# Patient Record
Sex: MALE | Race: WHITE | NOT HISPANIC OR LATINO | URBAN - METROPOLITAN AREA
[De-identification: names, ages, dates, MRNs, and addresses within clinical notes are randomized per-mention and may not be internally consistent; named-entity substitution may affect disease eponyms.]

---

## 2017-01-01 ENCOUNTER — EMERGENCY (EMERGENCY)
Facility: HOSPITAL | Age: 82
LOS: 1 days | Discharge: ANOTHER HOSPITAL | End: 2017-01-01
Attending: EMERGENCY MEDICINE | Admitting: EMERGENCY MEDICINE
Payer: MEDICARE

## 2017-01-01 ENCOUNTER — INPATIENT (INPATIENT)
Facility: HOSPITAL | Age: 82
LOS: 19 days | DRG: 215 | End: 2017-11-04
Attending: STUDENT IN AN ORGANIZED HEALTH CARE EDUCATION/TRAINING PROGRAM | Admitting: STUDENT IN AN ORGANIZED HEALTH CARE EDUCATION/TRAINING PROGRAM
Payer: MEDICARE

## 2017-01-01 VITALS
SYSTOLIC BLOOD PRESSURE: 98 MMHG | HEART RATE: 59 BPM | WEIGHT: 197.98 LBS | OXYGEN SATURATION: 94 % | RESPIRATION RATE: 22 BRPM | DIASTOLIC BLOOD PRESSURE: 72 MMHG | TEMPERATURE: 98 F | HEIGHT: 71 IN

## 2017-01-01 VITALS — RESPIRATION RATE: 24 BRPM | OXYGEN SATURATION: 77 % | HEART RATE: 58 BPM

## 2017-01-01 VITALS — HEART RATE: 100 BPM | SYSTOLIC BLOOD PRESSURE: 84 MMHG | DIASTOLIC BLOOD PRESSURE: 50 MMHG

## 2017-01-01 VITALS
SYSTOLIC BLOOD PRESSURE: 78 MMHG | DIASTOLIC BLOOD PRESSURE: 53 MMHG | OXYGEN SATURATION: 95 % | HEART RATE: 48 BPM | RESPIRATION RATE: 22 BRPM

## 2017-01-01 DIAGNOSIS — Z29.9 ENCOUNTER FOR PROPHYLACTIC MEASURES, UNSPECIFIED: ICD-10-CM

## 2017-01-01 DIAGNOSIS — E07.9 DISORDER OF THYROID, UNSPECIFIED: ICD-10-CM

## 2017-01-01 DIAGNOSIS — R41.0 DISORIENTATION, UNSPECIFIED: ICD-10-CM

## 2017-01-01 DIAGNOSIS — E04.9 NONTOXIC GOITER, UNSPECIFIED: ICD-10-CM

## 2017-01-01 DIAGNOSIS — I50.810 RIGHT HEART FAILURE, UNSPECIFIED: ICD-10-CM

## 2017-01-01 DIAGNOSIS — I10 ESSENTIAL (PRIMARY) HYPERTENSION: ICD-10-CM

## 2017-01-01 DIAGNOSIS — I48.91 UNSPECIFIED ATRIAL FIBRILLATION: ICD-10-CM

## 2017-01-01 DIAGNOSIS — I21.19 ST ELEVATION (STEMI) MYOCARDIAL INFARCTION INVOLVING OTHER CORONARY ARTERY OF INFERIOR WALL: ICD-10-CM

## 2017-01-01 DIAGNOSIS — E87.70 FLUID OVERLOAD, UNSPECIFIED: ICD-10-CM

## 2017-01-01 DIAGNOSIS — R57.0 CARDIOGENIC SHOCK: ICD-10-CM

## 2017-01-01 DIAGNOSIS — E04.0 NONTOXIC DIFFUSE GOITER: ICD-10-CM

## 2017-01-01 DIAGNOSIS — I72.4 ANEURYSM OF ARTERY OF LOWER EXTREMITY: ICD-10-CM

## 2017-01-01 DIAGNOSIS — R09.89 OTHER SPECIFIED SYMPTOMS AND SIGNS INVOLVING THE CIRCULATORY AND RESPIRATORY SYSTEMS: ICD-10-CM

## 2017-01-01 DIAGNOSIS — I48.2 CHRONIC ATRIAL FIBRILLATION: ICD-10-CM

## 2017-01-01 DIAGNOSIS — Z87.891 PERSONAL HISTORY OF NICOTINE DEPENDENCE: ICD-10-CM

## 2017-01-01 DIAGNOSIS — R80.9 PROTEINURIA, UNSPECIFIED: ICD-10-CM

## 2017-01-01 DIAGNOSIS — E87.5 HYPERKALEMIA: ICD-10-CM

## 2017-01-01 DIAGNOSIS — D75.9 DISEASE OF BLOOD AND BLOOD-FORMING ORGANS, UNSPECIFIED: ICD-10-CM

## 2017-01-01 DIAGNOSIS — I21.11 ST ELEVATION (STEMI) MYOCARDIAL INFARCTION INVOLVING RIGHT CORONARY ARTERY: ICD-10-CM

## 2017-01-01 DIAGNOSIS — N17.9 ACUTE KIDNEY FAILURE, UNSPECIFIED: ICD-10-CM

## 2017-01-01 DIAGNOSIS — F05 DELIRIUM DUE TO KNOWN PHYSIOLOGICAL CONDITION: ICD-10-CM

## 2017-01-01 DIAGNOSIS — Z79.01 LONG TERM (CURRENT) USE OF ANTICOAGULANTS: ICD-10-CM

## 2017-01-01 DIAGNOSIS — I21.3 ST ELEVATION (STEMI) MYOCARDIAL INFARCTION OF UNSPECIFIED SITE: ICD-10-CM

## 2017-01-01 DIAGNOSIS — I46.9 CARDIAC ARREST, CAUSE UNSPECIFIED: ICD-10-CM

## 2017-01-01 DIAGNOSIS — R06.02 SHORTNESS OF BREATH: ICD-10-CM

## 2017-01-01 LAB
% ALBUMIN: 43.7 % — SIGNIFICANT CHANGE UP
% ALBUMIN: SIGNIFICANT CHANGE UP %
% ALPHA 1: 11.2 % — SIGNIFICANT CHANGE UP
% ALPHA 1: SIGNIFICANT CHANGE UP %
% ALPHA 2: 7.9 % — SIGNIFICANT CHANGE UP
% ALPHA 2: SIGNIFICANT CHANGE UP %
% BETA: 19.7 % — SIGNIFICANT CHANGE UP
% BETA: SIGNIFICANT CHANGE UP %
% GAMMA: 17.5 % — SIGNIFICANT CHANGE UP
% GAMMA: SIGNIFICANT CHANGE UP %
-  COAGULASE NEGATIVE STAPHYLOCOCCUS: SIGNIFICANT CHANGE UP
ALBUMIN SERPL ELPH-MCNC: 2.4 G/DL — LOW (ref 3.3–5)
ALBUMIN SERPL ELPH-MCNC: 2.4 G/DL — LOW (ref 3.3–5)
ALBUMIN SERPL ELPH-MCNC: 2.4 G/DL — LOW (ref 3.6–5.5)
ALBUMIN SERPL ELPH-MCNC: 2.5 G/DL — LOW (ref 3.3–5)
ALBUMIN SERPL ELPH-MCNC: 2.6 G/DL — LOW (ref 3.3–5)
ALBUMIN SERPL ELPH-MCNC: 2.7 G/DL — LOW (ref 3.3–5)
ALBUMIN SERPL ELPH-MCNC: 2.8 G/DL — LOW (ref 3.3–5)
ALBUMIN SERPL ELPH-MCNC: 2.9 G/DL — LOW (ref 3.3–5)
ALBUMIN SERPL ELPH-MCNC: 3 G/DL — LOW (ref 3.3–5)
ALBUMIN SERPL ELPH-MCNC: 3.1 G/DL — LOW (ref 3.3–5)
ALBUMIN SERPL ELPH-MCNC: 3.1 G/DL — LOW (ref 3.3–5)
ALBUMIN SERPL ELPH-MCNC: 3.2 G/DL — LOW (ref 3.3–5)
ALBUMIN SERPL ELPH-MCNC: SIGNIFICANT CHANGE UP G/DL (ref 3.6–5.5)
ALBUMIN/GLOB SERPL ELPH: 0.8 RATIO — SIGNIFICANT CHANGE UP
ALBUMIN/GLOB SERPL ELPH: SIGNIFICANT CHANGE UP RATIO
ALP SERPL-CCNC: 100 U/L — SIGNIFICANT CHANGE UP (ref 40–120)
ALP SERPL-CCNC: 103 U/L — SIGNIFICANT CHANGE UP (ref 40–120)
ALP SERPL-CCNC: 30 U/L — LOW (ref 40–120)
ALP SERPL-CCNC: 39 U/L — LOW (ref 40–120)
ALP SERPL-CCNC: 40 U/L — SIGNIFICANT CHANGE UP (ref 40–120)
ALP SERPL-CCNC: 41 U/L — SIGNIFICANT CHANGE UP (ref 40–120)
ALP SERPL-CCNC: 42 U/L — SIGNIFICANT CHANGE UP (ref 40–120)
ALP SERPL-CCNC: 44 U/L — SIGNIFICANT CHANGE UP (ref 40–120)
ALP SERPL-CCNC: 44 U/L — SIGNIFICANT CHANGE UP (ref 40–120)
ALP SERPL-CCNC: 45 U/L — SIGNIFICANT CHANGE UP (ref 40–120)
ALP SERPL-CCNC: 48 U/L — SIGNIFICANT CHANGE UP (ref 40–120)
ALP SERPL-CCNC: 51 U/L — SIGNIFICANT CHANGE UP (ref 40–120)
ALP SERPL-CCNC: 52 U/L — SIGNIFICANT CHANGE UP (ref 40–120)
ALP SERPL-CCNC: 52 U/L — SIGNIFICANT CHANGE UP (ref 40–120)
ALP SERPL-CCNC: 53 U/L — SIGNIFICANT CHANGE UP (ref 40–120)
ALP SERPL-CCNC: 55 U/L — SIGNIFICANT CHANGE UP (ref 40–120)
ALP SERPL-CCNC: 57 U/L — SIGNIFICANT CHANGE UP (ref 40–120)
ALP SERPL-CCNC: 66 U/L — SIGNIFICANT CHANGE UP (ref 40–120)
ALP SERPL-CCNC: 68 U/L — SIGNIFICANT CHANGE UP (ref 40–120)
ALP SERPL-CCNC: 71 U/L — SIGNIFICANT CHANGE UP (ref 40–120)
ALP SERPL-CCNC: 72 U/L — SIGNIFICANT CHANGE UP (ref 40–120)
ALP SERPL-CCNC: 75 U/L — SIGNIFICANT CHANGE UP (ref 40–120)
ALP SERPL-CCNC: 78 U/L — SIGNIFICANT CHANGE UP (ref 40–120)
ALP SERPL-CCNC: 79 U/L — SIGNIFICANT CHANGE UP (ref 40–120)
ALP SERPL-CCNC: 81 U/L — SIGNIFICANT CHANGE UP (ref 40–120)
ALP SERPL-CCNC: 81 U/L — SIGNIFICANT CHANGE UP (ref 40–120)
ALP SERPL-CCNC: 82 U/L — SIGNIFICANT CHANGE UP (ref 40–120)
ALP SERPL-CCNC: 84 U/L — SIGNIFICANT CHANGE UP (ref 40–120)
ALP SERPL-CCNC: 85 U/L — SIGNIFICANT CHANGE UP (ref 40–120)
ALP SERPL-CCNC: 85 U/L — SIGNIFICANT CHANGE UP (ref 40–120)
ALP SERPL-CCNC: 86 U/L — SIGNIFICANT CHANGE UP (ref 40–120)
ALP SERPL-CCNC: 86 U/L — SIGNIFICANT CHANGE UP (ref 40–120)
ALP SERPL-CCNC: 88 U/L — SIGNIFICANT CHANGE UP (ref 40–120)
ALP SERPL-CCNC: 88 U/L — SIGNIFICANT CHANGE UP (ref 40–120)
ALP SERPL-CCNC: 89 U/L — SIGNIFICANT CHANGE UP (ref 40–120)
ALP SERPL-CCNC: 90 U/L — SIGNIFICANT CHANGE UP (ref 40–120)
ALP SERPL-CCNC: 90 U/L — SIGNIFICANT CHANGE UP (ref 40–120)
ALP SERPL-CCNC: 91 U/L — SIGNIFICANT CHANGE UP (ref 40–120)
ALP SERPL-CCNC: 91 U/L — SIGNIFICANT CHANGE UP (ref 40–120)
ALP SERPL-CCNC: 92 U/L — SIGNIFICANT CHANGE UP (ref 40–120)
ALP SERPL-CCNC: 94 U/L — SIGNIFICANT CHANGE UP (ref 40–120)
ALP SERPL-CCNC: 95 U/L — SIGNIFICANT CHANGE UP (ref 40–120)
ALP SERPL-CCNC: 97 U/L — SIGNIFICANT CHANGE UP (ref 40–120)
ALP SERPL-CCNC: 98 U/L — SIGNIFICANT CHANGE UP (ref 40–120)
ALP SERPL-CCNC: 99 U/L — SIGNIFICANT CHANGE UP (ref 40–120)
ALP SERPL-CCNC: 99 U/L — SIGNIFICANT CHANGE UP (ref 40–120)
ALPHA1 GLOB SERPL ELPH-MCNC: 0.6 G/DL — HIGH (ref 0.1–0.4)
ALPHA1 GLOB SERPL ELPH-MCNC: SIGNIFICANT CHANGE UP G/DL (ref 0.1–0.4)
ALPHA2 GLOB SERPL ELPH-MCNC: 0.4 G/DL — LOW (ref 0.5–1)
ALPHA2 GLOB SERPL ELPH-MCNC: SIGNIFICANT CHANGE UP G/DL (ref 0.5–1)
ALT FLD-CCNC: 102 U/L RC — HIGH (ref 10–45)
ALT FLD-CCNC: 106 U/L RC — HIGH (ref 10–45)
ALT FLD-CCNC: 116 U/L RC — HIGH (ref 10–45)
ALT FLD-CCNC: 120 U/L — HIGH (ref 12–78)
ALT FLD-CCNC: 130 U/L RC — HIGH (ref 10–45)
ALT FLD-CCNC: 130 U/L RC — HIGH (ref 10–45)
ALT FLD-CCNC: 132 U/L RC — HIGH (ref 10–45)
ALT FLD-CCNC: 138 U/L RC — HIGH (ref 10–45)
ALT FLD-CCNC: 144 U/L RC — HIGH (ref 10–45)
ALT FLD-CCNC: 153 U/L RC — HIGH (ref 10–45)
ALT FLD-CCNC: 162 U/L RC — HIGH (ref 10–45)
ALT FLD-CCNC: 196 U/L RC — HIGH (ref 10–45)
ALT FLD-CCNC: 197 U/L RC — HIGH (ref 10–45)
ALT FLD-CCNC: 206 U/L RC — HIGH (ref 10–45)
ALT FLD-CCNC: 226 U/L RC — HIGH (ref 10–45)
ALT FLD-CCNC: 238 U/L RC — HIGH (ref 10–45)
ALT FLD-CCNC: 248 U/L RC — HIGH (ref 10–45)
ALT FLD-CCNC: 274 U/L RC — HIGH (ref 10–45)
ALT FLD-CCNC: 299 U/L RC — HIGH (ref 10–45)
ALT FLD-CCNC: 313 U/L RC — HIGH (ref 10–45)
ALT FLD-CCNC: 347 U/L RC — HIGH (ref 10–45)
ALT FLD-CCNC: 353 U/L RC — HIGH (ref 10–45)
ALT FLD-CCNC: 386 U/L RC — HIGH (ref 10–45)
ALT FLD-CCNC: 431 U/L RC — HIGH (ref 10–45)
ALT FLD-CCNC: 466 U/L RC — HIGH (ref 10–45)
ALT FLD-CCNC: 471 U/L RC — HIGH (ref 10–45)
ALT FLD-CCNC: 528 U/L RC — HIGH (ref 10–45)
ALT FLD-CCNC: 543 U/L RC — HIGH (ref 10–45)
ALT FLD-CCNC: 55 U/L RC — HIGH (ref 10–45)
ALT FLD-CCNC: 55 U/L RC — HIGH (ref 10–45)
ALT FLD-CCNC: 57 U/L RC — HIGH (ref 10–45)
ALT FLD-CCNC: 57 U/L RC — HIGH (ref 10–45)
ALT FLD-CCNC: 59 U/L RC — HIGH (ref 10–45)
ALT FLD-CCNC: 59 U/L RC — HIGH (ref 10–45)
ALT FLD-CCNC: 60 U/L RC — HIGH (ref 10–45)
ALT FLD-CCNC: 601 U/L RC — HIGH (ref 10–45)
ALT FLD-CCNC: 61 U/L RC — HIGH (ref 10–45)
ALT FLD-CCNC: 61 U/L RC — HIGH (ref 10–45)
ALT FLD-CCNC: 62 U/L RC — HIGH (ref 10–45)
ALT FLD-CCNC: 64 U/L RC — HIGH (ref 10–45)
ALT FLD-CCNC: 65 U/L RC — HIGH (ref 10–45)
ALT FLD-CCNC: 66 U/L RC — HIGH (ref 10–45)
ALT FLD-CCNC: 67 U/L RC — HIGH (ref 10–45)
ALT FLD-CCNC: 71 U/L RC — HIGH (ref 10–45)
ALT FLD-CCNC: 71 U/L RC — HIGH (ref 10–45)
ALT FLD-CCNC: 80 U/L RC — HIGH (ref 10–45)
ALT FLD-CCNC: 83 U/L RC — HIGH (ref 10–45)
ALT FLD-CCNC: 83 U/L RC — HIGH (ref 10–45)
ALT FLD-CCNC: 87 U/L RC — HIGH (ref 10–45)
ALT FLD-CCNC: 91 U/L RC — HIGH (ref 10–45)
ALT FLD-CCNC: 95 U/L RC — HIGH (ref 10–45)
ANA PAT FLD IF-IMP: ABNORMAL
ANA PAT FLD IF-IMP: ABNORMAL
ANA TITR SER: ABNORMAL
ANA TITR SER: ABNORMAL
ANION GAP SERPL CALC-SCNC: 10 MMOL/L — SIGNIFICANT CHANGE UP (ref 5–17)
ANION GAP SERPL CALC-SCNC: 11 MMOL/L — SIGNIFICANT CHANGE UP (ref 5–17)
ANION GAP SERPL CALC-SCNC: 12 MMOL/L — SIGNIFICANT CHANGE UP (ref 5–17)
ANION GAP SERPL CALC-SCNC: 13 MMOL/L — SIGNIFICANT CHANGE UP (ref 5–17)
ANION GAP SERPL CALC-SCNC: 14 MMOL/L — SIGNIFICANT CHANGE UP (ref 5–17)
ANION GAP SERPL CALC-SCNC: 15 MMOL/L — SIGNIFICANT CHANGE UP (ref 5–17)
ANION GAP SERPL CALC-SCNC: 16 MMOL/L — SIGNIFICANT CHANGE UP (ref 5–17)
ANION GAP SERPL CALC-SCNC: 17 MMOL/L — SIGNIFICANT CHANGE UP (ref 5–17)
ANION GAP SERPL CALC-SCNC: 17 MMOL/L — SIGNIFICANT CHANGE UP (ref 5–17)
ANION GAP SERPL CALC-SCNC: 18 MMOL/L — HIGH (ref 5–17)
ANION GAP SERPL CALC-SCNC: 18 MMOL/L — HIGH (ref 5–17)
ANION GAP SERPL CALC-SCNC: 19 MMOL/L — HIGH (ref 5–17)
ANION GAP SERPL CALC-SCNC: 20 MMOL/L — HIGH (ref 5–17)
ANION GAP SERPL CALC-SCNC: 22 MMOL/L — HIGH (ref 5–17)
ANION GAP SERPL CALC-SCNC: 27 MMOL/L — HIGH (ref 5–17)
ANION GAP SERPL CALC-SCNC: 9 MMOL/L — SIGNIFICANT CHANGE UP (ref 5–17)
ANISOCYTOSIS BLD QL: SIGNIFICANT CHANGE UP
ANISOCYTOSIS BLD QL: SIGNIFICANT CHANGE UP
ANISOCYTOSIS BLD QL: SLIGHT — SIGNIFICANT CHANGE UP
ANISOCYTOSIS BLD QL: SLIGHT — SIGNIFICANT CHANGE UP
ANTI-RIBONUCLEAR PROTEIN: <0.2 AI — SIGNIFICANT CHANGE UP
APPEARANCE UR: ABNORMAL
APTT BLD: 103.1 SEC — HIGH (ref 27.5–37.4)
APTT BLD: 109 SEC — HIGH (ref 27.5–37.4)
APTT BLD: 110.6 SEC — HIGH (ref 27.5–37.4)
APTT BLD: 124.1 SEC — CRITICAL HIGH (ref 27.5–37.4)
APTT BLD: 144 SEC — CRITICAL HIGH (ref 27.5–37.4)
APTT BLD: 173 SEC — CRITICAL HIGH (ref 27.5–37.4)
APTT BLD: 29.7 SEC — SIGNIFICANT CHANGE UP (ref 27.5–37.4)
APTT BLD: 30.6 SEC — SIGNIFICANT CHANGE UP (ref 27.5–37.4)
APTT BLD: 35.2 SEC — SIGNIFICANT CHANGE UP (ref 27.5–37.4)
APTT BLD: 35.6 SEC — SIGNIFICANT CHANGE UP (ref 27.5–37.4)
APTT BLD: 37.7 SEC — HIGH (ref 27.5–37.4)
APTT BLD: 40.6 SEC — HIGH (ref 27.5–37.4)
APTT BLD: 42 SEC — HIGH (ref 27.5–37.4)
APTT BLD: 42 SEC — HIGH (ref 27.5–37.4)
APTT BLD: 45.5 SEC — HIGH (ref 27.5–37.4)
APTT BLD: 46.2 SEC — HIGH (ref 27.5–37.4)
APTT BLD: 47.4 SEC — HIGH (ref 27.5–37.4)
APTT BLD: 47.5 SEC — HIGH (ref 27.5–37.4)
APTT BLD: 48.5 SEC — HIGH (ref 27.5–37.4)
APTT BLD: 48.9 SEC — HIGH (ref 27.5–37.4)
APTT BLD: 49.2 SEC — HIGH (ref 27.5–37.4)
APTT BLD: 49.7 SEC — HIGH (ref 27.5–37.4)
APTT BLD: 50.3 SEC — HIGH (ref 27.5–37.4)
APTT BLD: 50.7 SEC — HIGH (ref 27.5–37.4)
APTT BLD: 50.7 SEC — HIGH (ref 27.5–37.4)
APTT BLD: 53.6 SEC — HIGH (ref 27.5–37.4)
APTT BLD: 57.2 SEC — HIGH (ref 27.5–37.4)
APTT BLD: 57.4 SEC — HIGH (ref 27.5–37.4)
APTT BLD: 57.9 SEC — HIGH (ref 27.5–37.4)
APTT BLD: 59.6 SEC — HIGH (ref 27.5–37.4)
APTT BLD: 62.2 SEC — HIGH (ref 27.5–37.4)
APTT BLD: 63.7 SEC — HIGH (ref 27.5–37.4)
APTT BLD: 64.6 SEC — HIGH (ref 27.5–37.4)
APTT BLD: 65.6 SEC — HIGH (ref 27.5–37.4)
APTT BLD: 66 SEC — HIGH (ref 27.5–37.4)
APTT BLD: 70.1 SEC — HIGH (ref 27.5–37.4)
APTT BLD: 71.2 SEC — HIGH (ref 27.5–37.4)
APTT BLD: 74.1 SEC — HIGH (ref 27.5–37.4)
APTT BLD: 74.1 SEC — HIGH (ref 27.5–37.4)
APTT BLD: 76.9 SEC — HIGH (ref 27.5–37.4)
APTT BLD: 78.9 SEC — HIGH (ref 27.5–37.4)
APTT BLD: 79.1 SEC — HIGH (ref 27.5–37.4)
APTT BLD: 79.2 SEC — HIGH (ref 27.5–37.4)
APTT BLD: 83.2 SEC — HIGH (ref 27.5–37.4)
APTT BLD: 84.7 SEC — HIGH (ref 27.5–37.4)
APTT BLD: 85.5 SEC — HIGH (ref 27.5–37.4)
APTT BLD: 87.3 SEC — HIGH (ref 27.5–37.4)
APTT BLD: 92.3 SEC — HIGH (ref 27.5–37.4)
APTT BLD: 98.4 SEC — HIGH (ref 27.5–37.4)
APTT BLD: > 200 SEC (ref 27.5–37.4)
APTT BLD: >200 SEC — CRITICAL HIGH (ref 27.5–37.4)
AST SERPL-CCNC: 106 U/L — HIGH (ref 10–40)
AST SERPL-CCNC: 106 U/L — HIGH (ref 10–40)
AST SERPL-CCNC: 109 U/L — HIGH (ref 10–40)
AST SERPL-CCNC: 114 U/L — HIGH (ref 15–37)
AST SERPL-CCNC: 168 U/L — HIGH (ref 10–40)
AST SERPL-CCNC: 214 U/L — HIGH (ref 10–40)
AST SERPL-CCNC: 231 U/L — HIGH (ref 10–40)
AST SERPL-CCNC: 236 U/L — HIGH (ref 10–40)
AST SERPL-CCNC: 236 U/L — HIGH (ref 10–40)
AST SERPL-CCNC: 241 U/L — HIGH (ref 10–40)
AST SERPL-CCNC: 272 U/L — HIGH (ref 10–40)
AST SERPL-CCNC: 279 U/L — HIGH (ref 10–40)
AST SERPL-CCNC: 316 U/L — HIGH (ref 10–40)
AST SERPL-CCNC: 318 U/L — HIGH (ref 10–40)
AST SERPL-CCNC: 324 U/L — HIGH (ref 10–40)
AST SERPL-CCNC: 324 U/L — HIGH (ref 10–40)
AST SERPL-CCNC: 336 U/L — HIGH (ref 10–40)
AST SERPL-CCNC: 341 U/L — HIGH (ref 10–40)
AST SERPL-CCNC: 364 U/L — HIGH (ref 10–40)
AST SERPL-CCNC: 40 U/L — SIGNIFICANT CHANGE UP (ref 10–40)
AST SERPL-CCNC: 41 U/L — HIGH (ref 10–40)
AST SERPL-CCNC: 425 U/L — HIGH (ref 10–40)
AST SERPL-CCNC: 43 U/L — HIGH (ref 10–40)
AST SERPL-CCNC: 44 U/L — HIGH (ref 10–40)
AST SERPL-CCNC: 45 U/L — HIGH (ref 10–40)
AST SERPL-CCNC: 46 U/L — HIGH (ref 10–40)
AST SERPL-CCNC: 473 U/L — HIGH (ref 10–40)
AST SERPL-CCNC: 49 U/L — HIGH (ref 10–40)
AST SERPL-CCNC: 50 U/L — HIGH (ref 10–40)
AST SERPL-CCNC: 50 U/L — HIGH (ref 10–40)
AST SERPL-CCNC: 507 U/L — HIGH (ref 10–40)
AST SERPL-CCNC: 55 U/L — HIGH (ref 10–40)
AST SERPL-CCNC: 61 U/L — HIGH (ref 10–40)
AST SERPL-CCNC: 629 U/L — HIGH (ref 10–40)
AST SERPL-CCNC: 63 U/L — HIGH (ref 10–40)
AST SERPL-CCNC: 63 U/L — HIGH (ref 10–40)
AST SERPL-CCNC: 66 U/L — HIGH (ref 10–40)
AST SERPL-CCNC: 663 U/L — HIGH (ref 10–40)
AST SERPL-CCNC: 674 U/L — HIGH (ref 10–40)
AST SERPL-CCNC: 69 U/L — HIGH (ref 10–40)
AST SERPL-CCNC: 773 U/L — HIGH (ref 10–40)
AST SERPL-CCNC: 78 U/L — HIGH (ref 10–40)
AST SERPL-CCNC: 80 U/L — HIGH (ref 10–40)
AST SERPL-CCNC: 839 U/L — HIGH (ref 10–40)
AST SERPL-CCNC: 849 U/L — HIGH (ref 10–40)
AST SERPL-CCNC: 85 U/L — HIGH (ref 10–40)
AST SERPL-CCNC: 90 U/L — HIGH (ref 10–40)
AST SERPL-CCNC: 92 U/L — HIGH (ref 10–40)
AST SERPL-CCNC: 920 U/L — HIGH (ref 10–40)
AST SERPL-CCNC: 93 U/L — HIGH (ref 10–40)
AST SERPL-CCNC: 95 U/L — HIGH (ref 10–40)
AST SERPL-CCNC: 96 U/L — HIGH (ref 10–40)
AUTO DIFF PNL BLD: ABNORMAL
B-GLOBULIN SERPL ELPH-MCNC: 1.1 G/DL — HIGH (ref 0.5–1)
B-GLOBULIN SERPL ELPH-MCNC: SIGNIFICANT CHANGE UP G/DL (ref 0.5–1)
BACTERIA # UR AUTO: ABNORMAL /HPF
BASE EXCESS BLDA CALC-SCNC: 4.5 MMOL/L — HIGH (ref -2–2)
BASE EXCESS BLDMV CALC-SCNC: -0.2 MMOL/L — SIGNIFICANT CHANGE UP (ref -3–3)
BASE EXCESS BLDMV CALC-SCNC: -1.4 MMOL/L — SIGNIFICANT CHANGE UP (ref -3–3)
BASE EXCESS BLDMV CALC-SCNC: -1.4 MMOL/L — SIGNIFICANT CHANGE UP (ref -3–3)
BASE EXCESS BLDMV CALC-SCNC: -2.1 MMOL/L — SIGNIFICANT CHANGE UP (ref -3–3)
BASE EXCESS BLDMV CALC-SCNC: -3.7 MMOL/L — LOW (ref -3–3)
BASE EXCESS BLDMV CALC-SCNC: -3.7 MMOL/L — LOW (ref -3–3)
BASE EXCESS BLDMV CALC-SCNC: -3.8 MMOL/L — LOW (ref -3–3)
BASE EXCESS BLDMV CALC-SCNC: -3.9 MMOL/L — LOW (ref -3–3)
BASE EXCESS BLDMV CALC-SCNC: -4 MMOL/L — LOW (ref -3–3)
BASE EXCESS BLDMV CALC-SCNC: -4 MMOL/L — LOW (ref -3–3)
BASE EXCESS BLDMV CALC-SCNC: -4.3 MMOL/L — LOW (ref -3–3)
BASE EXCESS BLDMV CALC-SCNC: -4.5 MMOL/L — LOW (ref -3–3)
BASE EXCESS BLDMV CALC-SCNC: -4.5 MMOL/L — LOW (ref -3–3)
BASE EXCESS BLDMV CALC-SCNC: -4.6 MMOL/L — LOW (ref -3–3)
BASE EXCESS BLDMV CALC-SCNC: -4.7 MMOL/L — LOW (ref -3–3)
BASE EXCESS BLDMV CALC-SCNC: -4.9 MMOL/L — LOW (ref -3–3)
BASE EXCESS BLDMV CALC-SCNC: -5.5 MMOL/L — LOW (ref -3–3)
BASE EXCESS BLDMV CALC-SCNC: -5.5 MMOL/L — LOW (ref -3–3)
BASE EXCESS BLDMV CALC-SCNC: -5.9 MMOL/L — LOW (ref -3–3)
BASE EXCESS BLDMV CALC-SCNC: -6.1 MMOL/L — LOW (ref -3–3)
BASE EXCESS BLDMV CALC-SCNC: -6.2 MMOL/L — LOW (ref -3–3)
BASE EXCESS BLDMV CALC-SCNC: -6.4 MMOL/L — LOW (ref -3–3)
BASE EXCESS BLDMV CALC-SCNC: -6.5 MMOL/L — LOW (ref -3–3)
BASE EXCESS BLDMV CALC-SCNC: -7.5 MMOL/L — LOW (ref -3–3)
BASE EXCESS BLDMV CALC-SCNC: 0.9 MMOL/L — SIGNIFICANT CHANGE UP (ref -3–3)
BASE EXCESS BLDMV CALC-SCNC: 1 MMOL/L — SIGNIFICANT CHANGE UP (ref -3–3)
BASE EXCESS BLDMV CALC-SCNC: 1 MMOL/L — SIGNIFICANT CHANGE UP (ref -3–3)
BASE EXCESS BLDMV CALC-SCNC: 1.1 MMOL/L — SIGNIFICANT CHANGE UP (ref -3–3)
BASE EXCESS BLDMV CALC-SCNC: 1.4 MMOL/L — SIGNIFICANT CHANGE UP (ref -3–3)
BASE EXCESS BLDMV CALC-SCNC: 1.7 MMOL/L — SIGNIFICANT CHANGE UP (ref -3–3)
BASE EXCESS BLDMV CALC-SCNC: 2.1 MMOL/L — SIGNIFICANT CHANGE UP (ref -3–3)
BASE EXCESS BLDMV CALC-SCNC: 2.6 MMOL/L — SIGNIFICANT CHANGE UP (ref -3–3)
BASE EXCESS BLDMV CALC-SCNC: 2.7 MMOL/L — SIGNIFICANT CHANGE UP (ref -3–3)
BASE EXCESS BLDMV CALC-SCNC: 2.9 MMOL/L — SIGNIFICANT CHANGE UP (ref -3–3)
BASE EXCESS BLDMV CALC-SCNC: 3 MMOL/L — SIGNIFICANT CHANGE UP (ref -3–3)
BASE EXCESS BLDMV CALC-SCNC: 3 MMOL/L — SIGNIFICANT CHANGE UP (ref -3–3)
BASE EXCESS BLDMV CALC-SCNC: 3.1 MMOL/L — HIGH (ref -3–3)
BASE EXCESS BLDMV CALC-SCNC: 3.2 MMOL/L — HIGH (ref -3–3)
BASE EXCESS BLDMV CALC-SCNC: 3.5 MMOL/L — HIGH (ref -3–3)
BASE EXCESS BLDMV CALC-SCNC: 3.7 MMOL/L — HIGH (ref -3–3)
BASE EXCESS BLDMV CALC-SCNC: 3.8 MMOL/L — HIGH (ref -3–3)
BASE EXCESS BLDMV CALC-SCNC: 4.9 MMOL/L — HIGH (ref -3–3)
BASE EXCESS BLDMV CALC-SCNC: 5.3 MMOL/L — HIGH (ref -3–3)
BASE EXCESS BLDMV CALC-SCNC: 5.7 MMOL/L — HIGH (ref -3–3)
BASE EXCESS BLDV CALC-SCNC: -2.2 MMOL/L — LOW (ref -2–2)
BASE EXCESS BLDV CALC-SCNC: -3.1 MMOL/L — LOW (ref -2–2)
BASE EXCESS BLDV CALC-SCNC: -7.2 MMOL/L — LOW (ref -2–2)
BASE EXCESS BLDV CALC-SCNC: 2.3 MMOL/L — HIGH (ref -2–2)
BASE EXCESS BLDV CALC-SCNC: 2.5 MMOL/L — HIGH (ref -2–2)
BASE EXCESS BLDV CALC-SCNC: 2.6 MMOL/L — HIGH (ref -2–2)
BASE EXCESS BLDV CALC-SCNC: 2.6 MMOL/L — HIGH (ref -2–2)
BASE EXCESS BLDV CALC-SCNC: 2.8 MMOL/L — HIGH (ref -2–2)
BASE EXCESS BLDV CALC-SCNC: 3 MMOL/L — HIGH (ref -2–2)
BASE EXCESS BLDV CALC-SCNC: 3 MMOL/L — HIGH (ref -2–2)
BASE EXCESS BLDV CALC-SCNC: 3.3 MMOL/L — HIGH (ref -2–2)
BASE EXCESS BLDV CALC-SCNC: 3.6 MMOL/L — HIGH (ref -2–2)
BASE EXCESS BLDV CALC-SCNC: 3.6 MMOL/L — HIGH (ref -2–2)
BASE EXCESS BLDV CALC-SCNC: 3.7 MMOL/L — HIGH (ref -2–2)
BASE EXCESS BLDV CALC-SCNC: 3.9 MMOL/L — HIGH (ref -2–2)
BASE EXCESS BLDV CALC-SCNC: 4.8 MMOL/L — HIGH (ref -2–2)
BASE EXCESS BLDV CALC-SCNC: 5.3 MMOL/L — HIGH (ref -2–2)
BASO STIPL BLD QL SMEAR: SLIGHT — SIGNIFICANT CHANGE UP
BASOPHILS # BLD AUTO: 0 K/UL — SIGNIFICANT CHANGE UP (ref 0–0.2)
BASOPHILS # BLD AUTO: 0.1 K/UL — SIGNIFICANT CHANGE UP (ref 0–0.2)
BASOPHILS # BLD AUTO: 0.1 K/UL — SIGNIFICANT CHANGE UP (ref 0–0.2)
BASOPHILS NFR BLD AUTO: 0 % — SIGNIFICANT CHANGE UP (ref 0–2)
BASOPHILS NFR BLD AUTO: 0.1 % — SIGNIFICANT CHANGE UP (ref 0–2)
BASOPHILS NFR BLD AUTO: 0.2 % — SIGNIFICANT CHANGE UP (ref 0–2)
BASOPHILS NFR BLD AUTO: 0.2 % — SIGNIFICANT CHANGE UP (ref 0–2)
BASOPHILS NFR BLD AUTO: 0.3 % — SIGNIFICANT CHANGE UP (ref 0–2)
BASOPHILS NFR BLD AUTO: 1.1 % — SIGNIFICANT CHANGE UP (ref 0–2)
BCR/ABL BY RT - PCR QUANTITATIVE: SIGNIFICANT CHANGE UP
BILIRUB SERPL-MCNC: 0.9 MG/DL — SIGNIFICANT CHANGE UP (ref 0.2–1.2)
BILIRUB SERPL-MCNC: 0.9 MG/DL — SIGNIFICANT CHANGE UP (ref 0.2–1.2)
BILIRUB SERPL-MCNC: 1 MG/DL — SIGNIFICANT CHANGE UP (ref 0.2–1.2)
BILIRUB SERPL-MCNC: 1 MG/DL — SIGNIFICANT CHANGE UP (ref 0.2–1.2)
BILIRUB SERPL-MCNC: 1.1 MG/DL — SIGNIFICANT CHANGE UP (ref 0.2–1.2)
BILIRUB SERPL-MCNC: 1.2 MG/DL — SIGNIFICANT CHANGE UP (ref 0.2–1.2)
BILIRUB SERPL-MCNC: 1.3 MG/DL — HIGH (ref 0.2–1.2)
BILIRUB SERPL-MCNC: 1.4 MG/DL — HIGH (ref 0.2–1.2)
BILIRUB SERPL-MCNC: 1.5 MG/DL — HIGH (ref 0.2–1.2)
BILIRUB SERPL-MCNC: 1.6 MG/DL — HIGH (ref 0.2–1.2)
BILIRUB SERPL-MCNC: 1.7 MG/DL — HIGH (ref 0.2–1.2)
BILIRUB SERPL-MCNC: 1.7 MG/DL — HIGH (ref 0.2–1.2)
BILIRUB SERPL-MCNC: 1.8 MG/DL — HIGH (ref 0.2–1.2)
BILIRUB SERPL-MCNC: 1.8 MG/DL — HIGH (ref 0.2–1.2)
BILIRUB SERPL-MCNC: 2 MG/DL — HIGH (ref 0.2–1.2)
BILIRUB SERPL-MCNC: 2.1 MG/DL — HIGH (ref 0.2–1.2)
BILIRUB SERPL-MCNC: 2.1 MG/DL — HIGH (ref 0.2–1.2)
BILIRUB SERPL-MCNC: 2.2 MG/DL — HIGH (ref 0.2–1.2)
BILIRUB SERPL-MCNC: 2.2 MG/DL — HIGH (ref 0.2–1.2)
BILIRUB SERPL-MCNC: 2.3 MG/DL — HIGH (ref 0.2–1.2)
BILIRUB SERPL-MCNC: 2.3 MG/DL — HIGH (ref 0.2–1.2)
BILIRUB SERPL-MCNC: 2.4 MG/DL — HIGH (ref 0.2–1.2)
BILIRUB SERPL-MCNC: 2.5 MG/DL — HIGH (ref 0.2–1.2)
BILIRUB SERPL-MCNC: 2.5 MG/DL — HIGH (ref 0.2–1.2)
BILIRUB SERPL-MCNC: 2.6 MG/DL — HIGH (ref 0.2–1.2)
BILIRUB SERPL-MCNC: 2.7 MG/DL — HIGH (ref 0.2–1.2)
BILIRUB SERPL-MCNC: 2.8 MG/DL — HIGH (ref 0.2–1.2)
BILIRUB SERPL-MCNC: 3.1 MG/DL — HIGH (ref 0.2–1.2)
BILIRUB SERPL-MCNC: 3.2 MG/DL — HIGH (ref 0.2–1.2)
BILIRUB SERPL-MCNC: 3.3 MG/DL — HIGH (ref 0.2–1.2)
BILIRUB UR-MCNC: NEGATIVE — SIGNIFICANT CHANGE UP
BLD GP AB SCN SERPL QL: NEGATIVE — SIGNIFICANT CHANGE UP
BUN SERPL-MCNC: 24 MG/DL — HIGH (ref 7–23)
BUN SERPL-MCNC: 25 MG/DL — HIGH (ref 7–23)
BUN SERPL-MCNC: 26 MG/DL — HIGH (ref 7–23)
BUN SERPL-MCNC: 27 MG/DL — HIGH (ref 7–23)
BUN SERPL-MCNC: 28 MG/DL — HIGH (ref 7–23)
BUN SERPL-MCNC: 29 MG/DL — HIGH (ref 7–23)
BUN SERPL-MCNC: 31 MG/DL — HIGH (ref 7–23)
BUN SERPL-MCNC: 32 MG/DL — HIGH (ref 7–23)
BUN SERPL-MCNC: 34 MG/DL — HIGH (ref 7–23)
BUN SERPL-MCNC: 35 MG/DL — HIGH (ref 7–23)
BUN SERPL-MCNC: 35 MG/DL — HIGH (ref 7–23)
BUN SERPL-MCNC: 36 MG/DL — HIGH (ref 7–23)
BUN SERPL-MCNC: 37 MG/DL — HIGH (ref 7–23)
BUN SERPL-MCNC: 37 MG/DL — HIGH (ref 7–23)
BUN SERPL-MCNC: 38 MG/DL — HIGH (ref 7–23)
BUN SERPL-MCNC: 39 MG/DL — HIGH (ref 7–23)
BUN SERPL-MCNC: 40 MG/DL — HIGH (ref 7–23)
BUN SERPL-MCNC: 41 MG/DL — HIGH (ref 7–23)
BUN SERPL-MCNC: 42 MG/DL — HIGH (ref 7–23)
BUN SERPL-MCNC: 43 MG/DL — HIGH (ref 7–23)
BUN SERPL-MCNC: 43 MG/DL — HIGH (ref 7–23)
BUN SERPL-MCNC: 44 MG/DL — HIGH (ref 7–23)
BUN SERPL-MCNC: 45 MG/DL — HIGH (ref 7–23)
BUN SERPL-MCNC: 46 MG/DL — HIGH (ref 7–23)
BUN SERPL-MCNC: 46 MG/DL — HIGH (ref 7–23)
BUN SERPL-MCNC: 48 MG/DL — HIGH (ref 7–23)
BUN SERPL-MCNC: 48 MG/DL — HIGH (ref 7–23)
BUN SERPL-MCNC: 50 MG/DL — HIGH (ref 7–23)
BUN SERPL-MCNC: 50 MG/DL — HIGH (ref 7–23)
BUN SERPL-MCNC: 54 MG/DL — HIGH (ref 7–23)
BUN SERPL-MCNC: 55 MG/DL — HIGH (ref 7–23)
BUN SERPL-MCNC: 58 MG/DL — HIGH (ref 7–23)
BUN SERPL-MCNC: 58 MG/DL — HIGH (ref 7–23)
BUN SERPL-MCNC: 61 MG/DL — HIGH (ref 7–23)
BUN SERPL-MCNC: 65 MG/DL — HIGH (ref 7–23)
BUN SERPL-MCNC: 67 MG/DL — HIGH (ref 7–23)
BUN SERPL-MCNC: 69 MG/DL — HIGH (ref 7–23)
C-ANCA SER-ACNC: NEGATIVE — SIGNIFICANT CHANGE UP
C3 SERPL-MCNC: 72 MG/DL — LOW (ref 80–180)
C3 SERPL-MCNC: 95 MG/DL — SIGNIFICANT CHANGE UP (ref 80–180)
C4 SERPL-MCNC: 14 MG/DL — SIGNIFICANT CHANGE UP (ref 10–45)
C4 SERPL-MCNC: 17 MG/DL — SIGNIFICANT CHANGE UP (ref 10–45)
CA-I SERPL-SCNC: 0.98 MMOL/L — LOW (ref 1.12–1.3)
CA-I SERPL-SCNC: 1.15 MMOL/L — SIGNIFICANT CHANGE UP (ref 1.12–1.3)
CA-I SERPL-SCNC: 1.2 MMOL/L — SIGNIFICANT CHANGE UP (ref 1.12–1.3)
CALCIUM SERPL-MCNC: 7.2 MG/DL — LOW (ref 8.4–10.5)
CALCIUM SERPL-MCNC: 7.2 MG/DL — LOW (ref 8.4–10.5)
CALCIUM SERPL-MCNC: 7.5 MG/DL — LOW (ref 8.4–10.5)
CALCIUM SERPL-MCNC: 7.5 MG/DL — LOW (ref 8.4–10.5)
CALCIUM SERPL-MCNC: 7.6 MG/DL — LOW (ref 8.4–10.5)
CALCIUM SERPL-MCNC: 7.9 MG/DL — LOW (ref 8.4–10.5)
CALCIUM SERPL-MCNC: 7.9 MG/DL — LOW (ref 8.4–10.5)
CALCIUM SERPL-MCNC: 8 MG/DL — LOW (ref 8.4–10.5)
CALCIUM SERPL-MCNC: 8.1 MG/DL — LOW (ref 8.4–10.5)
CALCIUM SERPL-MCNC: 8.2 MG/DL — LOW (ref 8.4–10.5)
CALCIUM SERPL-MCNC: 8.3 MG/DL — LOW (ref 8.4–10.5)
CALCIUM SERPL-MCNC: 8.3 MG/DL — LOW (ref 8.5–10.1)
CALCIUM SERPL-MCNC: 8.4 MG/DL — SIGNIFICANT CHANGE UP (ref 8.4–10.5)
CALCIUM SERPL-MCNC: 8.4 MG/DL — SIGNIFICANT CHANGE UP (ref 8.4–10.5)
CALCIUM SERPL-MCNC: 8.5 MG/DL — SIGNIFICANT CHANGE UP (ref 8.4–10.5)
CALCIUM SERPL-MCNC: 8.6 MG/DL — SIGNIFICANT CHANGE UP (ref 8.4–10.5)
CALCIUM SERPL-MCNC: 8.7 MG/DL — SIGNIFICANT CHANGE UP (ref 8.4–10.5)
CALCIUM SERPL-MCNC: 8.8 MG/DL — SIGNIFICANT CHANGE UP (ref 8.4–10.5)
CALCIUM SERPL-MCNC: 8.8 MG/DL — SIGNIFICANT CHANGE UP (ref 8.4–10.5)
CALCIUM SERPL-MCNC: 8.9 MG/DL — SIGNIFICANT CHANGE UP (ref 8.4–10.5)
CALCIUM SERPL-MCNC: 9 MG/DL — SIGNIFICANT CHANGE UP (ref 8.4–10.5)
CALCIUM SERPL-MCNC: 9 MG/DL — SIGNIFICANT CHANGE UP (ref 8.4–10.5)
CALCIUM SERPL-MCNC: 9.1 MG/DL — SIGNIFICANT CHANGE UP (ref 8.4–10.5)
CALCIUM SERPL-MCNC: 9.2 MG/DL — SIGNIFICANT CHANGE UP (ref 8.4–10.5)
CALCIUM SERPL-MCNC: 9.3 MG/DL — SIGNIFICANT CHANGE UP (ref 8.4–10.5)
CALCIUM SERPL-MCNC: <3 MG/DL — CRITICAL LOW (ref 8.4–10.5)
CHLORIDE BLDV-SCNC: 104 MMOL/L — SIGNIFICANT CHANGE UP (ref 96–108)
CHLORIDE BLDV-SCNC: 105 MMOL/L — SIGNIFICANT CHANGE UP (ref 96–108)
CHLORIDE BLDV-SCNC: 112 MMOL/L — HIGH (ref 96–108)
CHLORIDE SERPL-SCNC: 100 MMOL/L — SIGNIFICANT CHANGE UP (ref 96–108)
CHLORIDE SERPL-SCNC: 101 MMOL/L — SIGNIFICANT CHANGE UP (ref 96–108)
CHLORIDE SERPL-SCNC: 102 MMOL/L — SIGNIFICANT CHANGE UP (ref 96–108)
CHLORIDE SERPL-SCNC: 103 MMOL/L — SIGNIFICANT CHANGE UP (ref 96–108)
CHLORIDE SERPL-SCNC: 103 MMOL/L — SIGNIFICANT CHANGE UP (ref 96–108)
CHLORIDE SERPL-SCNC: 104 MMOL/L — SIGNIFICANT CHANGE UP (ref 96–108)
CHLORIDE SERPL-SCNC: 104 MMOL/L — SIGNIFICANT CHANGE UP (ref 96–108)
CHLORIDE SERPL-SCNC: 105 MMOL/L — SIGNIFICANT CHANGE UP (ref 96–108)
CHLORIDE SERPL-SCNC: 106 MMOL/L — SIGNIFICANT CHANGE UP (ref 96–108)
CHLORIDE SERPL-SCNC: 107 MMOL/L — SIGNIFICANT CHANGE UP (ref 96–108)
CHLORIDE SERPL-SCNC: 108 MMOL/L — SIGNIFICANT CHANGE UP (ref 96–108)
CHLORIDE SERPL-SCNC: 93 MMOL/L — LOW (ref 96–108)
CHLORIDE SERPL-SCNC: 97 MMOL/L — SIGNIFICANT CHANGE UP (ref 96–108)
CHLORIDE SERPL-SCNC: 98 MMOL/L — SIGNIFICANT CHANGE UP (ref 96–108)
CHLORIDE SERPL-SCNC: 99 MMOL/L — SIGNIFICANT CHANGE UP (ref 96–108)
CHOLEST SERPL-MCNC: 129 MG/DL — SIGNIFICANT CHANGE UP (ref 10–199)
CK MB BLD-MCNC: 0.6 % — SIGNIFICANT CHANGE UP (ref 0–3.5)
CK MB BLD-MCNC: 0.6 % — SIGNIFICANT CHANGE UP (ref 0–3.5)
CK MB BLD-MCNC: 0.7 % — SIGNIFICANT CHANGE UP (ref 0–3.5)
CK MB BLD-MCNC: 1 % — SIGNIFICANT CHANGE UP (ref 0–3.5)
CK MB BLD-MCNC: 1.4 % — SIGNIFICANT CHANGE UP (ref 0–3.5)
CK MB BLD-MCNC: 11.3 % — HIGH (ref 0–3.5)
CK MB BLD-MCNC: 2.9 % — SIGNIFICANT CHANGE UP (ref 0–3.5)
CK MB BLD-MCNC: 4.3 % — HIGH (ref 0–3.5)
CK MB BLD-MCNC: 6.5 % — HIGH (ref 0–3.5)
CK MB BLD-MCNC: 6.9 % — HIGH (ref 0–3.5)
CK MB BLD-MCNC: 9.9 % — HIGH (ref 0–3.5)
CK MB CFR SERPL CALC: 152.6 NG/ML — HIGH (ref 0–6.7)
CK MB CFR SERPL CALC: 179.1 NG/ML — HIGH (ref 0–6.7)
CK MB CFR SERPL CALC: 220.8 NG/ML — HIGH (ref 0–6.7)
CK MB CFR SERPL CALC: 27.1 NG/ML — HIGH (ref 0–6.7)
CK MB CFR SERPL CALC: 283.1 NG/ML — HIGH (ref 0–6.7)
CK MB CFR SERPL CALC: 285.9 NG/ML — HIGH (ref 0–6.7)
CK MB CFR SERPL CALC: 297.6 NG/ML — HIGH (ref 0–6.7)
CK MB CFR SERPL CALC: 30.9 NG/ML — HIGH (ref 0–6.7)
CK MB CFR SERPL CALC: 37.8 NG/ML — HIGH (ref 0–6.7)
CK MB CFR SERPL CALC: 53.6 NG/ML — HIGH (ref 0–6.7)
CK MB CFR SERPL CALC: 77.7 NG/ML — HIGH (ref 0–6.7)
CK SERPL-CCNC: 126 U/L — SIGNIFICANT CHANGE UP (ref 26–308)
CK SERPL-CCNC: 1417 U/L — HIGH (ref 30–200)
CK SERPL-CCNC: 1587 U/L — HIGH (ref 30–200)
CK SERPL-CCNC: 2864 U/L — HIGH (ref 30–200)
CK SERPL-CCNC: 4165 U/L — HIGH (ref 30–200)
CK SERPL-CCNC: 4583 U/L — HIGH (ref 30–200)
CK SERPL-CCNC: 4675 U/L — HIGH (ref 30–200)
CK SERPL-CCNC: 4968 U/L — HIGH (ref 30–200)
CK SERPL-CCNC: 5078 U/L — HIGH (ref 30–200)
CK SERPL-CCNC: 5097 U/L — HIGH (ref 30–200)
CK SERPL-CCNC: 5124 U/L — HIGH (ref 30–200)
CK SERPL-CCNC: 5331 U/L — HIGH (ref 30–200)
CK SERPL-CCNC: 5407 U/L — HIGH (ref 30–200)
CO2 BLDA-SCNC: 27 MMOL/L — SIGNIFICANT CHANGE UP (ref 22–30)
CO2 BLDMV-SCNC: 20 MMOL/L — LOW (ref 21–29)
CO2 BLDMV-SCNC: 20 MMOL/L — LOW (ref 21–29)
CO2 BLDMV-SCNC: 21 MMOL/L — SIGNIFICANT CHANGE UP (ref 21–29)
CO2 BLDMV-SCNC: 22 MMOL/L — SIGNIFICANT CHANGE UP (ref 21–29)
CO2 BLDMV-SCNC: 23 MMOL/L — SIGNIFICANT CHANGE UP (ref 21–29)
CO2 BLDMV-SCNC: 25 MMOL/L — SIGNIFICANT CHANGE UP (ref 21–29)
CO2 BLDMV-SCNC: 26 MMOL/L — SIGNIFICANT CHANGE UP (ref 21–29)
CO2 BLDMV-SCNC: 27 MMOL/L — SIGNIFICANT CHANGE UP (ref 21–29)
CO2 BLDMV-SCNC: 28 MMOL/L — SIGNIFICANT CHANGE UP (ref 21–29)
CO2 BLDMV-SCNC: 29 MMOL/L — SIGNIFICANT CHANGE UP (ref 21–29)
CO2 BLDV-SCNC: 21 MMOL/L — LOW (ref 22–30)
CO2 BLDV-SCNC: 21 MMOL/L — LOW (ref 22–30)
CO2 BLDV-SCNC: 22 MMOL/L — SIGNIFICANT CHANGE UP (ref 22–30)
CO2 BLDV-SCNC: 27 MMOL/L — SIGNIFICANT CHANGE UP (ref 22–30)
CO2 BLDV-SCNC: 28 MMOL/L — SIGNIFICANT CHANGE UP (ref 22–30)
CO2 BLDV-SCNC: 28 MMOL/L — SIGNIFICANT CHANGE UP (ref 22–30)
CO2 BLDV-SCNC: 29 MMOL/L — SIGNIFICANT CHANGE UP (ref 22–30)
CO2 BLDV-SCNC: 30 MMOL/L — SIGNIFICANT CHANGE UP (ref 22–30)
CO2 SERPL-SCNC: 11 MMOL/L — LOW (ref 22–31)
CO2 SERPL-SCNC: 15 MMOL/L — LOW (ref 22–31)
CO2 SERPL-SCNC: 15 MMOL/L — LOW (ref 22–31)
CO2 SERPL-SCNC: 16 MMOL/L — LOW (ref 22–31)
CO2 SERPL-SCNC: 17 MMOL/L — LOW (ref 22–31)
CO2 SERPL-SCNC: 18 MMOL/L — LOW (ref 22–31)
CO2 SERPL-SCNC: 19 MMOL/L — LOW (ref 22–31)
CO2 SERPL-SCNC: 20 MMOL/L — LOW (ref 22–31)
CO2 SERPL-SCNC: 21 MMOL/L — LOW (ref 22–31)
CO2 SERPL-SCNC: 22 MMOL/L — SIGNIFICANT CHANGE UP (ref 22–31)
CO2 SERPL-SCNC: 23 MMOL/L — SIGNIFICANT CHANGE UP (ref 22–31)
CO2 SERPL-SCNC: 24 MMOL/L — SIGNIFICANT CHANGE UP (ref 22–31)
CO2 SERPL-SCNC: 25 MMOL/L — SIGNIFICANT CHANGE UP (ref 22–31)
CO2 SERPL-SCNC: 26 MMOL/L — SIGNIFICANT CHANGE UP (ref 22–31)
CO2 SERPL-SCNC: 27 MMOL/L — SIGNIFICANT CHANGE UP (ref 22–31)
CO2 SERPL-SCNC: 27 MMOL/L — SIGNIFICANT CHANGE UP (ref 22–31)
CO2 SERPL-SCNC: 28 MMOL/L — SIGNIFICANT CHANGE UP (ref 22–31)
COLOR SPEC: YELLOW — SIGNIFICANT CHANGE UP
CREAT SERPL-MCNC: 1.28 MG/DL — SIGNIFICANT CHANGE UP (ref 0.5–1.3)
CREAT SERPL-MCNC: 1.29 MG/DL — SIGNIFICANT CHANGE UP (ref 0.5–1.3)
CREAT SERPL-MCNC: 1.31 MG/DL — HIGH (ref 0.5–1.3)
CREAT SERPL-MCNC: 1.33 MG/DL — HIGH (ref 0.5–1.3)
CREAT SERPL-MCNC: 1.36 MG/DL — HIGH (ref 0.5–1.3)
CREAT SERPL-MCNC: 1.37 MG/DL — HIGH (ref 0.5–1.3)
CREAT SERPL-MCNC: 1.37 MG/DL — HIGH (ref 0.5–1.3)
CREAT SERPL-MCNC: 1.44 MG/DL — HIGH (ref 0.5–1.3)
CREAT SERPL-MCNC: 1.47 MG/DL — HIGH (ref 0.5–1.3)
CREAT SERPL-MCNC: 1.5 MG/DL — HIGH (ref 0.5–1.3)
CREAT SERPL-MCNC: 1.53 MG/DL — HIGH (ref 0.5–1.3)
CREAT SERPL-MCNC: 1.53 MG/DL — HIGH (ref 0.5–1.3)
CREAT SERPL-MCNC: 1.56 MG/DL — HIGH (ref 0.5–1.3)
CREAT SERPL-MCNC: 1.56 MG/DL — HIGH (ref 0.5–1.3)
CREAT SERPL-MCNC: 1.62 MG/DL — HIGH (ref 0.5–1.3)
CREAT SERPL-MCNC: 1.64 MG/DL — HIGH (ref 0.5–1.3)
CREAT SERPL-MCNC: 1.7 MG/DL — HIGH (ref 0.5–1.3)
CREAT SERPL-MCNC: 1.72 MG/DL — HIGH (ref 0.5–1.3)
CREAT SERPL-MCNC: 1.81 MG/DL — HIGH (ref 0.5–1.3)
CREAT SERPL-MCNC: 1.83 MG/DL — HIGH (ref 0.5–1.3)
CREAT SERPL-MCNC: 1.85 MG/DL — HIGH (ref 0.5–1.3)
CREAT SERPL-MCNC: 1.88 MG/DL — HIGH (ref 0.5–1.3)
CREAT SERPL-MCNC: 1.91 MG/DL — HIGH (ref 0.5–1.3)
CREAT SERPL-MCNC: 1.92 MG/DL — HIGH (ref 0.5–1.3)
CREAT SERPL-MCNC: 1.96 MG/DL — HIGH (ref 0.5–1.3)
CREAT SERPL-MCNC: 1.98 MG/DL — HIGH (ref 0.5–1.3)
CREAT SERPL-MCNC: 2.01 MG/DL — HIGH (ref 0.5–1.3)
CREAT SERPL-MCNC: 2.07 MG/DL — HIGH (ref 0.5–1.3)
CREAT SERPL-MCNC: 2.1 MG/DL — HIGH (ref 0.5–1.3)
CREAT SERPL-MCNC: 2.12 MG/DL — HIGH (ref 0.5–1.3)
CREAT SERPL-MCNC: 2.18 MG/DL — HIGH (ref 0.5–1.3)
CREAT SERPL-MCNC: 2.26 MG/DL — HIGH (ref 0.5–1.3)
CREAT SERPL-MCNC: 2.27 MG/DL — HIGH (ref 0.5–1.3)
CREAT SERPL-MCNC: 2.33 MG/DL — HIGH (ref 0.5–1.3)
CREAT SERPL-MCNC: 2.36 MG/DL — HIGH (ref 0.5–1.3)
CREAT SERPL-MCNC: 2.45 MG/DL — HIGH (ref 0.5–1.3)
CREAT SERPL-MCNC: 2.46 MG/DL — HIGH (ref 0.5–1.3)
CREAT SERPL-MCNC: 2.56 MG/DL — HIGH (ref 0.5–1.3)
CREAT SERPL-MCNC: 2.69 MG/DL — HIGH (ref 0.5–1.3)
CREAT SERPL-MCNC: 2.81 MG/DL — HIGH (ref 0.5–1.3)
CREAT SERPL-MCNC: 2.9 MG/DL — HIGH (ref 0.5–1.3)
CREAT SERPL-MCNC: 2.94 MG/DL — HIGH (ref 0.5–1.3)
CREAT SERPL-MCNC: 2.97 MG/DL — HIGH (ref 0.5–1.3)
CREAT SERPL-MCNC: 3.04 MG/DL — HIGH (ref 0.5–1.3)
CREAT SERPL-MCNC: 3.04 MG/DL — HIGH (ref 0.5–1.3)
CREAT SERPL-MCNC: 3.27 MG/DL — HIGH (ref 0.5–1.3)
CREAT SERPL-MCNC: 3.3 MG/DL — HIGH (ref 0.5–1.3)
CREAT SERPL-MCNC: 3.48 MG/DL — HIGH (ref 0.5–1.3)
CREAT SERPL-MCNC: 3.5 MG/DL — HIGH (ref 0.5–1.3)
CREAT SERPL-MCNC: 3.68 MG/DL — HIGH (ref 0.5–1.3)
CREAT SERPL-MCNC: 3.71 MG/DL — HIGH (ref 0.5–1.3)
CREAT SERPL-MCNC: 3.89 MG/DL — HIGH (ref 0.5–1.3)
CREAT SERPL-MCNC: 3.91 MG/DL — HIGH (ref 0.5–1.3)
CREAT SERPL-MCNC: 4.39 MG/DL — HIGH (ref 0.5–1.3)
CREAT SERPL-MCNC: 4.43 MG/DL — HIGH (ref 0.5–1.3)
CREAT SERPL-MCNC: 4.7 MG/DL — HIGH (ref 0.5–1.3)
CREAT SERPL-MCNC: 5.09 MG/DL — HIGH (ref 0.5–1.3)
CREAT SERPL-MCNC: 5.51 MG/DL — HIGH (ref 0.5–1.3)
CULTURE RESULTS: NO GROWTH — SIGNIFICANT CHANGE UP
CULTURE RESULTS: SIGNIFICANT CHANGE UP
DACRYOCYTES BLD QL SMEAR: SLIGHT — SIGNIFICANT CHANGE UP
DACRYOCYTES BLD QL SMEAR: SLIGHT — SIGNIFICANT CHANGE UP
DIFF PNL FLD: ABNORMAL
DIGOXIN SERPL-MCNC: 1.5 NG/ML — SIGNIFICANT CHANGE UP (ref 0.8–2)
DSDNA AB SER-ACNC: <12 IU/ML — SIGNIFICANT CHANGE UP
DSDNA AB SER-ACNC: <12 IU/ML — SIGNIFICANT CHANGE UP
ELLIPTOCYTES BLD QL SMEAR: SLIGHT — SIGNIFICANT CHANGE UP
ELLIPTOCYTES BLD QL SMEAR: SLIGHT — SIGNIFICANT CHANGE UP
EOSINOPHIL # BLD AUTO: 0 K/UL — SIGNIFICANT CHANGE UP (ref 0–0.5)
EOSINOPHIL # BLD AUTO: 0.1 K/UL — SIGNIFICANT CHANGE UP (ref 0–0.5)
EOSINOPHIL NFR BLD AUTO: 0 % — SIGNIFICANT CHANGE UP (ref 0–6)
EOSINOPHIL NFR BLD AUTO: 0 % — SIGNIFICANT CHANGE UP (ref 0–6)
EOSINOPHIL NFR BLD AUTO: 0.1 % — SIGNIFICANT CHANGE UP (ref 0–6)
EOSINOPHIL NFR BLD AUTO: 0.1 % — SIGNIFICANT CHANGE UP (ref 0–6)
EOSINOPHIL NFR BLD AUTO: 0.2 % — SIGNIFICANT CHANGE UP (ref 0–6)
EOSINOPHIL NFR BLD AUTO: 0.3 % — SIGNIFICANT CHANGE UP (ref 0–6)
EOSINOPHIL NFR BLD AUTO: 0.4 % — SIGNIFICANT CHANGE UP (ref 0–6)
EOSINOPHIL NFR BLD AUTO: 0.4 % — SIGNIFICANT CHANGE UP (ref 0–6)
EOSINOPHIL NFR BLD AUTO: 0.5 % — SIGNIFICANT CHANGE UP (ref 0–6)
EOSINOPHIL NFR BLD AUTO: 1 % — SIGNIFICANT CHANGE UP (ref 0–6)
EOSINOPHIL NFR BLD AUTO: 1.5 % — SIGNIFICANT CHANGE UP (ref 0–6)
EPI CELLS # UR: SIGNIFICANT CHANGE UP /HPF
GAMMA GLOBULIN: 1 G/DL — SIGNIFICANT CHANGE UP (ref 0.6–1.6)
GAMMA GLOBULIN: SIGNIFICANT CHANGE UP G/DL (ref 0.6–1.6)
GAS PNL BLDA: SIGNIFICANT CHANGE UP
GAS PNL BLDMV: SIGNIFICANT CHANGE UP
GAS PNL BLDV: 134 MMOL/L — LOW (ref 136–145)
GAS PNL BLDV: 135 MMOL/L — LOW (ref 136–145)
GAS PNL BLDV: 139 MMOL/L — SIGNIFICANT CHANGE UP (ref 136–145)
GAS PNL BLDV: SIGNIFICANT CHANGE UP
GLUCOSE BLDC GLUCOMTR-MCNC: 104 MG/DL — HIGH (ref 70–99)
GLUCOSE BLDC GLUCOMTR-MCNC: 108 MG/DL — HIGH (ref 70–99)
GLUCOSE BLDC GLUCOMTR-MCNC: 114 MG/DL — HIGH (ref 70–99)
GLUCOSE BLDC GLUCOMTR-MCNC: 117 MG/DL — HIGH (ref 70–99)
GLUCOSE BLDC GLUCOMTR-MCNC: 125 MG/DL — HIGH (ref 70–99)
GLUCOSE BLDC GLUCOMTR-MCNC: 126 MG/DL — HIGH (ref 70–99)
GLUCOSE BLDC GLUCOMTR-MCNC: 127 MG/DL — HIGH (ref 70–99)
GLUCOSE BLDC GLUCOMTR-MCNC: 129 MG/DL — HIGH (ref 70–99)
GLUCOSE BLDC GLUCOMTR-MCNC: 421 MG/DL — HIGH (ref 70–99)
GLUCOSE BLDV-MCNC: 179 MG/DL — HIGH (ref 70–99)
GLUCOSE BLDV-MCNC: 73 MG/DL — SIGNIFICANT CHANGE UP (ref 70–99)
GLUCOSE BLDV-MCNC: 96 MG/DL — SIGNIFICANT CHANGE UP (ref 70–99)
GLUCOSE SERPL-MCNC: 101 MG/DL — HIGH (ref 70–99)
GLUCOSE SERPL-MCNC: 102 MG/DL — HIGH (ref 70–99)
GLUCOSE SERPL-MCNC: 103 MG/DL — HIGH (ref 70–99)
GLUCOSE SERPL-MCNC: 104 MG/DL — HIGH (ref 70–99)
GLUCOSE SERPL-MCNC: 104 MG/DL — HIGH (ref 70–99)
GLUCOSE SERPL-MCNC: 105 MG/DL — HIGH (ref 70–99)
GLUCOSE SERPL-MCNC: 105 MG/DL — HIGH (ref 70–99)
GLUCOSE SERPL-MCNC: 108 MG/DL — HIGH (ref 70–99)
GLUCOSE SERPL-MCNC: 111 MG/DL — HIGH (ref 70–99)
GLUCOSE SERPL-MCNC: 111 MG/DL — HIGH (ref 70–99)
GLUCOSE SERPL-MCNC: 112 MG/DL — HIGH (ref 70–99)
GLUCOSE SERPL-MCNC: 113 MG/DL — HIGH (ref 70–99)
GLUCOSE SERPL-MCNC: 114 MG/DL — HIGH (ref 70–99)
GLUCOSE SERPL-MCNC: 115 MG/DL — HIGH (ref 70–99)
GLUCOSE SERPL-MCNC: 116 MG/DL — HIGH (ref 70–99)
GLUCOSE SERPL-MCNC: 117 MG/DL — HIGH (ref 70–99)
GLUCOSE SERPL-MCNC: 117 MG/DL — HIGH (ref 70–99)
GLUCOSE SERPL-MCNC: 118 MG/DL — HIGH (ref 70–99)
GLUCOSE SERPL-MCNC: 120 MG/DL — HIGH (ref 70–99)
GLUCOSE SERPL-MCNC: 121 MG/DL — HIGH (ref 70–99)
GLUCOSE SERPL-MCNC: 123 MG/DL — HIGH (ref 70–99)
GLUCOSE SERPL-MCNC: 124 MG/DL — HIGH (ref 70–99)
GLUCOSE SERPL-MCNC: 124 MG/DL — HIGH (ref 70–99)
GLUCOSE SERPL-MCNC: 126 MG/DL — HIGH (ref 70–99)
GLUCOSE SERPL-MCNC: 126 MG/DL — HIGH (ref 70–99)
GLUCOSE SERPL-MCNC: 127 MG/DL — HIGH (ref 70–99)
GLUCOSE SERPL-MCNC: 127 MG/DL — HIGH (ref 70–99)
GLUCOSE SERPL-MCNC: 128 MG/DL — HIGH (ref 70–99)
GLUCOSE SERPL-MCNC: 129 MG/DL — HIGH (ref 70–99)
GLUCOSE SERPL-MCNC: 130 MG/DL — HIGH (ref 70–99)
GLUCOSE SERPL-MCNC: 131 MG/DL — HIGH (ref 70–99)
GLUCOSE SERPL-MCNC: 132 MG/DL — HIGH (ref 70–99)
GLUCOSE SERPL-MCNC: 132 MG/DL — HIGH (ref 70–99)
GLUCOSE SERPL-MCNC: 136 MG/DL — HIGH (ref 70–99)
GLUCOSE SERPL-MCNC: 137 MG/DL — HIGH (ref 70–99)
GLUCOSE SERPL-MCNC: 137 MG/DL — HIGH (ref 70–99)
GLUCOSE SERPL-MCNC: 138 MG/DL — HIGH (ref 70–99)
GLUCOSE SERPL-MCNC: 140 MG/DL — HIGH (ref 70–99)
GLUCOSE SERPL-MCNC: 141 MG/DL — HIGH (ref 70–99)
GLUCOSE SERPL-MCNC: 142 MG/DL — HIGH (ref 70–99)
GLUCOSE SERPL-MCNC: 145 MG/DL — HIGH (ref 70–99)
GLUCOSE SERPL-MCNC: 146 MG/DL — HIGH (ref 70–99)
GLUCOSE SERPL-MCNC: 148 MG/DL — HIGH (ref 70–99)
GLUCOSE SERPL-MCNC: 160 MG/DL — HIGH (ref 70–99)
GLUCOSE SERPL-MCNC: 191 MG/DL — HIGH (ref 70–99)
GLUCOSE SERPL-MCNC: 204 MG/DL — HIGH (ref 70–99)
GLUCOSE SERPL-MCNC: 211 MG/DL — HIGH (ref 70–99)
GLUCOSE SERPL-MCNC: 90 MG/DL — SIGNIFICANT CHANGE UP (ref 70–99)
GLUCOSE SERPL-MCNC: 90 MG/DL — SIGNIFICANT CHANGE UP (ref 70–99)
GLUCOSE SERPL-MCNC: 91 MG/DL — SIGNIFICANT CHANGE UP (ref 70–99)
GLUCOSE SERPL-MCNC: 92 MG/DL — SIGNIFICANT CHANGE UP (ref 70–99)
GLUCOSE SERPL-MCNC: 92 MG/DL — SIGNIFICANT CHANGE UP (ref 70–99)
GLUCOSE SERPL-MCNC: 93 MG/DL — SIGNIFICANT CHANGE UP (ref 70–99)
GLUCOSE SERPL-MCNC: 94 MG/DL — SIGNIFICANT CHANGE UP (ref 70–99)
GLUCOSE SERPL-MCNC: 94 MG/DL — SIGNIFICANT CHANGE UP (ref 70–99)
GLUCOSE SERPL-MCNC: 96 MG/DL — SIGNIFICANT CHANGE UP (ref 70–99)
GLUCOSE SERPL-MCNC: 98 MG/DL — SIGNIFICANT CHANGE UP (ref 70–99)
GLUCOSE UR QL: 50
GRAM STN FLD: SIGNIFICANT CHANGE UP
HAPTOGLOB SERPL-MCNC: <20 MG/DL — LOW (ref 34–200)
HAV IGM SER-ACNC: SIGNIFICANT CHANGE UP
HBA1C BLD-MCNC: 5.4 % — SIGNIFICANT CHANGE UP (ref 4–5.6)
HBV CORE IGM SER-ACNC: SIGNIFICANT CHANGE UP
HBV SURFACE AG SER-ACNC: SIGNIFICANT CHANGE UP
HCO3 BLDA-SCNC: 26 MMOL/L — SIGNIFICANT CHANGE UP (ref 21–29)
HCO3 BLDMV-SCNC: 19 MMOL/L — LOW (ref 20–28)
HCO3 BLDMV-SCNC: 20 MMOL/L — SIGNIFICANT CHANGE UP (ref 20–28)
HCO3 BLDMV-SCNC: 21 MMOL/L — SIGNIFICANT CHANGE UP (ref 20–28)
HCO3 BLDMV-SCNC: 22 MMOL/L — SIGNIFICANT CHANGE UP (ref 20–28)
HCO3 BLDMV-SCNC: 24 MMOL/L — SIGNIFICANT CHANGE UP (ref 20–28)
HCO3 BLDMV-SCNC: 25 MMOL/L — SIGNIFICANT CHANGE UP (ref 20–28)
HCO3 BLDMV-SCNC: 26 MMOL/L — SIGNIFICANT CHANGE UP (ref 20–28)
HCO3 BLDMV-SCNC: 27 MMOL/L — SIGNIFICANT CHANGE UP (ref 20–28)
HCO3 BLDMV-SCNC: 28 MMOL/L — SIGNIFICANT CHANGE UP (ref 20–28)
HCO3 BLDV-SCNC: 19 MMOL/L — LOW (ref 21–29)
HCO3 BLDV-SCNC: 21 MMOL/L — SIGNIFICANT CHANGE UP (ref 21–29)
HCO3 BLDV-SCNC: 21 MMOL/L — SIGNIFICANT CHANGE UP (ref 21–29)
HCO3 BLDV-SCNC: 26 MMOL/L — SIGNIFICANT CHANGE UP (ref 21–29)
HCO3 BLDV-SCNC: 27 MMOL/L — SIGNIFICANT CHANGE UP (ref 21–29)
HCO3 BLDV-SCNC: 27 MMOL/L — SIGNIFICANT CHANGE UP (ref 21–29)
HCO3 BLDV-SCNC: 28 MMOL/L — SIGNIFICANT CHANGE UP (ref 21–29)
HCO3 BLDV-SCNC: 29 MMOL/L — SIGNIFICANT CHANGE UP (ref 21–29)
HCT VFR BLD CALC: 22.5 % — LOW (ref 39–50)
HCT VFR BLD CALC: 22.8 % — LOW (ref 39–50)
HCT VFR BLD CALC: 23.1 % — LOW (ref 39–50)
HCT VFR BLD CALC: 23.2 % — LOW (ref 39–50)
HCT VFR BLD CALC: 23.3 % — LOW (ref 39–50)
HCT VFR BLD CALC: 23.7 % — LOW (ref 39–50)
HCT VFR BLD CALC: 24 % — LOW (ref 39–50)
HCT VFR BLD CALC: 24.2 % — LOW (ref 39–50)
HCT VFR BLD CALC: 24.4 % — LOW (ref 39–50)
HCT VFR BLD CALC: 24.4 % — LOW (ref 39–50)
HCT VFR BLD CALC: 24.5 % — LOW (ref 39–50)
HCT VFR BLD CALC: 24.5 % — LOW (ref 39–50)
HCT VFR BLD CALC: 24.7 % — LOW (ref 39–50)
HCT VFR BLD CALC: 25.2 % — LOW (ref 39–50)
HCT VFR BLD CALC: 25.3 % — LOW (ref 39–50)
HCT VFR BLD CALC: 25.3 % — LOW (ref 39–50)
HCT VFR BLD CALC: 25.4 % — LOW (ref 39–50)
HCT VFR BLD CALC: 25.4 % — LOW (ref 39–50)
HCT VFR BLD CALC: 25.5 % — LOW (ref 39–50)
HCT VFR BLD CALC: 25.6 % — LOW (ref 39–50)
HCT VFR BLD CALC: 25.6 % — LOW (ref 39–50)
HCT VFR BLD CALC: 25.8 % — LOW (ref 39–50)
HCT VFR BLD CALC: 25.8 % — LOW (ref 39–50)
HCT VFR BLD CALC: 25.9 % — LOW (ref 39–50)
HCT VFR BLD CALC: 26.2 % — LOW (ref 39–50)
HCT VFR BLD CALC: 26.2 % — LOW (ref 39–50)
HCT VFR BLD CALC: 26.3 % — LOW (ref 39–50)
HCT VFR BLD CALC: 26.4 % — LOW (ref 39–50)
HCT VFR BLD CALC: 26.6 % — LOW (ref 39–50)
HCT VFR BLD CALC: 26.7 % — LOW (ref 39–50)
HCT VFR BLD CALC: 26.7 % — LOW (ref 39–50)
HCT VFR BLD CALC: 26.9 % — LOW (ref 39–50)
HCT VFR BLD CALC: 27 % — LOW (ref 39–50)
HCT VFR BLD CALC: 27.3 % — LOW (ref 39–50)
HCT VFR BLD CALC: 27.7 % — LOW (ref 39–50)
HCT VFR BLD CALC: 27.8 % — LOW (ref 39–50)
HCT VFR BLD CALC: 29 % — LOW (ref 39–50)
HCT VFR BLD CALC: 29.5 % — LOW (ref 39–50)
HCT VFR BLD CALC: 29.7 % — LOW (ref 39–50)
HCT VFR BLD CALC: 30.5 % — LOW (ref 39–50)
HCT VFR BLD CALC: 30.7 % — LOW (ref 39–50)
HCT VFR BLD CALC: 32.7 % — LOW (ref 39–50)
HCT VFR BLD CALC: 33.8 % — LOW (ref 39–50)
HCT VFR BLD CALC: 37.1 % — LOW (ref 39–50)
HCT VFR BLD CALC: 37.7 % — LOW (ref 39–50)
HCT VFR BLD CALC: 38 % — LOW (ref 39–50)
HCT VFR BLD CALC: 43.4 % — SIGNIFICANT CHANGE UP (ref 39–50)
HCT VFR BLD CALC: 44.5 % — SIGNIFICANT CHANGE UP (ref 39–50)
HCT VFR BLD CALC: 46.4 % — SIGNIFICANT CHANGE UP (ref 39–50)
HCT VFR BLD CALC: 49.2 % — SIGNIFICANT CHANGE UP (ref 39–50)
HCT VFR BLDA CALC: 21 % — CRITICAL LOW (ref 39–50)
HCT VFR BLDA CALC: 22 % — CRITICAL LOW (ref 39–50)
HCT VFR BLDA CALC: 25 % — LOW (ref 39–50)
HCV AB S/CO SERPL IA: 0.15 S/CO — SIGNIFICANT CHANGE UP
HCV AB SERPL-IMP: SIGNIFICANT CHANGE UP
HDLC SERPL-MCNC: 41 MG/DL — SIGNIFICANT CHANGE UP (ref 40–125)
HGB BLD CALC-MCNC: 6.6 G/DL — CRITICAL LOW (ref 13–17)
HGB BLD CALC-MCNC: 7.1 G/DL — LOW (ref 13–17)
HGB BLD CALC-MCNC: 8.1 G/DL — LOW (ref 13–17)
HGB BLD-MCNC: 10 G/DL — LOW (ref 13–17)
HGB BLD-MCNC: 10.2 G/DL — LOW (ref 13–17)
HGB BLD-MCNC: 10.3 G/DL — LOW (ref 13–17)
HGB BLD-MCNC: 10.8 G/DL — LOW (ref 13–17)
HGB BLD-MCNC: 11.2 G/DL — LOW (ref 13–17)
HGB BLD-MCNC: 11.7 G/DL — LOW (ref 13–17)
HGB BLD-MCNC: 12.3 G/DL — LOW (ref 13–17)
HGB BLD-MCNC: 12.6 G/DL — LOW (ref 13–17)
HGB BLD-MCNC: 13.6 G/DL — SIGNIFICANT CHANGE UP (ref 13–17)
HGB BLD-MCNC: 14.6 G/DL — SIGNIFICANT CHANGE UP (ref 13–17)
HGB BLD-MCNC: 14.8 G/DL — SIGNIFICANT CHANGE UP (ref 13–17)
HGB BLD-MCNC: 14.9 G/DL — SIGNIFICANT CHANGE UP (ref 13–17)
HGB BLD-MCNC: 15.6 G/DL — SIGNIFICANT CHANGE UP (ref 13–17)
HGB BLD-MCNC: 7.3 G/DL — LOW (ref 13–17)
HGB BLD-MCNC: 7.6 G/DL — LOW (ref 13–17)
HGB BLD-MCNC: 7.7 G/DL — LOW (ref 13–17)
HGB BLD-MCNC: 7.7 G/DL — LOW (ref 13–17)
HGB BLD-MCNC: 7.8 G/DL — LOW (ref 13–17)
HGB BLD-MCNC: 7.9 G/DL — LOW (ref 13–17)
HGB BLD-MCNC: 7.9 G/DL — LOW (ref 13–17)
HGB BLD-MCNC: 8 G/DL — LOW (ref 13–17)
HGB BLD-MCNC: 8.1 G/DL — LOW (ref 13–17)
HGB BLD-MCNC: 8.2 G/DL — LOW (ref 13–17)
HGB BLD-MCNC: 8.2 G/DL — LOW (ref 13–17)
HGB BLD-MCNC: 8.3 G/DL — LOW (ref 13–17)
HGB BLD-MCNC: 8.4 G/DL — LOW (ref 13–17)
HGB BLD-MCNC: 8.5 G/DL — LOW (ref 13–17)
HGB BLD-MCNC: 8.6 G/DL — LOW (ref 13–17)
HGB BLD-MCNC: 8.7 G/DL — LOW (ref 13–17)
HGB BLD-MCNC: 8.8 G/DL — LOW (ref 13–17)
HGB BLD-MCNC: 8.9 G/DL — LOW (ref 13–17)
HGB BLD-MCNC: 9 G/DL — LOW (ref 13–17)
HGB BLD-MCNC: 9.2 G/DL — LOW (ref 13–17)
HGB BLD-MCNC: 9.3 G/DL — LOW (ref 13–17)
HGB BLD-MCNC: 9.5 G/DL — LOW (ref 13–17)
HGB BLD-MCNC: 9.8 G/DL — LOW (ref 13–17)
HGB BLD-MCNC: 9.8 G/DL — LOW (ref 13–17)
HGB FREE PLAS-MCNC: 127.5 MG/DL — HIGH
HISTONE AB SER-ACNC: 1.7 UNITS — HIGH (ref 0–0.9)
HIV 1+2 AB+HIV1 P24 AG SERPL QL IA: SIGNIFICANT CHANGE UP
HOROWITZ INDEX BLDA+IHG-RTO: 21 — SIGNIFICANT CHANGE UP
HOROWITZ INDEX BLDMV+IHG-RTO: 100 — SIGNIFICANT CHANGE UP
HOROWITZ INDEX BLDMV+IHG-RTO: 21 — SIGNIFICANT CHANGE UP
HOROWITZ INDEX BLDMV+IHG-RTO: 35 — SIGNIFICANT CHANGE UP
HOROWITZ INDEX BLDMV+IHG-RTO: 36 — SIGNIFICANT CHANGE UP
HOROWITZ INDEX BLDMV+IHG-RTO: 36 — SIGNIFICANT CHANGE UP
HOROWITZ INDEX BLDMV+IHG-RTO: 40 — SIGNIFICANT CHANGE UP
HOROWITZ INDEX BLDMV+IHG-RTO: 45 — SIGNIFICANT CHANGE UP
HOROWITZ INDEX BLDMV+IHG-RTO: 55 — SIGNIFICANT CHANGE UP
HOROWITZ INDEX BLDMV+IHG-RTO: 60 — SIGNIFICANT CHANGE UP
HOROWITZ INDEX BLDMV+IHG-RTO: 75 — SIGNIFICANT CHANGE UP
HOROWITZ INDEX BLDMV+IHG-RTO: 75 — SIGNIFICANT CHANGE UP
HOROWITZ INDEX BLDMV+IHG-RTO: SIGNIFICANT CHANGE UP
HOROWITZ INDEX BLDV+IHG-RTO: 100 — SIGNIFICANT CHANGE UP
HOROWITZ INDEX BLDV+IHG-RTO: 21 — SIGNIFICANT CHANGE UP
HOROWITZ INDEX BLDV+IHG-RTO: 28 — SIGNIFICANT CHANGE UP
INR BLD: 1.08 RATIO — SIGNIFICANT CHANGE UP (ref 0.88–1.16)
INR BLD: 1.09 RATIO — SIGNIFICANT CHANGE UP (ref 0.88–1.16)
INR BLD: 1.13 RATIO — SIGNIFICANT CHANGE UP (ref 0.88–1.16)
INR BLD: 1.18 RATIO — HIGH (ref 0.88–1.16)
INR BLD: 1.23 RATIO — HIGH (ref 0.88–1.16)
INR BLD: 1.23 RATIO — HIGH (ref 0.88–1.16)
INR BLD: 1.24 RATIO — HIGH (ref 0.88–1.16)
INR BLD: 1.24 RATIO — HIGH (ref 0.88–1.16)
INR BLD: 1.25 RATIO — HIGH (ref 0.88–1.16)
INR BLD: 1.3 RATIO — HIGH (ref 0.88–1.16)
INR BLD: 1.31 RATIO — HIGH (ref 0.88–1.16)
INR BLD: 1.36 RATIO — HIGH (ref 0.88–1.16)
INR BLD: 1.43 RATIO — HIGH (ref 0.88–1.16)
INR BLD: 1.5 RATIO — HIGH (ref 0.88–1.16)
INR BLD: 1.5 RATIO — HIGH (ref 0.88–1.16)
INR BLD: 1.51 RATIO — HIGH (ref 0.88–1.16)
INR BLD: 1.53 RATIO — HIGH (ref 0.88–1.16)
INR BLD: 1.57 RATIO — HIGH (ref 0.88–1.16)
INR BLD: 1.63 RATIO — HIGH (ref 0.88–1.16)
INR BLD: 1.69 RATIO — HIGH (ref 0.88–1.16)
INR BLD: 1.69 RATIO — HIGH (ref 0.88–1.16)
INR BLD: 2.12 RATIO — HIGH (ref 0.88–1.16)
INTERPRETATION SERPL IFE-IMP: SIGNIFICANT CHANGE UP
KAPPA LC SER QL IFE: 15.2 MG/DL — HIGH (ref 0.33–1.94)
KAPPA/LAMBDA FREE LIGHT CHAIN RATIO, SERUM: 1.45 RATIO — SIGNIFICANT CHANGE UP (ref 0.26–1.65)
KETONES UR-MCNC: NEGATIVE — SIGNIFICANT CHANGE UP
LACTATE BLDV-MCNC: 1.7 MMOL/L — SIGNIFICANT CHANGE UP (ref 0.7–2)
LACTATE BLDV-MCNC: 3.3 MMOL/L — HIGH (ref 0.7–2)
LACTATE BLDV-MCNC: 7.3 MMOL/L — CRITICAL HIGH (ref 0.7–2)
LACTATE SERPL-SCNC: 1.3 MMOL/L — SIGNIFICANT CHANGE UP (ref 0.7–2)
LACTATE SERPL-SCNC: 1.4 MMOL/L — SIGNIFICANT CHANGE UP (ref 0.7–2)
LACTATE SERPL-SCNC: 1.5 MMOL/L — SIGNIFICANT CHANGE UP (ref 0.7–2)
LACTATE SERPL-SCNC: 1.6 MMOL/L — SIGNIFICANT CHANGE UP (ref 0.7–2)
LACTATE SERPL-SCNC: 1.6 MMOL/L — SIGNIFICANT CHANGE UP (ref 0.7–2)
LACTATE SERPL-SCNC: 1.7 MMOL/L — SIGNIFICANT CHANGE UP (ref 0.7–2)
LACTATE SERPL-SCNC: 1.8 MMOL/L — SIGNIFICANT CHANGE UP (ref 0.7–2)
LACTATE SERPL-SCNC: 2.1 MMOL/L — HIGH (ref 0.7–2)
LACTATE SERPL-SCNC: 2.2 MMOL/L — HIGH (ref 0.7–2)
LACTATE SERPL-SCNC: 2.4 MMOL/L — HIGH (ref 0.7–2)
LACTATE SERPL-SCNC: 3 MMOL/L — HIGH (ref 0.7–2)
LACTATE SERPL-SCNC: 3.4 MMOL/L — HIGH (ref 0.7–2)
LACTATE SERPL-SCNC: 3.5 MMOL/L — HIGH (ref 0.7–2)
LACTATE SERPL-SCNC: 3.8 MMOL/L — HIGH (ref 0.7–2)
LACTATE SERPL-SCNC: 4.2 MMOL/L — CRITICAL HIGH (ref 0.7–2)
LAMBDA LC SER QL IFE: 10.5 MG/DL — HIGH (ref 0.57–2.63)
LDH SERPL L TO P-CCNC: 1438 U/L — HIGH (ref 50–242)
LDH SERPL L TO P-CCNC: 1815 U/L — HIGH (ref 50–242)
LDH SERPL L TO P-CCNC: 1924 U/L — HIGH (ref 50–242)
LDH SERPL L TO P-CCNC: 1932 U/L — HIGH (ref 50–242)
LDH SERPL L TO P-CCNC: 1979 U/L — HIGH (ref 50–242)
LDH SERPL L TO P-CCNC: 2048 U/L — HIGH (ref 50–242)
LDH SERPL L TO P-CCNC: 2411 U/L — HIGH (ref 50–242)
LDH SERPL L TO P-CCNC: 2432 U/L — HIGH (ref 50–242)
LDH SERPL L TO P-CCNC: 2452 U/L — HIGH (ref 50–242)
LDH SERPL L TO P-CCNC: 2718 U/L — HIGH (ref 50–242)
LDH SERPL L TO P-CCNC: 2720 U/L — HIGH (ref 50–242)
LDH SERPL L TO P-CCNC: 2740 U/L — HIGH (ref 50–242)
LDH SERPL L TO P-CCNC: 2950 U/L — HIGH (ref 50–242)
LDH SERPL L TO P-CCNC: 2990 U/L — HIGH (ref 50–242)
LDH SERPL L TO P-CCNC: 734 U/L — HIGH (ref 50–242)
LDH SERPL L TO P-CCNC: 929 U/L — HIGH (ref 50–242)
LEUKOCYTE ESTERASE UR-ACNC: NEGATIVE — SIGNIFICANT CHANGE UP
LIPID PNL WITH DIRECT LDL SERPL: 79 MG/DL — SIGNIFICANT CHANGE UP
LYMPHOCYTES # BLD AUTO: 0.7 K/UL — LOW (ref 1–3.3)
LYMPHOCYTES # BLD AUTO: 0.8 K/UL — LOW (ref 1–3.3)
LYMPHOCYTES # BLD AUTO: 0.8 K/UL — LOW (ref 1–3.3)
LYMPHOCYTES # BLD AUTO: 0.9 K/UL — LOW (ref 1–3.3)
LYMPHOCYTES # BLD AUTO: 1 K/UL — SIGNIFICANT CHANGE UP (ref 1–3.3)
LYMPHOCYTES # BLD AUTO: 1 K/UL — SIGNIFICANT CHANGE UP (ref 1–3.3)
LYMPHOCYTES # BLD AUTO: 1.1 K/UL — SIGNIFICANT CHANGE UP (ref 1–3.3)
LYMPHOCYTES # BLD AUTO: 1.2 K/UL — SIGNIFICANT CHANGE UP (ref 1–3.3)
LYMPHOCYTES # BLD AUTO: 1.2 K/UL — SIGNIFICANT CHANGE UP (ref 1–3.3)
LYMPHOCYTES # BLD AUTO: 1.3 K/UL — SIGNIFICANT CHANGE UP (ref 1–3.3)
LYMPHOCYTES # BLD AUTO: 1.4 K/UL — SIGNIFICANT CHANGE UP (ref 1–3.3)
LYMPHOCYTES # BLD AUTO: 1.4 K/UL — SIGNIFICANT CHANGE UP (ref 1–3.3)
LYMPHOCYTES # BLD AUTO: 1.5 K/UL — SIGNIFICANT CHANGE UP (ref 1–3.3)
LYMPHOCYTES # BLD AUTO: 1.5 K/UL — SIGNIFICANT CHANGE UP (ref 1–3.3)
LYMPHOCYTES # BLD AUTO: 1.7 K/UL — SIGNIFICANT CHANGE UP (ref 1–3.3)
LYMPHOCYTES # BLD AUTO: 10 % — LOW (ref 13–44)
LYMPHOCYTES # BLD AUTO: 10 % — LOW (ref 13–44)
LYMPHOCYTES # BLD AUTO: 12 % — LOW (ref 13–44)
LYMPHOCYTES # BLD AUTO: 13 % — SIGNIFICANT CHANGE UP (ref 13–44)
LYMPHOCYTES # BLD AUTO: 21.7 % — SIGNIFICANT CHANGE UP (ref 13–44)
LYMPHOCYTES # BLD AUTO: 3.2 K/UL — SIGNIFICANT CHANGE UP (ref 1–3.3)
LYMPHOCYTES # BLD AUTO: 3.7 % — LOW (ref 13–44)
LYMPHOCYTES # BLD AUTO: 3.9 K/UL — HIGH (ref 1–3.3)
LYMPHOCYTES # BLD AUTO: 33.7 % — SIGNIFICANT CHANGE UP (ref 13–44)
LYMPHOCYTES # BLD AUTO: 4 % — LOW (ref 13–44)
LYMPHOCYTES # BLD AUTO: 4 % — LOW (ref 13–44)
LYMPHOCYTES # BLD AUTO: 4.3 % — LOW (ref 13–44)
LYMPHOCYTES # BLD AUTO: 4.3 % — LOW (ref 13–44)
LYMPHOCYTES # BLD AUTO: 5 % — LOW (ref 13–44)
LYMPHOCYTES # BLD AUTO: 6 % — LOW (ref 13–44)
LYMPHOCYTES # BLD AUTO: 6.6 % — LOW (ref 13–44)
LYMPHOCYTES # BLD AUTO: 6.8 % — LOW (ref 13–44)
LYMPHOCYTES # BLD AUTO: 6.9 % — LOW (ref 13–44)
LYMPHOCYTES # BLD AUTO: 7 % — LOW (ref 13–44)
LYMPHOCYTES # BLD AUTO: 7.8 % — LOW (ref 13–44)
LYMPHOCYTES # BLD AUTO: 8.3 % — LOW (ref 13–44)
LYMPHOCYTES # BLD AUTO: 8.9 % — LOW (ref 13–44)
LYMPHOCYTES # BLD AUTO: 9 % — LOW (ref 13–44)
LYMPHOCYTES # BLD AUTO: SIGNIFICANT CHANGE UP (ref 1–3.3)
MACROCYTES BLD QL: SIGNIFICANT CHANGE UP
MAGNESIUM SERPL-MCNC: 0.1 MG/DL — CRITICAL LOW (ref 1.6–2.6)
MAGNESIUM SERPL-MCNC: 1.7 MG/DL — SIGNIFICANT CHANGE UP (ref 1.6–2.6)
MAGNESIUM SERPL-MCNC: 1.8 MG/DL — SIGNIFICANT CHANGE UP (ref 1.6–2.6)
MAGNESIUM SERPL-MCNC: 1.9 MG/DL — SIGNIFICANT CHANGE UP (ref 1.6–2.6)
MAGNESIUM SERPL-MCNC: 2 MG/DL — SIGNIFICANT CHANGE UP (ref 1.6–2.6)
MAGNESIUM SERPL-MCNC: 2.1 MG/DL — SIGNIFICANT CHANGE UP (ref 1.6–2.6)
MAGNESIUM SERPL-MCNC: 2.2 MG/DL — SIGNIFICANT CHANGE UP (ref 1.6–2.6)
MAGNESIUM SERPL-MCNC: 2.3 MG/DL — SIGNIFICANT CHANGE UP (ref 1.6–2.6)
MAGNESIUM SERPL-MCNC: 2.4 MG/DL — SIGNIFICANT CHANGE UP (ref 1.6–2.6)
MAGNESIUM SERPL-MCNC: 2.5 MG/DL — SIGNIFICANT CHANGE UP (ref 1.6–2.6)
MAGNESIUM SERPL-MCNC: 2.6 MG/DL — SIGNIFICANT CHANGE UP (ref 1.6–2.6)
MAGNESIUM SERPL-MCNC: 2.7 MG/DL — HIGH (ref 1.6–2.6)
MAGNESIUM SERPL-MCNC: 2.8 MG/DL — HIGH (ref 1.6–2.6)
MAGNESIUM SERPL-MCNC: 2.8 MG/DL — HIGH (ref 1.6–2.6)
MAGNESIUM SERPL-MCNC: 3 MG/DL — HIGH (ref 1.6–2.6)
MCHC RBC-ENTMCNC: 30.5 GM/DL — LOW (ref 32–36)
MCHC RBC-ENTMCNC: 31.1 GM/DL — LOW (ref 32–36)
MCHC RBC-ENTMCNC: 31.2 GM/DL — LOW (ref 32–36)
MCHC RBC-ENTMCNC: 31.6 GM/DL — LOW (ref 32–36)
MCHC RBC-ENTMCNC: 31.6 GM/DL — LOW (ref 32–36)
MCHC RBC-ENTMCNC: 31.7 GM/DL — LOW (ref 32–36)
MCHC RBC-ENTMCNC: 31.8 GM/DL — LOW (ref 32–36)
MCHC RBC-ENTMCNC: 31.8 PG — SIGNIFICANT CHANGE UP (ref 27–34)
MCHC RBC-ENTMCNC: 32 GM/DL — SIGNIFICANT CHANGE UP (ref 32–36)
MCHC RBC-ENTMCNC: 32.1 GM/DL — SIGNIFICANT CHANGE UP (ref 32–36)
MCHC RBC-ENTMCNC: 32.2 GM/DL — SIGNIFICANT CHANGE UP (ref 32–36)
MCHC RBC-ENTMCNC: 32.2 GM/DL — SIGNIFICANT CHANGE UP (ref 32–36)
MCHC RBC-ENTMCNC: 32.4 GM/DL — SIGNIFICANT CHANGE UP (ref 32–36)
MCHC RBC-ENTMCNC: 32.5 GM/DL — SIGNIFICANT CHANGE UP (ref 32–36)
MCHC RBC-ENTMCNC: 32.6 GM/DL — SIGNIFICANT CHANGE UP (ref 32–36)
MCHC RBC-ENTMCNC: 32.6 GM/DL — SIGNIFICANT CHANGE UP (ref 32–36)
MCHC RBC-ENTMCNC: 32.6 PG — SIGNIFICANT CHANGE UP (ref 27–34)
MCHC RBC-ENTMCNC: 32.7 PG — SIGNIFICANT CHANGE UP (ref 27–34)
MCHC RBC-ENTMCNC: 32.8 GM/DL — SIGNIFICANT CHANGE UP (ref 32–36)
MCHC RBC-ENTMCNC: 32.8 GM/DL — SIGNIFICANT CHANGE UP (ref 32–36)
MCHC RBC-ENTMCNC: 32.9 GM/DL — SIGNIFICANT CHANGE UP (ref 32–36)
MCHC RBC-ENTMCNC: 32.9 PG — SIGNIFICANT CHANGE UP (ref 27–34)
MCHC RBC-ENTMCNC: 33.2 PG — SIGNIFICANT CHANGE UP (ref 27–34)
MCHC RBC-ENTMCNC: 33.2 PG — SIGNIFICANT CHANGE UP (ref 27–34)
MCHC RBC-ENTMCNC: 33.3 GM/DL — SIGNIFICANT CHANGE UP (ref 32–36)
MCHC RBC-ENTMCNC: 33.4 PG — SIGNIFICANT CHANGE UP (ref 27–34)
MCHC RBC-ENTMCNC: 33.4 PG — SIGNIFICANT CHANGE UP (ref 27–34)
MCHC RBC-ENTMCNC: 33.5 GM/DL — SIGNIFICANT CHANGE UP (ref 32–36)
MCHC RBC-ENTMCNC: 33.5 GM/DL — SIGNIFICANT CHANGE UP (ref 32–36)
MCHC RBC-ENTMCNC: 33.5 PG — SIGNIFICANT CHANGE UP (ref 27–34)
MCHC RBC-ENTMCNC: 33.5 PG — SIGNIFICANT CHANGE UP (ref 27–34)
MCHC RBC-ENTMCNC: 33.6 GM/DL — SIGNIFICANT CHANGE UP (ref 32–36)
MCHC RBC-ENTMCNC: 33.6 GM/DL — SIGNIFICANT CHANGE UP (ref 32–36)
MCHC RBC-ENTMCNC: 33.7 GM/DL — SIGNIFICANT CHANGE UP (ref 32–36)
MCHC RBC-ENTMCNC: 33.7 PG — SIGNIFICANT CHANGE UP (ref 27–34)
MCHC RBC-ENTMCNC: 33.7 PG — SIGNIFICANT CHANGE UP (ref 27–34)
MCHC RBC-ENTMCNC: 33.8 GM/DL — SIGNIFICANT CHANGE UP (ref 32–36)
MCHC RBC-ENTMCNC: 33.8 GM/DL — SIGNIFICANT CHANGE UP (ref 32–36)
MCHC RBC-ENTMCNC: 33.8 PG — SIGNIFICANT CHANGE UP (ref 27–34)
MCHC RBC-ENTMCNC: 33.9 GM/DL — SIGNIFICANT CHANGE UP (ref 32–36)
MCHC RBC-ENTMCNC: 33.9 PG — SIGNIFICANT CHANGE UP (ref 27–34)
MCHC RBC-ENTMCNC: 34 GM/DL — SIGNIFICANT CHANGE UP (ref 32–36)
MCHC RBC-ENTMCNC: 34 PG — SIGNIFICANT CHANGE UP (ref 27–34)
MCHC RBC-ENTMCNC: 34 PG — SIGNIFICANT CHANGE UP (ref 27–34)
MCHC RBC-ENTMCNC: 34.1 GM/DL — SIGNIFICANT CHANGE UP (ref 32–36)
MCHC RBC-ENTMCNC: 34.1 PG — HIGH (ref 27–34)
MCHC RBC-ENTMCNC: 34.2 PG — HIGH (ref 27–34)
MCHC RBC-ENTMCNC: 34.3 GM/DL — SIGNIFICANT CHANGE UP (ref 32–36)
MCHC RBC-ENTMCNC: 34.3 PG — HIGH (ref 27–34)
MCHC RBC-ENTMCNC: 34.4 GM/DL — SIGNIFICANT CHANGE UP (ref 32–36)
MCHC RBC-ENTMCNC: 34.4 PG — HIGH (ref 27–34)
MCHC RBC-ENTMCNC: 34.5 GM/DL — SIGNIFICANT CHANGE UP (ref 32–36)
MCHC RBC-ENTMCNC: 34.5 PG — HIGH (ref 27–34)
MCHC RBC-ENTMCNC: 34.6 GM/DL — SIGNIFICANT CHANGE UP (ref 32–36)
MCHC RBC-ENTMCNC: 34.6 PG — HIGH (ref 27–34)
MCHC RBC-ENTMCNC: 34.7 GM/DL — SIGNIFICANT CHANGE UP (ref 32–36)
MCHC RBC-ENTMCNC: 34.7 PG — HIGH (ref 27–34)
MCHC RBC-ENTMCNC: 34.8 GM/DL — SIGNIFICANT CHANGE UP (ref 32–36)
MCHC RBC-ENTMCNC: 34.8 PG — HIGH (ref 27–34)
MCHC RBC-ENTMCNC: 34.8 PG — HIGH (ref 27–34)
MCHC RBC-ENTMCNC: 34.9 GM/DL — SIGNIFICANT CHANGE UP (ref 32–36)
MCHC RBC-ENTMCNC: 34.9 PG — HIGH (ref 27–34)
MCHC RBC-ENTMCNC: 35 GM/DL — SIGNIFICANT CHANGE UP (ref 32–36)
MCHC RBC-ENTMCNC: 35 PG — HIGH (ref 27–34)
MCHC RBC-ENTMCNC: 35.2 GM/DL — SIGNIFICANT CHANGE UP (ref 32–36)
MCHC RBC-ENTMCNC: 35.2 GM/DL — SIGNIFICANT CHANGE UP (ref 32–36)
MCHC RBC-ENTMCNC: 35.3 PG — HIGH (ref 27–34)
MCHC RBC-ENTMCNC: 35.4 PG — HIGH (ref 27–34)
MCHC RBC-ENTMCNC: 35.5 PG — HIGH (ref 27–34)
MCHC RBC-ENTMCNC: 35.7 GM/DL — SIGNIFICANT CHANGE UP (ref 32–36)
MCHC RBC-ENTMCNC: 35.7 PG — HIGH (ref 27–34)
MCHC RBC-ENTMCNC: 35.8 GM/DL — SIGNIFICANT CHANGE UP (ref 32–36)
MCHC RBC-ENTMCNC: 36 GM/DL — SIGNIFICANT CHANGE UP (ref 32–36)
MCHC RBC-ENTMCNC: 36 PG — HIGH (ref 27–34)
MCHC RBC-ENTMCNC: 36.1 GM/DL — HIGH (ref 32–36)
MCHC RBC-ENTMCNC: 36.1 PG — HIGH (ref 27–34)
MCHC RBC-ENTMCNC: 36.1 PG — HIGH (ref 27–34)
MCHC RBC-ENTMCNC: 36.2 GM/DL — HIGH (ref 32–36)
MCHC RBC-ENTMCNC: 36.3 GM/DL — HIGH (ref 32–36)
MCHC RBC-ENTMCNC: 36.4 PG — HIGH (ref 27–34)
MCHC RBC-ENTMCNC: 36.5 PG — HIGH (ref 27–34)
MCV RBC AUTO: 100 FL — SIGNIFICANT CHANGE UP (ref 80–100)
MCV RBC AUTO: 101 FL — HIGH (ref 80–100)
MCV RBC AUTO: 102 FL — HIGH (ref 80–100)
MCV RBC AUTO: 103 FL — HIGH (ref 80–100)
MCV RBC AUTO: 104 FL — HIGH (ref 80–100)
MCV RBC AUTO: 105 FL — HIGH (ref 80–100)
MCV RBC AUTO: 106 FL — HIGH (ref 80–100)
MCV RBC AUTO: 108 FL — HIGH (ref 80–100)
MCV RBC AUTO: 109 FL — HIGH (ref 80–100)
MCV RBC AUTO: 110 FL — HIGH (ref 80–100)
MCV RBC AUTO: 96.9 FL — SIGNIFICANT CHANGE UP (ref 80–100)
MCV RBC AUTO: 97.7 FL — SIGNIFICANT CHANGE UP (ref 80–100)
MCV RBC AUTO: 98 FL — SIGNIFICANT CHANGE UP (ref 80–100)
MCV RBC AUTO: 98.1 FL — SIGNIFICANT CHANGE UP (ref 80–100)
MCV RBC AUTO: 98.3 FL — SIGNIFICANT CHANGE UP (ref 80–100)
MCV RBC AUTO: 98.7 FL — SIGNIFICANT CHANGE UP (ref 80–100)
MCV RBC AUTO: 98.7 FL — SIGNIFICANT CHANGE UP (ref 80–100)
METAMYELOCYTES # FLD: 2 % — HIGH (ref 0–0)
METAMYELOCYTES # FLD: 2 % — HIGH (ref 0–0)
METAMYELOCYTES # FLD: 4 % — HIGH (ref 0–0)
METAMYELOCYTES # FLD: 4 % — HIGH (ref 0–0)
METAMYELOCYTES # FLD: 5 % — HIGH (ref 0–0)
METAMYELOCYTES # FLD: 7 % — HIGH (ref 0–0)
METHOD TYPE: SIGNIFICANT CHANGE UP
MONOCYTES # BLD AUTO: 0.5 K/UL — SIGNIFICANT CHANGE UP (ref 0–0.9)
MONOCYTES # BLD AUTO: 0.6 K/UL — SIGNIFICANT CHANGE UP (ref 0–0.9)
MONOCYTES # BLD AUTO: 0.6 K/UL — SIGNIFICANT CHANGE UP (ref 0–0.9)
MONOCYTES # BLD AUTO: 1 K/UL — HIGH (ref 0–0.9)
MONOCYTES # BLD AUTO: 1.1 K/UL — HIGH (ref 0–0.9)
MONOCYTES # BLD AUTO: 1.2 K/UL — HIGH (ref 0–0.9)
MONOCYTES # BLD AUTO: 1.3 K/UL — HIGH (ref 0–0.9)
MONOCYTES # BLD AUTO: 1.4 K/UL — HIGH (ref 0–0.9)
MONOCYTES # BLD AUTO: 1.4 K/UL — HIGH (ref 0–0.9)
MONOCYTES # BLD AUTO: 1.5 K/UL — HIGH (ref 0–0.9)
MONOCYTES # BLD AUTO: 1.6 K/UL — HIGH (ref 0–0.9)
MONOCYTES # BLD AUTO: 2.2 K/UL — HIGH (ref 0–0.9)
MONOCYTES NFR BLD AUTO: 10 % — SIGNIFICANT CHANGE UP (ref 2–14)
MONOCYTES NFR BLD AUTO: 10.6 % — SIGNIFICANT CHANGE UP (ref 2–14)
MONOCYTES NFR BLD AUTO: 11 % — SIGNIFICANT CHANGE UP (ref 2–14)
MONOCYTES NFR BLD AUTO: 11 % — SIGNIFICANT CHANGE UP (ref 2–14)
MONOCYTES NFR BLD AUTO: 13 % — SIGNIFICANT CHANGE UP (ref 2–14)
MONOCYTES NFR BLD AUTO: 2 % — SIGNIFICANT CHANGE UP (ref 2–14)
MONOCYTES NFR BLD AUTO: 3 % — SIGNIFICANT CHANGE UP (ref 2–14)
MONOCYTES NFR BLD AUTO: 4 % — SIGNIFICANT CHANGE UP (ref 2–14)
MONOCYTES NFR BLD AUTO: 4.9 % — SIGNIFICANT CHANGE UP (ref 2–14)
MONOCYTES NFR BLD AUTO: 5 % — SIGNIFICANT CHANGE UP (ref 2–14)
MONOCYTES NFR BLD AUTO: 6 % — SIGNIFICANT CHANGE UP (ref 2–14)
MONOCYTES NFR BLD AUTO: 6.1 % — SIGNIFICANT CHANGE UP (ref 1–9)
MONOCYTES NFR BLD AUTO: 6.9 % — SIGNIFICANT CHANGE UP (ref 2–14)
MONOCYTES NFR BLD AUTO: 7 % — SIGNIFICANT CHANGE UP (ref 2–14)
MONOCYTES NFR BLD AUTO: 7.3 % — SIGNIFICANT CHANGE UP (ref 2–14)
MONOCYTES NFR BLD AUTO: 7.4 % — SIGNIFICANT CHANGE UP (ref 2–14)
MONOCYTES NFR BLD AUTO: 7.6 % — SIGNIFICANT CHANGE UP (ref 2–14)
MONOCYTES NFR BLD AUTO: 7.7 % — SIGNIFICANT CHANGE UP (ref 2–14)
MONOCYTES NFR BLD AUTO: 7.8 % — SIGNIFICANT CHANGE UP (ref 2–14)
MONOCYTES NFR BLD AUTO: 8 % — SIGNIFICANT CHANGE UP (ref 2–14)
MONOCYTES NFR BLD AUTO: 8.5 % — SIGNIFICANT CHANGE UP (ref 2–14)
MONOCYTES NFR BLD AUTO: 8.6 % — SIGNIFICANT CHANGE UP (ref 2–14)
MYELOCYTES NFR BLD: 1 % — HIGH (ref 0–0)
MYELOCYTES NFR BLD: 2 % — HIGH (ref 0–0)
MYELOCYTES NFR BLD: 2 % — HIGH (ref 0–0)
MYELOCYTES NFR BLD: 4 % — HIGH (ref 0–0)
MYELOCYTES NFR BLD: 6 % — HIGH (ref 0–0)
MYELOCYTES NFR BLD: 6 % — HIGH (ref 0–0)
MYELOCYTES NFR BLD: 9 % — HIGH (ref 0–0)
MYOGLOBIN SERPL-MCNC: 1376 NG/ML — HIGH (ref 16–96)
MYOGLOBIN SERPL-MCNC: 1466 NG/ML — HIGH (ref 0–116)
MYOGLOBIN SERPL-MCNC: 1603 NG/ML — HIGH (ref 16–96)
MYOGLOBIN SERPL-MCNC: 1763 NG/ML — HIGH (ref 0–116)
MYOGLOBIN SERPL-MCNC: 1833 NG/ML — HIGH (ref 16–96)
MYOGLOBIN SERPL-MCNC: 2120 NG/ML — HIGH (ref 16–96)
MYOGLOBIN SERPL-MCNC: 2206 NG/ML — HIGH (ref 16–96)
MYOGLOBIN SERPL-MCNC: 2234 NG/ML — HIGH (ref 0–116)
MYOGLOBIN SERPL-MCNC: 2539 NG/ML — HIGH (ref 0–116)
MYOGLOBIN SERPL-MCNC: 2834 NG/ML — HIGH (ref 16–96)
MYOGLOBIN SERPL-MCNC: 3571 NG/ML — HIGH (ref 0–116)
MYOGLOBIN SERPL-MCNC: 4002 NG/ML — HIGH (ref 16–96)
MYOGLOBIN SERPL-MCNC: 4510 NG/ML — HIGH (ref 16–96)
MYOGLOBIN SERPL-MCNC: 6969 NG/ML — HIGH (ref 0–116)
NEUTROPHILS # BLD AUTO: 11.2 K/UL — HIGH (ref 1.8–7.4)
NEUTROPHILS # BLD AUTO: 11.8 K/UL — HIGH (ref 1.8–7.4)
NEUTROPHILS # BLD AUTO: 12 K/UL — HIGH (ref 1.8–7.4)
NEUTROPHILS # BLD AUTO: 12.4 K/UL — HIGH (ref 1.8–7.4)
NEUTROPHILS # BLD AUTO: 12.5 K/UL — HIGH (ref 1.8–7.4)
NEUTROPHILS # BLD AUTO: 13.7 K/UL — HIGH (ref 1.8–7.4)
NEUTROPHILS # BLD AUTO: 13.8 K/UL — HIGH (ref 1.8–7.4)
NEUTROPHILS # BLD AUTO: 14 K/UL — HIGH (ref 1.8–7.4)
NEUTROPHILS # BLD AUTO: 15.1 K/UL — HIGH (ref 1.8–7.4)
NEUTROPHILS # BLD AUTO: 15.2 K/UL — HIGH (ref 1.8–7.4)
NEUTROPHILS # BLD AUTO: 16.4 K/UL — HIGH (ref 1.8–7.4)
NEUTROPHILS # BLD AUTO: 16.5 K/UL — HIGH (ref 1.8–7.4)
NEUTROPHILS # BLD AUTO: 16.7 K/UL — HIGH (ref 1.8–7.4)
NEUTROPHILS # BLD AUTO: 16.9 K/UL — HIGH (ref 1.8–7.4)
NEUTROPHILS # BLD AUTO: 17.6 K/UL — HIGH (ref 1.8–7.4)
NEUTROPHILS # BLD AUTO: 17.8 K/UL — HIGH (ref 1.8–7.4)
NEUTROPHILS # BLD AUTO: 18.4 K/UL — HIGH (ref 1.8–7.4)
NEUTROPHILS # BLD AUTO: 18.8 K/UL — HIGH (ref 1.8–7.4)
NEUTROPHILS # BLD AUTO: 19 K/UL — HIGH (ref 1.8–7.4)
NEUTROPHILS # BLD AUTO: 19.5 K/UL — HIGH (ref 1.8–7.4)
NEUTROPHILS # BLD AUTO: 20.1 K/UL — HIGH (ref 1.8–7.4)
NEUTROPHILS # BLD AUTO: 22.4 K/UL — HIGH (ref 1.8–7.4)
NEUTROPHILS # BLD AUTO: 5.4 K/UL — SIGNIFICANT CHANGE UP (ref 1.8–7.4)
NEUTROPHILS # BLD AUTO: SIGNIFICANT CHANGE UP (ref 1.8–7.4)
NEUTROPHILS NFR BLD AUTO: 57.6 % — SIGNIFICANT CHANGE UP (ref 43–77)
NEUTROPHILS NFR BLD AUTO: 62 % — SIGNIFICANT CHANGE UP (ref 43–77)
NEUTROPHILS NFR BLD AUTO: 68 % — SIGNIFICANT CHANGE UP (ref 43–77)
NEUTROPHILS NFR BLD AUTO: 69.5 % — SIGNIFICANT CHANGE UP (ref 43–77)
NEUTROPHILS NFR BLD AUTO: 72 % — SIGNIFICANT CHANGE UP (ref 43–77)
NEUTROPHILS NFR BLD AUTO: 73 % — SIGNIFICANT CHANGE UP (ref 43–77)
NEUTROPHILS NFR BLD AUTO: 74 % — SIGNIFICANT CHANGE UP (ref 43–77)
NEUTROPHILS NFR BLD AUTO: 75 % — SIGNIFICANT CHANGE UP (ref 43–77)
NEUTROPHILS NFR BLD AUTO: 77 % — SIGNIFICANT CHANGE UP (ref 43–77)
NEUTROPHILS NFR BLD AUTO: 78 % — HIGH (ref 43–77)
NEUTROPHILS NFR BLD AUTO: 78 % — HIGH (ref 43–77)
NEUTROPHILS NFR BLD AUTO: 79 % — HIGH (ref 43–77)
NEUTROPHILS NFR BLD AUTO: 79.9 % — HIGH (ref 43–77)
NEUTROPHILS NFR BLD AUTO: 80 % — HIGH (ref 43–77)
NEUTROPHILS NFR BLD AUTO: 80 % — HIGH (ref 43–77)
NEUTROPHILS NFR BLD AUTO: 83.9 % — HIGH (ref 43–77)
NEUTROPHILS NFR BLD AUTO: 84.7 % — HIGH (ref 43–77)
NEUTROPHILS NFR BLD AUTO: 84.7 % — HIGH (ref 43–77)
NEUTROPHILS NFR BLD AUTO: 84.8 % — HIGH (ref 43–77)
NEUTROPHILS NFR BLD AUTO: 85 % — HIGH (ref 43–77)
NEUTROPHILS NFR BLD AUTO: 86.8 % — HIGH (ref 43–77)
NEUTROPHILS NFR BLD AUTO: 87 % — HIGH (ref 43–77)
NEUTROPHILS NFR BLD AUTO: 87.3 % — HIGH (ref 43–77)
NEUTROPHILS NFR BLD AUTO: 88.1 % — HIGH (ref 43–77)
NEUTROPHILS NFR BLD AUTO: 88.7 % — HIGH (ref 43–77)
NEUTROPHILS NFR BLD AUTO: 88.7 % — HIGH (ref 43–77)
NEUTS BAND # BLD: 1 % — SIGNIFICANT CHANGE UP (ref 0–8)
NEUTS BAND # BLD: 2 % — SIGNIFICANT CHANGE UP (ref 0–8)
NEUTS BAND # BLD: 2 % — SIGNIFICANT CHANGE UP (ref 0–8)
NEUTS BAND # BLD: 3 % — SIGNIFICANT CHANGE UP (ref 0–8)
NEUTS BAND # BLD: 4 % — SIGNIFICANT CHANGE UP (ref 0–8)
NEUTS BAND # BLD: 5 % — SIGNIFICANT CHANGE UP (ref 0–8)
NITRITE UR-MCNC: NEGATIVE — SIGNIFICANT CHANGE UP
NRBC # BLD: 1 /100 — HIGH (ref 0–0)
NRBC # BLD: 13 /100 — HIGH (ref 0–0)
NRBC # BLD: 13 /100 — HIGH (ref 0–0)
NRBC # BLD: 27 /100 — HIGH (ref 0–0)
NRBC # BLD: 28 /100 — HIGH (ref 0–0)
NRBC # BLD: 4 /100 — HIGH (ref 0–0)
NT-PROBNP SERPL-SCNC: 3344 PG/ML — HIGH (ref 0–300)
O2 CT VFR BLD CALC: 25 MMHG — LOW (ref 30–65)
O2 CT VFR BLD CALC: 25 MMHG — LOW (ref 30–65)
O2 CT VFR BLD CALC: 27 MMHG — LOW (ref 30–65)
O2 CT VFR BLD CALC: 28 MMHG — LOW (ref 30–65)
O2 CT VFR BLD CALC: 29 MMHG — LOW (ref 30–65)
O2 CT VFR BLD CALC: 30 MMHG — SIGNIFICANT CHANGE UP (ref 30–65)
O2 CT VFR BLD CALC: 31 MMHG — SIGNIFICANT CHANGE UP (ref 30–65)
O2 CT VFR BLD CALC: 34 MMHG — SIGNIFICANT CHANGE UP (ref 30–65)
O2 CT VFR BLD CALC: 35 MMHG — SIGNIFICANT CHANGE UP (ref 30–65)
O2 CT VFR BLD CALC: 36 MMHG — SIGNIFICANT CHANGE UP (ref 30–65)
O2 CT VFR BLD CALC: 37 MMHG — SIGNIFICANT CHANGE UP (ref 30–65)
O2 CT VFR BLD CALC: 38 MMHG — SIGNIFICANT CHANGE UP (ref 30–65)
O2 CT VFR BLD CALC: 39 MMHG — SIGNIFICANT CHANGE UP (ref 30–65)
O2 CT VFR BLD CALC: 39 MMHG — SIGNIFICANT CHANGE UP (ref 30–65)
O2 CT VFR BLD CALC: 40 MMHG — SIGNIFICANT CHANGE UP (ref 30–65)
O2 CT VFR BLD CALC: 41 MMHG — SIGNIFICANT CHANGE UP (ref 30–65)
O2 CT VFR BLD CALC: 52 MMHG — SIGNIFICANT CHANGE UP (ref 30–65)
ORGANISM # SPEC MICROSCOPIC CNT: SIGNIFICANT CHANGE UP
ORGANISM # SPEC MICROSCOPIC CNT: SIGNIFICANT CHANGE UP
OTHER CELLS CSF MANUAL: 4 ML/DL — LOW (ref 18–22)
OTHER CELLS CSF MANUAL: 6 ML/DL — LOW (ref 18–22)
OTHER CELLS CSF MANUAL: 7 ML/DL — LOW (ref 18–22)
OVALOCYTES BLD QL SMEAR: SLIGHT — SIGNIFICANT CHANGE UP
OVALOCYTES BLD QL SMEAR: SLIGHT — SIGNIFICANT CHANGE UP
P-ANCA SER-ACNC: NEGATIVE — SIGNIFICANT CHANGE UP
PCO2 BLDA: 29 MMHG — LOW (ref 32–46)
PCO2 BLDMV: 30 MMHG — SIGNIFICANT CHANGE UP (ref 30–65)
PCO2 BLDMV: 31 MMHG — SIGNIFICANT CHANGE UP (ref 30–65)
PCO2 BLDMV: 31 MMHG — SIGNIFICANT CHANGE UP (ref 30–65)
PCO2 BLDMV: 32 MMHG — SIGNIFICANT CHANGE UP (ref 30–65)
PCO2 BLDMV: 33 MMHG — SIGNIFICANT CHANGE UP (ref 30–65)
PCO2 BLDMV: 34 MMHG — SIGNIFICANT CHANGE UP (ref 30–65)
PCO2 BLDMV: 34 MMHG — SIGNIFICANT CHANGE UP (ref 30–65)
PCO2 BLDMV: 35 MMHG — SIGNIFICANT CHANGE UP (ref 30–65)
PCO2 BLDMV: 36 MMHG — SIGNIFICANT CHANGE UP (ref 30–65)
PCO2 BLDMV: 38 MMHG — SIGNIFICANT CHANGE UP (ref 30–65)
PCO2 BLDMV: 39 MMHG — SIGNIFICANT CHANGE UP (ref 30–65)
PCO2 BLDMV: 39 MMHG — SIGNIFICANT CHANGE UP (ref 30–65)
PCO2 BLDMV: 40 MMHG — SIGNIFICANT CHANGE UP (ref 30–65)
PCO2 BLDMV: 41 MMHG — SIGNIFICANT CHANGE UP (ref 30–65)
PCO2 BLDMV: 42 MMHG — SIGNIFICANT CHANGE UP (ref 30–65)
PCO2 BLDMV: 43 MMHG — SIGNIFICANT CHANGE UP (ref 30–65)
PCO2 BLDMV: 44 MMHG — SIGNIFICANT CHANGE UP (ref 30–65)
PCO2 BLDMV: 44 MMHG — SIGNIFICANT CHANGE UP (ref 30–65)
PCO2 BLDMV: 45 MMHG — SIGNIFICANT CHANGE UP (ref 30–65)
PCO2 BLDMV: 46 MMHG — SIGNIFICANT CHANGE UP (ref 30–65)
PCO2 BLDMV: 47 MMHG — SIGNIFICANT CHANGE UP (ref 30–65)
PCO2 BLDMV: 47 MMHG — SIGNIFICANT CHANGE UP (ref 30–65)
PCO2 BLDMV: 49 MMHG — SIGNIFICANT CHANGE UP (ref 30–65)
PCO2 BLDMV: 54 MMHG — SIGNIFICANT CHANGE UP (ref 30–65)
PCO2 BLDV: 28 MMHG — LOW (ref 35–50)
PCO2 BLDV: 32 MMHG — LOW (ref 35–50)
PCO2 BLDV: 33 MMHG — LOW (ref 35–50)
PCO2 BLDV: 35 MMHG — SIGNIFICANT CHANGE UP (ref 35–50)
PCO2 BLDV: 36 MMHG — SIGNIFICANT CHANGE UP (ref 35–50)
PCO2 BLDV: 36 MMHG — SIGNIFICANT CHANGE UP (ref 35–50)
PCO2 BLDV: 37 MMHG — SIGNIFICANT CHANGE UP (ref 35–50)
PCO2 BLDV: 37 MMHG — SIGNIFICANT CHANGE UP (ref 35–50)
PCO2 BLDV: 38 MMHG — SIGNIFICANT CHANGE UP (ref 35–50)
PCO2 BLDV: 40 MMHG — SIGNIFICANT CHANGE UP (ref 35–50)
PCO2 BLDV: 40 MMHG — SIGNIFICANT CHANGE UP (ref 35–50)
PCO2 BLDV: 42 MMHG — SIGNIFICANT CHANGE UP (ref 35–50)
PCO2 BLDV: 48 MMHG — SIGNIFICANT CHANGE UP (ref 35–50)
PH BLDA: 7.56 — HIGH (ref 7.35–7.45)
PH BLDMV: 7.21 — LOW (ref 7.32–7.45)
PH BLDMV: 7.26 — LOW (ref 7.32–7.45)
PH BLDMV: 7.27 — LOW (ref 7.32–7.45)
PH BLDMV: 7.28 — LOW (ref 7.32–7.45)
PH BLDMV: 7.28 — LOW (ref 7.32–7.45)
PH BLDMV: 7.29 — LOW (ref 7.32–7.45)
PH BLDMV: 7.3 — LOW (ref 7.32–7.45)
PH BLDMV: 7.32 — SIGNIFICANT CHANGE UP (ref 7.32–7.45)
PH BLDMV: 7.33 — SIGNIFICANT CHANGE UP (ref 7.32–7.45)
PH BLDMV: 7.36 — SIGNIFICANT CHANGE UP (ref 7.32–7.45)
PH BLDMV: 7.36 — SIGNIFICANT CHANGE UP (ref 7.32–7.45)
PH BLDMV: 7.37 — SIGNIFICANT CHANGE UP (ref 7.32–7.45)
PH BLDMV: 7.39 — SIGNIFICANT CHANGE UP (ref 7.32–7.45)
PH BLDMV: 7.4 — SIGNIFICANT CHANGE UP (ref 7.32–7.45)
PH BLDMV: 7.4 — SIGNIFICANT CHANGE UP (ref 7.32–7.45)
PH BLDMV: 7.41 — SIGNIFICANT CHANGE UP (ref 7.32–7.45)
PH BLDMV: 7.42 — SIGNIFICANT CHANGE UP (ref 7.32–7.45)
PH BLDMV: 7.43 — SIGNIFICANT CHANGE UP (ref 7.32–7.45)
PH BLDMV: 7.44 — SIGNIFICANT CHANGE UP (ref 7.32–7.45)
PH BLDMV: 7.47 — HIGH (ref 7.32–7.45)
PH BLDMV: 7.5 — HIGH (ref 7.32–7.45)
PH BLDMV: 7.51 — HIGH (ref 7.32–7.45)
PH BLDMV: 7.54 — HIGH (ref 7.32–7.45)
PH BLDMV: 7.55 — HIGH (ref 7.32–7.45)
PH BLDV: 7.23 — LOW (ref 7.35–7.45)
PH BLDV: 7.37 — SIGNIFICANT CHANGE UP (ref 7.35–7.45)
PH BLDV: 7.44 — SIGNIFICANT CHANGE UP (ref 7.35–7.45)
PH BLDV: 7.45 — SIGNIFICANT CHANGE UP (ref 7.35–7.45)
PH BLDV: 7.45 — SIGNIFICANT CHANGE UP (ref 7.35–7.45)
PH BLDV: 7.46 — HIGH (ref 7.35–7.45)
PH BLDV: 7.47 — HIGH (ref 7.35–7.45)
PH BLDV: 7.48 — HIGH (ref 7.35–7.45)
PH BLDV: 7.48 — HIGH (ref 7.35–7.45)
PH BLDV: 7.49 — HIGH (ref 7.35–7.45)
PH BLDV: 7.49 — HIGH (ref 7.35–7.45)
PH BLDV: 7.52 — HIGH (ref 7.35–7.45)
PH BLDV: 7.53 — HIGH (ref 7.35–7.45)
PH UR: 6.5 — SIGNIFICANT CHANGE UP (ref 5–8)
PHOSPHATE SERPL-MCNC: 1.6 MG/DL — LOW (ref 2.5–4.5)
PHOSPHATE SERPL-MCNC: 1.8 MG/DL — LOW (ref 2.5–4.5)
PHOSPHATE SERPL-MCNC: 1.9 MG/DL — LOW (ref 2.5–4.5)
PHOSPHATE SERPL-MCNC: 1.9 MG/DL — LOW (ref 2.5–4.5)
PHOSPHATE SERPL-MCNC: 2 MG/DL — LOW (ref 2.5–4.5)
PHOSPHATE SERPL-MCNC: 2.1 MG/DL — LOW (ref 2.5–4.5)
PHOSPHATE SERPL-MCNC: 2.2 MG/DL — LOW (ref 2.5–4.5)
PHOSPHATE SERPL-MCNC: 2.3 MG/DL — LOW (ref 2.5–4.5)
PHOSPHATE SERPL-MCNC: 2.4 MG/DL — LOW (ref 2.5–4.5)
PHOSPHATE SERPL-MCNC: 2.5 MG/DL — SIGNIFICANT CHANGE UP (ref 2.5–4.5)
PHOSPHATE SERPL-MCNC: 2.5 MG/DL — SIGNIFICANT CHANGE UP (ref 2.5–4.5)
PHOSPHATE SERPL-MCNC: 2.6 MG/DL — SIGNIFICANT CHANGE UP (ref 2.5–4.5)
PHOSPHATE SERPL-MCNC: 2.6 MG/DL — SIGNIFICANT CHANGE UP (ref 2.5–4.5)
PHOSPHATE SERPL-MCNC: 2.7 MG/DL — SIGNIFICANT CHANGE UP (ref 2.5–4.5)
PHOSPHATE SERPL-MCNC: 2.7 MG/DL — SIGNIFICANT CHANGE UP (ref 2.5–4.5)
PHOSPHATE SERPL-MCNC: 2.8 MG/DL — SIGNIFICANT CHANGE UP (ref 2.5–4.5)
PHOSPHATE SERPL-MCNC: 2.9 MG/DL — SIGNIFICANT CHANGE UP (ref 2.5–4.5)
PHOSPHATE SERPL-MCNC: 3.1 MG/DL — SIGNIFICANT CHANGE UP (ref 2.5–4.5)
PHOSPHATE SERPL-MCNC: 3.1 MG/DL — SIGNIFICANT CHANGE UP (ref 2.5–4.5)
PHOSPHATE SERPL-MCNC: 3.2 MG/DL — SIGNIFICANT CHANGE UP (ref 2.5–4.5)
PHOSPHATE SERPL-MCNC: 3.3 MG/DL — SIGNIFICANT CHANGE UP (ref 2.5–4.5)
PHOSPHATE SERPL-MCNC: 3.3 MG/DL — SIGNIFICANT CHANGE UP (ref 2.5–4.5)
PHOSPHATE SERPL-MCNC: 3.5 MG/DL — SIGNIFICANT CHANGE UP (ref 2.5–4.5)
PHOSPHATE SERPL-MCNC: 3.5 MG/DL — SIGNIFICANT CHANGE UP (ref 2.5–4.5)
PHOSPHATE SERPL-MCNC: 3.7 MG/DL — SIGNIFICANT CHANGE UP (ref 2.5–4.5)
PHOSPHATE SERPL-MCNC: 3.8 MG/DL — SIGNIFICANT CHANGE UP (ref 2.5–4.5)
PHOSPHATE SERPL-MCNC: 4.2 MG/DL — SIGNIFICANT CHANGE UP (ref 2.5–4.5)
PHOSPHATE SERPL-MCNC: 4.3 MG/DL — SIGNIFICANT CHANGE UP (ref 2.5–4.5)
PHOSPHATE SERPL-MCNC: 4.4 MG/DL — SIGNIFICANT CHANGE UP (ref 2.5–4.5)
PHOSPHATE SERPL-MCNC: 4.5 MG/DL — SIGNIFICANT CHANGE UP (ref 2.5–4.5)
PHOSPHATE SERPL-MCNC: 4.6 MG/DL — HIGH (ref 2.5–4.5)
PHOSPHATE SERPL-MCNC: 4.6 MG/DL — HIGH (ref 2.5–4.5)
PHOSPHATE SERPL-MCNC: 4.7 MG/DL — HIGH (ref 2.5–4.5)
PHOSPHATE SERPL-MCNC: 4.8 MG/DL — HIGH (ref 2.5–4.5)
PHOSPHATE SERPL-MCNC: 5 MG/DL — HIGH (ref 2.5–4.5)
PHOSPHATE SERPL-MCNC: 5.6 MG/DL — HIGH (ref 2.5–4.5)
PHOSPHATE SERPL-MCNC: 5.7 MG/DL — HIGH (ref 2.5–4.5)
PHOSPHATE SERPL-MCNC: 6 MG/DL — HIGH (ref 2.5–4.5)
PLAT MORPH BLD: NORMAL — SIGNIFICANT CHANGE UP
PLATELET # BLD AUTO: 102 K/UL — LOW (ref 150–400)
PLATELET # BLD AUTO: 104 K/UL — LOW (ref 150–400)
PLATELET # BLD AUTO: 109 K/UL — LOW (ref 150–400)
PLATELET # BLD AUTO: 112 K/UL — LOW (ref 150–400)
PLATELET # BLD AUTO: 118 K/UL — LOW (ref 150–400)
PLATELET # BLD AUTO: 120 K/UL — LOW (ref 150–400)
PLATELET # BLD AUTO: 123 K/UL — LOW (ref 150–400)
PLATELET # BLD AUTO: 125 K/UL — LOW (ref 150–400)
PLATELET # BLD AUTO: 126 K/UL — LOW (ref 150–400)
PLATELET # BLD AUTO: 132 K/UL — LOW (ref 150–400)
PLATELET # BLD AUTO: 139 K/UL — LOW (ref 150–400)
PLATELET # BLD AUTO: 142 K/UL — LOW (ref 150–400)
PLATELET # BLD AUTO: 154 K/UL — SIGNIFICANT CHANGE UP (ref 150–400)
PLATELET # BLD AUTO: 157 K/UL — SIGNIFICANT CHANGE UP (ref 150–400)
PLATELET # BLD AUTO: 169 K/UL — SIGNIFICANT CHANGE UP (ref 150–400)
PLATELET # BLD AUTO: 172 K/UL — SIGNIFICANT CHANGE UP (ref 150–400)
PLATELET # BLD AUTO: 173 K/UL — SIGNIFICANT CHANGE UP (ref 150–400)
PLATELET # BLD AUTO: 195 K/UL — SIGNIFICANT CHANGE UP (ref 150–400)
PLATELET # BLD AUTO: 200 K/UL — SIGNIFICANT CHANGE UP (ref 150–400)
PLATELET # BLD AUTO: 207 K/UL — SIGNIFICANT CHANGE UP (ref 150–400)
PLATELET # BLD AUTO: 212 K/UL — SIGNIFICANT CHANGE UP (ref 150–400)
PLATELET # BLD AUTO: 227 K/UL — SIGNIFICANT CHANGE UP (ref 150–400)
PLATELET # BLD AUTO: 230 K/UL — SIGNIFICANT CHANGE UP (ref 150–400)
PLATELET # BLD AUTO: 240 K/UL — SIGNIFICANT CHANGE UP (ref 150–400)
PLATELET # BLD AUTO: 259 K/UL — SIGNIFICANT CHANGE UP (ref 150–400)
PLATELET # BLD AUTO: 267 K/UL — SIGNIFICANT CHANGE UP (ref 150–400)
PLATELET # BLD AUTO: 298 K/UL — SIGNIFICANT CHANGE UP (ref 150–400)
PLATELET # BLD AUTO: 298 K/UL — SIGNIFICANT CHANGE UP (ref 150–400)
PLATELET # BLD AUTO: 309 K/UL — SIGNIFICANT CHANGE UP (ref 150–400)
PLATELET # BLD AUTO: 330 K/UL — SIGNIFICANT CHANGE UP (ref 150–400)
PLATELET # BLD AUTO: 338 K/UL — SIGNIFICANT CHANGE UP (ref 150–400)
PLATELET # BLD AUTO: 365 K/UL — SIGNIFICANT CHANGE UP (ref 150–400)
PLATELET # BLD AUTO: 366 K/UL — SIGNIFICANT CHANGE UP (ref 150–400)
PLATELET # BLD AUTO: 368 K/UL — SIGNIFICANT CHANGE UP (ref 150–400)
PLATELET # BLD AUTO: 374 K/UL — SIGNIFICANT CHANGE UP (ref 150–400)
PLATELET # BLD AUTO: 378 K/UL — SIGNIFICANT CHANGE UP (ref 150–400)
PLATELET # BLD AUTO: 386 K/UL — SIGNIFICANT CHANGE UP (ref 150–400)
PLATELET # BLD AUTO: 392 K/UL — SIGNIFICANT CHANGE UP (ref 150–400)
PLATELET # BLD AUTO: 393 K/UL — SIGNIFICANT CHANGE UP (ref 150–400)
PLATELET # BLD AUTO: 404 K/UL — HIGH (ref 150–400)
PLATELET # BLD AUTO: 411 K/UL — HIGH (ref 150–400)
PLATELET # BLD AUTO: 412 K/UL — HIGH (ref 150–400)
PLATELET # BLD AUTO: 425 K/UL — HIGH (ref 150–400)
PLATELET # BLD AUTO: 435 K/UL — HIGH (ref 150–400)
PLATELET # BLD AUTO: 450 K/UL — HIGH (ref 150–400)
PLATELET # BLD AUTO: 71 K/UL — LOW (ref 150–400)
PLATELET # BLD AUTO: 78 K/UL — LOW (ref 150–400)
PLATELET # BLD AUTO: 83 K/UL — LOW (ref 150–400)
PLATELET # BLD AUTO: 85 K/UL — LOW (ref 150–400)
PLATELET # BLD AUTO: 89 K/UL — LOW (ref 150–400)
PLATELET # BLD AUTO: 91 K/UL — LOW (ref 150–400)
PLATELET # BLD AUTO: 91 K/UL — LOW (ref 150–400)
PLATELET # BLD AUTO: 92 K/UL — LOW (ref 150–400)
PLATELET # BLD AUTO: 94 K/UL — LOW (ref 150–400)
PLATELET # BLD AUTO: 95 K/UL — LOW (ref 150–400)
PLATELET # BLD AUTO: 98 K/UL — LOW (ref 150–400)
PLATELET # BLD AUTO: 98 K/UL — LOW (ref 150–400)
PO2 BLDA: 81 MMHG — SIGNIFICANT CHANGE UP (ref 74–108)
PO2 BLDV: 21 MMHG — LOW (ref 25–45)
PO2 BLDV: 26 MMHG — SIGNIFICANT CHANGE UP (ref 25–45)
PO2 BLDV: 27 MMHG — SIGNIFICANT CHANGE UP (ref 25–45)
PO2 BLDV: 27 MMHG — SIGNIFICANT CHANGE UP (ref 25–45)
PO2 BLDV: 28 MMHG — SIGNIFICANT CHANGE UP (ref 25–45)
PO2 BLDV: 30 MMHG — SIGNIFICANT CHANGE UP (ref 25–45)
PO2 BLDV: 32 MMHG — SIGNIFICANT CHANGE UP (ref 25–45)
PO2 BLDV: 33 MMHG — SIGNIFICANT CHANGE UP (ref 25–45)
PO2 BLDV: 35 MMHG — SIGNIFICANT CHANGE UP (ref 25–45)
PO2 BLDV: 37 MMHG — SIGNIFICANT CHANGE UP (ref 25–45)
PO2 BLDV: 38 MMHG — SIGNIFICANT CHANGE UP (ref 25–45)
PO2 BLDV: 51 MMHG — HIGH (ref 25–45)
POIKILOCYTOSIS BLD QL AUTO: SLIGHT — SIGNIFICANT CHANGE UP
POLYCHROMASIA BLD QL SMEAR: SIGNIFICANT CHANGE UP
POLYCHROMASIA BLD QL SMEAR: SLIGHT — SIGNIFICANT CHANGE UP
POTASSIUM BLDV-SCNC: 3.5 MMOL/L — SIGNIFICANT CHANGE UP (ref 3.5–5)
POTASSIUM BLDV-SCNC: 4.8 MMOL/L — SIGNIFICANT CHANGE UP (ref 3.5–5)
POTASSIUM BLDV-SCNC: 5.1 MMOL/L — HIGH (ref 3.5–5)
POTASSIUM SERPL-MCNC: 2.9 MMOL/L — CRITICAL LOW (ref 3.5–5.3)
POTASSIUM SERPL-MCNC: 3.3 MMOL/L — LOW (ref 3.5–5.3)
POTASSIUM SERPL-MCNC: 3.3 MMOL/L — LOW (ref 3.5–5.3)
POTASSIUM SERPL-MCNC: 3.5 MMOL/L — SIGNIFICANT CHANGE UP (ref 3.5–5.3)
POTASSIUM SERPL-MCNC: 3.6 MMOL/L — SIGNIFICANT CHANGE UP (ref 3.5–5.3)
POTASSIUM SERPL-MCNC: 3.9 MMOL/L — SIGNIFICANT CHANGE UP (ref 3.5–5.3)
POTASSIUM SERPL-MCNC: 4 MMOL/L — SIGNIFICANT CHANGE UP (ref 3.5–5.3)
POTASSIUM SERPL-MCNC: 4 MMOL/L — SIGNIFICANT CHANGE UP (ref 3.5–5.3)
POTASSIUM SERPL-MCNC: 4.1 MMOL/L — SIGNIFICANT CHANGE UP (ref 3.5–5.3)
POTASSIUM SERPL-MCNC: 4.1 MMOL/L — SIGNIFICANT CHANGE UP (ref 3.5–5.3)
POTASSIUM SERPL-MCNC: 4.2 MMOL/L — SIGNIFICANT CHANGE UP (ref 3.5–5.3)
POTASSIUM SERPL-MCNC: 4.3 MMOL/L — SIGNIFICANT CHANGE UP (ref 3.5–5.3)
POTASSIUM SERPL-MCNC: 4.3 MMOL/L — SIGNIFICANT CHANGE UP (ref 3.5–5.3)
POTASSIUM SERPL-MCNC: 4.4 MMOL/L — SIGNIFICANT CHANGE UP (ref 3.5–5.3)
POTASSIUM SERPL-MCNC: 4.5 MMOL/L — SIGNIFICANT CHANGE UP (ref 3.5–5.3)
POTASSIUM SERPL-MCNC: 4.5 MMOL/L — SIGNIFICANT CHANGE UP (ref 3.5–5.3)
POTASSIUM SERPL-MCNC: 4.6 MMOL/L — SIGNIFICANT CHANGE UP (ref 3.5–5.3)
POTASSIUM SERPL-MCNC: 4.7 MMOL/L — SIGNIFICANT CHANGE UP (ref 3.5–5.3)
POTASSIUM SERPL-MCNC: 4.8 MMOL/L — SIGNIFICANT CHANGE UP (ref 3.5–5.3)
POTASSIUM SERPL-MCNC: 4.9 MMOL/L — SIGNIFICANT CHANGE UP (ref 3.5–5.3)
POTASSIUM SERPL-MCNC: 5 MMOL/L — SIGNIFICANT CHANGE UP (ref 3.5–5.3)
POTASSIUM SERPL-MCNC: 5.1 MMOL/L — SIGNIFICANT CHANGE UP (ref 3.5–5.3)
POTASSIUM SERPL-MCNC: 5.2 MMOL/L — SIGNIFICANT CHANGE UP (ref 3.5–5.3)
POTASSIUM SERPL-MCNC: 5.3 MMOL/L — SIGNIFICANT CHANGE UP (ref 3.5–5.3)
POTASSIUM SERPL-MCNC: 5.4 MMOL/L — HIGH (ref 3.5–5.3)
POTASSIUM SERPL-MCNC: 5.5 MMOL/L — HIGH (ref 3.5–5.3)
POTASSIUM SERPL-MCNC: 5.5 MMOL/L — HIGH (ref 3.5–5.3)
POTASSIUM SERPL-MCNC: 5.7 MMOL/L — HIGH (ref 3.5–5.3)
POTASSIUM SERPL-MCNC: 5.8 MMOL/L — HIGH (ref 3.5–5.3)
POTASSIUM SERPL-MCNC: 5.9 MMOL/L — HIGH (ref 3.5–5.3)
POTASSIUM SERPL-MCNC: >10 MMOL/L — CRITICAL HIGH (ref 3.5–5.3)
POTASSIUM SERPL-SCNC: 2.9 MMOL/L — CRITICAL LOW (ref 3.5–5.3)
POTASSIUM SERPL-SCNC: 3.3 MMOL/L — LOW (ref 3.5–5.3)
POTASSIUM SERPL-SCNC: 3.3 MMOL/L — LOW (ref 3.5–5.3)
POTASSIUM SERPL-SCNC: 3.5 MMOL/L — SIGNIFICANT CHANGE UP (ref 3.5–5.3)
POTASSIUM SERPL-SCNC: 3.6 MMOL/L — SIGNIFICANT CHANGE UP (ref 3.5–5.3)
POTASSIUM SERPL-SCNC: 3.9 MMOL/L — SIGNIFICANT CHANGE UP (ref 3.5–5.3)
POTASSIUM SERPL-SCNC: 4 MMOL/L — SIGNIFICANT CHANGE UP (ref 3.5–5.3)
POTASSIUM SERPL-SCNC: 4 MMOL/L — SIGNIFICANT CHANGE UP (ref 3.5–5.3)
POTASSIUM SERPL-SCNC: 4.1 MMOL/L — SIGNIFICANT CHANGE UP (ref 3.5–5.3)
POTASSIUM SERPL-SCNC: 4.1 MMOL/L — SIGNIFICANT CHANGE UP (ref 3.5–5.3)
POTASSIUM SERPL-SCNC: 4.2 MMOL/L — SIGNIFICANT CHANGE UP (ref 3.5–5.3)
POTASSIUM SERPL-SCNC: 4.3 MMOL/L — SIGNIFICANT CHANGE UP (ref 3.5–5.3)
POTASSIUM SERPL-SCNC: 4.3 MMOL/L — SIGNIFICANT CHANGE UP (ref 3.5–5.3)
POTASSIUM SERPL-SCNC: 4.4 MMOL/L — SIGNIFICANT CHANGE UP (ref 3.5–5.3)
POTASSIUM SERPL-SCNC: 4.5 MMOL/L — SIGNIFICANT CHANGE UP (ref 3.5–5.3)
POTASSIUM SERPL-SCNC: 4.5 MMOL/L — SIGNIFICANT CHANGE UP (ref 3.5–5.3)
POTASSIUM SERPL-SCNC: 4.6 MMOL/L — SIGNIFICANT CHANGE UP (ref 3.5–5.3)
POTASSIUM SERPL-SCNC: 4.7 MMOL/L — SIGNIFICANT CHANGE UP (ref 3.5–5.3)
POTASSIUM SERPL-SCNC: 4.8 MMOL/L — SIGNIFICANT CHANGE UP (ref 3.5–5.3)
POTASSIUM SERPL-SCNC: 4.9 MMOL/L — SIGNIFICANT CHANGE UP (ref 3.5–5.3)
POTASSIUM SERPL-SCNC: 5 MMOL/L — SIGNIFICANT CHANGE UP (ref 3.5–5.3)
POTASSIUM SERPL-SCNC: 5.1 MMOL/L — SIGNIFICANT CHANGE UP (ref 3.5–5.3)
POTASSIUM SERPL-SCNC: 5.2 MMOL/L — SIGNIFICANT CHANGE UP (ref 3.5–5.3)
POTASSIUM SERPL-SCNC: 5.3 MMOL/L — SIGNIFICANT CHANGE UP (ref 3.5–5.3)
POTASSIUM SERPL-SCNC: 5.4 MMOL/L — HIGH (ref 3.5–5.3)
POTASSIUM SERPL-SCNC: 5.5 MMOL/L — HIGH (ref 3.5–5.3)
POTASSIUM SERPL-SCNC: 5.5 MMOL/L — HIGH (ref 3.5–5.3)
POTASSIUM SERPL-SCNC: 5.7 MMOL/L — HIGH (ref 3.5–5.3)
POTASSIUM SERPL-SCNC: 5.8 MMOL/L — HIGH (ref 3.5–5.3)
POTASSIUM SERPL-SCNC: 5.9 MMOL/L — HIGH (ref 3.5–5.3)
POTASSIUM SERPL-SCNC: >10 MMOL/L — CRITICAL HIGH (ref 3.5–5.3)
PROMYELOCYTES # FLD: 2 % — HIGH (ref 0–0)
PROT PATTERN SERPL ELPH-IMP: SIGNIFICANT CHANGE UP
PROT PATTERN SERPL ELPH-IMP: SIGNIFICANT CHANGE UP
PROT SERPL-MCNC: 5 G/DL — LOW (ref 6–8.3)
PROT SERPL-MCNC: 5.2 G/DL — LOW (ref 6–8.3)
PROT SERPL-MCNC: 5.3 G/DL — LOW (ref 6–8.3)
PROT SERPL-MCNC: 5.3 G/DL — LOW (ref 6–8.3)
PROT SERPL-MCNC: 5.4 G/DL — LOW (ref 6–8.3)
PROT SERPL-MCNC: 5.4 G/DL — LOW (ref 6–8.3)
PROT SERPL-MCNC: 5.5 G/DL — LOW (ref 6–8.3)
PROT SERPL-MCNC: 5.6 G/DL — LOW (ref 6–8.3)
PROT SERPL-MCNC: 5.6 G/DL — LOW (ref 6–8.3)
PROT SERPL-MCNC: 5.7 G/DL — LOW (ref 6–8.3)
PROT SERPL-MCNC: 5.8 G/DL — LOW (ref 6–8.3)
PROT SERPL-MCNC: 5.9 G/DL — LOW (ref 6–8.3)
PROT SERPL-MCNC: 6 G/DL — SIGNIFICANT CHANGE UP (ref 6–8.3)
PROT SERPL-MCNC: 6.1 G/DL — SIGNIFICANT CHANGE UP (ref 6–8.3)
PROT SERPL-MCNC: 6.2 G/DL — SIGNIFICANT CHANGE UP (ref 6–8.3)
PROT SERPL-MCNC: 6.3 G/DL — SIGNIFICANT CHANGE UP (ref 6–8.3)
PROT SERPL-MCNC: 6.5 G/DL — SIGNIFICANT CHANGE UP (ref 6–8.3)
PROT SERPL-MCNC: 6.5 G/DL — SIGNIFICANT CHANGE UP (ref 6–8.3)
PROT SERPL-MCNC: 6.6 G/DL — SIGNIFICANT CHANGE UP (ref 6–8.3)
PROT SERPL-MCNC: 7.1 G/DL — SIGNIFICANT CHANGE UP (ref 6–8.3)
PROT UR-MCNC: >600 MG/DL
PROTHROM AB SERPL-ACNC: 11.8 SEC — SIGNIFICANT CHANGE UP (ref 9.8–12.7)
PROTHROM AB SERPL-ACNC: 11.8 SEC — SIGNIFICANT CHANGE UP (ref 9.8–12.7)
PROTHROM AB SERPL-ACNC: 12.4 SEC — SIGNIFICANT CHANGE UP (ref 9.8–12.7)
PROTHROM AB SERPL-ACNC: 12.8 SEC — HIGH (ref 9.8–12.7)
PROTHROM AB SERPL-ACNC: 12.8 SEC — HIGH (ref 9.8–12.7)
PROTHROM AB SERPL-ACNC: 12.9 SEC — HIGH (ref 9.8–12.7)
PROTHROM AB SERPL-ACNC: 13.3 SEC — HIGH (ref 9.8–12.7)
PROTHROM AB SERPL-ACNC: 13.4 SEC — HIGH (ref 9.8–12.7)
PROTHROM AB SERPL-ACNC: 13.4 SEC — HIGH (ref 9.8–12.7)
PROTHROM AB SERPL-ACNC: 13.5 SEC — HIGH (ref 9.8–12.7)
PROTHROM AB SERPL-ACNC: 13.6 SEC — HIGH (ref 9.8–12.7)
PROTHROM AB SERPL-ACNC: 14.3 SEC — HIGH (ref 9.8–12.7)
PROTHROM AB SERPL-ACNC: 14.4 SEC — HIGH (ref 9.8–12.7)
PROTHROM AB SERPL-ACNC: 14.9 SEC — HIGH (ref 9.8–12.7)
PROTHROM AB SERPL-ACNC: 15.7 SEC — HIGH (ref 9.8–12.7)
PROTHROM AB SERPL-ACNC: 16.3 SEC — HIGH (ref 9.8–12.7)
PROTHROM AB SERPL-ACNC: 16.5 SEC — HIGH (ref 9.8–12.7)
PROTHROM AB SERPL-ACNC: 16.5 SEC — HIGH (ref 9.8–12.7)
PROTHROM AB SERPL-ACNC: 16.8 SEC — HIGH (ref 9.8–12.7)
PROTHROM AB SERPL-ACNC: 17.3 SEC — HIGH (ref 9.8–12.7)
PROTHROM AB SERPL-ACNC: 18 SEC — HIGH (ref 9.8–12.7)
PROTHROM AB SERPL-ACNC: 18.6 SEC — HIGH (ref 9.8–12.7)
PROTHROM AB SERPL-ACNC: 18.6 SEC — HIGH (ref 9.8–12.7)
PROTHROM AB SERPL-ACNC: 23.3 SEC — HIGH (ref 9.8–12.7)
RBC # BLD: 2.07 M/UL — LOW (ref 4.2–5.8)
RBC # BLD: 2.13 M/UL — LOW (ref 4.2–5.8)
RBC # BLD: 2.23 M/UL — LOW (ref 4.2–5.8)
RBC # BLD: 2.23 M/UL — LOW (ref 4.2–5.8)
RBC # BLD: 2.26 M/UL — LOW (ref 4.2–5.8)
RBC # BLD: 2.26 M/UL — LOW (ref 4.2–5.8)
RBC # BLD: 2.27 M/UL — LOW (ref 4.2–5.8)
RBC # BLD: 2.28 M/UL — LOW (ref 4.2–5.8)
RBC # BLD: 2.3 M/UL — LOW (ref 4.2–5.8)
RBC # BLD: 2.3 M/UL — LOW (ref 4.2–5.8)
RBC # BLD: 2.31 M/UL — LOW (ref 4.2–5.8)
RBC # BLD: 2.31 M/UL — LOW (ref 4.2–5.8)
RBC # BLD: 2.32 M/UL — LOW (ref 4.2–5.8)
RBC # BLD: 2.35 M/UL — LOW (ref 4.2–5.8)
RBC # BLD: 2.39 M/UL — LOW (ref 4.2–5.8)
RBC # BLD: 2.4 M/UL — LOW (ref 4.2–5.8)
RBC # BLD: 2.41 M/UL — LOW (ref 4.2–5.8)
RBC # BLD: 2.41 M/UL — LOW (ref 4.2–5.8)
RBC # BLD: 2.43 M/UL — LOW (ref 4.2–5.8)
RBC # BLD: 2.43 M/UL — LOW (ref 4.2–5.8)
RBC # BLD: 2.44 M/UL — LOW (ref 4.2–5.8)
RBC # BLD: 2.44 M/UL — LOW (ref 4.2–5.8)
RBC # BLD: 2.45 M/UL — LOW (ref 4.2–5.8)
RBC # BLD: 2.5 M/UL — LOW (ref 4.2–5.8)
RBC # BLD: 2.51 M/UL — LOW (ref 4.2–5.8)
RBC # BLD: 2.54 M/UL — LOW (ref 4.2–5.8)
RBC # BLD: 2.59 M/UL — LOW (ref 4.2–5.8)
RBC # BLD: 2.59 M/UL — LOW (ref 4.2–5.8)
RBC # BLD: 2.6 M/UL — LOW (ref 4.2–5.8)
RBC # BLD: 2.6 M/UL — LOW (ref 4.2–5.8)
RBC # BLD: 2.62 M/UL — LOW (ref 4.2–5.8)
RBC # BLD: 2.63 M/UL — LOW (ref 4.2–5.8)
RBC # BLD: 2.66 M/UL — LOW (ref 4.2–5.8)
RBC # BLD: 2.68 M/UL — LOW (ref 4.2–5.8)
RBC # BLD: 2.69 M/UL — LOW (ref 4.2–5.8)
RBC # BLD: 2.71 M/UL — LOW (ref 4.2–5.8)
RBC # BLD: 2.72 M/UL — LOW (ref 4.2–5.8)
RBC # BLD: 2.74 M/UL — LOW (ref 4.2–5.8)
RBC # BLD: 2.74 M/UL — LOW (ref 4.2–5.8)
RBC # BLD: 2.85 M/UL — LOW (ref 4.2–5.8)
RBC # BLD: 2.9 M/UL — LOW (ref 4.2–5.8)
RBC # BLD: 2.93 M/UL — LOW (ref 4.2–5.8)
RBC # BLD: 2.99 M/UL — LOW (ref 4.2–5.8)
RBC # BLD: 3.05 M/UL — LOW (ref 4.2–5.8)
RBC # BLD: 3.24 M/UL — LOW (ref 4.2–5.8)
RBC # BLD: 3.35 M/UL — LOW (ref 4.2–5.8)
RBC # BLD: 3.66 M/UL — LOW (ref 4.2–5.8)
RBC # BLD: 3.7 M/UL — LOW (ref 4.2–5.8)
RBC # BLD: 3.76 M/UL — LOW (ref 4.2–5.8)
RBC # BLD: 4.28 M/UL — SIGNIFICANT CHANGE UP (ref 4.2–5.8)
RBC # BLD: 4.37 M/UL — SIGNIFICANT CHANGE UP (ref 4.2–5.8)
RBC # BLD: 4.54 M/UL — SIGNIFICANT CHANGE UP (ref 4.2–5.8)
RBC # BLD: 4.77 M/UL — SIGNIFICANT CHANGE UP (ref 4.2–5.8)
RBC # FLD: 13 % — SIGNIFICANT CHANGE UP (ref 10.3–14.5)
RBC # FLD: 13 % — SIGNIFICANT CHANGE UP (ref 10.3–14.5)
RBC # FLD: 13.1 % — SIGNIFICANT CHANGE UP (ref 10.3–14.5)
RBC # FLD: 13.2 % — SIGNIFICANT CHANGE UP (ref 10.3–14.5)
RBC # FLD: 13.3 % — SIGNIFICANT CHANGE UP (ref 10.3–14.5)
RBC # FLD: 13.4 % — SIGNIFICANT CHANGE UP (ref 10.3–14.5)
RBC # FLD: 13.4 % — SIGNIFICANT CHANGE UP (ref 10.3–14.5)
RBC # FLD: 13.5 % — SIGNIFICANT CHANGE UP (ref 10.3–14.5)
RBC # FLD: 13.6 % — SIGNIFICANT CHANGE UP (ref 10.3–14.5)
RBC # FLD: 13.7 % — SIGNIFICANT CHANGE UP (ref 10.3–14.5)
RBC # FLD: 15.6 % — HIGH (ref 10.3–14.5)
RBC # FLD: 15.8 % — HIGH (ref 10.3–14.5)
RBC # FLD: 15.9 % — HIGH (ref 10.3–14.5)
RBC # FLD: 16 % — HIGH (ref 10.3–14.5)
RBC # FLD: 16.1 % — HIGH (ref 10.3–14.5)
RBC # FLD: 16.2 % — HIGH (ref 10.3–14.5)
RBC # FLD: 16.7 % — HIGH (ref 10.3–14.5)
RBC # FLD: 16.7 % — HIGH (ref 10.3–14.5)
RBC # FLD: 16.8 % — HIGH (ref 10.3–14.5)
RBC # FLD: 16.9 % — HIGH (ref 10.3–14.5)
RBC # FLD: 16.9 % — HIGH (ref 10.3–14.5)
RBC # FLD: 17 % — HIGH (ref 10.3–14.5)
RBC # FLD: 17.1 % — HIGH (ref 10.3–14.5)
RBC # FLD: 17.3 % — HIGH (ref 10.3–14.5)
RBC # FLD: 17.4 % — HIGH (ref 10.3–14.5)
RBC # FLD: 17.6 % — HIGH (ref 10.3–14.5)
RBC # FLD: 17.8 % — HIGH (ref 10.3–14.5)
RBC # FLD: 17.9 % — HIGH (ref 10.3–14.5)
RBC # FLD: 17.9 % — HIGH (ref 10.3–14.5)
RBC # FLD: 18.3 % — HIGH (ref 10.3–14.5)
RBC # FLD: 18.5 % — HIGH (ref 10.3–14.5)
RBC # FLD: 18.6 % — HIGH (ref 10.3–14.5)
RBC # FLD: 18.6 % — HIGH (ref 10.3–14.5)
RBC # FLD: 18.7 % — HIGH (ref 10.3–14.5)
RBC # FLD: 19.9 % — HIGH (ref 10.3–14.5)
RBC # FLD: 19.9 % — HIGH (ref 10.3–14.5)
RBC # FLD: 20.1 % — HIGH (ref 10.3–14.5)
RBC # FLD: 20.7 % — HIGH (ref 10.3–14.5)
RBC # FLD: 20.8 % — HIGH (ref 10.3–14.5)
RBC # FLD: 20.9 % — HIGH (ref 10.3–14.5)
RBC # FLD: 21.5 % — HIGH (ref 10.3–14.5)
RBC # FLD: 21.8 % — HIGH (ref 10.3–14.5)
RBC # FLD: 22.2 % — HIGH (ref 10.3–14.5)
RBC # FLD: 22.6 % — HIGH (ref 10.3–14.5)
RBC # FLD: 22.7 % — HIGH (ref 10.3–14.5)
RBC BLD AUTO: ABNORMAL
RBC BLD AUTO: SIGNIFICANT CHANGE UP
RBC CASTS # UR COMP ASSIST: >50 /HPF (ref 0–2)
RH IG SCN BLD-IMP: POSITIVE — SIGNIFICANT CHANGE UP
SAO2 % BLDA: 97 % — HIGH (ref 92–96)
SAO2 % BLDMV: 41 % — LOW (ref 60–90)
SAO2 % BLDMV: 41 % — LOW (ref 60–90)
SAO2 % BLDMV: 42 % — LOW (ref 60–90)
SAO2 % BLDMV: 42 % — LOW (ref 60–90)
SAO2 % BLDMV: 46 % — LOW (ref 60–90)
SAO2 % BLDMV: 47 % — LOW (ref 60–90)
SAO2 % BLDMV: 48 % — LOW (ref 60–90)
SAO2 % BLDMV: 48 % — LOW (ref 60–90)
SAO2 % BLDMV: 49 % — LOW (ref 60–90)
SAO2 % BLDMV: 49 % — LOW (ref 60–90)
SAO2 % BLDMV: 50 % — LOW (ref 60–90)
SAO2 % BLDMV: 52 % — LOW (ref 60–90)
SAO2 % BLDMV: 53 % — LOW (ref 60–90)
SAO2 % BLDMV: 54 % — LOW (ref 60–90)
SAO2 % BLDMV: 56 % — LOW (ref 60–90)
SAO2 % BLDMV: 56 % — LOW (ref 60–90)
SAO2 % BLDMV: 57 % — LOW (ref 60–90)
SAO2 % BLDMV: 57 % — LOW (ref 60–90)
SAO2 % BLDMV: 58 % — LOW (ref 60–90)
SAO2 % BLDMV: 59 % — LOW (ref 60–90)
SAO2 % BLDMV: 59 % — LOW (ref 60–90)
SAO2 % BLDMV: 60 % — SIGNIFICANT CHANGE UP (ref 60–90)
SAO2 % BLDMV: 60 % — SIGNIFICANT CHANGE UP (ref 60–90)
SAO2 % BLDMV: 61 % — SIGNIFICANT CHANGE UP (ref 60–90)
SAO2 % BLDMV: 62 % — SIGNIFICANT CHANGE UP (ref 60–90)
SAO2 % BLDMV: 62 % — SIGNIFICANT CHANGE UP (ref 60–90)
SAO2 % BLDMV: 63 % — SIGNIFICANT CHANGE UP (ref 60–90)
SAO2 % BLDMV: 64 % — SIGNIFICANT CHANGE UP (ref 60–90)
SAO2 % BLDMV: 66 % — SIGNIFICANT CHANGE UP (ref 60–90)
SAO2 % BLDMV: 67 % — SIGNIFICANT CHANGE UP (ref 60–90)
SAO2 % BLDMV: 68 % — SIGNIFICANT CHANGE UP (ref 60–90)
SAO2 % BLDMV: 68 % — SIGNIFICANT CHANGE UP (ref 60–90)
SAO2 % BLDMV: 69 % — SIGNIFICANT CHANGE UP (ref 60–90)
SAO2 % BLDMV: 69 % — SIGNIFICANT CHANGE UP (ref 60–90)
SAO2 % BLDMV: 72 % — SIGNIFICANT CHANGE UP (ref 60–90)
SAO2 % BLDMV: 84 % — SIGNIFICANT CHANGE UP (ref 60–90)
SAO2 % BLDV: 25 % — LOW (ref 67–88)
SAO2 % BLDV: 38 % — LOW (ref 67–88)
SAO2 % BLDV: 44 % — LOW (ref 67–88)
SAO2 % BLDV: 45 % — LOW (ref 67–88)
SAO2 % BLDV: 45 % — LOW (ref 67–88)
SAO2 % BLDV: 46 % — LOW (ref 67–88)
SAO2 % BLDV: 46 % — LOW (ref 67–88)
SAO2 % BLDV: 47 % — LOW (ref 67–88)
SAO2 % BLDV: 48 % — LOW (ref 67–88)
SAO2 % BLDV: 49 % — LOW (ref 67–88)
SAO2 % BLDV: 51 % — LOW (ref 67–88)
SAO2 % BLDV: 55 % — LOW (ref 67–88)
SAO2 % BLDV: 57 % — LOW (ref 67–88)
SAO2 % BLDV: 60 % — LOW (ref 67–88)
SAO2 % BLDV: 65 % — LOW (ref 67–88)
SAO2 % BLDV: 69 % — SIGNIFICANT CHANGE UP (ref 67–88)
SAO2 % BLDV: 71 % — SIGNIFICANT CHANGE UP (ref 67–88)
SCHISTOCYTES BLD QL AUTO: SLIGHT — SIGNIFICANT CHANGE UP
SODIUM SERPL-SCNC: 131 MMOL/L — LOW (ref 135–145)
SODIUM SERPL-SCNC: 134 MMOL/L — LOW (ref 135–145)
SODIUM SERPL-SCNC: 134 MMOL/L — LOW (ref 135–145)
SODIUM SERPL-SCNC: 135 MMOL/L — SIGNIFICANT CHANGE UP (ref 135–145)
SODIUM SERPL-SCNC: 136 MMOL/L — SIGNIFICANT CHANGE UP (ref 135–145)
SODIUM SERPL-SCNC: 137 MMOL/L — SIGNIFICANT CHANGE UP (ref 135–145)
SODIUM SERPL-SCNC: 138 MMOL/L — SIGNIFICANT CHANGE UP (ref 135–145)
SODIUM SERPL-SCNC: 139 MMOL/L — SIGNIFICANT CHANGE UP (ref 135–145)
SODIUM SERPL-SCNC: 140 MMOL/L — SIGNIFICANT CHANGE UP (ref 135–145)
SODIUM SERPL-SCNC: 141 MMOL/L — SIGNIFICANT CHANGE UP (ref 135–145)
SP GR SPEC: >1.03 — HIGH (ref 1.01–1.02)
SPECIMEN SOURCE: SIGNIFICANT CHANGE UP
STOMATOCYTES BLD QL SMEAR: PRESENT — SIGNIFICANT CHANGE UP
T3 SERPL-MCNC: 85 NG/DL — SIGNIFICANT CHANGE UP (ref 80–200)
T3 SERPL-MCNC: 94 NG/DL — SIGNIFICANT CHANGE UP (ref 80–200)
T4 AB SER-ACNC: 10.4 UG/DL — SIGNIFICANT CHANGE UP (ref 4.6–12)
T4 AB SER-ACNC: 2.8 UG/DL — LOW (ref 4.6–12)
TARGETS BLD QL SMEAR: SLIGHT — SIGNIFICANT CHANGE UP
TARGETS BLD QL SMEAR: SLIGHT — SIGNIFICANT CHANGE UP
TOTAL CHOLESTEROL/HDL RATIO MEASUREMENT: 3.1 RATIO — LOW (ref 3.4–9.6)
TOXIC GRANULES BLD QL SMEAR: PRESENT — SIGNIFICANT CHANGE UP
TRIGL SERPL-MCNC: 45 MG/DL — SIGNIFICANT CHANGE UP (ref 10–149)
TROPONIN I SERPL-MCNC: 0.97 NG/ML — HIGH (ref 0.01–0.04)
TROPONIN T SERPL-MCNC: 10.87 NG/ML — HIGH (ref 0–0.06)
TROPONIN T SERPL-MCNC: 10.99 NG/ML — HIGH (ref 0–0.06)
TROPONIN T SERPL-MCNC: 11.79 NG/ML — HIGH (ref 0–0.06)
TROPONIN T SERPL-MCNC: 12.58 NG/ML — HIGH (ref 0–0.06)
TROPONIN T SERPL-MCNC: 13.48 NG/ML — HIGH (ref 0–0.06)
TROPONIN T SERPL-MCNC: 13.8 NG/ML — HIGH (ref 0–0.06)
TROPONIN T SERPL-MCNC: 13.8 NG/ML — HIGH (ref 0–0.06)
TROPONIN T SERPL-MCNC: 14.83 NG/ML — HIGH (ref 0–0.06)
TROPONIN T SERPL-MCNC: 15.09 NG/ML — HIGH (ref 0–0.06)
TROPONIN T SERPL-MCNC: 2.89 NG/ML — HIGH (ref 0–0.06)
TROPONIN T SERPL-MCNC: 4.7 NG/ML — HIGH (ref 0–0.06)
TSH SERPL-MCNC: 0.05 UIU/ML — LOW (ref 0.27–4.2)
TSH SERPL-MCNC: 2.4 UIU/ML — SIGNIFICANT CHANGE UP (ref 0.27–4.2)
URATE SERPL-MCNC: 2.2 MG/DL — LOW (ref 3.4–8.8)
UROBILINOGEN FLD QL: NEGATIVE — SIGNIFICANT CHANGE UP
VANCOMYCIN FLD-MCNC: 12.9 UG/ML — SIGNIFICANT CHANGE UP
VANCOMYCIN FLD-MCNC: 13.1 UG/ML — SIGNIFICANT CHANGE UP
VANCOMYCIN FLD-MCNC: 13.5 UG/ML — SIGNIFICANT CHANGE UP
VANCOMYCIN FLD-MCNC: 17.9 UG/ML — SIGNIFICANT CHANGE UP
VANCOMYCIN FLD-MCNC: 18.4 UG/ML — SIGNIFICANT CHANGE UP
VANCOMYCIN FLD-MCNC: 20.3 UG/ML — SIGNIFICANT CHANGE UP
VANCOMYCIN FLD-MCNC: 9.2 UG/ML — SIGNIFICANT CHANGE UP
VANCOMYCIN FLD-MCNC: 9.7 UG/ML — SIGNIFICANT CHANGE UP
WBC # BLD: 12.8 K/UL — HIGH (ref 3.8–10.5)
WBC # BLD: 13.6 K/UL — HIGH (ref 3.8–10.5)
WBC # BLD: 13.9 K/UL — HIGH (ref 3.8–10.5)
WBC # BLD: 13.9 K/UL — HIGH (ref 3.8–10.5)
WBC # BLD: 14.1 K/UL — HIGH (ref 3.8–10.5)
WBC # BLD: 14.2 K/UL — HIGH (ref 3.8–10.5)
WBC # BLD: 14.5 K/UL — HIGH (ref 3.8–10.5)
WBC # BLD: 14.7 K/UL — HIGH (ref 3.8–10.5)
WBC # BLD: 14.8 K/UL — HIGH (ref 3.8–10.5)
WBC # BLD: 14.9 K/UL — HIGH (ref 3.8–10.5)
WBC # BLD: 15.1 K/UL — HIGH (ref 3.8–10.5)
WBC # BLD: 15.1 K/UL — HIGH (ref 3.8–10.5)
WBC # BLD: 15.2 K/UL — HIGH (ref 3.8–10.5)
WBC # BLD: 15.5 K/UL — HIGH (ref 3.8–10.5)
WBC # BLD: 15.7 K/UL — HIGH (ref 3.8–10.5)
WBC # BLD: 16.2 K/UL — HIGH (ref 3.8–10.5)
WBC # BLD: 16.3 K/UL — HIGH (ref 3.8–10.5)
WBC # BLD: 16.4 K/UL — HIGH (ref 3.8–10.5)
WBC # BLD: 16.5 K/UL — HIGH (ref 3.8–10.5)
WBC # BLD: 16.6 K/UL — HIGH (ref 3.8–10.5)
WBC # BLD: 16.7 K/UL — HIGH (ref 3.8–10.5)
WBC # BLD: 16.7 K/UL — HIGH (ref 3.8–10.5)
WBC # BLD: 17.1 K/UL — HIGH (ref 3.8–10.5)
WBC # BLD: 17.2 K/UL — HIGH (ref 3.8–10.5)
WBC # BLD: 17.9 K/UL — HIGH (ref 3.8–10.5)
WBC # BLD: 18 K/UL — HIGH (ref 3.8–10.5)
WBC # BLD: 18.1 K/UL — HIGH (ref 3.8–10.5)
WBC # BLD: 18.2 K/UL — HIGH (ref 3.8–10.5)
WBC # BLD: 18.4 K/UL — HIGH (ref 3.8–10.5)
WBC # BLD: 18.4 K/UL — HIGH (ref 3.8–10.5)
WBC # BLD: 18.6 K/UL — HIGH (ref 3.8–10.5)
WBC # BLD: 18.6 K/UL — HIGH (ref 3.8–10.5)
WBC # BLD: 18.7 K/UL — HIGH (ref 3.8–10.5)
WBC # BLD: 18.8 K/UL — HIGH (ref 3.8–10.5)
WBC # BLD: 18.9 K/UL — HIGH (ref 3.8–10.5)
WBC # BLD: 19 K/UL — HIGH (ref 3.8–10.5)
WBC # BLD: 19.3 K/UL — HIGH (ref 3.8–10.5)
WBC # BLD: 19.5 K/UL — HIGH (ref 3.8–10.5)
WBC # BLD: 19.8 K/UL — HIGH (ref 3.8–10.5)
WBC # BLD: 20 K/UL — HIGH (ref 3.8–10.5)
WBC # BLD: 20.1 K/UL — HIGH (ref 3.8–10.5)
WBC # BLD: 20.2 K/UL — HIGH (ref 3.8–10.5)
WBC # BLD: 20.7 K/UL — HIGH (ref 3.8–10.5)
WBC # BLD: 21 K/UL — HIGH (ref 3.8–10.5)
WBC # BLD: 21.2 K/UL — HIGH (ref 3.8–10.5)
WBC # BLD: 21.4 K/UL — HIGH (ref 3.8–10.5)
WBC # BLD: 21.9 K/UL — HIGH (ref 3.8–10.5)
WBC # BLD: 22.3 K/UL — HIGH (ref 3.8–10.5)
WBC # BLD: 22.5 K/UL — HIGH (ref 3.8–10.5)
WBC # BLD: 22.7 K/UL — HIGH (ref 3.8–10.5)
WBC # BLD: 23.5 K/UL — HIGH (ref 3.8–10.5)
WBC # BLD: 25 K/UL — HIGH (ref 3.8–10.5)
WBC # BLD: 7.9 K/UL — SIGNIFICANT CHANGE UP (ref 3.8–10.5)
WBC # BLD: 9.4 K/UL — SIGNIFICANT CHANGE UP (ref 3.8–10.5)
WBC # FLD AUTO: 12.8 K/UL — HIGH (ref 3.8–10.5)
WBC # FLD AUTO: 13.6 K/UL — HIGH (ref 3.8–10.5)
WBC # FLD AUTO: 13.9 K/UL — HIGH (ref 3.8–10.5)
WBC # FLD AUTO: 13.9 K/UL — HIGH (ref 3.8–10.5)
WBC # FLD AUTO: 14.1 K/UL — HIGH (ref 3.8–10.5)
WBC # FLD AUTO: 14.2 K/UL — HIGH (ref 3.8–10.5)
WBC # FLD AUTO: 14.5 K/UL — HIGH (ref 3.8–10.5)
WBC # FLD AUTO: 14.7 K/UL — HIGH (ref 3.8–10.5)
WBC # FLD AUTO: 14.8 K/UL — HIGH (ref 3.8–10.5)
WBC # FLD AUTO: 14.9 K/UL — HIGH (ref 3.8–10.5)
WBC # FLD AUTO: 15.1 K/UL — HIGH (ref 3.8–10.5)
WBC # FLD AUTO: 15.1 K/UL — HIGH (ref 3.8–10.5)
WBC # FLD AUTO: 15.2 K/UL — HIGH (ref 3.8–10.5)
WBC # FLD AUTO: 15.5 K/UL — HIGH (ref 3.8–10.5)
WBC # FLD AUTO: 15.7 K/UL — HIGH (ref 3.8–10.5)
WBC # FLD AUTO: 16.2 K/UL — HIGH (ref 3.8–10.5)
WBC # FLD AUTO: 16.3 K/UL — HIGH (ref 3.8–10.5)
WBC # FLD AUTO: 16.4 K/UL — HIGH (ref 3.8–10.5)
WBC # FLD AUTO: 16.5 K/UL — HIGH (ref 3.8–10.5)
WBC # FLD AUTO: 16.6 K/UL — HIGH (ref 3.8–10.5)
WBC # FLD AUTO: 16.7 K/UL — HIGH (ref 3.8–10.5)
WBC # FLD AUTO: 16.7 K/UL — HIGH (ref 3.8–10.5)
WBC # FLD AUTO: 17.1 K/UL — HIGH (ref 3.8–10.5)
WBC # FLD AUTO: 17.2 K/UL — HIGH (ref 3.8–10.5)
WBC # FLD AUTO: 17.9 K/UL — HIGH (ref 3.8–10.5)
WBC # FLD AUTO: 18 K/UL — HIGH (ref 3.8–10.5)
WBC # FLD AUTO: 18.1 K/UL — HIGH (ref 3.8–10.5)
WBC # FLD AUTO: 18.2 K/UL — HIGH (ref 3.8–10.5)
WBC # FLD AUTO: 18.4 K/UL — HIGH (ref 3.8–10.5)
WBC # FLD AUTO: 18.4 K/UL — HIGH (ref 3.8–10.5)
WBC # FLD AUTO: 18.6 K/UL — HIGH (ref 3.8–10.5)
WBC # FLD AUTO: 18.6 K/UL — HIGH (ref 3.8–10.5)
WBC # FLD AUTO: 18.7 K/UL — HIGH (ref 3.8–10.5)
WBC # FLD AUTO: 18.8 K/UL — HIGH (ref 3.8–10.5)
WBC # FLD AUTO: 18.9 K/UL — HIGH (ref 3.8–10.5)
WBC # FLD AUTO: 19 K/UL — HIGH (ref 3.8–10.5)
WBC # FLD AUTO: 19.3 K/UL — HIGH (ref 3.8–10.5)
WBC # FLD AUTO: 19.5 K/UL — HIGH (ref 3.8–10.5)
WBC # FLD AUTO: 19.8 K/UL — HIGH (ref 3.8–10.5)
WBC # FLD AUTO: 20 K/UL — HIGH (ref 3.8–10.5)
WBC # FLD AUTO: 20.1 K/UL — HIGH (ref 3.8–10.5)
WBC # FLD AUTO: 20.2 K/UL — HIGH (ref 3.8–10.5)
WBC # FLD AUTO: 20.7 K/UL — HIGH (ref 3.8–10.5)
WBC # FLD AUTO: 21 K/UL — HIGH (ref 3.8–10.5)
WBC # FLD AUTO: 21.2 K/UL — HIGH (ref 3.8–10.5)
WBC # FLD AUTO: 21.4 K/UL — HIGH (ref 3.8–10.5)
WBC # FLD AUTO: 21.9 K/UL — HIGH (ref 3.8–10.5)
WBC # FLD AUTO: 22.3 K/UL — HIGH (ref 3.8–10.5)
WBC # FLD AUTO: 22.5 K/UL — HIGH (ref 3.8–10.5)
WBC # FLD AUTO: 22.7 K/UL — HIGH (ref 3.8–10.5)
WBC # FLD AUTO: 23.5 K/UL — HIGH (ref 3.8–10.5)
WBC # FLD AUTO: 25 K/UL — HIGH (ref 3.8–10.5)
WBC # FLD AUTO: 7.9 K/UL — SIGNIFICANT CHANGE UP (ref 3.8–10.5)
WBC # FLD AUTO: 9.4 K/UL — SIGNIFICANT CHANGE UP (ref 3.8–10.5)
WBC UR QL: SIGNIFICANT CHANGE UP /HPF (ref 0–5)

## 2017-01-01 PROCEDURE — 99233 SBSQ HOSP IP/OBS HIGH 50: CPT | Mod: GC

## 2017-01-01 PROCEDURE — 33967 INSERT I-AORT PERCUT DEVICE: CPT

## 2017-01-01 PROCEDURE — 90945 DIALYSIS ONE EVALUATION: CPT | Mod: GC

## 2017-01-01 PROCEDURE — 71010: CPT | Mod: 26

## 2017-01-01 PROCEDURE — 99232 SBSQ HOSP IP/OBS MODERATE 35: CPT

## 2017-01-01 PROCEDURE — 93306 TTE W/DOPPLER COMPLETE: CPT | Mod: 26

## 2017-01-01 PROCEDURE — 99222 1ST HOSP IP/OBS MODERATE 55: CPT | Mod: 24

## 2017-01-01 PROCEDURE — 99291 CRITICAL CARE FIRST HOUR: CPT

## 2017-01-01 PROCEDURE — 93321 DOPPLER ECHO F-UP/LMTD STD: CPT | Mod: 26,59

## 2017-01-01 PROCEDURE — G0452: CPT | Mod: 26

## 2017-01-01 PROCEDURE — 99291 CRITICAL CARE FIRST HOUR: CPT | Mod: 25

## 2017-01-01 PROCEDURE — 76937 US GUIDE VASCULAR ACCESS: CPT | Mod: 26,78

## 2017-01-01 PROCEDURE — 82962 GLUCOSE BLOOD TEST: CPT

## 2017-01-01 PROCEDURE — 99223 1ST HOSP IP/OBS HIGH 75: CPT | Mod: GC

## 2017-01-01 PROCEDURE — 93010 ELECTROCARDIOGRAM REPORT: CPT

## 2017-01-01 PROCEDURE — 99222 1ST HOSP IP/OBS MODERATE 55: CPT | Mod: GC

## 2017-01-01 PROCEDURE — 37220: CPT

## 2017-01-01 PROCEDURE — 85610 PROTHROMBIN TIME: CPT

## 2017-01-01 PROCEDURE — 95819 EEG AWAKE AND ASLEEP: CPT | Mod: 26

## 2017-01-01 PROCEDURE — 70450 CT HEAD/BRAIN W/O DYE: CPT | Mod: 26

## 2017-01-01 PROCEDURE — 74174 CTA ABD&PLVS W/CONTRAST: CPT | Mod: 26

## 2017-01-01 PROCEDURE — 99292 CRITICAL CARE ADDL 30 MIN: CPT

## 2017-01-01 PROCEDURE — 95957 EEG DIGITAL ANALYSIS: CPT | Mod: 26

## 2017-01-01 PROCEDURE — 96375 TX/PRO/DX INJ NEW DRUG ADDON: CPT

## 2017-01-01 PROCEDURE — 82550 ASSAY OF CK (CPK): CPT

## 2017-01-01 PROCEDURE — 96376 TX/PRO/DX INJ SAME DRUG ADON: CPT

## 2017-01-01 PROCEDURE — 99222 1ST HOSP IP/OBS MODERATE 55: CPT

## 2017-01-01 PROCEDURE — 99232 SBSQ HOSP IP/OBS MODERATE 35: CPT | Mod: 25

## 2017-01-01 PROCEDURE — 71010: CPT | Mod: 26,76

## 2017-01-01 PROCEDURE — 93926 LOWER EXTREMITY STUDY: CPT | Mod: 26,RT

## 2017-01-01 PROCEDURE — 85027 COMPLETE CBC AUTOMATED: CPT

## 2017-01-01 PROCEDURE — 71010: CPT | Mod: 26,77,76

## 2017-01-01 PROCEDURE — 93308 TTE F-UP OR LMTD: CPT | Mod: 26,59

## 2017-01-01 PROCEDURE — 76775 US EXAM ABDO BACK WALL LIM: CPT | Mod: 26

## 2017-01-01 PROCEDURE — 33990 INSJ PERQ VAD L HRT ARTERIAL: CPT | Mod: 78

## 2017-01-01 PROCEDURE — 99233 SBSQ HOSP IP/OBS HIGH 50: CPT

## 2017-01-01 PROCEDURE — 99222 1ST HOSP IP/OBS MODERATE 55: CPT | Mod: 25

## 2017-01-01 PROCEDURE — 93451 RIGHT HEART CATH: CPT | Mod: 26,GC

## 2017-01-01 PROCEDURE — 71010: CPT | Mod: 26,77

## 2017-01-01 PROCEDURE — 75716 ARTERY X-RAYS ARMS/LEGS: CPT | Mod: 26,59

## 2017-01-01 PROCEDURE — 31575 DIAGNOSTIC LARYNGOSCOPY: CPT

## 2017-01-01 PROCEDURE — 80053 COMPREHEN METABOLIC PANEL: CPT

## 2017-01-01 PROCEDURE — 99223 1ST HOSP IP/OBS HIGH 75: CPT

## 2017-01-01 PROCEDURE — 93456 R HRT CORONARY ARTERY ANGIO: CPT | Mod: 26,59

## 2017-01-01 PROCEDURE — 93970 EXTREMITY STUDY: CPT | Mod: 26

## 2017-01-01 PROCEDURE — 92941 PRQ TRLML REVSC TOT OCCL AMI: CPT | Mod: RC

## 2017-01-01 PROCEDURE — 96374 THER/PROPH/DIAG INJ IV PUSH: CPT

## 2017-01-01 PROCEDURE — 99232 SBSQ HOSP IP/OBS MODERATE 35: CPT | Mod: GC

## 2017-01-01 PROCEDURE — 93321 DOPPLER ECHO F-UP/LMTD STD: CPT | Mod: 26

## 2017-01-01 PROCEDURE — 71045 X-RAY EXAM CHEST 1 VIEW: CPT

## 2017-01-01 PROCEDURE — 93308 TTE F-UP OR LMTD: CPT | Mod: 26

## 2017-01-01 PROCEDURE — 84484 ASSAY OF TROPONIN QUANT: CPT

## 2017-01-01 PROCEDURE — 95951: CPT | Mod: 26,52

## 2017-01-01 PROCEDURE — 93010 ELECTROCARDIOGRAM REPORT: CPT | Mod: 76

## 2017-01-01 PROCEDURE — 33210 INSERT ELECTRD/PM CATH SNGL: CPT

## 2017-01-01 RX ORDER — PIPERACILLIN AND TAZOBACTAM 4; .5 G/20ML; G/20ML
3.38 INJECTION, POWDER, LYOPHILIZED, FOR SOLUTION INTRAVENOUS EVERY 12 HOURS
Qty: 0 | Refills: 0 | Status: DISCONTINUED | OUTPATIENT
Start: 2017-01-01 | End: 2017-01-01

## 2017-01-01 RX ORDER — HEPARIN SODIUM 5000 [USP'U]/ML
1000 INJECTION INTRAVENOUS; SUBCUTANEOUS
Qty: 25000 | Refills: 0 | Status: DISCONTINUED | OUTPATIENT
Start: 2017-01-01 | End: 2017-01-01

## 2017-01-01 RX ORDER — OXYCODONE AND ACETAMINOPHEN 5; 325 MG/1; MG/1
1 TABLET ORAL EVERY 6 HOURS
Qty: 0 | Refills: 0 | Status: DISCONTINUED | OUTPATIENT
Start: 2017-01-01 | End: 2017-01-01

## 2017-01-01 RX ORDER — HEPARIN SODIUM 5000 [USP'U]/ML
900 INJECTION INTRAVENOUS; SUBCUTANEOUS
Qty: 25000 | Refills: 0 | Status: DISCONTINUED | OUTPATIENT
Start: 2017-01-01 | End: 2017-01-01

## 2017-01-01 RX ORDER — ACETYLCYSTEINE 200 MG/ML
5 VIAL (ML) MISCELLANEOUS ONCE
Qty: 0 | Refills: 0 | Status: DISCONTINUED | OUTPATIENT
Start: 2017-01-01 | End: 2017-01-01

## 2017-01-01 RX ORDER — HEPARIN SODIUM 5000 [USP'U]/ML
4000 INJECTION INTRAVENOUS; SUBCUTANEOUS ONCE
Qty: 0 | Refills: 0 | Status: COMPLETED | OUTPATIENT
Start: 2017-01-01 | End: 2017-01-01

## 2017-01-01 RX ORDER — DOBUTAMINE HCL 250MG/20ML
2.5 VIAL (ML) INTRAVENOUS
Qty: 500 | Refills: 0 | Status: DISCONTINUED | OUTPATIENT
Start: 2017-01-01 | End: 2017-01-01

## 2017-01-01 RX ORDER — AMIODARONE HYDROCHLORIDE 400 MG/1
200 TABLET ORAL DAILY
Qty: 0 | Refills: 0 | Status: DISCONTINUED | OUTPATIENT
Start: 2017-01-01 | End: 2017-01-01

## 2017-01-01 RX ORDER — PIPERACILLIN AND TAZOBACTAM 4; .5 G/20ML; G/20ML
3.38 INJECTION, POWDER, LYOPHILIZED, FOR SOLUTION INTRAVENOUS EVERY 8 HOURS
Qty: 0 | Refills: 0 | Status: DISCONTINUED | OUTPATIENT
Start: 2017-01-01 | End: 2017-01-01

## 2017-01-01 RX ORDER — ATORVASTATIN CALCIUM 80 MG/1
40 TABLET, FILM COATED ORAL AT BEDTIME
Qty: 0 | Refills: 0 | Status: DISCONTINUED | OUTPATIENT
Start: 2017-01-01 | End: 2017-01-01

## 2017-01-01 RX ORDER — SODIUM CHLORIDE 9 MG/ML
1000 INJECTION, SOLUTION INTRAVENOUS
Qty: 0 | Refills: 0 | Status: DISCONTINUED | OUTPATIENT
Start: 2017-01-01 | End: 2017-01-01

## 2017-01-01 RX ORDER — AMIODARONE HYDROCHLORIDE 400 MG/1
400 TABLET ORAL EVERY 8 HOURS
Qty: 0 | Refills: 0 | Status: DISCONTINUED | OUTPATIENT
Start: 2017-01-01 | End: 2017-01-01

## 2017-01-01 RX ORDER — HYDRALAZINE HCL 50 MG
10 TABLET ORAL ONCE
Qty: 0 | Refills: 0 | Status: COMPLETED | OUTPATIENT
Start: 2017-01-01 | End: 2017-01-01

## 2017-01-01 RX ORDER — HEPARIN SODIUM 5000 [USP'U]/ML
4000 INJECTION INTRAVENOUS; SUBCUTANEOUS EVERY 6 HOURS
Qty: 0 | Refills: 0 | Status: DISCONTINUED | OUTPATIENT
Start: 2017-01-01 | End: 2017-01-01

## 2017-01-01 RX ORDER — POTASSIUM PHOSPHATE, MONOBASIC POTASSIUM PHOSPHATE, DIBASIC 236; 224 MG/ML; MG/ML
15 INJECTION, SOLUTION INTRAVENOUS ONCE
Qty: 0 | Refills: 0 | Status: DISCONTINUED | OUTPATIENT
Start: 2017-01-01 | End: 2017-01-01

## 2017-01-01 RX ORDER — VASOPRESSIN 20 [USP'U]/ML
0.04 INJECTION INTRAVENOUS
Qty: 100 | Refills: 0 | Status: DISCONTINUED | OUTPATIENT
Start: 2017-01-01 | End: 2017-01-01

## 2017-01-01 RX ORDER — CALCIUM GLUCONATE 100 MG/ML
2 VIAL (ML) INTRAVENOUS ONCE
Qty: 0 | Refills: 0 | Status: COMPLETED | OUTPATIENT
Start: 2017-01-01 | End: 2017-01-01

## 2017-01-01 RX ORDER — AMIODARONE HYDROCHLORIDE 400 MG/1
400 TABLET ORAL ONCE
Qty: 0 | Refills: 0 | Status: COMPLETED | OUTPATIENT
Start: 2017-01-01 | End: 2017-01-01

## 2017-01-01 RX ORDER — ASPIRIN/CALCIUM CARB/MAGNESIUM 324 MG
300 TABLET ORAL ONCE
Qty: 0 | Refills: 0 | Status: COMPLETED | OUTPATIENT
Start: 2017-01-01 | End: 2017-01-01

## 2017-01-01 RX ORDER — TRAZODONE HCL 50 MG
0 TABLET ORAL
Qty: 0 | Refills: 0 | COMMUNITY

## 2017-01-01 RX ORDER — HALOPERIDOL DECANOATE 100 MG/ML
0.5 INJECTION INTRAMUSCULAR ONCE
Qty: 0 | Refills: 0 | Status: COMPLETED | OUTPATIENT
Start: 2017-01-01 | End: 2017-01-01

## 2017-01-01 RX ORDER — VANCOMYCIN HCL 1 G
1000 VIAL (EA) INTRAVENOUS EVERY 24 HOURS
Qty: 0 | Refills: 0 | Status: DISCONTINUED | OUTPATIENT
Start: 2017-01-01 | End: 2017-01-01

## 2017-01-01 RX ORDER — INFLUENZA VIRUS VACCINE 15; 15; 15; 15 UG/.5ML; UG/.5ML; UG/.5ML; UG/.5ML
0.5 SUSPENSION INTRAMUSCULAR ONCE
Qty: 0 | Refills: 0 | Status: DISCONTINUED | OUTPATIENT
Start: 2017-01-01 | End: 2017-01-01

## 2017-01-01 RX ORDER — HEPARIN SODIUM 5000 [USP'U]/ML
7500 INJECTION INTRAVENOUS; SUBCUTANEOUS EVERY 6 HOURS
Qty: 0 | Refills: 0 | Status: DISCONTINUED | OUTPATIENT
Start: 2017-01-01 | End: 2017-01-01

## 2017-01-01 RX ORDER — POTASSIUM CHLORIDE 20 MEQ
20 PACKET (EA) ORAL
Qty: 0 | Refills: 0 | Status: COMPLETED | OUTPATIENT
Start: 2017-01-01 | End: 2017-01-01

## 2017-01-01 RX ORDER — DIGOXIN 250 MCG
0.5 TABLET ORAL ONCE
Qty: 0 | Refills: 0 | Status: COMPLETED | OUTPATIENT
Start: 2017-01-01 | End: 2017-01-01

## 2017-01-01 RX ORDER — ATROPINE SULFATE 0.1 MG/ML
1 SYRINGE (ML) INJECTION ONCE
Qty: 0 | Refills: 0 | Status: COMPLETED | OUTPATIENT
Start: 2017-01-01 | End: 2017-01-01

## 2017-01-01 RX ORDER — FENTANYL CITRATE 50 UG/ML
3 INJECTION INTRAVENOUS
Qty: 5000 | Refills: 0 | Status: DISCONTINUED | OUTPATIENT
Start: 2017-01-01 | End: 2017-01-01

## 2017-01-01 RX ORDER — INSULIN LISPRO 100/ML
VIAL (ML) SUBCUTANEOUS AT BEDTIME
Qty: 0 | Refills: 0 | Status: DISCONTINUED | OUTPATIENT
Start: 2017-01-01 | End: 2017-01-01

## 2017-01-01 RX ORDER — NOREPINEPHRINE BITARTRATE/D5W 8 MG/250ML
0.1 PLASTIC BAG, INJECTION (ML) INTRAVENOUS
Qty: 8 | Refills: 0 | Status: DISCONTINUED | OUTPATIENT
Start: 2017-01-01 | End: 2017-01-01

## 2017-01-01 RX ORDER — ASPIRIN/CALCIUM CARB/MAGNESIUM 324 MG
81 TABLET ORAL DAILY
Qty: 0 | Refills: 0 | Status: DISCONTINUED | OUTPATIENT
Start: 2017-01-01 | End: 2017-01-01

## 2017-01-01 RX ORDER — SODIUM CHLORIDE 9 MG/ML
250 INJECTION INTRAMUSCULAR; INTRAVENOUS; SUBCUTANEOUS ONCE
Qty: 0 | Refills: 0 | Status: COMPLETED | OUTPATIENT
Start: 2017-01-01 | End: 2017-01-01

## 2017-01-01 RX ORDER — HEPARIN SODIUM 5000 [USP'U]/ML
100 INJECTION INTRAVENOUS; SUBCUTANEOUS
Qty: 25000 | Refills: 0 | Status: DISCONTINUED | OUTPATIENT
Start: 2017-01-01 | End: 2017-01-01

## 2017-01-01 RX ORDER — SODIUM CHLORIDE 9 MG/ML
500 INJECTION INTRAMUSCULAR; INTRAVENOUS; SUBCUTANEOUS ONCE
Qty: 0 | Refills: 0 | Status: COMPLETED | OUTPATIENT
Start: 2017-01-01 | End: 2017-01-01

## 2017-01-01 RX ORDER — OXYCODONE AND ACETAMINOPHEN 5; 325 MG/1; MG/1
1 TABLET ORAL ONCE
Qty: 0 | Refills: 0 | Status: DISCONTINUED | OUTPATIENT
Start: 2017-01-01 | End: 2017-01-01

## 2017-01-01 RX ORDER — RISPERIDONE 4 MG/1
0.5 TABLET ORAL EVERY 12 HOURS
Qty: 0 | Refills: 0 | Status: DISCONTINUED | OUTPATIENT
Start: 2017-01-01 | End: 2017-01-01

## 2017-01-01 RX ORDER — ACETAMINOPHEN 500 MG
1000 TABLET ORAL ONCE
Qty: 0 | Refills: 0 | Status: COMPLETED | OUTPATIENT
Start: 2017-01-01 | End: 2017-01-01

## 2017-01-01 RX ORDER — MIDAZOLAM HYDROCHLORIDE 1 MG/ML
2 INJECTION, SOLUTION INTRAMUSCULAR; INTRAVENOUS
Qty: 100 | Refills: 0 | Status: DISCONTINUED | OUTPATIENT
Start: 2017-01-01 | End: 2017-01-01

## 2017-01-01 RX ORDER — MORPHINE SULFATE 50 MG/1
2 CAPSULE, EXTENDED RELEASE ORAL ONCE
Qty: 0 | Refills: 0 | Status: DISCONTINUED | OUTPATIENT
Start: 2017-01-01 | End: 2017-01-01

## 2017-01-01 RX ORDER — HALOPERIDOL DECANOATE 100 MG/ML
5 INJECTION INTRAMUSCULAR ONCE
Qty: 0 | Refills: 0 | Status: COMPLETED | OUTPATIENT
Start: 2017-01-01 | End: 2017-01-01

## 2017-01-01 RX ORDER — CAPTOPRIL 12.5 MG/1
6.25 TABLET ORAL ONCE
Qty: 0 | Refills: 0 | Status: DISCONTINUED | OUTPATIENT
Start: 2017-01-01 | End: 2017-01-01

## 2017-01-01 RX ORDER — DEXMEDETOMIDINE HYDROCHLORIDE IN 0.9% SODIUM CHLORIDE 4 UG/ML
0.2 INJECTION INTRAVENOUS
Qty: 200 | Refills: 0 | Status: DISCONTINUED | OUTPATIENT
Start: 2017-01-01 | End: 2017-01-01

## 2017-01-01 RX ORDER — VASOPRESSIN 20 [USP'U]/ML
0.03 INJECTION INTRAVENOUS
Qty: 100 | Refills: 0 | Status: DISCONTINUED | OUTPATIENT
Start: 2017-01-01 | End: 2017-01-01

## 2017-01-01 RX ORDER — HEPARIN SODIUM 5000 [USP'U]/ML
600 INJECTION INTRAVENOUS; SUBCUTANEOUS
Qty: 25000 | Refills: 0 | Status: DISCONTINUED | OUTPATIENT
Start: 2017-01-01 | End: 2017-01-01

## 2017-01-01 RX ORDER — POTASSIUM PHOSPHATE, MONOBASIC POTASSIUM PHOSPHATE, DIBASIC 236; 224 MG/ML; MG/ML
15 INJECTION, SOLUTION INTRAVENOUS ONCE
Qty: 0 | Refills: 0 | Status: COMPLETED | OUTPATIENT
Start: 2017-01-01 | End: 2017-01-01

## 2017-01-01 RX ORDER — CAPTOPRIL 12.5 MG/1
6.25 TABLET ORAL EVERY 8 HOURS
Qty: 0 | Refills: 0 | Status: DISCONTINUED | OUTPATIENT
Start: 2017-01-01 | End: 2017-01-01

## 2017-01-01 RX ORDER — VANCOMYCIN HCL 1 G
750 VIAL (EA) INTRAVENOUS EVERY 12 HOURS
Qty: 0 | Refills: 0 | Status: DISCONTINUED | OUTPATIENT
Start: 2017-01-01 | End: 2017-01-01

## 2017-01-01 RX ORDER — HEPARIN SODIUM 5000 [USP'U]/ML
800 INJECTION INTRAVENOUS; SUBCUTANEOUS
Qty: 25000 | Refills: 0 | Status: DISCONTINUED | OUTPATIENT
Start: 2017-01-01 | End: 2017-01-01

## 2017-01-01 RX ORDER — HALOPERIDOL DECANOATE 100 MG/ML
2.5 INJECTION INTRAMUSCULAR ONCE
Qty: 0 | Refills: 0 | Status: COMPLETED | OUTPATIENT
Start: 2017-01-01 | End: 2017-01-01

## 2017-01-01 RX ORDER — VASOPRESSIN 20 [USP'U]/ML
0.02 INJECTION INTRAVENOUS
Qty: 100 | Refills: 0 | Status: DISCONTINUED | OUTPATIENT
Start: 2017-01-01 | End: 2017-01-01

## 2017-01-01 RX ORDER — SODIUM CHLORIDE 9 MG/ML
1000 INJECTION INTRAMUSCULAR; INTRAVENOUS; SUBCUTANEOUS
Qty: 0 | Refills: 0 | Status: DISCONTINUED | OUTPATIENT
Start: 2017-01-01 | End: 2017-01-01

## 2017-01-01 RX ORDER — POTASSIUM CHLORIDE 20 MEQ
20 PACKET (EA) ORAL ONCE
Qty: 0 | Refills: 0 | Status: COMPLETED | OUTPATIENT
Start: 2017-01-01 | End: 2017-01-01

## 2017-01-01 RX ORDER — LEVETIRACETAM 250 MG/1
500 TABLET, FILM COATED ORAL EVERY 12 HOURS
Qty: 0 | Refills: 0 | Status: DISCONTINUED | OUTPATIENT
Start: 2017-01-01 | End: 2017-01-01

## 2017-01-01 RX ORDER — HEPARIN SODIUM 5000 [USP'U]/ML
650 INJECTION INTRAVENOUS; SUBCUTANEOUS
Qty: 25000 | Refills: 0 | Status: DISCONTINUED | OUTPATIENT
Start: 2017-01-01 | End: 2017-01-01

## 2017-01-01 RX ORDER — HALOPERIDOL DECANOATE 100 MG/ML
0.5 INJECTION INTRAMUSCULAR EVERY 12 HOURS
Qty: 0 | Refills: 0 | Status: DISCONTINUED | OUTPATIENT
Start: 2017-01-01 | End: 2017-01-01

## 2017-01-01 RX ORDER — DEXTROSE 50 % IN WATER 50 %
25 SYRINGE (ML) INTRAVENOUS ONCE
Qty: 0 | Refills: 0 | Status: DISCONTINUED | OUTPATIENT
Start: 2017-01-01 | End: 2017-01-01

## 2017-01-01 RX ORDER — DIGOXIN 250 MCG
0.25 TABLET ORAL EVERY 6 HOURS
Qty: 0 | Refills: 0 | Status: COMPLETED | OUTPATIENT
Start: 2017-01-01 | End: 2017-01-01

## 2017-01-01 RX ORDER — INSULIN LISPRO 100/ML
VIAL (ML) SUBCUTANEOUS EVERY 6 HOURS
Qty: 0 | Refills: 0 | Status: DISCONTINUED | OUTPATIENT
Start: 2017-01-01 | End: 2017-01-01

## 2017-01-01 RX ORDER — DEXMEDETOMIDINE HYDROCHLORIDE IN 0.9% SODIUM CHLORIDE 4 UG/ML
0.1 INJECTION INTRAVENOUS
Qty: 200 | Refills: 0 | Status: DISCONTINUED | OUTPATIENT
Start: 2017-01-01 | End: 2017-01-01

## 2017-01-01 RX ORDER — VANCOMYCIN HCL 1 G
500 VIAL (EA) INTRAVENOUS EVERY 24 HOURS
Qty: 0 | Refills: 0 | Status: DISCONTINUED | OUTPATIENT
Start: 2017-01-01 | End: 2017-01-01

## 2017-01-01 RX ORDER — FENTANYL CITRATE 50 UG/ML
25 INJECTION INTRAVENOUS ONCE
Qty: 0 | Refills: 0 | Status: DISCONTINUED | OUTPATIENT
Start: 2017-01-01 | End: 2017-01-01

## 2017-01-01 RX ORDER — PANTOPRAZOLE SODIUM 20 MG/1
40 TABLET, DELAYED RELEASE ORAL DAILY
Qty: 0 | Refills: 0 | Status: DISCONTINUED | OUTPATIENT
Start: 2017-01-01 | End: 2017-01-01

## 2017-01-01 RX ORDER — HEPARIN SODIUM 5000 [USP'U]/ML
3500 INJECTION INTRAVENOUS; SUBCUTANEOUS EVERY 6 HOURS
Qty: 0 | Refills: 0 | Status: DISCONTINUED | OUTPATIENT
Start: 2017-01-01 | End: 2017-01-01

## 2017-01-01 RX ORDER — NOREPINEPHRINE BITARTRATE/D5W 8 MG/250ML
0.01 PLASTIC BAG, INJECTION (ML) INTRAVENOUS
Qty: 8 | Refills: 0 | Status: DISCONTINUED | OUTPATIENT
Start: 2017-01-01 | End: 2017-01-01

## 2017-01-01 RX ORDER — ACETAMINOPHEN 500 MG
930 TABLET ORAL EVERY 4 HOURS
Qty: 0 | Refills: 0 | Status: DISCONTINUED | OUTPATIENT
Start: 2017-01-01 | End: 2017-01-01

## 2017-01-01 RX ORDER — HEPARIN SODIUM 5000 [USP'U]/ML
INJECTION INTRAVENOUS; SUBCUTANEOUS
Qty: 25000 | Refills: 0 | Status: DISCONTINUED | OUTPATIENT
Start: 2017-01-01 | End: 2017-01-01

## 2017-01-01 RX ORDER — IMIPENEM AND CILASTATIN 250; 250 MG/100ML; MG/100ML
1000 INJECTION, POWDER, FOR SOLUTION INTRAVENOUS ONCE
Qty: 0 | Refills: 0 | Status: COMPLETED | OUTPATIENT
Start: 2017-01-01 | End: 2017-01-01

## 2017-01-01 RX ORDER — HEPARIN SODIUM 5000 [USP'U]/ML
700 INJECTION INTRAVENOUS; SUBCUTANEOUS
Qty: 25000 | Refills: 0 | Status: DISCONTINUED | OUTPATIENT
Start: 2017-01-01 | End: 2017-01-01

## 2017-01-01 RX ORDER — ACETAMINOPHEN 500 MG
650 TABLET ORAL ONCE
Qty: 0 | Refills: 0 | Status: COMPLETED | OUTPATIENT
Start: 2017-01-01 | End: 2017-01-01

## 2017-01-01 RX ORDER — LIDOCAINE HCL 20 MG/ML
1 VIAL (ML) INJECTION
Qty: 2 | Refills: 0 | Status: DISCONTINUED | OUTPATIENT
Start: 2017-01-01 | End: 2017-01-01

## 2017-01-01 RX ORDER — VANCOMYCIN HCL 1 G
1250 VIAL (EA) INTRAVENOUS EVERY 24 HOURS
Qty: 0 | Refills: 0 | Status: DISCONTINUED | OUTPATIENT
Start: 2017-01-01 | End: 2017-01-01

## 2017-01-01 RX ORDER — IMIPENEM AND CILASTATIN 250; 250 MG/100ML; MG/100ML
250 INJECTION, POWDER, FOR SOLUTION INTRAVENOUS EVERY 6 HOURS
Qty: 0 | Refills: 0 | Status: DISCONTINUED | OUTPATIENT
Start: 2017-01-01 | End: 2017-01-01

## 2017-01-01 RX ORDER — PIPERACILLIN AND TAZOBACTAM 4; .5 G/20ML; G/20ML
3.38 INJECTION, POWDER, LYOPHILIZED, FOR SOLUTION INTRAVENOUS ONCE
Qty: 0 | Refills: 0 | Status: COMPLETED | OUTPATIENT
Start: 2017-01-01 | End: 2017-01-01

## 2017-01-01 RX ORDER — VANCOMYCIN HCL 1 G
1000 VIAL (EA) INTRAVENOUS EVERY 12 HOURS
Qty: 0 | Refills: 0 | Status: DISCONTINUED | OUTPATIENT
Start: 2017-01-01 | End: 2017-01-01

## 2017-01-01 RX ORDER — DEXAMETHASONE 0.5 MG/5ML
4 ELIXIR ORAL EVERY 12 HOURS
Qty: 0 | Refills: 0 | Status: COMPLETED | OUTPATIENT
Start: 2017-01-01 | End: 2017-01-01

## 2017-01-01 RX ORDER — HEPARIN SODIUM 5000 [USP'U]/ML
1500 INJECTION INTRAVENOUS; SUBCUTANEOUS
Qty: 25000 | Refills: 0 | Status: DISCONTINUED | OUTPATIENT
Start: 2017-01-01 | End: 2017-01-01

## 2017-01-01 RX ORDER — SODIUM,POTASSIUM PHOSPHATES 278-250MG
1 POWDER IN PACKET (EA) ORAL
Qty: 0 | Refills: 0 | Status: DISCONTINUED | OUTPATIENT
Start: 2017-01-01 | End: 2017-01-01

## 2017-01-01 RX ORDER — DEXAMETHASONE 0.5 MG/5ML
4 ELIXIR ORAL
Qty: 0 | Refills: 0 | Status: COMPLETED | OUTPATIENT
Start: 2017-01-01 | End: 2017-01-01

## 2017-01-01 RX ORDER — DOBUTAMINE HCL 250MG/20ML
5 VIAL (ML) INTRAVENOUS
Qty: 500 | Refills: 0 | Status: DISCONTINUED | OUTPATIENT
Start: 2017-01-01 | End: 2017-01-01

## 2017-01-01 RX ORDER — DOPAMINE HYDROCHLORIDE 40 MG/ML
5 INJECTION, SOLUTION, CONCENTRATE INTRAVENOUS
Qty: 400 | Refills: 0 | Status: DISCONTINUED | OUTPATIENT
Start: 2017-01-01 | End: 2017-01-01

## 2017-01-01 RX ORDER — AZITHROMYCIN 500 MG/1
500 TABLET, FILM COATED ORAL EVERY 24 HOURS
Qty: 0 | Refills: 0 | Status: DISCONTINUED | OUTPATIENT
Start: 2017-01-01 | End: 2017-01-01

## 2017-01-01 RX ORDER — MIDAZOLAM HYDROCHLORIDE 1 MG/ML
1 INJECTION, SOLUTION INTRAMUSCULAR; INTRAVENOUS
Qty: 100 | Refills: 0 | Status: DISCONTINUED | OUTPATIENT
Start: 2017-01-01 | End: 2017-01-01

## 2017-01-01 RX ORDER — VANCOMYCIN HCL 1 G
750 VIAL (EA) INTRAVENOUS EVERY 24 HOURS
Qty: 0 | Refills: 0 | Status: DISCONTINUED | OUTPATIENT
Start: 2017-01-01 | End: 2017-01-01

## 2017-01-01 RX ORDER — MIDAZOLAM HYDROCHLORIDE 1 MG/ML
2 INJECTION, SOLUTION INTRAMUSCULAR; INTRAVENOUS ONCE
Qty: 0 | Refills: 0 | Status: DISCONTINUED | OUTPATIENT
Start: 2017-01-01 | End: 2017-01-01

## 2017-01-01 RX ORDER — FENTANYL CITRATE 50 UG/ML
1 INJECTION INTRAVENOUS
Qty: 5000 | Refills: 0 | Status: DISCONTINUED | OUTPATIENT
Start: 2017-01-01 | End: 2017-01-01

## 2017-01-01 RX ORDER — DOBUTAMINE HCL 250MG/20ML
3 VIAL (ML) INTRAVENOUS
Qty: 500 | Refills: 0 | Status: DISCONTINUED | OUTPATIENT
Start: 2017-01-01 | End: 2017-01-01

## 2017-01-01 RX ORDER — POTASSIUM CHLORIDE 20 MEQ
10 PACKET (EA) ORAL ONCE
Qty: 0 | Refills: 0 | Status: COMPLETED | OUTPATIENT
Start: 2017-01-01 | End: 2017-01-01

## 2017-01-01 RX ORDER — HEPARIN SODIUM 5000 [USP'U]/ML
250 INJECTION INTRAVENOUS; SUBCUTANEOUS
Qty: 25000 | Refills: 0 | Status: DISCONTINUED | OUTPATIENT
Start: 2017-01-01 | End: 2017-01-01

## 2017-01-01 RX ORDER — SODIUM BICARBONATE 1 MEQ/ML
50 SYRINGE (ML) INTRAVENOUS ONCE
Qty: 0 | Refills: 0 | Status: COMPLETED | OUTPATIENT
Start: 2017-01-01 | End: 2017-01-01

## 2017-01-01 RX ORDER — HEPARIN SODIUM 5000 [USP'U]/ML
400 INJECTION INTRAVENOUS; SUBCUTANEOUS
Qty: 25000 | Refills: 0 | Status: DISCONTINUED | OUTPATIENT
Start: 2017-01-01 | End: 2017-01-01

## 2017-01-01 RX ORDER — CLOPIDOGREL BISULFATE 75 MG/1
600 TABLET, FILM COATED ORAL ONCE
Qty: 0 | Refills: 0 | Status: COMPLETED | OUTPATIENT
Start: 2017-01-01 | End: 2017-01-01

## 2017-01-01 RX ORDER — AMIODARONE HYDROCHLORIDE 400 MG/1
0.5 TABLET ORAL
Qty: 900 | Refills: 0 | Status: DISCONTINUED | OUTPATIENT
Start: 2017-01-01 | End: 2017-01-01

## 2017-01-01 RX ORDER — LACTOBACILLUS ACIDOPH-L.BULGARICUS 1 MILLION CELL CHEWABLE TABLET 1MM CELL
1 TABLET,CHEWABLE ORAL DAILY
Qty: 0 | Refills: 0 | Status: DISCONTINUED | OUTPATIENT
Start: 2017-01-01 | End: 2017-01-01

## 2017-01-01 RX ORDER — ACETAMINOPHEN 500 MG
930 TABLET ORAL EVERY 6 HOURS
Qty: 0 | Refills: 0 | Status: DISCONTINUED | OUTPATIENT
Start: 2017-01-01 | End: 2017-01-01

## 2017-01-01 RX ORDER — PANTOPRAZOLE SODIUM 20 MG/1
40 TABLET, DELAYED RELEASE ORAL
Qty: 0 | Refills: 0 | Status: DISCONTINUED | OUTPATIENT
Start: 2017-01-01 | End: 2017-01-01

## 2017-01-01 RX ORDER — ATORVASTATIN CALCIUM 80 MG/1
80 TABLET, FILM COATED ORAL AT BEDTIME
Qty: 0 | Refills: 0 | Status: DISCONTINUED | OUTPATIENT
Start: 2017-01-01 | End: 2017-01-01

## 2017-01-01 RX ORDER — TICAGRELOR 90 MG/1
180 TABLET ORAL ONCE
Qty: 0 | Refills: 0 | Status: COMPLETED | OUTPATIENT
Start: 2017-01-01 | End: 2017-01-01

## 2017-01-01 RX ORDER — AMIODARONE HYDROCHLORIDE 400 MG/1
200 TABLET ORAL THREE TIMES A DAY
Qty: 0 | Refills: 0 | Status: DISCONTINUED | OUTPATIENT
Start: 2017-01-01 | End: 2017-01-01

## 2017-01-01 RX ORDER — CHLORHEXIDINE GLUCONATE 213 G/1000ML
15 SOLUTION TOPICAL
Qty: 0 | Refills: 0 | Status: DISCONTINUED | OUTPATIENT
Start: 2017-01-01 | End: 2017-01-01

## 2017-01-01 RX ORDER — TICAGRELOR 90 MG/1
90 TABLET ORAL
Qty: 0 | Refills: 0 | Status: DISCONTINUED | OUTPATIENT
Start: 2017-01-01 | End: 2017-01-01

## 2017-01-01 RX ORDER — IMIPENEM AND CILASTATIN 250; 250 MG/100ML; MG/100ML
INJECTION, POWDER, FOR SOLUTION INTRAVENOUS
Qty: 0 | Refills: 0 | Status: DISCONTINUED | OUTPATIENT
Start: 2017-01-01 | End: 2017-01-01

## 2017-01-01 RX ORDER — HALOPERIDOL DECANOATE 100 MG/ML
2 INJECTION INTRAMUSCULAR ONCE
Qty: 0 | Refills: 0 | Status: COMPLETED | OUTPATIENT
Start: 2017-01-01 | End: 2017-01-01

## 2017-01-01 RX ORDER — BUMETANIDE 0.25 MG/ML
4 INJECTION INTRAMUSCULAR; INTRAVENOUS ONCE
Qty: 0 | Refills: 0 | Status: DISCONTINUED | OUTPATIENT
Start: 2017-01-01 | End: 2017-01-01

## 2017-01-01 RX ORDER — QUETIAPINE FUMARATE 200 MG/1
12.5 TABLET, FILM COATED ORAL ONCE
Qty: 0 | Refills: 0 | Status: COMPLETED | OUTPATIENT
Start: 2017-01-01 | End: 2017-01-01

## 2017-01-01 RX ORDER — ASPIRIN/CALCIUM CARB/MAGNESIUM 324 MG
325 TABLET ORAL ONCE
Qty: 0 | Refills: 0 | Status: COMPLETED | OUTPATIENT
Start: 2017-01-01 | End: 2017-01-01

## 2017-01-01 RX ORDER — SODIUM CHLORIDE 9 MG/ML
250 INJECTION INTRAMUSCULAR; INTRAVENOUS; SUBCUTANEOUS ONCE
Qty: 0 | Refills: 0 | Status: DISCONTINUED | OUTPATIENT
Start: 2017-01-01 | End: 2017-01-01

## 2017-01-01 RX ORDER — INSULIN LISPRO 100/ML
VIAL (ML) SUBCUTANEOUS
Qty: 0 | Refills: 0 | Status: DISCONTINUED | OUTPATIENT
Start: 2017-01-01 | End: 2017-01-01

## 2017-01-01 RX ORDER — AMIODARONE HYDROCHLORIDE 400 MG/1
150 TABLET ORAL ONCE
Qty: 0 | Refills: 0 | Status: COMPLETED | OUTPATIENT
Start: 2017-01-01 | End: 2017-01-01

## 2017-01-01 RX ORDER — GLUCAGON INJECTION, SOLUTION 0.5 MG/.1ML
1 INJECTION, SOLUTION SUBCUTANEOUS ONCE
Qty: 0 | Refills: 0 | Status: DISCONTINUED | OUTPATIENT
Start: 2017-01-01 | End: 2017-01-01

## 2017-01-01 RX ORDER — HALOPERIDOL DECANOATE 100 MG/ML
10 INJECTION INTRAMUSCULAR ONCE
Qty: 0 | Refills: 0 | Status: COMPLETED | OUTPATIENT
Start: 2017-01-01 | End: 2017-01-01

## 2017-01-01 RX ORDER — DEXTROSE 50 % IN WATER 50 %
12.5 SYRINGE (ML) INTRAVENOUS ONCE
Qty: 0 | Refills: 0 | Status: DISCONTINUED | OUTPATIENT
Start: 2017-01-01 | End: 2017-01-01

## 2017-01-01 RX ORDER — IPRATROPIUM/ALBUTEROL SULFATE 18-103MCG
3 AEROSOL WITH ADAPTER (GRAM) INHALATION EVERY 6 HOURS
Qty: 0 | Refills: 0 | Status: DISCONTINUED | OUTPATIENT
Start: 2017-01-01 | End: 2017-01-01

## 2017-01-01 RX ORDER — ATROPINE SULFATE 0.1 MG/ML
0.5 SYRINGE (ML) INJECTION ONCE
Qty: 0 | Refills: 0 | Status: COMPLETED | OUTPATIENT
Start: 2017-01-01 | End: 2017-01-01

## 2017-01-01 RX ORDER — LEVETIRACETAM 250 MG/1
500 TABLET, FILM COATED ORAL
Qty: 0 | Refills: 0 | Status: DISCONTINUED | OUTPATIENT
Start: 2017-01-01 | End: 2017-01-01

## 2017-01-01 RX ORDER — NOREPINEPHRINE BITARTRATE/D5W 8 MG/250ML
1 PLASTIC BAG, INJECTION (ML) INTRAVENOUS
Qty: 8 | Refills: 0 | Status: DISCONTINUED | OUTPATIENT
Start: 2017-01-01 | End: 2017-01-01

## 2017-01-01 RX ORDER — DEXTROSE 50 % IN WATER 50 %
1 SYRINGE (ML) INTRAVENOUS ONCE
Qty: 0 | Refills: 0 | Status: DISCONTINUED | OUTPATIENT
Start: 2017-01-01 | End: 2017-01-01

## 2017-01-01 RX ORDER — HYDRALAZINE HCL 50 MG
10 TABLET ORAL EVERY 8 HOURS
Qty: 0 | Refills: 0 | Status: DISCONTINUED | OUTPATIENT
Start: 2017-01-01 | End: 2017-01-01

## 2017-01-01 RX ORDER — SODIUM CHLORIDE 9 MG/ML
3 INJECTION INTRAMUSCULAR; INTRAVENOUS; SUBCUTANEOUS ONCE
Qty: 0 | Refills: 0 | Status: COMPLETED | OUTPATIENT
Start: 2017-01-01 | End: 2017-01-01

## 2017-01-01 RX ORDER — DEXMEDETOMIDINE HYDROCHLORIDE IN 0.9% SODIUM CHLORIDE 4 UG/ML
0.21 INJECTION INTRAVENOUS
Qty: 200 | Refills: 0 | Status: DISCONTINUED | OUTPATIENT
Start: 2017-01-01 | End: 2017-01-01

## 2017-01-01 RX ORDER — POTASSIUM CHLORIDE 20 MEQ
20 PACKET (EA) ORAL ONCE
Qty: 0 | Refills: 0 | Status: DISCONTINUED | OUTPATIENT
Start: 2017-01-01 | End: 2017-01-01

## 2017-01-01 RX ORDER — DEXMEDETOMIDINE HYDROCHLORIDE IN 0.9% SODIUM CHLORIDE 4 UG/ML
0.3 INJECTION INTRAVENOUS
Qty: 200 | Refills: 0 | Status: DISCONTINUED | OUTPATIENT
Start: 2017-01-01 | End: 2017-01-01

## 2017-01-01 RX ADMIN — PANTOPRAZOLE SODIUM 40 MILLIGRAM(S): 20 TABLET, DELAYED RELEASE ORAL at 12:01

## 2017-01-01 RX ADMIN — Medication 1 MILLIGRAM(S): at 12:35

## 2017-01-01 RX ADMIN — FENTANYL CITRATE 4.5 MICROGRAM(S)/KG/HR: 50 INJECTION INTRAVENOUS at 23:53

## 2017-01-01 RX ADMIN — IMIPENEM AND CILASTATIN 100 MILLIGRAM(S): 250; 250 INJECTION, POWDER, FOR SOLUTION INTRAVENOUS at 17:32

## 2017-01-01 RX ADMIN — Medication 6.98 MICROGRAM(S)/KG/MIN: at 23:34

## 2017-01-01 RX ADMIN — Medication 13.96 MICROGRAM(S)/KG/MIN: at 05:00

## 2017-01-01 RX ADMIN — Medication 13.96 MICROGRAM(S)/KG/MIN: at 14:20

## 2017-01-01 RX ADMIN — PIPERACILLIN AND TAZOBACTAM 25 GRAM(S): 4; .5 INJECTION, POWDER, LYOPHILIZED, FOR SOLUTION INTRAVENOUS at 17:12

## 2017-01-01 RX ADMIN — Medication 1000 MILLIGRAM(S): at 03:30

## 2017-01-01 RX ADMIN — RISPERIDONE 0.5 MILLIGRAM(S): 4 TABLET ORAL at 11:54

## 2017-01-01 RX ADMIN — HEPARIN SODIUM 7 UNIT(S)/HR: 5000 INJECTION INTRAVENOUS; SUBCUTANEOUS at 01:31

## 2017-01-01 RX ADMIN — Medication 100 MILLIEQUIVALENT(S): at 17:54

## 2017-01-01 RX ADMIN — Medication 110 MILLIGRAM(S): at 21:54

## 2017-01-01 RX ADMIN — LEVETIRACETAM 500 MILLIGRAM(S): 250 TABLET, FILM COATED ORAL at 15:19

## 2017-01-01 RX ADMIN — PANTOPRAZOLE SODIUM 40 MILLIGRAM(S): 20 TABLET, DELAYED RELEASE ORAL at 15:15

## 2017-01-01 RX ADMIN — RISPERIDONE 0.5 MILLIGRAM(S): 4 TABLET ORAL at 01:00

## 2017-01-01 RX ADMIN — IMIPENEM AND CILASTATIN 100 MILLIGRAM(S): 250; 250 INJECTION, POWDER, FOR SOLUTION INTRAVENOUS at 12:54

## 2017-01-01 RX ADMIN — TICAGRELOR 90 MILLIGRAM(S): 90 TABLET ORAL at 17:41

## 2017-01-01 RX ADMIN — CHLORHEXIDINE GLUCONATE 15 MILLILITER(S): 213 SOLUTION TOPICAL at 17:35

## 2017-01-01 RX ADMIN — Medication 4 MILLIGRAM(S): at 05:28

## 2017-01-01 RX ADMIN — PIPERACILLIN AND TAZOBACTAM 25 GRAM(S): 4; .5 INJECTION, POWDER, LYOPHILIZED, FOR SOLUTION INTRAVENOUS at 05:14

## 2017-01-01 RX ADMIN — HEPARIN SODIUM 7 UNIT(S)/HR: 5000 INJECTION INTRAVENOUS; SUBCUTANEOUS at 15:16

## 2017-01-01 RX ADMIN — SODIUM CHLORIDE 500 MILLILITER(S): 9 INJECTION INTRAMUSCULAR; INTRAVENOUS; SUBCUTANEOUS at 19:13

## 2017-01-01 RX ADMIN — QUETIAPINE FUMARATE 12.5 MILLIGRAM(S): 200 TABLET, FILM COATED ORAL at 12:12

## 2017-01-01 RX ADMIN — Medication 0.25 MILLIGRAM(S): at 12:48

## 2017-01-01 RX ADMIN — RISPERIDONE 0.5 MILLIGRAM(S): 4 TABLET ORAL at 00:22

## 2017-01-01 RX ADMIN — SODIUM CHLORIDE 250 MILLILITER(S): 9 INJECTION INTRAMUSCULAR; INTRAVENOUS; SUBCUTANEOUS at 04:20

## 2017-01-01 RX ADMIN — OXYCODONE AND ACETAMINOPHEN 1 TABLET(S): 5; 325 TABLET ORAL at 16:51

## 2017-01-01 RX ADMIN — TICAGRELOR 90 MILLIGRAM(S): 90 TABLET ORAL at 05:27

## 2017-01-01 RX ADMIN — Medication 81 MILLIGRAM(S): at 11:03

## 2017-01-01 RX ADMIN — HALOPERIDOL DECANOATE 0.5 MILLIGRAM(S): 100 INJECTION INTRAMUSCULAR at 11:28

## 2017-01-01 RX ADMIN — Medication 6.98 MICROGRAM(S)/KG/MIN: at 02:43

## 2017-01-01 RX ADMIN — Medication 110 MILLIGRAM(S): at 23:07

## 2017-01-01 RX ADMIN — LEVETIRACETAM 500 MILLIGRAM(S): 250 TABLET, FILM COATED ORAL at 18:51

## 2017-01-01 RX ADMIN — VASOPRESSIN 2.4 UNIT(S)/MIN: 20 INJECTION INTRAVENOUS at 10:31

## 2017-01-01 RX ADMIN — IMIPENEM AND CILASTATIN 100 MILLIGRAM(S): 250; 250 INJECTION, POWDER, FOR SOLUTION INTRAVENOUS at 05:55

## 2017-01-01 RX ADMIN — TICAGRELOR 90 MILLIGRAM(S): 90 TABLET ORAL at 18:26

## 2017-01-01 RX ADMIN — IMIPENEM AND CILASTATIN 100 MILLIGRAM(S): 250; 250 INJECTION, POWDER, FOR SOLUTION INTRAVENOUS at 15:01

## 2017-01-01 RX ADMIN — CHLORHEXIDINE GLUCONATE 15 MILLILITER(S): 213 SOLUTION TOPICAL at 05:19

## 2017-01-01 RX ADMIN — TICAGRELOR 90 MILLIGRAM(S): 90 TABLET ORAL at 18:00

## 2017-01-01 RX ADMIN — HEPARIN SODIUM 6 UNIT(S)/HR: 5000 INJECTION INTRAVENOUS; SUBCUTANEOUS at 11:30

## 2017-01-01 RX ADMIN — Medication 0.5 MILLIGRAM(S): at 22:36

## 2017-01-01 RX ADMIN — Medication 0.5 MILLIGRAM(S): at 12:05

## 2017-01-01 RX ADMIN — Medication 1 DROP(S): at 13:18

## 2017-01-01 RX ADMIN — TICAGRELOR 90 MILLIGRAM(S): 90 TABLET ORAL at 17:22

## 2017-01-01 RX ADMIN — Medication 81 MILLIGRAM(S): at 11:43

## 2017-01-01 RX ADMIN — Medication 400 GRAM(S): at 15:53

## 2017-01-01 RX ADMIN — HEPARIN SODIUM 1300 UNIT(S)/HR: 5000 INJECTION INTRAVENOUS; SUBCUTANEOUS at 13:15

## 2017-01-01 RX ADMIN — RISPERIDONE 0.5 MILLIGRAM(S): 4 TABLET ORAL at 12:37

## 2017-01-01 RX ADMIN — TICAGRELOR 90 MILLIGRAM(S): 90 TABLET ORAL at 05:28

## 2017-01-01 RX ADMIN — SODIUM CHLORIDE 1000 MILLILITER(S): 9 INJECTION INTRAMUSCULAR; INTRAVENOUS; SUBCUTANEOUS at 15:56

## 2017-01-01 RX ADMIN — Medication 63.75 MILLIMOLE(S): at 02:25

## 2017-01-01 RX ADMIN — CHLORHEXIDINE GLUCONATE 15 MILLILITER(S): 213 SOLUTION TOPICAL at 06:13

## 2017-01-01 RX ADMIN — IMIPENEM AND CILASTATIN 100 MILLIGRAM(S): 250; 250 INJECTION, POWDER, FOR SOLUTION INTRAVENOUS at 17:58

## 2017-01-01 RX ADMIN — Medication 1 APPLICATION(S): at 01:30

## 2017-01-01 RX ADMIN — LACTOBACILLUS ACIDOPH-L.BULGARICUS 1 MILLION CELL CHEWABLE TABLET 1 TABLET(S): at 18:25

## 2017-01-01 RX ADMIN — HEPARIN SODIUM 1300 UNIT(S)/HR: 5000 INJECTION INTRAVENOUS; SUBCUTANEOUS at 05:45

## 2017-01-01 RX ADMIN — IMIPENEM AND CILASTATIN 100 MILLIGRAM(S): 250; 250 INJECTION, POWDER, FOR SOLUTION INTRAVENOUS at 02:06

## 2017-01-01 RX ADMIN — Medication 4 MILLIGRAM(S): at 18:26

## 2017-01-01 RX ADMIN — AMIODARONE HYDROCHLORIDE 400 MILLIGRAM(S): 400 TABLET ORAL at 05:19

## 2017-01-01 RX ADMIN — ATORVASTATIN CALCIUM 80 MILLIGRAM(S): 80 TABLET, FILM COATED ORAL at 23:04

## 2017-01-01 RX ADMIN — Medication 81 MILLIGRAM(S): at 12:05

## 2017-01-01 RX ADMIN — PANTOPRAZOLE SODIUM 40 MILLIGRAM(S): 20 TABLET, DELAYED RELEASE ORAL at 18:12

## 2017-01-01 RX ADMIN — MIDAZOLAM HYDROCHLORIDE 1 MG/HR: 1 INJECTION, SOLUTION INTRAMUSCULAR; INTRAVENOUS at 23:53

## 2017-01-01 RX ADMIN — Medication 1.75 MICROGRAM(S)/KG/MIN: at 06:46

## 2017-01-01 RX ADMIN — Medication 166.67 MILLIGRAM(S): at 01:51

## 2017-01-01 RX ADMIN — DEXMEDETOMIDINE HYDROCHLORIDE IN 0.9% SODIUM CHLORIDE 4.66 MICROGRAM(S)/KG/HR: 4 INJECTION INTRAVENOUS at 15:18

## 2017-01-01 RX ADMIN — IMIPENEM AND CILASTATIN 100 MILLIGRAM(S): 250; 250 INJECTION, POWDER, FOR SOLUTION INTRAVENOUS at 18:08

## 2017-01-01 RX ADMIN — Medication 1 APPLICATION(S): at 05:19

## 2017-01-01 RX ADMIN — Medication 81 MILLIGRAM(S): at 15:15

## 2017-01-01 RX ADMIN — Medication 4 MILLIGRAM(S): at 16:18

## 2017-01-01 RX ADMIN — HEPARIN SODIUM 1600 UNIT(S)/HR: 5000 INJECTION INTRAVENOUS; SUBCUTANEOUS at 10:47

## 2017-01-01 RX ADMIN — Medication 150 MILLIGRAM(S): at 17:08

## 2017-01-01 RX ADMIN — Medication 930 MILLIGRAM(S): at 11:58

## 2017-01-01 RX ADMIN — AMIODARONE HYDROCHLORIDE 400 MILLIGRAM(S): 400 TABLET ORAL at 23:51

## 2017-01-01 RX ADMIN — HEPARIN SODIUM 1600 UNIT(S)/HR: 5000 INJECTION INTRAVENOUS; SUBCUTANEOUS at 06:39

## 2017-01-01 RX ADMIN — Medication 100 MILLIGRAM(S): at 20:37

## 2017-01-01 RX ADMIN — IMIPENEM AND CILASTATIN 100 MILLIGRAM(S): 250; 250 INJECTION, POWDER, FOR SOLUTION INTRAVENOUS at 01:19

## 2017-01-01 RX ADMIN — VASOPRESSIN 1.2 UNIT(S)/MIN: 20 INJECTION INTRAVENOUS at 09:49

## 2017-01-01 RX ADMIN — RISPERIDONE 0.5 MILLIGRAM(S): 4 TABLET ORAL at 21:53

## 2017-01-01 RX ADMIN — Medication 81 MILLIGRAM(S): at 13:14

## 2017-01-01 RX ADMIN — LEVETIRACETAM 400 MILLIGRAM(S): 250 TABLET, FILM COATED ORAL at 09:36

## 2017-01-01 RX ADMIN — TICAGRELOR 90 MILLIGRAM(S): 90 TABLET ORAL at 17:17

## 2017-01-01 RX ADMIN — HEPARIN SODIUM 0 UNIT(S)/HR: 5000 INJECTION INTRAVENOUS; SUBCUTANEOUS at 08:15

## 2017-01-01 RX ADMIN — FENTANYL CITRATE 25 MICROGRAM(S): 50 INJECTION INTRAVENOUS at 03:05

## 2017-01-01 RX ADMIN — Medication 1 DROP(S): at 05:19

## 2017-01-01 RX ADMIN — PIPERACILLIN AND TAZOBACTAM 25 GRAM(S): 4; .5 INJECTION, POWDER, LYOPHILIZED, FOR SOLUTION INTRAVENOUS at 17:00

## 2017-01-01 RX ADMIN — Medication 150 MILLIGRAM(S): at 05:29

## 2017-01-01 RX ADMIN — IMIPENEM AND CILASTATIN 100 MILLIGRAM(S): 250; 250 INJECTION, POWDER, FOR SOLUTION INTRAVENOUS at 23:02

## 2017-01-01 RX ADMIN — HEPARIN SODIUM 1400 UNIT(S)/HR: 5000 INJECTION INTRAVENOUS; SUBCUTANEOUS at 03:16

## 2017-01-01 RX ADMIN — Medication 400 MILLIGRAM(S): at 15:29

## 2017-01-01 RX ADMIN — HEPARIN SODIUM 900 UNIT(S)/HR: 5000 INJECTION INTRAVENOUS; SUBCUTANEOUS at 03:39

## 2017-01-01 RX ADMIN — HEPARIN SODIUM 1500 UNIT(S)/HR: 5000 INJECTION INTRAVENOUS; SUBCUTANEOUS at 10:22

## 2017-01-01 RX ADMIN — PANTOPRAZOLE SODIUM 40 MILLIGRAM(S): 20 TABLET, DELAYED RELEASE ORAL at 11:38

## 2017-01-01 RX ADMIN — PANTOPRAZOLE SODIUM 40 MILLIGRAM(S): 20 TABLET, DELAYED RELEASE ORAL at 05:01

## 2017-01-01 RX ADMIN — IMIPENEM AND CILASTATIN 100 MILLIGRAM(S): 250; 250 INJECTION, POWDER, FOR SOLUTION INTRAVENOUS at 13:57

## 2017-01-01 RX ADMIN — Medication 250 MILLIGRAM(S): at 05:44

## 2017-01-01 RX ADMIN — IMIPENEM AND CILASTATIN 100 MILLIGRAM(S): 250; 250 INJECTION, POWDER, FOR SOLUTION INTRAVENOUS at 23:05

## 2017-01-01 RX ADMIN — Medication 81 MILLIGRAM(S): at 12:01

## 2017-01-01 RX ADMIN — Medication 81 MILLIGRAM(S): at 12:25

## 2017-01-01 RX ADMIN — HEPARIN SODIUM 1000 UNIT(S)/HR: 5000 INJECTION INTRAVENOUS; SUBCUTANEOUS at 20:00

## 2017-01-01 RX ADMIN — IMIPENEM AND CILASTATIN 100 MILLIGRAM(S): 250; 250 INJECTION, POWDER, FOR SOLUTION INTRAVENOUS at 18:06

## 2017-01-01 RX ADMIN — Medication 81 MILLIGRAM(S): at 17:32

## 2017-01-01 RX ADMIN — ATORVASTATIN CALCIUM 80 MILLIGRAM(S): 80 TABLET, FILM COATED ORAL at 23:07

## 2017-01-01 RX ADMIN — FENTANYL CITRATE 25 MICROGRAM(S): 50 INJECTION INTRAVENOUS at 12:43

## 2017-01-01 RX ADMIN — FENTANYL CITRATE 25 MICROGRAM(S): 50 INJECTION INTRAVENOUS at 03:30

## 2017-01-01 RX ADMIN — Medication 400 MILLIGRAM(S): at 06:46

## 2017-01-01 RX ADMIN — Medication 300 MILLIGRAM(S): at 17:36

## 2017-01-01 RX ADMIN — HALOPERIDOL DECANOATE 2 MILLIGRAM(S): 100 INJECTION INTRAMUSCULAR at 00:44

## 2017-01-01 RX ADMIN — HEPARIN SODIUM 1500 UNIT(S)/HR: 5000 INJECTION INTRAVENOUS; SUBCUTANEOUS at 18:32

## 2017-01-01 RX ADMIN — PIPERACILLIN AND TAZOBACTAM 25 GRAM(S): 4; .5 INJECTION, POWDER, LYOPHILIZED, FOR SOLUTION INTRAVENOUS at 13:55

## 2017-01-01 RX ADMIN — Medication 81 MILLIGRAM(S): at 11:54

## 2017-01-01 RX ADMIN — Medication 650 MILLIGRAM(S): at 20:30

## 2017-01-01 RX ADMIN — PIPERACILLIN AND TAZOBACTAM 25 GRAM(S): 4; .5 INJECTION, POWDER, LYOPHILIZED, FOR SOLUTION INTRAVENOUS at 22:17

## 2017-01-01 RX ADMIN — FENTANYL CITRATE 4.5 MICROGRAM(S)/KG/HR: 50 INJECTION INTRAVENOUS at 04:43

## 2017-01-01 RX ADMIN — MIDAZOLAM HYDROCHLORIDE 2 MILLIGRAM(S): 1 INJECTION, SOLUTION INTRAMUSCULAR; INTRAVENOUS at 10:00

## 2017-01-01 RX ADMIN — Medication 13.96 MICROGRAM(S)/KG/MIN: at 15:16

## 2017-01-01 RX ADMIN — TICAGRELOR 90 MILLIGRAM(S): 90 TABLET ORAL at 17:23

## 2017-01-01 RX ADMIN — PANTOPRAZOLE SODIUM 40 MILLIGRAM(S): 20 TABLET, DELAYED RELEASE ORAL at 12:09

## 2017-01-01 RX ADMIN — Medication 250 MILLIGRAM(S): at 17:25

## 2017-01-01 RX ADMIN — SODIUM CHLORIDE 1000 MILLILITER(S): 9 INJECTION INTRAMUSCULAR; INTRAVENOUS; SUBCUTANEOUS at 14:37

## 2017-01-01 RX ADMIN — OXYCODONE AND ACETAMINOPHEN 1 TABLET(S): 5; 325 TABLET ORAL at 01:49

## 2017-01-01 RX ADMIN — PANTOPRAZOLE SODIUM 40 MILLIGRAM(S): 20 TABLET, DELAYED RELEASE ORAL at 11:53

## 2017-01-01 RX ADMIN — FENTANYL CITRATE 13.96 MICROGRAM(S)/KG/HR: 50 INJECTION INTRAVENOUS at 18:15

## 2017-01-01 RX ADMIN — POTASSIUM PHOSPHATE, MONOBASIC POTASSIUM PHOSPHATE, DIBASIC 62.5 MILLIMOLE(S): 236; 224 INJECTION, SOLUTION INTRAVENOUS at 08:36

## 2017-01-01 RX ADMIN — TICAGRELOR 180 MILLIGRAM(S): 90 TABLET ORAL at 09:42

## 2017-01-01 RX ADMIN — TICAGRELOR 90 MILLIGRAM(S): 90 TABLET ORAL at 05:07

## 2017-01-01 RX ADMIN — AMIODARONE HYDROCHLORIDE 400 MILLIGRAM(S): 400 TABLET ORAL at 17:23

## 2017-01-01 RX ADMIN — CHLORHEXIDINE GLUCONATE 15 MILLILITER(S): 213 SOLUTION TOPICAL at 05:44

## 2017-01-01 RX ADMIN — MORPHINE SULFATE 2 MILLIGRAM(S): 50 CAPSULE, EXTENDED RELEASE ORAL at 01:25

## 2017-01-01 RX ADMIN — IMIPENEM AND CILASTATIN 100 MILLIGRAM(S): 250; 250 INJECTION, POWDER, FOR SOLUTION INTRAVENOUS at 12:37

## 2017-01-01 RX ADMIN — Medication 100 MILLIEQUIVALENT(S): at 08:20

## 2017-01-01 RX ADMIN — Medication 0.25 MILLIGRAM(S): at 06:04

## 2017-01-01 RX ADMIN — POTASSIUM PHOSPHATE, MONOBASIC POTASSIUM PHOSPHATE, DIBASIC 62.5 MILLIMOLE(S): 236; 224 INJECTION, SOLUTION INTRAVENOUS at 09:55

## 2017-01-01 RX ADMIN — Medication 174.56 MICROGRAM(S)/KG/MIN: at 17:58

## 2017-01-01 RX ADMIN — Medication 13.96 MICROGRAM(S)/KG/MIN: at 14:58

## 2017-01-01 RX ADMIN — DEXMEDETOMIDINE HYDROCHLORIDE IN 0.9% SODIUM CHLORIDE 5 MICROGRAM(S)/KG/HR: 4 INJECTION INTRAVENOUS at 09:54

## 2017-01-01 RX ADMIN — Medication 1 APPLICATION(S): at 00:50

## 2017-01-01 RX ADMIN — PIPERACILLIN AND TAZOBACTAM 25 GRAM(S): 4; .5 INJECTION, POWDER, LYOPHILIZED, FOR SOLUTION INTRAVENOUS at 05:10

## 2017-01-01 RX ADMIN — TICAGRELOR 90 MILLIGRAM(S): 90 TABLET ORAL at 10:03

## 2017-01-01 RX ADMIN — SODIUM CHLORIDE 500 MILLILITER(S): 9 INJECTION INTRAMUSCULAR; INTRAVENOUS; SUBCUTANEOUS at 05:11

## 2017-01-01 RX ADMIN — RISPERIDONE 0.5 MILLIGRAM(S): 4 TABLET ORAL at 11:37

## 2017-01-01 RX ADMIN — ATORVASTATIN CALCIUM 80 MILLIGRAM(S): 80 TABLET, FILM COATED ORAL at 20:46

## 2017-01-01 RX ADMIN — FENTANYL CITRATE 4.5 MICROGRAM(S)/KG/HR: 50 INJECTION INTRAVENOUS at 19:28

## 2017-01-01 RX ADMIN — HEPARIN SODIUM 1500 UNIT(S)/HR: 5000 INJECTION INTRAVENOUS; SUBCUTANEOUS at 20:30

## 2017-01-01 RX ADMIN — Medication 63.75 MILLIMOLE(S): at 14:00

## 2017-01-01 RX ADMIN — HEPARIN SODIUM 800 UNIT(S)/HR: 5000 INJECTION INTRAVENOUS; SUBCUTANEOUS at 19:19

## 2017-01-01 RX ADMIN — TICAGRELOR 90 MILLIGRAM(S): 90 TABLET ORAL at 06:37

## 2017-01-01 RX ADMIN — Medication 1 APPLICATION(S): at 11:52

## 2017-01-01 RX ADMIN — OXYCODONE AND ACETAMINOPHEN 1 TABLET(S): 5; 325 TABLET ORAL at 17:17

## 2017-01-01 RX ADMIN — Medication 250 MILLIGRAM(S): at 06:37

## 2017-01-01 RX ADMIN — RISPERIDONE 0.5 MILLIGRAM(S): 4 TABLET ORAL at 12:10

## 2017-01-01 RX ADMIN — Medication 81 MILLIGRAM(S): at 11:16

## 2017-01-01 RX ADMIN — IMIPENEM AND CILASTATIN 100 MILLIGRAM(S): 250; 250 INJECTION, POWDER, FOR SOLUTION INTRAVENOUS at 18:10

## 2017-01-01 RX ADMIN — Medication 1 DROP(S): at 01:30

## 2017-01-01 RX ADMIN — CHLORHEXIDINE GLUCONATE 15 MILLILITER(S): 213 SOLUTION TOPICAL at 19:17

## 2017-01-01 RX ADMIN — ATORVASTATIN CALCIUM 80 MILLIGRAM(S): 80 TABLET, FILM COATED ORAL at 21:53

## 2017-01-01 RX ADMIN — HALOPERIDOL DECANOATE 0.5 MILLIGRAM(S): 100 INJECTION INTRAMUSCULAR at 14:24

## 2017-01-01 RX ADMIN — IMIPENEM AND CILASTATIN 100 MILLIGRAM(S): 250; 250 INJECTION, POWDER, FOR SOLUTION INTRAVENOUS at 05:00

## 2017-01-01 RX ADMIN — POTASSIUM PHOSPHATE, MONOBASIC POTASSIUM PHOSPHATE, DIBASIC 62.5 MILLIMOLE(S): 236; 224 INJECTION, SOLUTION INTRAVENOUS at 14:37

## 2017-01-01 RX ADMIN — FENTANYL CITRATE 4.5 MICROGRAM(S)/KG/HR: 50 INJECTION INTRAVENOUS at 10:31

## 2017-01-01 RX ADMIN — PANTOPRAZOLE SODIUM 40 MILLIGRAM(S): 20 TABLET, DELAYED RELEASE ORAL at 12:25

## 2017-01-01 RX ADMIN — IMIPENEM AND CILASTATIN 100 MILLIGRAM(S): 250; 250 INJECTION, POWDER, FOR SOLUTION INTRAVENOUS at 12:22

## 2017-01-01 RX ADMIN — Medication 100 MILLIGRAM(S): at 20:08

## 2017-01-01 RX ADMIN — Medication 13.96 MICROGRAM(S)/KG/MIN: at 22:36

## 2017-01-01 RX ADMIN — OXYCODONE AND ACETAMINOPHEN 1 TABLET(S): 5; 325 TABLET ORAL at 20:47

## 2017-01-01 RX ADMIN — VASOPRESSIN 2.4 UNIT(S)/MIN: 20 INJECTION INTRAVENOUS at 05:19

## 2017-01-01 RX ADMIN — VASOPRESSIN 1.2 UNIT(S)/MIN: 20 INJECTION INTRAVENOUS at 05:45

## 2017-01-01 RX ADMIN — Medication 6.98 MICROGRAM(S)/KG/MIN: at 12:05

## 2017-01-01 RX ADMIN — HEPARIN SODIUM 250 UNIT(S)/HR: 5000 INJECTION INTRAVENOUS; SUBCUTANEOUS at 00:58

## 2017-01-01 RX ADMIN — FENTANYL CITRATE 4.5 MICROGRAM(S)/KG/HR: 50 INJECTION INTRAVENOUS at 05:22

## 2017-01-01 RX ADMIN — AZITHROMYCIN 250 MILLIGRAM(S): 500 TABLET, FILM COATED ORAL at 18:41

## 2017-01-01 RX ADMIN — TICAGRELOR 90 MILLIGRAM(S): 90 TABLET ORAL at 05:19

## 2017-01-01 RX ADMIN — PIPERACILLIN AND TAZOBACTAM 25 GRAM(S): 4; .5 INJECTION, POWDER, LYOPHILIZED, FOR SOLUTION INTRAVENOUS at 05:24

## 2017-01-01 RX ADMIN — CHLORHEXIDINE GLUCONATE 15 MILLILITER(S): 213 SOLUTION TOPICAL at 05:52

## 2017-01-01 RX ADMIN — Medication 100 MILLIEQUIVALENT(S): at 10:29

## 2017-01-01 RX ADMIN — TICAGRELOR 90 MILLIGRAM(S): 90 TABLET ORAL at 05:48

## 2017-01-01 RX ADMIN — HEPARIN SODIUM 3500 UNIT(S): 5000 INJECTION INTRAVENOUS; SUBCUTANEOUS at 10:52

## 2017-01-01 RX ADMIN — LACTOBACILLUS ACIDOPH-L.BULGARICUS 1 MILLION CELL CHEWABLE TABLET 1 TABLET(S): at 15:29

## 2017-01-01 RX ADMIN — HEPARIN SODIUM 650 UNIT(S)/HR: 5000 INJECTION INTRAVENOUS; SUBCUTANEOUS at 21:45

## 2017-01-01 RX ADMIN — VASOPRESSIN 2.4 UNIT(S)/MIN: 20 INJECTION INTRAVENOUS at 15:17

## 2017-01-01 RX ADMIN — Medication 174.56 MICROGRAM(S)/KG/MIN: at 20:53

## 2017-01-01 RX ADMIN — PANTOPRAZOLE SODIUM 40 MILLIGRAM(S): 20 TABLET, DELAYED RELEASE ORAL at 11:52

## 2017-01-01 RX ADMIN — Medication 174.56 MICROGRAM(S)/KG/MIN: at 14:59

## 2017-01-01 RX ADMIN — LACTOBACILLUS ACIDOPH-L.BULGARICUS 1 MILLION CELL CHEWABLE TABLET 1 TABLET(S): at 12:37

## 2017-01-01 RX ADMIN — AMIODARONE HYDROCHLORIDE 400 MILLIGRAM(S): 400 TABLET ORAL at 06:12

## 2017-01-01 RX ADMIN — LACTOBACILLUS ACIDOPH-L.BULGARICUS 1 MILLION CELL CHEWABLE TABLET 1 TABLET(S): at 11:58

## 2017-01-01 RX ADMIN — HEPARIN SODIUM 1800 UNIT(S)/HR: 5000 INJECTION INTRAVENOUS; SUBCUTANEOUS at 05:03

## 2017-01-01 RX ADMIN — IMIPENEM AND CILASTATIN 100 MILLIGRAM(S): 250; 250 INJECTION, POWDER, FOR SOLUTION INTRAVENOUS at 05:07

## 2017-01-01 RX ADMIN — HEPARIN SODIUM 1300 UNIT(S)/HR: 5000 INJECTION INTRAVENOUS; SUBCUTANEOUS at 06:13

## 2017-01-01 RX ADMIN — HEPARIN SODIUM 800 UNIT(S)/HR: 5000 INJECTION INTRAVENOUS; SUBCUTANEOUS at 06:03

## 2017-01-01 RX ADMIN — RISPERIDONE 0.5 MILLIGRAM(S): 4 TABLET ORAL at 01:13

## 2017-01-01 RX ADMIN — HEPARIN SODIUM 4000 UNIT(S): 5000 INJECTION INTRAVENOUS; SUBCUTANEOUS at 22:52

## 2017-01-01 RX ADMIN — ATORVASTATIN CALCIUM 80 MILLIGRAM(S): 80 TABLET, FILM COATED ORAL at 23:03

## 2017-01-01 RX ADMIN — Medication 10 MILLIGRAM(S): at 16:36

## 2017-01-01 RX ADMIN — MIDAZOLAM HYDROCHLORIDE 2 MG/HR: 1 INJECTION, SOLUTION INTRAMUSCULAR; INTRAVENOUS at 00:00

## 2017-01-01 RX ADMIN — OXYCODONE AND ACETAMINOPHEN 1 TABLET(S): 5; 325 TABLET ORAL at 08:30

## 2017-01-01 RX ADMIN — SODIUM CHLORIDE 1000 MILLILITER(S): 9 INJECTION INTRAMUSCULAR; INTRAVENOUS; SUBCUTANEOUS at 18:45

## 2017-01-01 RX ADMIN — LACTOBACILLUS ACIDOPH-L.BULGARICUS 1 MILLION CELL CHEWABLE TABLET 1 TABLET(S): at 12:10

## 2017-01-01 RX ADMIN — CHLORHEXIDINE GLUCONATE 15 MILLILITER(S): 213 SOLUTION TOPICAL at 17:01

## 2017-01-01 RX ADMIN — Medication 81 MILLIGRAM(S): at 12:09

## 2017-01-01 RX ADMIN — Medication 81 MILLIGRAM(S): at 11:52

## 2017-01-01 RX ADMIN — Medication 1000 MILLIGRAM(S): at 07:00

## 2017-01-01 RX ADMIN — LACTOBACILLUS ACIDOPH-L.BULGARICUS 1 MILLION CELL CHEWABLE TABLET 1 TABLET(S): at 11:54

## 2017-01-01 RX ADMIN — PANTOPRAZOLE SODIUM 40 MILLIGRAM(S): 20 TABLET, DELAYED RELEASE ORAL at 11:50

## 2017-01-01 RX ADMIN — Medication 174.56 MICROGRAM(S)/KG/MIN: at 12:06

## 2017-01-01 RX ADMIN — TICAGRELOR 90 MILLIGRAM(S): 90 TABLET ORAL at 17:13

## 2017-01-01 RX ADMIN — TICAGRELOR 90 MILLIGRAM(S): 90 TABLET ORAL at 17:59

## 2017-01-01 RX ADMIN — VASOPRESSIN 2.4 UNIT(S)/MIN: 20 INJECTION INTRAVENOUS at 05:23

## 2017-01-01 RX ADMIN — AMIODARONE HYDROCHLORIDE 33.33 MG/MIN: 400 TABLET ORAL at 14:22

## 2017-01-01 RX ADMIN — HEPARIN SODIUM 500 UNIT(S)/HR: 5000 INJECTION INTRAVENOUS; SUBCUTANEOUS at 07:59

## 2017-01-01 RX ADMIN — RISPERIDONE 0.5 MILLIGRAM(S): 4 TABLET ORAL at 11:16

## 2017-01-01 RX ADMIN — TICAGRELOR 90 MILLIGRAM(S): 90 TABLET ORAL at 17:25

## 2017-01-01 RX ADMIN — Medication 174.56 MICROGRAM(S)/KG/MIN: at 21:23

## 2017-01-01 RX ADMIN — IMIPENEM AND CILASTATIN 100 MILLIGRAM(S): 250; 250 INJECTION, POWDER, FOR SOLUTION INTRAVENOUS at 17:59

## 2017-01-01 RX ADMIN — DEXMEDETOMIDINE HYDROCHLORIDE IN 0.9% SODIUM CHLORIDE 6.98 MICROGRAM(S)/KG/HR: 4 INJECTION INTRAVENOUS at 02:07

## 2017-01-01 RX ADMIN — Medication 100 MILLIGRAM(S): at 21:05

## 2017-01-01 RX ADMIN — SODIUM CHLORIDE 1000 MILLILITER(S): 9 INJECTION INTRAMUSCULAR; INTRAVENOUS; SUBCUTANEOUS at 16:59

## 2017-01-01 RX ADMIN — Medication 1 APPLICATION(S): at 17:01

## 2017-01-01 RX ADMIN — Medication 2 MILLIGRAM(S): at 13:44

## 2017-01-01 RX ADMIN — TICAGRELOR 90 MILLIGRAM(S): 90 TABLET ORAL at 05:55

## 2017-01-01 RX ADMIN — PANTOPRAZOLE SODIUM 40 MILLIGRAM(S): 20 TABLET, DELAYED RELEASE ORAL at 11:42

## 2017-01-01 RX ADMIN — Medication 100 MILLIGRAM(S): at 21:23

## 2017-01-01 RX ADMIN — PANTOPRAZOLE SODIUM 40 MILLIGRAM(S): 20 TABLET, DELAYED RELEASE ORAL at 17:32

## 2017-01-01 RX ADMIN — RISPERIDONE 0.5 MILLIGRAM(S): 4 TABLET ORAL at 17:33

## 2017-01-01 RX ADMIN — HEPARIN SODIUM 1500 UNIT(S)/HR: 5000 INJECTION INTRAVENOUS; SUBCUTANEOUS at 02:43

## 2017-01-01 RX ADMIN — IMIPENEM AND CILASTATIN 100 MILLIGRAM(S): 250; 250 INJECTION, POWDER, FOR SOLUTION INTRAVENOUS at 01:54

## 2017-01-01 RX ADMIN — Medication 81 MILLIGRAM(S): at 11:58

## 2017-01-01 RX ADMIN — TICAGRELOR 90 MILLIGRAM(S): 90 TABLET ORAL at 05:03

## 2017-01-01 RX ADMIN — PIPERACILLIN AND TAZOBACTAM 25 GRAM(S): 4; .5 INJECTION, POWDER, LYOPHILIZED, FOR SOLUTION INTRAVENOUS at 06:12

## 2017-01-01 RX ADMIN — HEPARIN SODIUM 7 UNIT(S)/HR: 5000 INJECTION INTRAVENOUS; SUBCUTANEOUS at 05:19

## 2017-01-01 RX ADMIN — Medication 1 DROP(S): at 17:01

## 2017-01-01 RX ADMIN — HEPARIN SODIUM 1600 UNIT(S)/HR: 5000 INJECTION INTRAVENOUS; SUBCUTANEOUS at 18:40

## 2017-01-01 RX ADMIN — DEXMEDETOMIDINE HYDROCHLORIDE IN 0.9% SODIUM CHLORIDE 6.98 MICROGRAM(S)/KG/HR: 4 INJECTION INTRAVENOUS at 11:00

## 2017-01-01 RX ADMIN — SODIUM CHLORIDE 1000 MILLILITER(S): 9 INJECTION INTRAMUSCULAR; INTRAVENOUS; SUBCUTANEOUS at 19:16

## 2017-01-01 RX ADMIN — HALOPERIDOL DECANOATE 10 MILLIGRAM(S): 100 INJECTION INTRAMUSCULAR at 13:37

## 2017-01-01 RX ADMIN — SODIUM CHLORIDE 3 MILLILITER(S): 9 INJECTION INTRAMUSCULAR; INTRAVENOUS; SUBCUTANEOUS at 02:01

## 2017-01-01 RX ADMIN — Medication 6.98 MICROGRAM(S)/KG/MIN: at 18:27

## 2017-01-01 RX ADMIN — ATORVASTATIN CALCIUM 80 MILLIGRAM(S): 80 TABLET, FILM COATED ORAL at 21:38

## 2017-01-01 RX ADMIN — TICAGRELOR 90 MILLIGRAM(S): 90 TABLET ORAL at 18:51

## 2017-01-01 RX ADMIN — ATORVASTATIN CALCIUM 40 MILLIGRAM(S): 80 TABLET, FILM COATED ORAL at 23:33

## 2017-01-01 RX ADMIN — Medication 110 MILLIGRAM(S): at 11:36

## 2017-01-01 RX ADMIN — HEPARIN SODIUM 6 UNIT(S)/HR: 5000 INJECTION INTRAVENOUS; SUBCUTANEOUS at 06:46

## 2017-01-01 RX ADMIN — OXYCODONE AND ACETAMINOPHEN 1 TABLET(S): 5; 325 TABLET ORAL at 00:20

## 2017-01-01 RX ADMIN — TICAGRELOR 90 MILLIGRAM(S): 90 TABLET ORAL at 05:44

## 2017-01-01 RX ADMIN — Medication 100 MILLIEQUIVALENT(S): at 18:56

## 2017-01-01 RX ADMIN — Medication 930 MILLIGRAM(S): at 21:01

## 2017-01-01 RX ADMIN — PANTOPRAZOLE SODIUM 40 MILLIGRAM(S): 20 TABLET, DELAYED RELEASE ORAL at 11:17

## 2017-01-01 RX ADMIN — HEPARIN SODIUM 0 UNIT(S)/HR: 5000 INJECTION INTRAVENOUS; SUBCUTANEOUS at 14:13

## 2017-01-01 RX ADMIN — TICAGRELOR 90 MILLIGRAM(S): 90 TABLET ORAL at 05:37

## 2017-01-01 RX ADMIN — LACTOBACILLUS ACIDOPH-L.BULGARICUS 1 MILLION CELL CHEWABLE TABLET 1 TABLET(S): at 18:40

## 2017-01-01 RX ADMIN — PIPERACILLIN AND TAZOBACTAM 200 GRAM(S): 4; .5 INJECTION, POWDER, LYOPHILIZED, FOR SOLUTION INTRAVENOUS at 23:54

## 2017-01-01 RX ADMIN — Medication 930 MILLIGRAM(S): at 21:30

## 2017-01-01 RX ADMIN — MIDAZOLAM HYDROCHLORIDE 1 MG/HR: 1 INJECTION, SOLUTION INTRAMUSCULAR; INTRAVENOUS at 10:31

## 2017-01-01 RX ADMIN — CHLORHEXIDINE GLUCONATE 15 MILLILITER(S): 213 SOLUTION TOPICAL at 05:28

## 2017-01-01 RX ADMIN — Medication 110 MILLIGRAM(S): at 10:15

## 2017-01-01 RX ADMIN — Medication 13.96 MICROGRAM(S)/KG/MIN: at 05:22

## 2017-01-01 RX ADMIN — CHLORHEXIDINE GLUCONATE 15 MILLILITER(S): 213 SOLUTION TOPICAL at 18:56

## 2017-01-01 RX ADMIN — IMIPENEM AND CILASTATIN 100 MILLIGRAM(S): 250; 250 INJECTION, POWDER, FOR SOLUTION INTRAVENOUS at 05:56

## 2017-01-01 RX ADMIN — AMIODARONE HYDROCHLORIDE 33.33 MG/MIN: 400 TABLET ORAL at 06:38

## 2017-01-01 RX ADMIN — IMIPENEM AND CILASTATIN 100 MILLIGRAM(S): 250; 250 INJECTION, POWDER, FOR SOLUTION INTRAVENOUS at 05:45

## 2017-01-01 RX ADMIN — Medication 1 DROP(S): at 11:52

## 2017-01-01 RX ADMIN — DEXMEDETOMIDINE HYDROCHLORIDE IN 0.9% SODIUM CHLORIDE 2.33 MICROGRAM(S)/KG/HR: 4 INJECTION INTRAVENOUS at 23:15

## 2017-01-01 RX ADMIN — HALOPERIDOL DECANOATE 0.5 MILLIGRAM(S): 100 INJECTION INTRAMUSCULAR at 22:15

## 2017-01-01 RX ADMIN — Medication 174.56 MICROGRAM(S)/KG/MIN: at 05:47

## 2017-01-01 RX ADMIN — DEXMEDETOMIDINE HYDROCHLORIDE IN 0.9% SODIUM CHLORIDE 4.66 MICROGRAM(S)/KG/HR: 4 INJECTION INTRAVENOUS at 04:38

## 2017-01-01 RX ADMIN — Medication 1.75 MICROGRAM(S)/KG/MIN: at 14:20

## 2017-01-01 RX ADMIN — SODIUM CHLORIDE 250 MILLILITER(S): 9 INJECTION INTRAMUSCULAR; INTRAVENOUS; SUBCUTANEOUS at 00:04

## 2017-01-01 RX ADMIN — OXYCODONE AND ACETAMINOPHEN 1 TABLET(S): 5; 325 TABLET ORAL at 15:51

## 2017-01-01 RX ADMIN — TICAGRELOR 90 MILLIGRAM(S): 90 TABLET ORAL at 17:01

## 2017-01-01 RX ADMIN — HEPARIN SODIUM 900 UNIT(S)/HR: 5000 INJECTION INTRAVENOUS; SUBCUTANEOUS at 09:48

## 2017-01-01 RX ADMIN — FENTANYL CITRATE 4.5 MICROGRAM(S)/KG/HR: 50 INJECTION INTRAVENOUS at 05:43

## 2017-01-01 RX ADMIN — TICAGRELOR 90 MILLIGRAM(S): 90 TABLET ORAL at 05:15

## 2017-01-01 RX ADMIN — CHLORHEXIDINE GLUCONATE 15 MILLILITER(S): 213 SOLUTION TOPICAL at 17:08

## 2017-01-01 RX ADMIN — POTASSIUM PHOSPHATE, MONOBASIC POTASSIUM PHOSPHATE, DIBASIC 62.5 MILLIMOLE(S): 236; 224 INJECTION, SOLUTION INTRAVENOUS at 08:13

## 2017-01-01 RX ADMIN — PANTOPRAZOLE SODIUM 40 MILLIGRAM(S): 20 TABLET, DELAYED RELEASE ORAL at 14:45

## 2017-01-01 RX ADMIN — Medication 325 MILLIGRAM(S): at 12:12

## 2017-01-01 RX ADMIN — LACTOBACILLUS ACIDOPH-L.BULGARICUS 1 MILLION CELL CHEWABLE TABLET 1 TABLET(S): at 21:23

## 2017-01-01 RX ADMIN — TICAGRELOR 90 MILLIGRAM(S): 90 TABLET ORAL at 17:58

## 2017-01-01 RX ADMIN — HALOPERIDOL DECANOATE 5 MILLIGRAM(S): 100 INJECTION INTRAMUSCULAR at 13:07

## 2017-01-01 RX ADMIN — LEVETIRACETAM 500 MILLIGRAM(S): 250 TABLET, FILM COATED ORAL at 05:19

## 2017-01-01 RX ADMIN — IMIPENEM AND CILASTATIN 100 MILLIGRAM(S): 250; 250 INJECTION, POWDER, FOR SOLUTION INTRAVENOUS at 23:39

## 2017-01-01 RX ADMIN — Medication 150 MILLIGRAM(S): at 05:14

## 2017-01-01 RX ADMIN — RISPERIDONE 0.5 MILLIGRAM(S): 4 TABLET ORAL at 23:33

## 2017-01-01 RX ADMIN — RISPERIDONE 0.5 MILLIGRAM(S): 4 TABLET ORAL at 23:39

## 2017-01-01 RX ADMIN — AMIODARONE HYDROCHLORIDE 600 MILLIGRAM(S): 400 TABLET ORAL at 03:15

## 2017-01-01 RX ADMIN — Medication 100 MILLIEQUIVALENT(S): at 19:50

## 2017-01-01 RX ADMIN — HALOPERIDOL DECANOATE 0.5 MILLIGRAM(S): 100 INJECTION INTRAMUSCULAR at 00:25

## 2017-01-01 RX ADMIN — CHLORHEXIDINE GLUCONATE 15 MILLILITER(S): 213 SOLUTION TOPICAL at 17:06

## 2017-01-01 RX ADMIN — SODIUM CHLORIDE 250 MILLILITER(S): 9 INJECTION INTRAMUSCULAR; INTRAVENOUS; SUBCUTANEOUS at 02:04

## 2017-01-01 RX ADMIN — Medication 13.96 MICROGRAM(S)/KG/MIN: at 00:10

## 2017-01-01 RX ADMIN — Medication 1 DROP(S): at 00:50

## 2017-01-01 RX ADMIN — HEPARIN SODIUM 2 UNIT(S)/HR: 5000 INJECTION INTRAVENOUS; SUBCUTANEOUS at 07:15

## 2017-01-01 RX ADMIN — Medication 110 MILLIGRAM(S): at 10:40

## 2017-01-01 RX ADMIN — VASOPRESSIN 1.8 UNIT(S)/MIN: 20 INJECTION INTRAVENOUS at 12:47

## 2017-01-01 RX ADMIN — IMIPENEM AND CILASTATIN 100 MILLIGRAM(S): 250; 250 INJECTION, POWDER, FOR SOLUTION INTRAVENOUS at 17:16

## 2017-01-01 RX ADMIN — ATORVASTATIN CALCIUM 80 MILLIGRAM(S): 80 TABLET, FILM COATED ORAL at 21:01

## 2017-01-01 RX ADMIN — RISPERIDONE 0.5 MILLIGRAM(S): 4 TABLET ORAL at 11:02

## 2017-01-01 RX ADMIN — OXYCODONE AND ACETAMINOPHEN 1 TABLET(S): 5; 325 TABLET ORAL at 09:30

## 2017-01-01 RX ADMIN — Medication 400 MILLIGRAM(S): at 09:28

## 2017-01-01 RX ADMIN — CHLORHEXIDINE GLUCONATE 15 MILLILITER(S): 213 SOLUTION TOPICAL at 05:03

## 2017-01-01 RX ADMIN — HEPARIN SODIUM 6 UNIT(S)/HR: 5000 INJECTION INTRAVENOUS; SUBCUTANEOUS at 21:29

## 2017-01-01 RX ADMIN — SODIUM CHLORIDE 1500 MILLILITER(S): 9 INJECTION INTRAMUSCULAR; INTRAVENOUS; SUBCUTANEOUS at 16:09

## 2017-01-01 RX ADMIN — AMIODARONE HYDROCHLORIDE 400 MILLIGRAM(S): 400 TABLET ORAL at 22:49

## 2017-01-01 RX ADMIN — IMIPENEM AND CILASTATIN 100 MILLIGRAM(S): 250; 250 INJECTION, POWDER, FOR SOLUTION INTRAVENOUS at 12:05

## 2017-01-01 RX ADMIN — HEPARIN SODIUM 0 UNIT(S)/HR: 5000 INJECTION INTRAVENOUS; SUBCUTANEOUS at 06:57

## 2017-01-01 RX ADMIN — TICAGRELOR 90 MILLIGRAM(S): 90 TABLET ORAL at 18:22

## 2017-01-01 RX ADMIN — ATORVASTATIN CALCIUM 80 MILLIGRAM(S): 80 TABLET, FILM COATED ORAL at 22:15

## 2017-01-01 RX ADMIN — AMIODARONE HYDROCHLORIDE 400 MILLIGRAM(S): 400 TABLET ORAL at 05:03

## 2017-01-01 RX ADMIN — LACTOBACILLUS ACIDOPH-L.BULGARICUS 1 MILLION CELL CHEWABLE TABLET 1 TABLET(S): at 22:15

## 2017-01-01 RX ADMIN — PANTOPRAZOLE SODIUM 40 MILLIGRAM(S): 20 TABLET, DELAYED RELEASE ORAL at 12:58

## 2017-01-01 RX ADMIN — VASOPRESSIN 2.4 UNIT(S)/MIN: 20 INJECTION INTRAVENOUS at 04:48

## 2017-01-01 RX ADMIN — LEVETIRACETAM 500 MILLIGRAM(S): 250 TABLET, FILM COATED ORAL at 17:00

## 2017-01-01 RX ADMIN — HEPARIN SODIUM 4000 UNIT(S): 5000 INJECTION INTRAVENOUS; SUBCUTANEOUS at 12:39

## 2017-01-01 RX ADMIN — POTASSIUM PHOSPHATE, MONOBASIC POTASSIUM PHOSPHATE, DIBASIC 62.5 MILLIMOLE(S): 236; 224 INJECTION, SOLUTION INTRAVENOUS at 09:35

## 2017-01-01 RX ADMIN — LACTOBACILLUS ACIDOPH-L.BULGARICUS 1 MILLION CELL CHEWABLE TABLET 1 TABLET(S): at 11:37

## 2017-01-01 RX ADMIN — HEPARIN SODIUM 4 UNIT(S)/HR: 5000 INJECTION INTRAVENOUS; SUBCUTANEOUS at 15:45

## 2017-01-01 RX ADMIN — SODIUM CHLORIDE 1000 MILLILITER(S): 9 INJECTION INTRAMUSCULAR; INTRAVENOUS; SUBCUTANEOUS at 12:12

## 2017-01-01 RX ADMIN — IMIPENEM AND CILASTATIN 100 MILLIGRAM(S): 250; 250 INJECTION, POWDER, FOR SOLUTION INTRAVENOUS at 17:22

## 2017-01-01 RX ADMIN — ATORVASTATIN CALCIUM 40 MILLIGRAM(S): 80 TABLET, FILM COATED ORAL at 23:51

## 2017-01-01 RX ADMIN — IMIPENEM AND CILASTATIN 100 MILLIGRAM(S): 250; 250 INJECTION, POWDER, FOR SOLUTION INTRAVENOUS at 16:59

## 2017-01-01 RX ADMIN — PANTOPRAZOLE SODIUM 40 MILLIGRAM(S): 20 TABLET, DELAYED RELEASE ORAL at 11:58

## 2017-01-01 RX ADMIN — PIPERACILLIN AND TAZOBACTAM 25 GRAM(S): 4; .5 INJECTION, POWDER, LYOPHILIZED, FOR SOLUTION INTRAVENOUS at 05:36

## 2017-01-01 RX ADMIN — TICAGRELOR 90 MILLIGRAM(S): 90 TABLET ORAL at 17:16

## 2017-01-01 RX ADMIN — IMIPENEM AND CILASTATIN 100 MILLIGRAM(S): 250; 250 INJECTION, POWDER, FOR SOLUTION INTRAVENOUS at 11:17

## 2017-01-01 RX ADMIN — Medication 1000 MILLIGRAM(S): at 09:58

## 2017-01-01 RX ADMIN — Medication 250 MILLIGRAM(S): at 18:16

## 2017-01-01 RX ADMIN — Medication 81 MILLIGRAM(S): at 11:50

## 2017-01-01 RX ADMIN — RISPERIDONE 0.5 MILLIGRAM(S): 4 TABLET ORAL at 13:14

## 2017-01-01 RX ADMIN — LACTOBACILLUS ACIDOPH-L.BULGARICUS 1 MILLION CELL CHEWABLE TABLET 1 TABLET(S): at 12:17

## 2017-01-01 RX ADMIN — AMIODARONE HYDROCHLORIDE 600 MILLIGRAM(S): 400 TABLET ORAL at 16:47

## 2017-01-01 RX ADMIN — CHLORHEXIDINE GLUCONATE 15 MILLILITER(S): 213 SOLUTION TOPICAL at 17:23

## 2017-01-01 RX ADMIN — MIDAZOLAM HYDROCHLORIDE 2 MG/HR: 1 INJECTION, SOLUTION INTRAMUSCULAR; INTRAVENOUS at 19:28

## 2017-01-01 RX ADMIN — IMIPENEM AND CILASTATIN 100 MILLIGRAM(S): 250; 250 INJECTION, POWDER, FOR SOLUTION INTRAVENOUS at 11:36

## 2017-01-01 RX ADMIN — Medication 13.96 MICROGRAM(S)/KG/MIN: at 05:04

## 2017-01-01 RX ADMIN — HEPARIN SODIUM 1500 UNIT(S)/HR: 5000 INJECTION INTRAVENOUS; SUBCUTANEOUS at 05:29

## 2017-01-01 RX ADMIN — HEPARIN SODIUM 900 UNIT(S)/HR: 5000 INJECTION INTRAVENOUS; SUBCUTANEOUS at 17:59

## 2017-01-01 RX ADMIN — IMIPENEM AND CILASTATIN 100 MILLIGRAM(S): 250; 250 INJECTION, POWDER, FOR SOLUTION INTRAVENOUS at 05:28

## 2017-01-01 RX ADMIN — Medication 150 MILLIGRAM(S): at 17:06

## 2017-01-01 RX ADMIN — Medication 13.96 MICROGRAM(S)/KG/MIN: at 12:09

## 2017-01-01 RX ADMIN — Medication 50 MILLIEQUIVALENT(S): at 02:30

## 2017-01-01 RX ADMIN — ATORVASTATIN CALCIUM 80 MILLIGRAM(S): 80 TABLET, FILM COATED ORAL at 03:04

## 2017-01-01 RX ADMIN — ATORVASTATIN CALCIUM 40 MILLIGRAM(S): 80 TABLET, FILM COATED ORAL at 22:00

## 2017-01-01 RX ADMIN — FENTANYL CITRATE 4.66 MICROGRAM(S)/KG/HR: 50 INJECTION INTRAVENOUS at 03:04

## 2017-01-01 RX ADMIN — Medication 63.75 MILLIMOLE(S): at 05:06

## 2017-01-01 RX ADMIN — FENTANYL CITRATE 25 MICROGRAM(S): 50 INJECTION INTRAVENOUS at 14:04

## 2017-01-01 RX ADMIN — SODIUM CHLORIDE 250 MILLILITER(S): 9 INJECTION INTRAMUSCULAR; INTRAVENOUS; SUBCUTANEOUS at 06:00

## 2017-01-01 RX ADMIN — HEPARIN SODIUM 800 UNIT(S)/HR: 5000 INJECTION INTRAVENOUS; SUBCUTANEOUS at 00:18

## 2017-01-01 RX ADMIN — HEPARIN SODIUM 1500 UNIT(S)/HR: 5000 INJECTION INTRAVENOUS; SUBCUTANEOUS at 01:12

## 2017-01-01 RX ADMIN — Medication 3 MILLILITER(S): at 02:02

## 2017-01-01 RX ADMIN — Medication 100 MILLIGRAM(S): at 21:09

## 2017-01-01 RX ADMIN — AMIODARONE HYDROCHLORIDE 400 MILLIGRAM(S): 400 TABLET ORAL at 13:40

## 2017-01-01 RX ADMIN — IMIPENEM AND CILASTATIN 100 MILLIGRAM(S): 250; 250 INJECTION, POWDER, FOR SOLUTION INTRAVENOUS at 05:47

## 2017-01-01 RX ADMIN — CHLORHEXIDINE GLUCONATE 15 MILLILITER(S): 213 SOLUTION TOPICAL at 17:18

## 2017-01-01 RX ADMIN — Medication 250 MILLIGRAM(S): at 02:05

## 2017-01-01 RX ADMIN — Medication 150 MILLIGRAM(S): at 02:07

## 2017-01-01 RX ADMIN — ATORVASTATIN CALCIUM 40 MILLIGRAM(S): 80 TABLET, FILM COATED ORAL at 22:49

## 2017-01-01 RX ADMIN — HEPARIN SODIUM 5 UNIT(S)/HR: 5000 INJECTION INTRAVENOUS; SUBCUTANEOUS at 23:30

## 2017-01-01 RX ADMIN — Medication 110 MILLIGRAM(S): at 22:59

## 2017-01-01 RX ADMIN — MORPHINE SULFATE 2 MILLIGRAM(S): 50 CAPSULE, EXTENDED RELEASE ORAL at 01:08

## 2017-01-01 RX ADMIN — PANTOPRAZOLE SODIUM 40 MILLIGRAM(S): 20 TABLET, DELAYED RELEASE ORAL at 17:16

## 2017-01-01 RX ADMIN — TICAGRELOR 90 MILLIGRAM(S): 90 TABLET ORAL at 17:06

## 2017-01-01 RX ADMIN — AMIODARONE HYDROCHLORIDE 16.67 MG/MIN: 400 TABLET ORAL at 14:37

## 2017-01-01 RX ADMIN — Medication 2 MILLIGRAM(S): at 18:02

## 2017-01-01 RX ADMIN — TICAGRELOR 90 MILLIGRAM(S): 90 TABLET ORAL at 17:32

## 2017-01-01 RX ADMIN — PANTOPRAZOLE SODIUM 40 MILLIGRAM(S): 20 TABLET, DELAYED RELEASE ORAL at 05:55

## 2017-01-01 RX ADMIN — TICAGRELOR 90 MILLIGRAM(S): 90 TABLET ORAL at 17:08

## 2017-01-01 RX ADMIN — CHLORHEXIDINE GLUCONATE 15 MILLILITER(S): 213 SOLUTION TOPICAL at 17:13

## 2017-01-01 RX ADMIN — DOPAMINE HYDROCHLORIDE 16.84 MICROGRAM(S)/KG/MIN: 40 INJECTION, SOLUTION, CONCENTRATE INTRAVENOUS at 12:30

## 2017-01-01 RX ADMIN — Medication 1 APPLICATION(S): at 15:45

## 2017-01-01 RX ADMIN — PANTOPRAZOLE SODIUM 40 MILLIGRAM(S): 20 TABLET, DELAYED RELEASE ORAL at 11:02

## 2017-01-01 RX ADMIN — IMIPENEM AND CILASTATIN 100 MILLIGRAM(S): 250; 250 INJECTION, POWDER, FOR SOLUTION INTRAVENOUS at 23:33

## 2017-01-01 RX ADMIN — AMIODARONE HYDROCHLORIDE 400 MILLIGRAM(S): 400 TABLET ORAL at 14:24

## 2017-01-01 RX ADMIN — Medication 930 MILLIGRAM(S): at 12:57

## 2017-01-01 RX ADMIN — Medication 13.96 MICROGRAM(S)/KG/MIN: at 09:42

## 2017-01-01 RX ADMIN — RISPERIDONE 0.5 MILLIGRAM(S): 4 TABLET ORAL at 23:05

## 2017-01-01 RX ADMIN — Medication 81 MILLIGRAM(S): at 11:37

## 2017-01-01 RX ADMIN — TICAGRELOR 90 MILLIGRAM(S): 90 TABLET ORAL at 06:12

## 2017-01-01 RX ADMIN — OXYCODONE AND ACETAMINOPHEN 1 TABLET(S): 5; 325 TABLET ORAL at 21:30

## 2017-01-01 RX ADMIN — CHLORHEXIDINE GLUCONATE 15 MILLILITER(S): 213 SOLUTION TOPICAL at 05:02

## 2017-01-01 RX ADMIN — Medication 400 MILLIGRAM(S): at 03:17

## 2017-01-01 RX ADMIN — Medication 100 MILLIEQUIVALENT(S): at 09:31

## 2017-01-01 RX ADMIN — Medication 174.56 MICROGRAM(S)/KG/MIN: at 20:00

## 2017-01-01 RX ADMIN — CHLORHEXIDINE GLUCONATE 15 MILLILITER(S): 213 SOLUTION TOPICAL at 18:27

## 2017-01-01 RX ADMIN — OXYCODONE AND ACETAMINOPHEN 1 TABLET(S): 5; 325 TABLET ORAL at 18:20

## 2017-01-01 RX ADMIN — Medication 110 MILLIGRAM(S): at 11:09

## 2017-01-01 RX ADMIN — Medication 81 MILLIGRAM(S): at 14:44

## 2017-01-01 RX ADMIN — POTASSIUM PHOSPHATE, MONOBASIC POTASSIUM PHOSPHATE, DIBASIC 62.5 MILLIMOLE(S): 236; 224 INJECTION, SOLUTION INTRAVENOUS at 19:01

## 2017-01-01 RX ADMIN — Medication 110 MILLIGRAM(S): at 00:00

## 2017-01-01 RX ADMIN — HEPARIN SODIUM 500 UNIT(S)/HR: 5000 INJECTION INTRAVENOUS; SUBCUTANEOUS at 01:08

## 2017-01-01 RX ADMIN — Medication 250 MILLIGRAM(S): at 17:13

## 2017-01-01 RX ADMIN — HEPARIN SODIUM 500 UNIT(S)/HR: 5000 INJECTION INTRAVENOUS; SUBCUTANEOUS at 15:29

## 2017-01-01 RX ADMIN — Medication 1000 MILLIGRAM(S): at 16:00

## 2017-01-01 RX ADMIN — IMIPENEM AND CILASTATIN 100 MILLIGRAM(S): 250; 250 INJECTION, POWDER, FOR SOLUTION INTRAVENOUS at 23:04

## 2017-01-01 RX ADMIN — PANTOPRAZOLE SODIUM 40 MILLIGRAM(S): 20 TABLET, DELAYED RELEASE ORAL at 13:14

## 2017-01-01 RX ADMIN — Medication 4 MILLIGRAM(S): at 17:09

## 2017-01-01 RX ADMIN — Medication 13.96 MICROGRAM(S)/KG/MIN: at 10:32

## 2017-01-01 RX ADMIN — IMIPENEM AND CILASTATIN 100 MILLIGRAM(S): 250; 250 INJECTION, POWDER, FOR SOLUTION INTRAVENOUS at 06:36

## 2017-01-01 RX ADMIN — HALOPERIDOL DECANOATE 2.5 MILLIGRAM(S): 100 INJECTION INTRAMUSCULAR at 12:55

## 2017-01-01 RX ADMIN — PIPERACILLIN AND TAZOBACTAM 25 GRAM(S): 4; .5 INJECTION, POWDER, LYOPHILIZED, FOR SOLUTION INTRAVENOUS at 14:45

## 2017-01-01 RX ADMIN — AMIODARONE HYDROCHLORIDE 400 MILLIGRAM(S): 400 TABLET ORAL at 23:33

## 2017-01-01 RX ADMIN — HEPARIN SODIUM 1000 UNIT(S)/HR: 5000 INJECTION INTRAVENOUS; SUBCUTANEOUS at 17:02

## 2017-01-01 RX ADMIN — TICAGRELOR 90 MILLIGRAM(S): 90 TABLET ORAL at 05:11

## 2017-01-01 RX ADMIN — PIPERACILLIN AND TAZOBACTAM 25 GRAM(S): 4; .5 INJECTION, POWDER, LYOPHILIZED, FOR SOLUTION INTRAVENOUS at 15:18

## 2017-01-01 RX ADMIN — HEPARIN SODIUM 800 UNIT(S)/HR: 5000 INJECTION INTRAVENOUS; SUBCUTANEOUS at 13:15

## 2017-01-01 RX ADMIN — ATORVASTATIN CALCIUM 80 MILLIGRAM(S): 80 TABLET, FILM COATED ORAL at 23:39

## 2017-01-01 RX ADMIN — Medication 100 MILLIGRAM(S): at 20:51

## 2017-01-01 RX ADMIN — Medication 650 MILLIGRAM(S): at 21:15

## 2017-01-01 RX ADMIN — SODIUM CHLORIDE 500 MILLILITER(S): 9 INJECTION INTRAMUSCULAR; INTRAVENOUS; SUBCUTANEOUS at 05:37

## 2017-01-01 RX ADMIN — MIDAZOLAM HYDROCHLORIDE 2 MG/HR: 1 INJECTION, SOLUTION INTRAMUSCULAR; INTRAVENOUS at 00:51

## 2017-01-01 RX ADMIN — RISPERIDONE 0.5 MILLIGRAM(S): 4 TABLET ORAL at 23:07

## 2017-01-01 RX ADMIN — POTASSIUM PHOSPHATE, MONOBASIC POTASSIUM PHOSPHATE, DIBASIC 62.5 MILLIMOLE(S): 236; 224 INJECTION, SOLUTION INTRAVENOUS at 20:28

## 2017-01-01 RX ADMIN — PANTOPRAZOLE SODIUM 40 MILLIGRAM(S): 20 TABLET, DELAYED RELEASE ORAL at 06:37

## 2017-01-01 RX ADMIN — HEPARIN SODIUM 1 UNIT(S)/HR: 5000 INJECTION INTRAVENOUS; SUBCUTANEOUS at 02:04

## 2017-01-01 RX ADMIN — HEPARIN SODIUM 6 UNIT(S)/HR: 5000 INJECTION INTRAVENOUS; SUBCUTANEOUS at 19:00

## 2017-01-01 RX ADMIN — Medication 81 MILLIGRAM(S): at 11:38

## 2017-01-01 RX ADMIN — HEPARIN SODIUM 1600 UNIT(S)/HR: 5000 INJECTION INTRAVENOUS; SUBCUTANEOUS at 01:19

## 2017-01-01 RX ADMIN — RISPERIDONE 0.5 MILLIGRAM(S): 4 TABLET ORAL at 03:03

## 2017-01-01 RX ADMIN — CLOPIDOGREL BISULFATE 600 MILLIGRAM(S): 75 TABLET, FILM COATED ORAL at 12:12

## 2017-01-01 RX ADMIN — HEPARIN SODIUM 800 UNIT(S)/HR: 5000 INJECTION INTRAVENOUS; SUBCUTANEOUS at 06:02

## 2017-01-01 RX ADMIN — HEPARIN SODIUM 1500 UNIT(S)/HR: 5000 INJECTION INTRAVENOUS; SUBCUTANEOUS at 06:18

## 2017-10-15 NOTE — H&P ADULT - NSHPSOCIALHISTORY_GEN_ALL_CORE
lives at home with wife. Completely independent at baseline.   Smoke: quite 40 years ago  Drink: very sparingly (once every couple of months)  Drugs: denies

## 2017-10-15 NOTE — ED PROVIDER NOTE - CRITICAL CARE PROVIDED
documentation/additional history taking/consult w/ pt's family directly relating to pts condition/direct patient care (not related to procedure)/interpretation of diagnostic studies/consultation with other physicians

## 2017-10-15 NOTE — H&P ADULT - PROBLEM SELECTOR PLAN 2
Pt was found to have poor R heart functionality while at the cath lab. This is likely 2/2 RCA occlusion.   - pt returning to cath lab of R heart impella placement

## 2017-10-15 NOTE — H&P ADULT - PROBLEM SELECTOR PLAN 1
Pt went into cardiogenic shock before catheterization. It is presumed to be due to R ventricular shock 2/2 RCA occlusion and revascularization. After admission to CCU, it pt's cardiac function was found to be insufficient.   - pt taken back down to cath lab for R heart impella placment  - pt on dobutamine drip  - pt on broad spectrum antibiotics to cover for septic shock

## 2017-10-15 NOTE — H&P ADULT - PROBLEM SELECTOR PLAN 7
DVT ppx: Hep drip  Diet: currently intubated, may start OG tube feeds once pt is more stable.    JACK Thakur MD-PGY1  Internal Medicine Department  Pager: [phone]

## 2017-10-15 NOTE — ED ADULT NURSE REASSESSMENT NOTE - NS ED NURSE REASSESS COMMENT FT1
Pt continues to c/o CP.  Dopamine infusing at 20 mcg, Vaso at 0.4 mcg as per MD orders from Dr Em.  EMS currently at bedside. Pt placed on pacer pads.  Additional 20 gauge IV inserted for pressors. Pt continues to c/o CP.  Dopamine infusing at 20 mcg, Vaso at 0.4 mcg as per MD orders from Dr Em.  EMS currently at bedside to transfer pt to [place] for emergent cath. Pt placed on pacer pads.  Additional 20 gauge IV inserted for pressors.

## 2017-10-15 NOTE — ED PROVIDER NOTE - CARE PLAN
Principal Discharge DX:	ST elevation myocardial infarction (STEMI) involving other coronary artery of inferior wall

## 2017-10-15 NOTE — H&P ADULT - NSHPPHYSICALEXAM_GEN_ALL_CORE
GENERAL: Pt laying in bed, intubated, but responds to commands.   HEAD:  Atraumatic, Normocephalic  EYES: EOMI, PERRLA, conjunctiva and sclera clear  NECK: Very large goiter, unable to assess for JVD  CHEST/LUNG: ventilator sounds heard clearly  HEART: distant heart sounds, ventilator sounds obstructed heart sounds. S1S2 ascultated  ABDOMEN: Soft, Nontender, Nondistended; Bowel sounds present  EXTREMITIES:  2+ Peripheral Pulses, No clubbing, cyanosis, or edema  PSYCH: pt can follow demands, intubated, thus unable to speak  NEUROLOGY: non-focal  SKIN: No rashes or lesions

## 2017-10-15 NOTE — PROGRESS NOTE ADULT - SUBJECTIVE AND OBJECTIVE BOX
Called to patient's bedside for intubation.  Therapy initiated by primary medical team.    Patient Assessment:     Baseline Vital Signs:  BP: none  HR: ventricular fibrillation  RR: agonal breathing  SpO2: not registering    Medications Administered: 160 milligm succinlycholine to open the mouth    Intubation:      [x ] Direct Laryngoscopy    [ ] Video-Assisted Laryngoscopy   [ ] Fiberoptic Intubation    Blade: stringer 3  Cormack-Lehane View: [ ] 1     [ ] 2A     [ ] 2B     [ ] 3     [x ]  4 saw only something that looked like a glottis. HUGE goiter  ETT Size:8.0 cuffed with cuff up 3 cc  Marking at Teeth: 23 cm    Post-Intubation Vital Signs: pt was being coded for ventricular fibrillation with multiple epinephrines and shocks  BP:  HR:  RR:   SpO2:     Positive end-tidal carbon dioxide via EasyCap, positive bilateral breath sounds.  CXR to be reviewed by primary team.  Atraumatic intubation with no complications. Called to cath lab with patient in full code for intubation.  Therapy initiated by primary medical team. pt being ambubagged with 100% oxygen. CPR in progress    Patient Assessment:     Baseline Vital Signs:  BP: none  HR: ventricular fibrillation  RR: agonal breathing  SpO2: not registering    Medications Administered: 160 milligm succinlycholine to open the mouth( mouth and teeth clenched. )    Intubation:      [x ] Direct Laryngoscopy    [ ] Video-Assisted Laryngoscopy   [ ] Fiberoptic Intubation    Blade: stringer 3 [id number] atraumatic  Cormack-Lehane View: [ ] 1     [ ] 2A     [ ] 2B     [ ] 3     [x ]  4 (saw only something that looked like a glottis. HUGE goiter)  ETT Size:8.0 cuffed with cuff up 3 cc  Marking at Teeth: 23 cm    Post-Intubation Vital Signs: pt was being coded for ventricular fibrillation with multiple epinephrines and shocks  BP:  HR:  RR:   SpO2:     Positive end-tidal carbon dioxide via EasyCap, positive bilateral breath sounds.  CXR to be reviewed by primary team.  Atraumatic intubation with no complications.

## 2017-10-15 NOTE — CONSULT NOTE ADULT - SUBJECTIVE AND OBJECTIVE BOX
CHIEF COMPLAINT: Patient is a 83y old  Male who presents with a chief complaint of STEMI    HPI:  83 year old man with a hx of AF on AC, ?severe AI presents with STEMI. He apparently has been feeling more dyspnea on exertion for the last 2 weeks. Today c/o chest pain, became diaphoretic, and not very responsive. Came to ED with HR in 30s and SBP in 60s. EKG with junctional isabelle with Inferior ST elevations.   Pt given atropine with some HR recovery. Started on Dopamine 20mcg. Hr improved to 60s. Tanscutaneous pads with back up pacing at 35. Started on Vaso for hypotension.   Pt is continuing to complain of pain.   Took his pradaxa today.        REVIEW OF SYSTEMS:   Limited 2/2 comorbidities   PAST MEDICAL & SURGICAL HISTORY:  Vertigo  Thyroid condition  Afib  High blood pressure  No significant past surgical history      SOCIAL HISTORY:  No tobacco, ethanol, or drug abuse.    FAMILY HISTORY:    + CAD    MEDICATIONS  (STANDING):  DOPamine Infusion 5 MICROgram(s)/kG/Min (16.837 mL/Hr) IV Continuous <Continuous>  sodium chloride 0.9% Bolus 1000 milliLiter(s) IV Bolus every 1 hour  vasopressin Infusion 0.03 Unit(s)/Min (1.8 mL/Hr) IV Continuous <Continuous>    MEDICATIONS  (PRN):      Allergies    No Known Allergies    Intolerances        Home meds:  Home Medications:  atenolol 25 mg oral tablet: 1 tab(s) orally once a day (at bedtime) (15 Oct 2017 11:50)  Pradaxa 75 mg oral capsule: 1 cap(s) orally 2 times a day (15 Oct 2017 11:50)  traZODone:  (15 Oct 2017 11:50)        VITAL SIGNS:   Vital Signs Last 24 Hrs  T(C): 36.5 (15 Oct 2017 11:45), Max: 36.5 (15 Oct 2017 11:45)  T(F): 97.7 (15 Oct 2017 11:45), Max: 97.7 (15 Oct 2017 11:45)  HR: 58 (15 Oct 2017 12:35) (58 - 59)  BP: 75/58 (15 Oct 2017 12:35) (75/58 - 98/72)  BP(mean): --  RR: 24 (15 Oct 2017 12:35) (22 - 24)  SpO2: 94% (15 Oct 2017 12:35) (94% - 94%)    I&O's Summary      On Exam:     Constitutional: on nonrebreather, uncomfortable.   HEENT: Moist Mucous Membranes, Anicteric  Pulmonary: Decreased breath sounds b/l. crackles at bases  Cardiovascular: Isabelle , S1 and S2, 2/4 DM  Gastrointestinal: Bowel Sounds present, soft, nontender.   Lymph: No peripheral edema. No lymphadenopathy.  Skin: No visible rashes or ulcers.       LABS: All Labs Reviewed:                        15.6   9.4   )-----------( 259      ( 15 Oct 2017 12:13 )             49.2     15 Oct 2017 12:13    140    |  107    |  32     ----------------------------<  204    4.2     |  23     |  1.70     Ca    8.3        15 Oct 2017 12:13    TPro  7.1    /  Alb  3.1    /  TBili  0.9    /  DBili  x      /  AST  114    /  ALT  120    /  AlkPhos  x      15 Oct 2017 12:13          Blood Culture:         RADIOLOGY:         < from: Xray Chest 1 View AP- PORTABLE-Urgent (10.15.17 @ 12:26) >    EXAM:  PORTABLE CHEST URGENT                            PROCEDURE DATE:  10/15/2017          INTERPRETATION:  History: Chest pain    Portable AP radiograph of the chest is performed. comparison is made to   6/22/2016.    The heart is not enlarged. There is no focal infiltrate or pleural   effusion. There is again soft tissue density in the superior mediastinum   on the right deviating the trachea to left consistent with enlarged   thyroid gland noted on prior MR angiogram of the neck from 2016.    Impression: Deviation of the trachea to the right secondary to enlarged   thyroid gland  No active pulmonary disease.                CHRISTOPHER OSULLIVAN   This document has been electronically signed. Oct 15 2017 12:40PM                < end of copied text >

## 2017-10-15 NOTE — H&P ADULT - ASSESSMENT
83 male with PMH of HTN (on atenolol), Afib (on Pradaxa), and goiter (last checked in August) presents with infer wall STEMI complicated by cardiogenic shock. Pt coded in the cath lab, ROS was achieved, received intravenous pacing wire, balloon pump, RCA stent, and intra illiac balloon to stop groin bleed. Pt returning to cath lab for R heart impella placement.

## 2017-10-15 NOTE — H&P ADULT - FAMILY HISTORY
Father  Still living? Unknown  Family history of heart attack, Age at diagnosis: Age Unknown     Sibling  Still living? Unknown  Family history of heart attack, Age at diagnosis: Age Unknown     Grandparent  Still living? No  Family history of heart attack, Age at diagnosis: Age Unknown     Uncle  Still living? Unknown  Family history of heart attack, Age at diagnosis: Age Unknown

## 2017-10-15 NOTE — ED PROVIDER NOTE - PROGRESS NOTE DETAILS
called cardiology Dr. Bush, [phone], left message, awaiting call back [facility] transfer [facility] placed on hold spoke with cardiology Dr. Em, will come to see the patient EMS arrived, patient currently on dopamin drip, atropine as needed

## 2017-10-15 NOTE — ED ADULT NURSE NOTE - OBJECTIVE STATEMENT
Presents to ER radha KATE for the past couple of days. As per wife, she heard a thump and found her  on the floor.  Upon EMS arriving, pt was found to be hypotensive and bradycardic. Presents to ER w SOB for the past couple of days. As per wife, she heard a thump and found her  on the floor sweating profusely.  Upon EMS arriving, pt was found to be hypotensive and bradycardic.  Here in ER, pt is c/o SOB, CP, and numbness to both hands.  Dr Em currently at bedside as pt is actively having a STEMI.  ASA and Plavix given STAT. Sinus isabelle on cardiac monitor, Dopa to be initiated for bradycardia. Presents to ER w SOB for the past couple of days. As per wife, she heard a thump and found her  on the bathroom floor sweating profusely.  Upon EMS arriving, pt was found to be hypotensive and bradycardic.  Here in ER, pt is c/o SOB, CP, and numbness to both hands.  Dr Em currently at bedside as pt is actively having a STEMI.  ASA and Plavix given STAT. Sinus isabelle on cardiac monitor, Dopa to be initiated for bradycardia.

## 2017-10-15 NOTE — ED PROVIDER NOTE - OBJECTIVE STATEMENT
83 male presents to ER by ambulance with report of shortness of breath. Wife at the bedside states patient has h/o Afib on Pradaxa, has "heart valve problems" 83 male presents to ER by ambulance with report of shortness of breath. Wife at the bedside states patient has h/o Afib on Pradaxa, has "heart valve problems", has been having shortness of breath for the past few days, today was found sitting on stairs, c/o increased shortness of breath, chest pain and diaphoresis.

## 2017-10-15 NOTE — CONSULT NOTE ADULT - ASSESSMENT
83 M w hx as listed with Inferior Stemi with bradycardia and hypotension.   - ASA 325mg x 1   - Plavix 600mg x 1  - Cont to monitor with transcutaneous pads  - COnt dopamine @ 20  - Cont Vaso   - emergent tx to [place] for cardiac cath. Cath team at [place] aware.   - Patient at high risk for decompensation. Critically ill. >90 minutes of critical care time was spent with this patient.

## 2017-10-15 NOTE — H&P ADULT - PROBLEM SELECTOR PLAN 5
Pt has a diffuse goiter for many years. His endocrinologist follows this closely and was last checked in August.  - thyroid panels sent

## 2017-10-15 NOTE — H&P ADULT - NSHPLABSRESULTS_GEN_ALL_CORE
14.8   21.9  )-----------( 207      ( 15 Oct 2017 17:05 )             43.4       10-15    140  |  105  |  38<H>  ----------------------------<  191<H>  4.5   |  17<L>  |  2.01<H>    Ca    9.1      15 Oct 2017 17:05  Phos  5.7     10-15  Mg     2.2     10-15    TPro  6.2  /  Alb  3.2<L>  /  TBili  1.4<H>  /  DBili  x   /  AST  663<H>  /  ALT  466<H>  /  AlkPhos  84  10-15    ABG - ( 15 Oct 2017 17:22 )  pH: 7.29  /  pCO2: 34    /  pO2: 252   / HCO3: 16    / Base Excess: -9.5  /  SaO2: 99       Urinalysis Basic - ( 15 Oct 2017 16:48 )    Color: Yellow / Appearance: Turbid / SG: >1.030 / pH: x  Gluc: x / Ketone: Negative  / Bili: Negative / Urobili: Negative   Blood: x / Protein: >600 mg/dL / Nitrite: Negative   Leuk Esterase: Negative / RBC: >50 /HPF / WBC 3-5 /HPF   Sq Epi: x / Non Sq Epi: OCC /HPF / Bacteria: Few /HPF    PT/INR - ( 15 Oct 2017 17:05 )   PT: 23.3 sec;   INR: 2.12 ratio      PTT - ( 15 Oct 2017 17:05 )  PTT:> 200 sec    CARDIAC MARKERS ( 15 Oct 2017 17:05 )  x     / 2.89 ng/mL / 1587 U/L / x     / 179.1 ng/mL  CARDIAC MARKERS ( 15 Oct 2017 12:13 )  .971 ng/mL / x     / 126 U/L / x     / x        CAPILLARY BLOOD GLUCOSE  137 (15 Oct 2017 12:34)    POCT Blood Glucose.: 137 mg/dL (15 Oct 2017 12:34)    T(C): 34.7 (10-15-17 @ 16:30), Max: 36.5 (10-15-17 @ 11:45)  HR: 98 (10-15-17 @ 17:59) (31 - 102)  BP: 84/50 (10-15-17 @ 16:06) (75/58 - 98/72)  RR: 19 (10-15-17 @ 17:59) (14 - 24)  SpO2: 99% (10-15-17 @ 17:59) (94% - 100%) 14.8   21.9  )-----------( 207      ( 15 Oct 2017 17:05 )              43.4       10-15    140  |  105  |  38<H>  ----------------------------<  191<H>  4.5   |  17<L>  |  2.01<H>    Ca    9.1      15 Oct 2017 17:05  Phos  5.7     10-15  Mg     2.2     10-15    TPro  6.2  /  Alb  3.2<L>  /  TBili  1.4<H>  /  DBili  x   /  AST  663<H>  /  ALT  466<H>  /  AlkPhos  84  10-15    ABG - ( 15 Oct 2017 17:22 )  pH: 7.29  /  pCO2: 34    /  pO2: 252   / HCO3: 16    / Base Excess: -9.5  /  SaO2: 99       Urinalysis Basic - ( 15 Oct 2017 16:48 )    Color: Yellow / Appearance: Turbid / SG: >1.030 / pH: x  Gluc: x / Ketone: Negative  / Bili: Negative / Urobili: Negative   Blood: x / Protein: >600 mg/dL / Nitrite: Negative   Leuk Esterase: Negative / RBC: >50 /HPF / WBC 3-5 /HPF   Sq Epi: x / Non Sq Epi: OCC /HPF / Bacteria: Few /HPF    PT/INR - ( 15 Oct 2017 17:05 )   PT: 23.3 sec;   INR: 2.12 ratio      PTT - ( 15 Oct 2017 17:05 )  PTT:> 200 sec    CARDIAC MARKERS ( 15 Oct 2017 17:05 )  x     / 2.89 ng/mL / 1587 U/L / x     / 179.1 ng/mL  CARDIAC MARKERS ( 15 Oct 2017 12:13 )  .971 ng/mL / x     / 126 U/L / x     / x        CAPILLARY BLOOD GLUCOSE  137 (15 Oct 2017 12:34)    POCT Blood Glucose.: 137 mg/dL (15 Oct 2017 12:34)    T(C): 34.7 (10-15-17 @ 16:30), Max: 36.5 (10-15-17 @ 11:45)  HR: 98 (10-15-17 @ 17:59) (31 - 102)  BP: 84/50 (10-15-17 @ 16:06) (75/58 - 98/72)  RR: 19 (10-15-17 @ 17:59) (14 - 24)  SpO2: 99% (10-15-17 @ 17:59) (94% - 100%)

## 2017-10-15 NOTE — H&P ADULT - HISTORY OF PRESENT ILLNESS
83 male presents to ER by ambulance with report of shortness of breath. Wife at the bedside states patient has h/o Afib on Pradaxa, has "heart valve problems", has been having shortness of breath for the past few days, today was found sitting on stairs, c/o increased shortness of breath, chest pain and diaphoresis. 83 male presents to ER by ambulance with report of shortness of breath. Wife at the bedside states patient has h/o Afib on Pradaxa, has "heart valve problems", has been having shortness of breath for the past few days, today was found sitting on stairs, c/o increased shortness of breath, chest pain and diaphoresis. At OSH, he was found to have an inferior wall STEMI and was brought to [facility] for cath. Upon arrival, pt appeared unwell and fixated on chest pain. Upon going to Cath lab, pt became non responsive and went into cardiogenic arrest. advanced life support was started, pt was intubated, and ROS was achieved. In cath lab, pt received a inta venus pacing wire, baloon pump, RCA stent, a swan RH cath, and an illiac balloon to stop R groin bleeding. Pt was admitted to the CCU for further management.

## 2017-10-16 NOTE — CONSULT NOTE ADULT - SUBJECTIVE AND OBJECTIVE BOX
NEUROLOGY CONSULT     Patient is a 83y old  Male who presents with a chief complaint of SOB, chest pain, diaphoresis (15 Oct 2017 17:59)      HPI:  83 male presents to ER by ambulance with report of shortness of breath. Wife at the bedside states patient has h/o Afib on Pradaxa, has "heart valve problems", has been having shortness of breath for the past few days, today was found sitting on stairs, c/o increased shortness of breath, chest pain and diaphoresis. At OSH, he was found to have an inferior wall STEMI and was brought to [facility] for cath. Upon arrival, pt appeared unwell and fixated on chest pain. Upon going to Cath lab, pt became non responsive and went into cardiogenic arrest. advanced life support was started, pt was intubated, and ROS was achieved. In cath lab, pt received a inta venus pacing wire, baloon pump, RCA stent, a swan RH cath, and an illiac balloon to stop R groin bleeding. Pt was admitted to the CCU for further management. (15 Oct 2017 17:59)      Neurology  Interval History: code stroke called for asymmetric pupils, R>L, last time normal 8:30 AM. Patient was on heparin gtt w/ PTT >200 this AM. Upon noticing asymmetry, heparin gtt stopped and code stroke called. Patient also on LV device which requires heparin to function appropiately and cannot be stopped.     PAST MEDICAL & SURGICAL HISTORY:  Vertigo  Thyroid condition  Afib  High blood pressure  No significant past surgical history      Allergies    No Known Allergies    Intolerances        MEDICATIONS  (STANDING):  aspirin  chewable 81 milliGRAM(s) Oral daily  DOBUTamine Infusion 5 MICROgram(s)/kG/Min (13.965 mL/Hr) IV Continuous <Continuous>  fentaNYL   Infusion 1 MICROgram(s)/kG/Hr (4.5 mL/Hr) IV Continuous <Continuous>  heparin  Infusion. 250 Unit(s)/Hr (2.5 mL/Hr) IV Continuous <Continuous>  heparin 69447 Unit(s),dextrose 5% in water 500 milliLiter(s) 72743 Unit(s) (15 mL/Hr) IV Continuous <Continuous>  influenza   Vaccine 0.5 milliLiter(s) IntraMuscular once  midazolam Infusion 1 mG/Hr (1 mL/Hr) IV Continuous <Continuous>  pantoprazole  Injectable 40 milliGRAM(s) IV Push daily  piperacillin/tazobactam IVPB. 3.375 Gram(s) IV Intermittent every 8 hours  ticagrelor 90 milliGRAM(s) Oral two times a day  vancomycin  IVPB 750 milliGRAM(s) IV Intermittent every 24 hours  vasopressin Infusion 0.04 Unit(s)/Min (2.4 mL/Hr) IV Continuous <Continuous>    MEDICATIONS  (PRN):  fentaNYL    Injectable 25 MICROGram(s) IV Push once PRN Moderate Pain (4 - 6)  heparin  Injectable 4000 Unit(s) IV Push every 6 hours PRN For aPTT less than 40      SOCIAL HISTORY: Denies tobacco/EtOH/drug use     FAMILY HISTORY:  Family history of heart attack (Father, Sibling, Grandparent, Uncle): many family members have had heart problems at young ages        Vital Signs Last 24 Hrs  T(C): 37.6 (16 Oct 2017 11:00), Max: 37.6 (16 Oct 2017 07:00)  T(F): 99.7 (16 Oct 2017 11:00), Max: 99.7 (16 Oct 2017 07:00)  HR: 76 (16 Oct 2017 11:00) (31 - 115)  BP: 84/50 (15 Oct 2017 16:06) (75/58 - 84/50)  BP(mean): 67 (15 Oct 2017 16:06) (67 - 67)  RR: 21 (16 Oct 2017 11:00) (0 - 25)  SpO2: 98% (16 Oct 2017 11:00) (93% - 100%)    PHYSICAL EXAM:   General appearance: Intubated, sedated           Mental Status: able to open eyes to verbal stimuli, follows some simple commands, intubated, unable to assess speech   Cranial Nerves: R pupil 4 mm, L pupil 2 mm, face appears symmetric, blinks to threat b/l, orthophoric on primary gaze, unable to abduct R eye   Motor: RUE some effort against gravity, appears to grimace when attempt to lift, LUE antigravity w/ drift, RLE w/ knee flexion limited due to immobilizer brace, LLE knee flexion   Sensation: Intact to LT throughout  NIHSS: 10     LABS:                          12.6   16.3  )-----------( 154      ( 16 Oct 2017 11:25 )             37.1     10-16    140  |  108  |  46<H>  ----------------------------<  146<H>  5.8<H>   |  17<L>  |  3.04<H>    Ca    8.0<L>      16 Oct 2017 08:06  Phos  4.4     10-16  Mg     1.8     10-16    TPro  5.2<L>  /  Alb  3.0<L>  /  TBili  0.9  /  DBili  x   /  AST  839<H>  /  ALT  543<H>  /  AlkPhos  57  10-16      IMAGING:

## 2017-10-16 NOTE — PROGRESS NOTE ADULT - PROBLEM SELECTOR PLAN 8
DVT ppx: Hep through impella machine  Diet: currently intubated, may start OG tube feeds once pt is more stable.    JACK Thakur MD-PGY1  Internal Medicine Department  Pager: [phone]

## 2017-10-16 NOTE — CONSULT NOTE ADULT - PROBLEM SELECTOR RECOMMENDATION 9
- requiring RP support, would continue at this time given acute MI. Aim for CVP of 10-12.  - cont  at 5  - agree once troponin decrease, would aim to remove IABP before RP impella

## 2017-10-16 NOTE — PROGRESS NOTE ADULT - SUBJECTIVE AND OBJECTIVE BOX
KAMLESH PATEL  83y  Male      Patient is a 83y old  Male who presents with a chief complaint of SOB, chest pain, diaphoresis (15 Oct 2017 17:59)      INTERVAL HPI/OVERNIGHT EVENTS:      Medications:  aspirin  chewable 81 milliGRAM(s) Oral daily  DOBUTamine Infusion 5 MICROgram(s)/kG/Min IV Continuous <Continuous>  fentaNYL   Infusion 1 MICROgram(s)/kG/Hr IV Continuous <Continuous>  heparin  Infusion. 250 Unit(s)/Hr IV Continuous <Continuous>  heparin  Injectable 4000 Unit(s) IV Push every 6 hours PRN  heparin 14234 Unit(s),dextrose 5% in water 500 milliLiter(s) 71078 Unit(s) IV Continuous <Continuous>  influenza   Vaccine 0.5 milliLiter(s) IntraMuscular once  midazolam Infusion 1 mG/Hr IV Continuous <Continuous>  pantoprazole  Injectable 40 milliGRAM(s) IV Push daily  piperacillin/tazobactam IVPB. 3.375 Gram(s) IV Intermittent every 8 hours  ticagrelor 90 milliGRAM(s) Oral two times a day  vancomycin  IVPB 1250 milliGRAM(s) IV Intermittent every 24 hours  vasopressin Infusion 0.04 Unit(s)/Min IV Continuous <Continuous>      REVIEW OF SYSTEMS:    T(C): 37.6 (10-16-17 @ 08:00), Max: 37.6 (10-16-17 @ 07:00)  HR: 64 (10-16-17 @ 08:30) (31 - 115)  BP: 84/50 (10-15-17 @ 16:06) (75/58 - 98/72)  RR: 20 (10-16-17 @ 08:30) (0 - 25)  SpO2: 93% (10-16-17 @ 08:30) (93% - 100%)  Wt(kg): --Vital Signs Last 24 Hrs  T(C): 37.6 (16 Oct 2017 08:00), Max: 37.6 (16 Oct 2017 07:00)  T(F): 99.7 (16 Oct 2017 08:00), Max: 99.7 (16 Oct 2017 07:00)  HR: 64 (16 Oct 2017 08:30) (31 - 115)  BP: 84/50 (15 Oct 2017 16:06) (75/58 - 98/72)  BP(mean): 67 (15 Oct 2017 16:06) (67 - 67)  RR: 20 (16 Oct 2017 08:30) (0 - 25)  SpO2: 93% (16 Oct 2017 08:30) (93% - 100%)    PHYSICAL EXAM:    Consultant(s) Notes Reviewed:  [x ] YES  [ ] NO  Care Discussed with Consultants/Other Providers [ x] YES  [ ] NO    LABS:                        12.3   16.6  )-----------( 169      ( 16 Oct 2017 06:38 )             37.7     10-16    140  |  108  |  46<H>  ----------------------------<  146<H>  5.8<H>   |  17<L>  |  3.04<H>    Ca    8.0<L>      16 Oct 2017 08:06  Phos  4.4     10-16  Mg     1.8     10-16    TPro  5.2<L>  /  Alb  3.0<L>  /  TBili  0.9  /  DBili  x   /  AST  839<H>  /  ALT  543<H>  /  AlkPhos  57  10-16    PT/INR - ( 15 Oct 2017 17:05 )   PT: 23.3 sec;   INR: 2.12 ratio         PTT - ( 16 Oct 2017 06:38 )  PTT:> 200 sec  Urinalysis Basic - ( 15 Oct 2017 16:48 )    Color: Yellow / Appearance: Turbid / SG: >1.030 / pH: x  Gluc: x / Ketone: Negative  / Bili: Negative / Urobili: Negative   Blood: x / Protein: >600 mg/dL / Nitrite: Negative   Leuk Esterase: Negative / RBC: >50 /HPF / WBC 3-5 /HPF   Sq Epi: x / Non Sq Epi: OCC /HPF / Bacteria: Few /HPF      CAPILLARY BLOOD GLUCOSE  137 (15 Oct 2017 12:34)      POCT Blood Glucose.: 137 mg/dL (15 Oct 2017 12:34)      ABG - ( 16 Oct 2017 06:36 )  pH: 7.37  /  pCO2: 33    /  pO2: 151   / HCO3: 19    / Base Excess: -5.3  /  SaO2: 99                Urinalysis Basic - ( 15 Oct 2017 16:48 )    Color: Yellow / Appearance: Turbid / SG: >1.030 / pH: x  Gluc: x / Ketone: Negative  / Bili: Negative / Urobili: Negative   Blood: x / Protein: >600 mg/dL / Nitrite: Negative   Leuk Esterase: Negative / RBC: >50 /HPF / WBC 3-5 /HPF   Sq Epi: x / Non Sq Epi: OCC /HPF / Bacteria: Few /HPF        RADIOLOGY & ADDITIONAL TESTS:    Imaging Personally Reviewed:  [ ] YES  [ ] NO    HEALTH ISSUES - PROBLEM Dx:  Prophylactic measure: Prophylactic measure  Hypertension: Hypertension  Goiter diffuse, nontoxic: Goiter diffuse, nontoxic  Atrial fibrillation, chronic: Atrial fibrillation, chronic  STEMI involving right coronary artery: STEMI involving right coronary artery  Right heart failure: Right heart failure  Cardiogenic shock: Cardiogenic shock KAMLESH PATEL  83y  Male      Patient is a 83y old  Male who presents with a chief complaint of SOB, chest pain, diaphoresis (15 Oct 2017 17:59)      INTERVAL HPI/OVERNIGHT EVENTS:  pt received RH impella o/n  code stroke was called for blown R pupil    Medications:  aspirin  chewable 81 milliGRAM(s) Oral daily  DOBUTamine Infusion 5 MICROgram(s)/kG/Min IV Continuous <Continuous>  fentaNYL   Infusion 1 MICROgram(s)/kG/Hr IV Continuous <Continuous>  heparin  Infusion. 250 Unit(s)/Hr IV Continuous <Continuous>  heparin  Injectable 4000 Unit(s) IV Push every 6 hours PRN  heparin 30109 Unit(s),dextrose 5% in water 500 milliLiter(s) 68092 Unit(s) IV Continuous <Continuous>  influenza   Vaccine 0.5 milliLiter(s) IntraMuscular once  midazolam Infusion 1 mG/Hr IV Continuous <Continuous>  pantoprazole  Injectable 40 milliGRAM(s) IV Push daily  piperacillin/tazobactam IVPB. 3.375 Gram(s) IV Intermittent every 8 hours  ticagrelor 90 milliGRAM(s) Oral two times a day  vancomycin  IVPB 1250 milliGRAM(s) IV Intermittent every 24 hours  vasopressin Infusion 0.04 Unit(s)/Min IV Continuous <Continuous>      REVIEW OF SYSTEMS:  unable to perform ROS due to intubation    T(C): 37.6 (10-16-17 @ 08:00), Max: 37.6 (10-16-17 @ 07:00)  HR: 64 (10-16-17 @ 08:30) (31 - 115)  BP: 84/50 (10-15-17 @ 16:06) (75/58 - 98/72)  RR: 20 (10-16-17 @ 08:30) (0 - 25)  SpO2: 93% (10-16-17 @ 08:30) (93% - 100%)  Wt(kg): --Vital Signs Last 24 Hrs  T(C): 37.6 (16 Oct 2017 08:00), Max: 37.6 (16 Oct 2017 07:00)  T(F): 99.7 (16 Oct 2017 08:00), Max: 99.7 (16 Oct 2017 07:00)  HR: 64 (16 Oct 2017 08:30) (31 - 115)  BP: 84/50 (15 Oct 2017 16:06) (75/58 - 98/72)  BP(mean): 67 (15 Oct 2017 16:06) (67 - 67)  RR: 20 (16 Oct 2017 08:30) (0 - 25)  SpO2: 93% (16 Oct 2017 08:30) (93% - 100%)    PHYSICAL EXAM:  GENERAL: Pt laying in bed, intubated, but responds to commands.   HEAD:  Atraumatic, Normocephalic  EYES: R pupil was found to be blown at 11am.  NECK: Very large goiter, unable to assess for JVD  CHEST/LUNG: ventilator sounds heard clearly  HEART: distant heart sounds, ventilator sounds obstructed heart sounds. S1S2 auscultated  ABDOMEN: Soft, Nontender, Nondistended; Bowel sounds present  EXTREMITIES:  2+ Peripheral Pulses, No clubbing, cyanosis, or edema  PSYCH: pt can follow demands, intubated, thus unable to speak  NEUROLOGY: non-focal  SKIN: No rashes or lesions    Consultant(s) Notes Reviewed:  [x ] YES  [ ] NO  Care Discussed with Consultants/Other Providers [ x] YES  [ ] NO    LABS:                        12.3   16.6  )-----------( 169      ( 16 Oct 2017 06:38 )             37.7     10-16    140  |  108  |  46<H>  ----------------------------<  146<H>  5.8<H>   |  17<L>  |  3.04<H>    Ca    8.0<L>      16 Oct 2017 08:06  Phos  4.4     10-16  Mg     1.8     10-16    TPro  5.2<L>  /  Alb  3.0<L>  /  TBili  0.9  /  DBili  x   /  AST  839<H>  /  ALT  543<H>  /  AlkPhos  57  10-16    PT/INR - ( 15 Oct 2017 17:05 )   PT: 23.3 sec;   INR: 2.12 ratio         PTT - ( 16 Oct 2017 06:38 )  PTT:> 200 sec  Urinalysis Basic - ( 15 Oct 2017 16:48 )    Color: Yellow / Appearance: Turbid / SG: >1.030 / pH: x  Gluc: x / Ketone: Negative  / Bili: Negative / Urobili: Negative   Blood: x / Protein: >600 mg/dL / Nitrite: Negative   Leuk Esterase: Negative / RBC: >50 /HPF / WBC 3-5 /HPF   Sq Epi: x / Non Sq Epi: OCC /HPF / Bacteria: Few /HPF      CAPILLARY BLOOD GLUCOSE  137 (15 Oct 2017 12:34)      POCT Blood Glucose.: 137 mg/dL (15 Oct 2017 12:34)      ABG - ( 16 Oct 2017 06:36 )  pH: 7.37  /  pCO2: 33    /  pO2: 151   / HCO3: 19    / Base Excess: -5.3  /  SaO2: 99          Urinalysis Basic - ( 15 Oct 2017 16:48 )    Color: Yellow / Appearance: Turbid / SG: >1.030 / pH: x  Gluc: x / Ketone: Negative  / Bili: Negative / Urobili: Negative   Blood: x / Protein: >600 mg/dL / Nitrite: Negative   Leuk Esterase: Negative / RBC: >50 /HPF / WBC 3-5 /HPF   Sq Epi: x / Non Sq Epi: OCC /HPF / Bacteria: Few /HPF        RADIOLOGY & ADDITIONAL TESTS:  < from: CT Head No Cont (10.16.17 @ 12:58) >  No evidence for acute territorial infarct or hemorrhage. If clinical   concern persist recommend brain MRI if not contraindicated or short-term   follow-up head CT for further evaluation.    < end of copied text >    Imaging Personally Reviewed:  [x] YES  [ ] NO    HEALTH ISSUES - PROBLEM Dx:  Prophylactic measure: Prophylactic measure  Hypertension: Hypertension  Goiter diffuse, nontoxic: Goiter diffuse, nontoxic  Atrial fibrillation, chronic: Atrial fibrillation, chronic  STEMI involving right coronary artery: STEMI involving right coronary artery  Right heart failure: Right heart failure  Cardiogenic shock: Cardiogenic shock

## 2017-10-16 NOTE — CONSULT NOTE ADULT - ASSESSMENT
83 year old man with atrial fibrillation, AC with Pradaxa, aortic stenosis with acute inferior wall MI, RV component with severe RV dilitation and RV dysfunction, severe LV dysfunction, NSVT, cardiogenic shock, cardiac arrest, s/p IABP, Impella, swan, RV pacing wire, acute kidney injury:    - Continue CCU care  - Continue mechanical ventillation  - Wean Impella and IABP as tolerated  - Heparin gtt for goal PTT 60-90  - Renal consult for oliguric DANITA  - Continue DAPT with aspirin and Ticagrelor  - Continue  and vaso.  Wean as tolerated  - Repeat echocardiogram with Definity  - Continue abx for now.  Follow up cultures  - IV PPI  - Monitor and replete lytes  - Critically ill.  Time greater than 90 minutes

## 2017-10-16 NOTE — PROGRESS NOTE ADULT - ATTENDING COMMENTS
Patient is seen and examined with fellow, NP and the CCU house-staff. I agree with the history, physical and the assessment and plan.  cardiac arrest with Biventricular failure s/p PCI to oRCA on IABP and RP Impella support  CVVHD today with no fluif removal  heart failure team evaluation pending

## 2017-10-16 NOTE — CONSULT NOTE ADULT - ASSESSMENT
ASSESSMENT: Patient is a 83y old m with ARF secondary to global ischemia    PLAN:   - care per CCU  - spoke w/ Dr. Arevalo (Nephrology), no emergent need for CVVHD at this time  - will wait for aPTT for fall < 90 before attempting Shiley placement later this evening  - will have to place line in IJ as no femoral access available  - Plan discussed with Attending, Dr. Goncalves  - please page [phone] w/ any questions

## 2017-10-16 NOTE — PROGRESS NOTE ADULT - PROBLEM SELECTOR PLAN 3
Pt's Cr, K+ going up. Pt anuric.   - we appreceate nephro recs  - called vascular for ally wang in preparation CVVH

## 2017-10-16 NOTE — CONSULT NOTE ADULT - SUBJECTIVE AND OBJECTIVE BOX
[facility] - Sharonda Ratliff, Lesvia, Beba, Celeste, Sergio Em  Office Number: [phone]    Initial Consult Note    CHIEF COMPLAINT: Patient is a 83y old  Male who presents with a chief complaint of SOB, chest pain, diaphoresis (15 Oct 2017 17:59)      HPI:  This is an 83 year old male presented to ER at [facility] [facility] by ambulance with report of shortness of breath.  The wife reports tath he has not been feeling well, and was scheduled to see his outpatient cardiologist.  He has a history of atrial fibrillation and is on Pradaxa.  He has a history of a "heart valve problem".  He has been having shortness of breath for the past few days, and yesterday was found sitting on stairs, c/o increased shortness of breath, chest pain and diaphoresis. At [place] he was found to have an inferior wall STEMI and was brought to [facility] for cath.  In the Cath lab, pt became non responsive and had a cardiac arrest.  Patient was intubated, and pulsatile rhythm was restored.  In cath lab, pt underwent PCI to the ostial RCA.  He also has a [place], an IAPB, an Implla, and a transvenous pacing wire.  He had a right groin hematoma and got 1 unit PRBC.     He is now on vaso and , and his urine output is 5 cc/hr.  He has had a few episodes of NSVT.        PAST MEDICAL & SURGICAL HISTORY:  Vertigo  Thyroid condition  Afib  High blood pressure  No significant past surgical history      SOCIAL HISTORY:  No tobacco, ethanol, or drug abuse.    FAMILY HISTORY:  Family history of heart attack (Father, Sibling, Grandparent, Uncle): many family members have had heart problems at young ages      MEDICATIONS  (STANDING):  aspirin  chewable 81 milliGRAM(s) Oral daily  DOBUTamine Infusion 2.5 MICROgram(s)/kG/Min (6.982 mL/Hr) IV Continuous <Continuous>  fentaNYL   Infusion 1 MICROgram(s)/kG/Hr (4.5 mL/Hr) IV Continuous <Continuous>  heparin  Infusion. 250 Unit(s)/Hr (2.5 mL/Hr) IV Continuous <Continuous>  heparin 34862 Unit(s),dextrose 5% in water 500 milliLiter(s) 50250 Unit(s) (15 mL/Hr) IV Continuous <Continuous>  influenza   Vaccine 0.5 milliLiter(s) IntraMuscular once  midazolam Infusion 1 mG/Hr (1 mL/Hr) IV Continuous <Continuous>  pantoprazole  Injectable 40 milliGRAM(s) IV Push daily  piperacillin/tazobactam IVPB. 3.375 Gram(s) IV Intermittent every 8 hours  ticagrelor 90 milliGRAM(s) Oral two times a day  vancomycin  IVPB 1250 milliGRAM(s) IV Intermittent every 24 hours  vasopressin Infusion 0.02 Unit(s)/Min (1.2 mL/Hr) IV Continuous <Continuous>    MEDICATIONS  (PRN):  heparin  Injectable 4000 Unit(s) IV Push every 6 hours PRN For aPTT less than 40      Allergies    No Known Allergies          REVIEW OF SYSTEMS:  All other review of systems is negative unless indicated above    VITAL SIGNS:   Vital Signs Last 24 Hrs  T(C): 37.6 (16 Oct 2017 07:00), Max: 37.6 (16 Oct 2017 07:00)  T(F): 99.7 (16 Oct 2017 07:00), Max: 99.7 (16 Oct 2017 07:00)  HR: 79 (16 Oct 2017 07:30) (31 - 115)  BP: 84/50 (15 Oct 2017 16:06) (75/58 - 98/72)  BP(mean): 67 (15 Oct 2017 16:06) (67 - 67)  RR: 20 (16 Oct 2017 07:30) (0 - 25)  SpO2: 100% (16 Oct 2017 07:30) (94% - 100%)    I&O's Summary    15 Oct 2017 07:01  -  16 Oct 2017 07:00  --------------------------------------------------------  IN: 2057.6 mL / OUT: 162 mL / NET: 1895.6 mL        On Exam:    Constitutional: Intubated, sedated.  Lungs:  Non-labored, breath sounds are clear bilaterally, No wheezing, rales or rhonchi  Cardiovascular: IRRR.  S1 and S2 positive.     Gastrointestinal: Bowel Sounds present, soft, nontender.   Lymph: No peripheral edema. No cervical lymphadenopathy.  Skin: No rashes or ulcers     LABS: All Labs Reviewed:                        12.3   16.6  )-----------( 169      ( 16 Oct 2017 06:38 )             37.7                         13.6   17.1  )-----------( 157      ( 16 Oct 2017 00:42 )             38.0                         14.6   21.0  )-----------( 240      ( 15 Oct 2017 20:20 )             44.5     16 Oct 2017 06:38    139    |  107    |  46     ----------------------------<  138    5.9     |  19     |  2.90   15 Oct 2017 20:20    141    |  106    |  39     ----------------------------<  145    4.7     |  18     |  2.12   15 Oct 2017 17:05    140    |  105    |  38     ----------------------------<  191    4.5     |  17     |  2.01     Ca    8.1        16 Oct 2017 06:38  Ca    9.2        15 Oct 2017 20:20  Ca    9.1        15 Oct 2017 17:05  Phos  4.6       16 Oct 2017 06:38  Phos  4.3       16 Oct 2017 04:03  Phos  4.7       15 Oct 2017 20:20  Mg     1.9       16 Oct 2017 06:38  Mg     1.8       16 Oct 2017 04:03  Mg     2.1       15 Oct 2017 20:20    TPro  5.2    /  Alb  3.0    /  TBili  0.9    /  DBili  x      /  AST  839    /  ALT  543    /  AlkPhos  57     16 Oct 2017 06:38  TPro  6.2    /  Alb  3.2    /  TBili  1.6    /  DBili  x      /  AST  849    /  ALT  601    /  AlkPhos  81     15 Oct 2017 20:20  TPro  6.2    /  Alb  3.2    /  TBili  1.4    /  DBili  x      /  AST  663    /  ALT  466    /  AlkPhos  84     15 Oct 2017 17:05    PT/INR - ( 15 Oct 2017 17:05 )   PT: 23.3 sec;   INR: 2.12 ratio         PTT - ( 15 Oct 2017 17:05 )  PTT:> 200 sec  CARDIAC MARKERS ( 16 Oct 2017 06:38 )  x     / 12.58 ng/mL / 4583 U/L / x     / 297.6 ng/mL  CARDIAC MARKERS ( 16 Oct 2017 04:03 )  x     / 11.79 ng/mL / 4165 U/L / x     / 285.9 ng/mL  CARDIAC MARKERS ( 15 Oct 2017 20:20 )  x     / 4.70 ng/mL / 2864 U/L / x     / 283.1 ng/mL  CARDIAC MARKERS ( 15 Oct 2017 17:05 )  x     / 2.89 ng/mL / 1587 U/L / x     / 179.1 ng/mL  CARDIAC MARKERS ( 15 Oct 2017 12:13 )  .971 ng/mL / x     / 126 U/L / x     / x        10-15 @ 16:48  Pro Bnp 3344    10-15 @ 21:34  TSH: 2.40      RADIOLOGY:  < from: Xray Chest 1 View AP- PORTABLE-Urgent (10.15.17 @ 12:26) >    EXAM:  PORTABLE CHEST URGENT                            PROCEDURE DATE:  10/15/2017          INTERPRETATION:  History: Chest pain    Portable AP radiograph of the chest is performed. comparison is made to   2016.    The heart is not enlarged. There is no focal infiltrate or pleural   effusion. There is again soft tissue density in the superior mediastinum   on the right deviating the trachea to left consistent with enlarged   thyroid gland noted on prior MR angiogram of the neck from 2016.    Impression: Deviation of the trachea to the right secondary to enlarged   thyroid gland  No active pulmonary disease.                CHRISTOPHER OSULLIVAN   This document has been electronically signed. Oct 15 2017 12:40PM        < end of copied text >  < from: Transthoracic Echocardiogram (10.15.17 @ 18:56) >    Patient name: KAMLESH PATEL  YOB: 1933   Age: 83 (M)   MR#: [id number]  Study Date: 10/15/2017  Location: [place] [id number]: Opal Albarran M.D.  [facility] Sonographer: Opal Albarran M.D.  Study quality: Technically good  Referring Physician: Racquel Hammer MD  Height: 170 cm  Weight: 77 kg  BSA: 1.9 m2  ------------------------------------------------------------------------  PROCEDURE: Transthoracic echocardiogram with 2-D, M-Mode  and complete spectral and color flow Doppler.  INDICATION: Cardiac arrest due to other underlying  condition (I46.8)  ------------------------------------------------------------------------  Observations:  Mitral Valve: Normal mitral valve.  Aortic Valve/Aorta: Calcified trileaflet aorticvalve with  decreased opening.  Normal aortic root.  Left Atrium: Normal left atrium.  Left Ventricle: Left ventricular systolic function, by  visual estimation, is 20-25%. Endocardium not well  visualized. Repeat study in with use of contrast for better  delineation of endocardial border.  Right Heart: Moderate right atrial enlargement. Right  ventricular severely dilated with reduced right ventricular  systolic function. Normal tricuspid valve. Normal pulmonic  valve.  Pericardium/Pleura: Normal pericardium with no pericardial  effusion.  ------------------------------------------------------------------------  Conclusions:  Technically difficult study.  1. Left ventricular systolic function, by visual  estimation, is 20-25%. Endocardium not well visualized.  Repeat study with use of contrast for better delineation of  endocardial border.  2. Moderate right atrial enlargement.  3. Right ventricular severely dilated with reduced right  ventricular systolic function.  4. Device wire notedin IVC and right ventricle.  5. Calcified trileaflet aortic valve with decreased  opening.  6. Normal pericardium with no pericardial effusion.  ------------------------------------------------------------------------  Confirmed on  10/16/2017 - 00:47:28 by Radha Mcguire M.D.  ------------------------------------------------------------------------    < end of copied text >

## 2017-10-16 NOTE — PROGRESS NOTE ADULT - PROBLEM SELECTOR PLAN 1
Pt currently has balloon pump at max asist, RH impella at max assist, pressures holing with pressors. Currently on Vaso and bod drip.   - will continue to monitor hemodynamics closely  - cont with bod and vaso drips

## 2017-10-16 NOTE — CHART NOTE - NSCHARTNOTEFT_GEN_A_CORE
====================  NEW EVENTS:  ====================  Pt went back to cath lab for R sided impella  Pt off , levo.    ====================  SUMMARY:  ====================  83 male presents to ER by ambulance with report of shortness of breath. Wife at the bedside states patient has h/o Afib on Pradaxa, has "heart valve problems", has been having shortness of breath for the past few days, today was found sitting on stairs, c/o increased shortness of breath, chest pain and diaphoresis. At OSH, he was found to have an inferior wall STEMI and was brought to [facility] for cath. Upon arrival, pt appeared unwell and fixated on chest pain. Upon going to Cath lab, pt became non responsive and went into cardiogenic arrest. advanced life support was started, pt was intubated, and ROS was achieved. In cath lab, pt received a inta venus pacing wire, baloon pump, RCA stent, a swan RH cath, and an illiac balloon to stop R groin bleeding. Pt was admitted to the CCU for further management, now with IABP, R sided impella    ====================  VITALS:  ====================    ICU Vital Signs Last 24 Hrs  T(C): 36.8 (15 Oct 2017 23:00), Max: 36.8 (15 Oct 2017 23:00)  T(F): 98.2 (15 Oct 2017 23:00), Max: 98.2 (15 Oct 2017 23:00)  HR: 66 (16 Oct 2017 00:00) (31 - 115)  BP: 84/50 (15 Oct 2017 16:06) (75/58 - 98/72)  BP(mean): 67 (15 Oct 2017 16:06) (67 - 67)  ABP: 92/36 (16 Oct 2017 00:00) (72/36 - 134/52)  ABP(mean): 58 (16 Oct 2017 00:00) (56 - 86)  RR: 20 (16 Oct 2017 00:00) (10 - 25)  SpO2: 97% (16 Oct 2017 00:00) (94% - 100%)      I&O's Summary    15 Oct 2017 07:01  -  16 Oct 2017 00:51  --------------------------------------------------------  IN: 508.7 mL / OUT: 125 mL / NET: 383.7 mL        Adult Advanced Hemodynamics Last 24 Hrs  CVP(mm Hg): 3 (16 Oct 2017 00:00) (3 - 16)  CVP(cm H2O): --  CO: 2 (15 Oct 2017 18:27) (2 - 2.1)  CI: 0.9 (15 Oct 2017 18:27) (0.9 - 0.9)  PA: 26/21 (16 Oct 2017 00:00) (26/21 - 45/27)  PA(mean): 24 (16 Oct 2017 00:00) (20 - 35)  PCWP: --  SVR: 1877 (15 Oct 2017 18:27) (1877 - 2206)  SVRI: 4172 (15 Oct 2017 18:27) (1870 - 1134)  PVR: --  PVRI: --    Mode: AC/ CMV (Assist Control/ Continuous Mandatory Ventilation)  RR (machine): 20  TV (machine): 500  FiO2: 60  PEEP: 5  ITime: 1  MAP: 9  PIP: 20      ====================  LABS:  ====================                          14.6   21.0  )-----------( 240      ( 15 Oct 2017 20:20 )             44.5     10-15    141  |  106  |  39<H>  ----------------------------<  145<H>  4.7   |  18<L>  |  2.12<H>    Ca    9.2      15 Oct 2017 20:20  Phos  4.7     10-15  Mg     2.1     10-15    TPro  6.2  /  Alb  3.2<L>  /  TBili  1.6<H>  /  DBili  x   /  AST  849<H>  /  ALT  601<H>  /  AlkPhos  81  10-15    PT/INR - ( 15 Oct 2017 17:05 )   PT: 23.3 sec;   INR: 2.12 ratio         PTT - ( 15 Oct 2017 17:05 )  PTT:> 200 sec  Troponin T, Serum: 4.70 ng/mL <H> (10-15-17 @ 20:20)  Creatine Kinase, Serum: 2864 U/L <H> (10-15-17 @ 20:20)  Creatine Kinase, Serum: 1587 U/L <H> (10-15-17 @ 17:05)  Troponin T, Serum: 2.89 ng/mL <H> (10-15-17 @ 17:05)  Creatine Kinase, Serum: 126 U/L (10-15-17 @ 12:13)    ABG - ( 16 Oct 2017 00:41 )  pH: 7.37  /  pCO2: 33    /  pO2: 259   / HCO3: 19    / Base Excess: -5.4  /  SaO2: 100                 ====================  PLAN:  ====================  # RV Failure  -Impella for RV support  -Hep purge fluid 750u  -Hemolysis labs in AM.  -TTE daily    #CAD  -IABP in for CAD support  -ASA/Brillinta  -Hep gtt

## 2017-10-16 NOTE — CONSULT NOTE ADULT - ASSESSMENT
83M pmhx of afb on pradaxa initially presented on 10/15/17 with chest pain/diaphresis/ dyspnea to [facility] emergently transferred here for C c/b VF arrest requiring IABP followed by oRCA stent c/b now right heart failure and cardiogenic shock.

## 2017-10-16 NOTE — CONSULT NOTE ADULT - ATTENDING COMMENTS
VASCULAR NEUROLOGY ATTENDING  The patient is seen and examined the history and imaging are reviewed. I agree with the resident note unless otherwise noted. At this point no further work-up for pupillary abnormality. For noted myoclonic movements possibly related to hypoxic injury would obtain EEG monitoring and consider treatment if movements persist.

## 2017-10-16 NOTE — CONSULT NOTE ADULT - ASSESSMENT
82 y/o M w/ h/o A fib on Pradaxa, HTN admitted to [facility] for lower wall NSTEMI s/p cardiac arrest w/ ROSC of 30 mins, s/p cardiac cath and LV assist device on systemic heparin gtt, code stroke called for asymmetric pupils R>L in setting of PTT >200. Exam limited due to intubation, sedation and need for immobilization, however, notable for complete third nerve palsy. In the setting of elevated PTT, concern for brainstem ICH.     Plan:   - obtaining urgent portable CT head as patient unable to be mobilized   - PTT   - no tPA secondary to elevated PTT and concern for ICH 84 y/o M w/ h/o A fib on Pradaxa, HTN admitted to [facility] for lower wall NSTEMI s/p cardiac arrest w/ ROSC of 30 mins, s/p cardiac cath and LV assist device on systemic heparin gtt, code stroke called for asymmetric pupils R>L in setting of PTT >200. Exam limited due to intubation, sedation and need for immobilization, however, notable for complete third nerve palsy. In the setting of elevated PTT, concern for brainstem ICH.     Plan:   - obtaining urgent portable CT head as patient unable to be mobilized   - PTT   - no tPA secondary to elevated PTT and concern for ICH   - agree w/ stopping heparin gtt until CTH performed   - BP monitoring, goal <160/90

## 2017-10-16 NOTE — PROVIDER CONTACT NOTE (OTHER) - ASSESSMENT
Patient with multiple groin lines in place, site oozing scant drainage.  patient leaking blood around pace catheter

## 2017-10-16 NOTE — CONSULT NOTE ADULT - ATTENDING COMMENTS
Pt seen and reviewed am and pm with team, family.  ARF on CKD in setting of NSTEMI  1.  ARF-multifactorial.  Hemodynamic optimization with current cardiac assist.  NO absolute indication for renal replacement rx(would opt for CVVHDF) at this time.  2.  Hyperkalemia--discussed med management including rx of low Ca++.    3.  Respiratory failure acute--high FIO2.  If increased requirements may suggest need for more urgent UF.  4.  Coagulopathy--optimize before dialysis catheter placement.

## 2017-10-16 NOTE — CONSULT NOTE ADULT - ASSESSMENT
83 year old male with a PMH of atrial fibrillation (on Pradaxa), HTN, Thyroid Goiter, CKD stage III (dx 2016) was seen at [facility] with inferior wall STEMI and was brought to [facility] for cardiac catherization s/p PCI x 1 now with DANITA and Anuria.

## 2017-10-16 NOTE — CONSULT NOTE ADULT - ATTENDING COMMENTS
83yrs well functioning. PMH AF on Pradaxa.  Recent SOB mowing lawn. CP and diapheresis. Inf STEMI transferred from [place].  Transferred [facility]: VF Arrest in Lab,  IABP, Cors; Ostial , RICHARDSON RCA. Mid LAD 80.   RA 16, PA 40/17 27, PA sat 42. CO 2.1   To floor with IABP, [place].   Deteriorating hemodynamic status: RP impella.   Now RP impella: P4,,  5, IABP, Vaso 0.04,  MAP 70 SBP 75-98, HR 70,   Net positive 2lites. CVVHD being initiated.   RA: 6, PA 29/20 23, PA 68, CO 5.2   Labs:   WBC 16.3, Hb 12.6, Plt 154, BUN 50, Cr 3.0 (1.7), , , Peak Troponin 13.8, CKMB 220 83yrs well functioning. PMH AF on Pradaxa.  Recent SOB mowing lawn. CP and diapheresis. Inf STEMI transferred from [place].  Transferred [facility]: VF Arrest in Lab,  IABP, Cors; Ostial , RICHARDSON RCA. Mid LAD 80.   RA 16, PA 40/17 27, PA sat 42. CO 2.1   To floor with IABP, [place].   Deteriorating hemodynamic status: RP impella.   Now RP impella: P4,,  5, IABP, Vaso 0.04,  MAP 70 SBP 75-98, HR 70,   sedated, intubated, good A/e, warm LE  Net positive 2lites. CVVHD being initiated.   RA: 6, PA 29/20 23, PA 68, CO 5.2   Labs:   WBC 16.3, Hb 12.6, Plt 154, BUN 50, Cr 3.0 (1.7), , , Peak Troponin 13.8, CKMB 220  83, s/p large righ infarct with cardiogenic shock with ATN and shock liver. PA line shows pulsatile flow.  Improved with RPella. Suggest continued mechanical support. Maintain CVP 10-12. CVVHD only required for metabolic clearance.   Will review echo. Possible wean tomorrow.   Porter Celis

## 2017-10-16 NOTE — CONSULT NOTE ADULT - SUBJECTIVE AND OBJECTIVE BOX
CHIEF COMPLAINT: IWLas CrucesI    HISTORY OF PRESENT ILLNESS:  83M pmhx of afb on pradaxa initially presented on 10/15/17 with chest pain/diaphresis/ [facility] c/b VF arrest requiring TVP, IABP, oRCA stent c/b R groin hematoma sp 1uprbc.     Allergies    No Known Allergies    Intolerances    	    MEDICATIONS:  aspirin  chewable 81 milliGRAM(s) Oral daily  DOBUTamine Infusion 5 MICROgram(s)/kG/Min IV Continuous <Continuous>  heparin  Infusion. 250 Unit(s)/Hr IV Continuous <Continuous>  heparin  Injectable 4000 Unit(s) IV Push every 6 hours PRN  ticagrelor 90 milliGRAM(s) Oral two times a day    piperacillin/tazobactam IVPB. 3.375 Gram(s) IV Intermittent every 8 hours  vancomycin  IVPB 1250 milliGRAM(s) IV Intermittent every 24 hours      fentaNYL    Injectable 25 MICROGram(s) IV Push once PRN  fentaNYL   Infusion 1 MICROgram(s)/kG/Hr IV Continuous <Continuous>  midazolam Infusion 1 mG/Hr IV Continuous <Continuous>    pantoprazole  Injectable 40 milliGRAM(s) IV Push daily    vasopressin Infusion 0.04 Unit(s)/Min IV Continuous <Continuous>    influenza   Vaccine 0.5 milliLiter(s) IntraMuscular once      PAST MEDICAL & SURGICAL HISTORY:  Vertigo  Thyroid condition  Afib  High blood pressure  No significant past surgical history      FAMILY HISTORY:  Family history of heart attack (Father, Sibling, Grandparent, Uncle): many family members have had heart problems at young ages      SOCIAL HISTORY:    [ ] Non-smoker  [ ] Smoker  [ ] Alcohol      REVIEW OF SYSTEMS:  General: no fatigue/malaise, weight loss/gain.  Skin: no rashes.  Ophthalmologic: no blurred vision, no loss of vision. 	  ENT: no sore throat, rhinorrhea, sinus congestion.  Respiratory: no SOB, cough or wheeze.  Gastrointestinal:  no N/V/D, no melena/hematemesis/hematochezia.  Genitourinary: no dysuria/hesitancy or hematuria.  Musculoskeletal: no myalgias or arthralgias.  Neurological: no changes in vision or hearing, no lightheadedness/dizziness, no syncope/near syncope	  Psychiatric: no unusual stress/anxiety.   Hematology/Lymphatics: no unusual bleeding, bruising and no lymphadenopathy.  Endocrine: no unusual thirst.   All others negative except as stated above and in HPI.    PHYSICAL EXAM:  T(C): 37.6 (10-16-17 @ 08:00), Max: 37.6 (10-16-17 @ 07:00)  HR: 70 (10-16-17 @ 10:00) (31 - 115)  BP: 84/50 (10-15-17 @ 16:06) (75/58 - 98/72)  RR: 19 (10-16-17 @ 10:00) (0 - 25)  SpO2: 94% (10-16-17 @ 10:00) (93% - 100%)  Wt(kg): --  I&O's Summary    15 Oct 2017 07:01  -  16 Oct 2017 07:00  --------------------------------------------------------  IN: 2082.6 mL / OUT: 162 mL / NET: 1920.6 mL    16 Oct 2017 07:01  -  16 Oct 2017 11:07  --------------------------------------------------------  IN: 169 mL / OUT: 20 mL / NET: 149 mL        Appearance: Normal	  HEENT:   Normal oral mucosa, PERRL, EOMI	  Lymphatic: No lymphadenopathy  Cardiovascular: Normal S1 S2, No JVD, No murmurs, No edema  Respiratory: Lungs clear to auscultation	  Psychiatry: A & O x 3, Mood & affect appropriate  Gastrointestinal:  Soft, Non-tender, + BS	  Skin: No rashes, No ecchymoses, No cyanosis	  Neurologic: Non-focal  Extremities: Normal range of motion, No clubbing, cyanosis or edema  Vascular: Peripheral pulses palpable 2+ bilaterally        LABS:	 	    CBC Full  -  ( 16 Oct 2017 06:38 )  WBC Count : 16.6 K/uL  Hemoglobin : 12.3 g/dL  Hematocrit : 37.7 %  Platelet Count - Automated : 169 K/uL  Mean Cell Volume : 102.0 fl  Mean Cell Hemoglobin : 33.2 pg  Mean Cell Hemoglobin Concentration : 32.6 gm/dL  Auto Neutrophil # : 14.0 K/uL  Auto Lymphocyte # : 1.1 K/uL  Auto Monocyte # : 1.3 K/uL  Auto Eosinophil # : 0.1 K/uL  Auto Basophil # : 0.0 K/uL  Auto Neutrophil % : 84.8 %  Auto Lymphocyte % : 6.8 %  Auto Monocyte % : 8.0 %  Auto Eosinophil % : 0.3 %  Auto Basophil % : 0.1 %    10-16    140  |  108  |  46<H>  ----------------------------<  146<H>  5.8<H>   |  17<L>  |  3.04<H>  10-16    139  |  107  |  46<H>  ----------------------------<  138<H>  5.9<H>   |  19<L>  |  2.90<H>    Ca    8.0<L>      16 Oct 2017 08:06  Ca    8.1<L>      16 Oct 2017 06:38  Phos  4.4     10-16  Phos  4.6     10-16  Mg     1.8     10-16  Mg     1.9     10-16    TPro  5.2<L>  /  Alb  3.0<L>  /  TBili  0.9  /  DBili  x   /  AST  839<H>  /  ALT  543<H>  /  AlkPhos  57  10-16  TPro  6.2  /  Alb  3.2<L>  /  TBili  1.6<H>  /  DBili  x   /  AST  849<H>  /  ALT  601<H>  /  AlkPhos  81  10-15      proBNP: Serum Pro-Brain Natriuretic Peptide: 3344 pg/mL (10-15 @ 16:48)    Lipid Profile:   HgA1c: Hemoglobin A1C, Whole Blood: 5.4 % (10-15 @ 21:32)    TSH: Thyroid Stimulating Hormone, Serum: 2.40 uIU/mL (10-15 @ 21:34)        CARDIAC MARKERS:  Troponin I, Serum: .971 ng/mL (10-15 @ 12:13)            TELEMETRY: 	    ECG:  	  RADIOLOGY:  OTHER: 	    PREVIOUS DIAGNOSTIC TESTING:    [ ] Echocardiogram:  [ ]  Catheterization:  [ ] Stress Test:  	  	  ASSESSMENT/PLAN: CHIEF COMPLAINT: IWSTEMI    HISTORY OF PRESENT ILLNESS:  83M pmhx of afb on pradaxa initially presented on 10/15/17 with chest pain/diaphresis/ dyspnea to OSH, [facility] emergently transferred here for LHC c/b VF arrest requiring IABP followed by oRCA stent c/b R groin hematoma sp 1uprbc. Went to CCU, where overnight MVo2 fell to low of 41, CO 2.3, CI 1.6 despite levo at 0.1 and  at 3 thus sp RP impella now at P9.  Anuric overnight, started on CVVHd without fludi removal. Currently CVP 10 PA 37/29/32 MVo2 68 MAP65  on vaso 0.04 dob5.   Allergies    No Known Allergies    Intolerances    	    MEDICATIONS:  aspirin  chewable 81 milliGRAM(s) Oral daily  DOBUTamine Infusion 5 MICROgram(s)/kG/Min IV Continuous <Continuous>  heparin  Infusion. 250 Unit(s)/Hr IV Continuous <Continuous>  heparin  Injectable 4000 Unit(s) IV Push every 6 hours PRN  ticagrelor 90 milliGRAM(s) Oral two times a day    piperacillin/tazobactam IVPB. 3.375 Gram(s) IV Intermittent every 8 hours  vancomycin  IVPB 1250 milliGRAM(s) IV Intermittent every 24 hours      fentaNYL    Injectable 25 MICROGram(s) IV Push once PRN  fentaNYL   Infusion 1 MICROgram(s)/kG/Hr IV Continuous <Continuous>  midazolam Infusion 1 mG/Hr IV Continuous <Continuous>    pantoprazole  Injectable 40 milliGRAM(s) IV Push daily    vasopressin Infusion 0.04 Unit(s)/Min IV Continuous <Continuous>    influenza   Vaccine 0.5 milliLiter(s) IntraMuscular once      PAST MEDICAL & SURGICAL HISTORY:  Vertigo  Thyroid condition  Afib  High blood pressure  No significant past surgical history      FAMILY HISTORY:  Family history of heart attack (Father, Sibling, Grandparent, Uncle): many family members have had heart problems at young ages      SOCIAL HISTORY:    [ ] Non-smoker  [ ] Smoker  [ ] Alcohol      REVIEW OF SYSTEMS:  General: no fatigue/malaise, weight loss/gain.  Skin: no rashes.  Ophthalmologic: no blurred vision, no loss of vision. 	  ENT: no sore throat, rhinorrhea, sinus congestion.  Respiratory: no SOB, cough or wheeze.  Gastrointestinal:  no N/V/D, no melena/hematemesis/hematochezia.  Genitourinary: no dysuria/hesitancy or hematuria.  Musculoskeletal: no myalgias or arthralgias.  Neurological: no changes in vision or hearing, no lightheadedness/dizziness, no syncope/near syncope	  Psychiatric: no unusual stress/anxiety.   Hematology/Lymphatics: no unusual bleeding, bruising and no lymphadenopathy.  Endocrine: no unusual thirst.   All others negative except as stated above and in HPI.    PHYSICAL EXAM:  T(C): 37.6 (10-16-17 @ 08:00), Max: 37.6 (10-16-17 @ 07:00)  HR: 70 (10-16-17 @ 10:00) (31 - 115)  BP: 84/50 (10-15-17 @ 16:06) (75/58 - 98/72)  RR: 19 (10-16-17 @ 10:00) (0 - 25)  SpO2: 94% (10-16-17 @ 10:00) (93% - 100%)  Wt(kg): --  I&O's Summary    15 Oct 2017 07:  -  16 Oct 2017 07:00  --------------------------------------------------------  IN: 2082.6 mL / OUT: 162 mL / NET: 1920.6 mL    16 Oct 2017 07:  -  16 Oct 2017 11:07  --------------------------------------------------------  IN: 169 mL / OUT: 20 mL / NET: 149 mL        Appearance: Normal	  HEENT:   Normal oral mucosa, PERRL, EOMI	  Lymphatic: No lymphadenopathy  Cardiovascular: Normal S1 S2, No JVD, No murmurs, No edema  Respiratory: Lungs clear to auscultation	  Psychiatry: A & O x 3, Mood & affect appropriate  Gastrointestinal:  Soft, Non-tender, + BS	  Skin: No rashes, No ecchymoses, No cyanosis	  Neurologic: Non-focal  Extremities: Normal range of motion, No clubbing, cyanosis or edema  Vascular: Peripheral pulses palpable 2+ bilaterally        LABS:	 	    CBC Full  -  ( 16 Oct 2017 06:38 )  WBC Count : 16.6 K/uL  Hemoglobin : 12.3 g/dL  Hematocrit : 37.7 %  Platelet Count - Automated : 169 K/uL  Mean Cell Volume : 102.0 fl  Mean Cell Hemoglobin : 33.2 pg  Mean Cell Hemoglobin Concentration : 32.6 gm/dL  Auto Neutrophil # : 14.0 K/uL  Auto Lymphocyte # : 1.1 K/uL  Auto Monocyte # : 1.3 K/uL  Auto Eosinophil # : 0.1 K/uL  Auto Basophil # : 0.0 K/uL  Auto Neutrophil % : 84.8 %  Auto Lymphocyte % : 6.8 %  Auto Monocyte % : 8.0 %  Auto Eosinophil % : 0.3 %  Auto Basophil % : 0.1 %    10-16    140  |  108  |  46<H>  ----------------------------<  146<H>  5.8<H>   |  17<L>  |  3.04<H>  10-16    139  |  107  |  46<H>  ----------------------------<  138<H>  5.9<H>   |  19<L>  |  2.90<H>    Ca    8.0<L>      16 Oct 2017 08:06  Ca    8.1<L>      16 Oct 2017 06:38  Phos  4.4     10-16  Phos  4.6     10-  Mg     1.8     10-16  Mg     1.9     10-    TPro  5.2<L>  /  Alb  3.0<L>  /  TBili  0.9  /  DBili  x   /  AST  839<H>  /  ALT  543<H>  /  AlkPhos  57  10-16  TPro  6.2  /  Alb  3.2<L>  /  TBili  1.6<H>  /  DBili  x   /  AST  849<H>  /  ALT  601<H>  /  AlkPhos  81  10-15      proBNP: Serum Pro-Brain Natriuretic Peptide: 3344 pg/mL (10-15 @ 16:48)    Lipid Profile:   HgA1c: Hemoglobin A1C, Whole Blood: 5.4 % (10-15 @ 21:32)    TSH: Thyroid Stimulating Hormone, Serum: 2.40 uIU/mL (10-15 @ 21:34)        CARDIAC MARKERS:  Troponin I, Serum: .971 ng/mL (10-15 @ 12:13)        < from: Transthoracic Echocardiogram (10.16.17 @ 08:52) >  Dimensions:    Normal Values:  LA:     4.2    2.0 - 4.0 cm  Ao:     3.4    2.0 - 3.8 cm  SEPTUM: 1.1    0.6 - 1.2 cm  PWT:    1.2    0.6 - 1.1 cm  LVIDd:  4.4    3.0 - 5.6 cm  LVIDs:  3.2    1.8 - 4.0 cm  Derived variables:  LVMI: 84 g/m2  RWT: 0.54  Fractional short: 27 %  EF (Visual Estimate): 60 %  Doppler Peak Velocity (m/sec): AoV=2.1  ------------------------------------------------------------------------  Observations:  Mitral Valve: Tethered mitral valve leaflets. Mild-moderate  mitral regurgitation.  Aortic Valve/Aorta: Calcified trileaflet aortic valve with  decreased opening. Peak transaortic valve gradient equals  18 mm Hg, mean transaortic valve gradient equals 8 mm Hg,  estimated aortic valve area equals 1.6 sqcm (by continuity  equation), aortic valve velocity time integral equals 26  cm, consistent with mild aortic stenosis. Mild aortic  regurgitation.  Peak leftventricular outflow tract  gradient equals 2 mm Hg, mean gradient is equal to 1 mm Hg,  LVOT velocity time integral equals 13 cm.  Aortic Root: 3.4 cm.  LVOT diameter: 2.2 cm.  Left Atrium: Mildly dilated left atrium.  LA volume index =  41 cc/m2.  Left Ventricle: Mild segmental left ventricular systolic  dysfunction with normal ejection fraction. The basal  inferior and inferolateral walls are hypokinetic.  Increased relative wall thickness with normal left  ventricular mass index, consistent with concentric left  ventricular remodeling.  Right Heart: Normal right atrium. Right ventricular  enlargement with severely decreased right ventricular  systolic function. TAPSE decreased at 0.9cm. Basal  dimension 5.0cm. RV impella device visualized in right  ventricle  Normal tricuspid valve. Unable to determine  degree of TR due to significant color doppler artifact.  Pulmonic valve not well visualized.  Pericardium/Pleura: Normal pericardium with no pericardial  effusion.  Hemodynamic: Estimated right atrial pressure is 8 mm Hg.  ------------------------------------------------------------------------  Conclusions:  1. Increased relative wall thickness with normal left  ventricular mass index, consistent with concentric left  ventricular remodeling.  2. Mild segmental left ventricular systolic dysfunction  with normal ejection fraction. The basal inferior and  inferolateral walls are hypokinetic.  3. Right ventricular enlargement with severely decreased  right ventricular systolic function. TAPSE decreased at  0.9cm. Basal dimension 5.0cm. RV impella device visualized  in right ventricle  4. Normal tricuspid valve. Unable to determine degree of TR  due to significant color doppler artifact.  5. Normal pericardium with no pericardial effusion.  *** Compared with echocardiogram of 10/15/2017, interval  placement of RV impella and improved LV systolic function    < end of copied text >      	  ASSESSMENT/PLAN: CHIEF COMPLAINT: IWSTEMI    HISTORY OF PRESENT ILLNESS:  83M pmhx of afb on pradaxa initially presented on 10/15/17 with chest pain/diaphresis/ dyspnea to OSH, [facility] emergently transferred here for LHC c/b VF arrest requiring IABP followed by oRCA stent c/b R groin hematoma sp 1uprbc. Went to CCU, where overnight MVo2 fell to low of 41, CO 2.3, CI 1.6 despite levo at 0.1 and  at 3 with continued hypotension and CVP up to 16, thus sp RP impella now at P9.  Anuric overnight. Currently CVP 10 PA 37/29/32 MVo2 68 MAP65  on vaso 0.04 dob5.       Allergies    No Known Allergies    Intolerances    	    MEDICATIONS:  aspirin  chewable 81 milliGRAM(s) Oral daily  DOBUTamine Infusion 5 MICROgram(s)/kG/Min IV Continuous <Continuous>  heparin  Infusion. 250 Unit(s)/Hr IV Continuous <Continuous>  heparin  Injectable 4000 Unit(s) IV Push every 6 hours PRN  ticagrelor 90 milliGRAM(s) Oral two times a day    piperacillin/tazobactam IVPB. 3.375 Gram(s) IV Intermittent every 8 hours  vancomycin  IVPB 1250 milliGRAM(s) IV Intermittent every 24 hours      fentaNYL    Injectable 25 MICROGram(s) IV Push once PRN  fentaNYL   Infusion 1 MICROgram(s)/kG/Hr IV Continuous <Continuous>  midazolam Infusion 1 mG/Hr IV Continuous <Continuous>    pantoprazole  Injectable 40 milliGRAM(s) IV Push daily    vasopressin Infusion 0.04 Unit(s)/Min IV Continuous <Continuous>    influenza   Vaccine 0.5 milliLiter(s) IntraMuscular once      PAST MEDICAL & SURGICAL HISTORY:  Vertigo  Thyroid condition  Afib  High blood pressure  No significant past surgical history      FAMILY HISTORY:  Family history of heart attack (Father, Sibling, Grandparent, Uncle): many family members have had heart problems at young ages      SOCIAL HISTORY:    [ ] Non-smoker  [ ] Smoker  [ ] Alcohol      REVIEW OF SYSTEMS:  General: no fatigue/malaise, weight loss/gain.  Skin: no rashes.  Ophthalmologic: no blurred vision, no loss of vision. 	  ENT: no sore throat, rhinorrhea, sinus congestion.  Respiratory: no SOB, cough or wheeze.  Gastrointestinal:  no N/V/D, no melena/hematemesis/hematochezia.  Genitourinary: no dysuria/hesitancy or hematuria.  Musculoskeletal: no myalgias or arthralgias.  Neurological: no changes in vision or hearing, no lightheadedness/dizziness, no syncope/near syncope	  Psychiatric: no unusual stress/anxiety.   Hematology/Lymphatics: no unusual bleeding, bruising and no lymphadenopathy.  Endocrine: no unusual thirst.   All others negative except as stated above and in HPI.    PHYSICAL EXAM:  T(C): 37.6 (10-16-17 @ 08:00), Max: 37.6 (10-16-17 @ 07:00)  HR: 70 (10-16-17 @ 10:00) (31 - 115)  BP: 84/50 (10-15-17 @ 16:06) (75/58 - 98/72)  RR: 19 (10-16-17 @ 10:00) (0 - 25)  SpO2: 94% (10-16-17 @ 10:00) (93% - 100%)  Wt(kg): --  I&O's Summary    15 Oct 2017 07:  -  16 Oct 2017 07:00  --------------------------------------------------------  IN: 2082.6 mL / OUT: 162 mL / NET: 1920.6 mL    16 Oct 2017 07:  -  16 Oct 2017 11:07  --------------------------------------------------------  IN: 169 mL / OUT: 20 mL / NET: 149 mL        Appearance: Normal	  HEENT:   Normal oral mucosa, PERRL, EOMI	  Lymphatic: No lymphadenopathy  Cardiovascular: Normal S1 S2, No JVD, No murmurs, No edema  Respiratory: Lungs clear to auscultation	  Psychiatry: A & O x 3, Mood & affect appropriate  Gastrointestinal:  Soft, Non-tender, + BS	  Skin: No rashes, No ecchymoses, No cyanosis	  Neurologic: Non-focal  Extremities: Normal range of motion, No clubbing, cyanosis or edema  Vascular: Peripheral pulses palpable 2+ bilaterally        LABS:	 	    CBC Full  -  ( 16 Oct 2017 06:38 )  WBC Count : 16.6 K/uL  Hemoglobin : 12.3 g/dL  Hematocrit : 37.7 %  Platelet Count - Automated : 169 K/uL  Mean Cell Volume : 102.0 fl  Mean Cell Hemoglobin : 33.2 pg  Mean Cell Hemoglobin Concentration : 32.6 gm/dL  Auto Neutrophil # : 14.0 K/uL  Auto Lymphocyte # : 1.1 K/uL  Auto Monocyte # : 1.3 K/uL  Auto Eosinophil # : 0.1 K/uL  Auto Basophil # : 0.0 K/uL  Auto Neutrophil % : 84.8 %  Auto Lymphocyte % : 6.8 %  Auto Monocyte % : 8.0 %  Auto Eosinophil % : 0.3 %  Auto Basophil % : 0.1 %    10-16    140  |  108  |  46<H>  ----------------------------<  146<H>  5.8<H>   |  17<L>  |  3.04<H>  10-16    139  |  107  |  46<H>  ----------------------------<  138<H>  5.9<H>   |  19<L>  |  2.90<H>    Ca    8.0<L>      16 Oct 2017 08:06  Ca    8.1<L>      16 Oct 2017 06:38  Phos  4.4     10-16  Phos  4.6     10-  Mg     1.8     10-16  Mg     1.9     10-    TPro  5.2<L>  /  Alb  3.0<L>  /  TBili  0.9  /  DBili  x   /  AST  839<H>  /  ALT  543<H>  /  AlkPhos  57  10-16  TPro  6.2  /  Alb  3.2<L>  /  TBili  1.6<H>  /  DBili  x   /  AST  849<H>  /  ALT  601<H>  /  AlkPhos  81  10-15      proBNP: Serum Pro-Brain Natriuretic Peptide: 3344 pg/mL (10-15 @ 16:48)    Lipid Profile:   HgA1c: Hemoglobin A1C, Whole Blood: 5.4 % (10-15 @ 21:32)    TSH: Thyroid Stimulating Hormone, Serum: 2.40 uIU/mL (10-15 @ 21:34)        CARDIAC MARKERS:  Troponin I, Serum: .971 ng/mL (10-15 @ 12:13)        < from: Transthoracic Echocardiogram (10.16.17 @ 08:52) >  Dimensions:    Normal Values:  LA:     4.2    2.0 - 4.0 cm  Ao:     3.4    2.0 - 3.8 cm  SEPTUM: 1.1    0.6 - 1.2 cm  PWT:    1.2    0.6 - 1.1 cm  LVIDd:  4.4    3.0 - 5.6 cm  LVIDs:  3.2    1.8 - 4.0 cm  Derived variables:  LVMI: 84 g/m2  RWT: 0.54  Fractional short: 27 %  EF (Visual Estimate): 60 %  Doppler Peak Velocity (m/sec): AoV=2.1  ------------------------------------------------------------------------  Observations:  Mitral Valve: Tethered mitral valve leaflets. Mild-moderate  mitral regurgitation.  Aortic Valve/Aorta: Calcified trileaflet aortic valve with  decreased opening. Peak transaortic valve gradient equals  18 mm Hg, mean transaortic valve gradient equals 8 mm Hg,  estimated aortic valve area equals 1.6 sqcm (by continuity  equation), aortic valve velocity time integral equals 26  cm, consistent with mild aortic stenosis. Mild aortic  regurgitation.  Peak leftventricular outflow tract  gradient equals 2 mm Hg, mean gradient is equal to 1 mm Hg,  LVOT velocity time integral equals 13 cm.  Aortic Root: 3.4 cm.  LVOT diameter: 2.2 cm.  Left Atrium: Mildly dilated left atrium.  LA volume index =  41 cc/m2.  Left Ventricle: Mild segmental left ventricular systolic  dysfunction with normal ejection fraction. The basal  inferior and inferolateral walls are hypokinetic.  Increased relative wall thickness with normal left  ventricular mass index, consistent with concentric left  ventricular remodeling.  Right Heart: Normal right atrium. Right ventricular  enlargement with severely decreased right ventricular  systolic function. TAPSE decreased at 0.9cm. Basal  dimension 5.0cm. RV impella device visualized in right  ventricle  Normal tricuspid valve. Unable to determine  degree of TR due to significant color doppler artifact.  Pulmonic valve not well visualized.  Pericardium/Pleura: Normal pericardium with no pericardial  effusion.  Hemodynamic: Estimated right atrial pressure is 8 mm Hg.  ------------------------------------------------------------------------  Conclusions:  1. Increased relative wall thickness with normal left  ventricular mass index, consistent with concentric left  ventricular remodeling.  2. Mild segmental left ventricular systolic dysfunction  with normal ejection fraction. The basal inferior and  inferolateral walls are hypokinetic.  3. Right ventricular enlargement with severely decreased  right ventricular systolic function. TAPSE decreased at  0.9cm. Basal dimension 5.0cm. RV impella device visualized  in right ventricle  4. Normal tricuspid valve. Unable to determine degree of TR  due to significant color doppler artifact.  5. Normal pericardium with no pericardial effusion.  *** Compared with echocardiogram of 10/15/2017, interval  placement of RV impella and improved LV systolic function    < end of copied text >      	  ASSESSMENT/PLAN:

## 2017-10-16 NOTE — CONSULT NOTE ADULT - PROBLEM SELECTOR RECOMMENDATION 3
Patient with noted proteinuria since 2016 without an underlying etiology. Recommend full workup of proteinuria/ CKD. Urine electrolytes and with urine spot protein/Cr ratio to quantify protein. Hepatitis panel, C3, C4, EYAD, DS DNA, Urine/Serum immunofixation, free light chains, HIV, ANCA Patient with noted increasing proteinuria since UA on 6/2016 without an underlying etiology (non-diabetic). Recommend full workup of proteinuria/ CKD. Urine electrolytes and with urine spot protein/Cr ratio to quantify protein. Hepatitis panel, C3, C4, EYAD, DS DNA, Urine/Serum immunofixation, free light chains, HIV, ANCA. Renal sonogram once stabilized

## 2017-10-16 NOTE — CONSULT NOTE ADULT - PROBLEM SELECTOR RECOMMENDATION 9
DANITA on CKD stage III. DANITA may be multifactorial in the setting of IV contrast (cardiac cath) and ATN from hypotension/ cardiac arrest. Patient has no etiology of his underlying CKD. Noted proteinuria since 2016 which is concerning in a non-diabetic patient. Patient remains Anuric with minimal urine output since presentation. Currently patient is net 1800 positive on multiple drips which are required at this time. Would consider CVVHDF if needed in the setting of Anuria. Case discussed with wife at bedside. Recommend renal sonogram once medically stabilized. Continue to monitor intake/output, BMP and urine output. Avoid NSAIDs, RCA and nephrotoxins. DANITA on CKD stage III. DANITA may be multifactorial in the setting of IV contrast (cardiac cath), rhabdomyolysis and ATN from hypotension/ cardiac arrest. Patient has no etiology of his underlying CKD. Noted proteinuria since 2016 which is concerning in a non-diabetic patient. Patient remains Anuric with minimal urine output since presentation. Currently patient is net 1800 positive on multiple drips which are required at this time. Would consider CVVHDF if needed in the setting of Anuria. Case discussed with wife at bedside. Recommend renal sonogram once medically stabilized. Continue to monitor intake/output, BMP and urine output. Avoid NSAIDs, RCA and nephrotoxins.

## 2017-10-16 NOTE — CONSULT NOTE ADULT - SUBJECTIVE AND OBJECTIVE BOX
VASCULAR SURGERY CONSULT NOTE  --------------------------------------------------------------------------------------------    Patient is a 83y old  Male who presents with a chief complaint of SOB, chest pain, diaphoresis (15 Oct 2017 17:59)      HPI: 83yM w/ PMH sig for HTN, Afib on Pradaxa, Goiter. Pt presented to [facility] ED on 10/15/2017 w/ STEMI. Was taken to cath lab for RCA stent, s/p V fib arrest on table, underwent ACLS w/ ROSC, had IABP, R sided Impella, and [place] catheter placement. Was transferred to CCU for ongoing care. Pt noted to have acute renal failure secondary to global ischemia. Vascular consult called for Shiley placement.    ROS: Unable to be completed secondary to current condition    PAST MEDICAL & SURGICAL HISTORY:  Vertigo  Thyroid condition  Afib  High blood pressure  No significant past surgical history    FAMILY HISTORY:  Family history of heart attack (Father, Sibling, Grandparent, Uncle): many family members have had heart problems at young ages    SOCIAL HISTORY: Former smoker quit 40 years ago, No EtOH abuse    ALLERGIES: No Known Allergies      CURRENT MEDICATIONS  MEDICATIONS (STANDING): aspirin  chewable 81 milliGRAM(s) Oral daily  DOBUTamine Infusion 5 MICROgram(s)/kG/Min IV Continuous <Continuous>  fentaNYL   Infusion 1 MICROgram(s)/kG/Hr IV Continuous <Continuous>  hEPARIN 6250 Unit(s),D5W 500 milliLiter(s) 500 milliLiter(s) IV Continuous <Continuous>  influenza   Vaccine 0.5 milliLiter(s) IntraMuscular once  midazolam Infusion 1 mG/Hr IV Continuous <Continuous>  pantoprazole  Injectable 40 milliGRAM(s) IV Push daily  piperacillin/tazobactam IVPB. 3.375 Gram(s) IV Intermittent every 8 hours  ticagrelor 90 milliGRAM(s) Oral two times a day  vancomycin  IVPB 750 milliGRAM(s) IV Intermittent every 24 hours  vasopressin Infusion 0.04 Unit(s)/Min IV Continuous <Continuous>    MEDICATIONS (PRN):fentaNYL    Injectable 25 MICROGram(s) IV Push once PRN Moderate Pain (4 - 6)    --------------------------------------------------------------------------------------------    Vitals:   T(C): 37.6 (10-16-17 @ 11:00), Max: 37.6 (10-16-17 @ 07:00)  HR: 76 (10-16-17 @ 13:30) (60 - 115)  BP: 84/50 (10-15-17 @ 16:06) (84/50 - 84/50)  BP(mean): 67 (10-15-17 @ 16:06) (67 - 67)  RR: 19 (10-16-17 @ 13:30) (0 - 25)  SpO2: 99% (10-16-17 @ 13:30) (93% - 100%)  Wt(kg): --  CAPILLARY BLOOD GLUCOSE      POCT Blood Glucose.: 126 mg/dL (16 Oct 2017 11:41)    CAPILLARY BLOOD GLUCOSE      POCT Blood Glucose.: 126 mg/dL (16 Oct 2017 11:41)      10-15 @ 07:01  -  10-16 @ 07:00  --------------------------------------------------------  IN:    DOBUTamine Infusion: 42 mL    DOBUTamine Infusion: 42.4 mL    Enteral Tube Flush: 100 mL    fentaNYL  Infusion: 31.5 mL    freetext medication -  Infusion: 121 mL    heparin  Infusion.: 17.5 mL    IV PiggyBack: 1541.7 mL    lidocaine   Infusion: 30 mL    midazolam Infusion: 7 mL    norepinephrine Infusion: 146.7 mL    vasopressin Infusion: 2.8 mL  Total IN: 2082.6 mL    OUT:    Indwelling Catheter - Urethral: 162 mL  Total OUT: 162 mL    Total NET: 1920.6 mL      10-16 @ 07:01  -  10-16 @ 15:27  --------------------------------------------------------  IN:    DOBUTamine Infusion: 99.4 mL    fentaNYL  Infusion: 27 mL    freetext medication -  Infusion: 47.2 mL    heparin  Infusion.: 2.5 mL    IV PiggyBack: 50 mL    midazolam Infusion: 8 mL    vasopressin Infusion: 16.8 mL  Total IN: 250.9 mL    OUT:    Indwelling Catheter - Urethral: 40 mL  Total OUT: 40 mL    Total NET: 210.9 mL        Height (cm): 180.34 (10-15 @ 16:30)  Weight (kg): 93.1 (10-15 @ 16:30)  BMI (kg/m2): 28.6 (10-15 @ 16:30)  BSA (m2): 2.13 (10-15 @ 16:30)    PHYSICAL EXAM:   General: Sedated in bed  Neuro: Not arousable  HEENT: NC/AT, large goiter appreciated  Cardio: distant heart sounds  Resp: ETT in place, mechanical breath sounds transmitted b/l  GI/Abd: Soft, NT/ND, no masses palpated  Vascular: b/l femoral arteries and veins cannulated w/ various life sustaining equipment  --------------------------------------------------------------------------------------------    LABS  CBC (10-16 @ 11:25)                              12.6<L>                         16.3<H>  )----------------(  154        83.9<H>% Neutrophils, 8.3<L>% Lymphocytes, ANC: 13.7<H>                              37.1<L>  CBC (10-16 @ 06:38)                              12.3<L>                         16.6<H>  )----------------(  169        84.8<H>% Neutrophils, 6.8<L>% Lymphocytes, ANC: 14.0<H>                              37.7<L>    BMP (10-16 @ 11:25)             136     |  105     |  50<H> 		Ca++ --      Ca 8.2<L>             ---------------------------------( 140<H>		Mg 1.8                5.4<H>  |  17<L>   |  3.30<H>			Ph 4.7<H>  BMP (10-16 @ 08:06)             140     |  108     |  46<H> 		Ca++ --      Ca 8.0<L>             ---------------------------------( 146<H>		Mg 1.8                5.8<H>  |  17<L>   |  3.04<H>			Ph 4.4       LFTs (10-16 @ 11:25)      TPro 5.6<L> / Alb 2.8<L> / TBili 1.1 / DBili -- / <H> / <H> / AlkPhos 53  LFTs (10-16 @ 06:38)      TPro 5.2<L> / Alb 3.0<L> / TBili 0.9 / DBili -- / <H> / <H> / AlkPhos 57    Coags (10-16 @ 11:25)  aPTT >200.0<HH> / INR 1.69<H> / PT 18.6<H>  Coags (10-16 @ 06:38)  aPTT > 200<!!> / INR -- / PT --    Cardiac Markers (10-16 @ 11:25)     Trop: 13.80 -- / CKMB: -- / CK: 5097  Cardiac Markers (10-16 @ 06:38)     Trop: 12.58 -- / CKMB: -- / CK: 4583  Cardiac Markers (10-16 @ 04:03)     Trop: 11.79 -- / CKMB: -- / CK: 4165    ABG (10-16 @ 13:53)     7.40 / 30<L> / 175<H> / 18<L> / -5.2<L> / 100<H>%     Lactate:    ABG (10-16 @ 11:23)     7.36 / 35 / 119<H> / 19<L> / -5.2<L> / 98<H>%     Lactate:        --------------------------------------------------------------------------------------------    MICROBIOLOGY  Urinalysis (10-15 @ 16:48):     Color: Yellow / Appearance: Turbid<!> / SG: >1.030<H> / pH: 6.5 / Gluc: 50<!> / Ketones: Negative / Bili: Negative / Urobili: Negative / Protein :>600<!> / Nitrites: Negative / Leuk.Est: Negative / RBC: >50<!> / WBC: 3-5 / Sq Epi:  / Non Sq Epi: OCC / Bacteria Few<!>         --------------------------------------------------------------------------------------------    IMAGING

## 2017-10-16 NOTE — CHART NOTE - NSCHARTNOTEFT_GEN_A_CORE
====================  CCU MIDNIGHT ROUNDS  ====================    KAMLESH PATEL  [id number]    ====================  SUMMARY: 83/M with HTN (on atenolol), Afib (on Pradaxa), and goiter (last checked in August) presents with IWSTEMI complicated by cardiogenic shock. Pt coded in the cath lab, ROS was achieved, received intravenous pacing wire, balloon pump, RCA stent, and IABP, now s/p impella in right heart. Patient noted to have hyperkalemia although labs are hemolyzed. Nephrology does not recommend CVVH at this time.   ====================        ====================  NEW EVENTS: S/p R heart impella placement, currently on vaso and  gtts.   ====================        ====================  VITALS (Last 12 hrs):  ====================    T(C): 37.7 (10-16-17 @ 19:00), Max: 37.7 (10-16-17 @ 19:00)  HR: 78 (10-16-17 @ 23:00) (72 - 94)  BP: --  BP(mean): --  ABP: 90/46 (10-16-17 @ 23:00) (74/42 - 130/58)  ABP(mean): 64 (10-16-17 @ 23:00) (54 - 94)  RR: 20 (10-16-17 @ 23:00) (13 - 22)  SpO2: 94% (10-16-17 @ 23:00) (94% - 99%)  Wt(kg): --  CVP(mm Hg): 8 (10-16-17 @ 23:00) (6 - 12)  CO: 5.1 (10-16-17 @ 21:00) (5.1 - 5.1)  CI: 2.4 (10-16-17 @ 21:00) (2.4 - 2.4)  PA:  ( - /28)  PA(mean): 26 (10-16-17 @ 23:00) (22 - 33)  PA(direct): --  PCWP: --  SVR: 1284 (10-16-17 @ 21:00) (1284 - 1284)    TELEMETRY:    Mode: AC/ CMV (Assist Control/ Continuous Mandatory Ventilation)  RR (machine): 20  TV (machine): 500  FiO2: 35  PEEP: 5  ITime: 1  MAP: 9  PIP: 17      *BLOOD GAS/ARTERIAL/MIXED/VENOUS  *LACTATE    I&O's Summary    15 Oct 2017 07:  -  16 Oct 2017 07:00  --------------------------------------------------------  IN: 2082.6 mL / OUT: 162 mL / NET: 1920.6 mL    16 Oct 2017 07:01  -  16 Oct 2017 23:55  --------------------------------------------------------  IN: 1708.9 mL / OUT: 40 mL / NET: 1668.9 mL        ====================  PLAN:  #Cardiogenic shock  -goal CVP 9-10 requiring occasional fluid boluses  -c/w pressors for now  -continue to monitor closely  -s/p impella for RV failure, c/w heparin gtts    #DANITA  -likely 2/2 cardiogenic shock  -no CVVH recommended by nephrology as yet    #STEMI   -s/p pci, c/w asa and brilinta    #HTN - continue to monitor on pressors    #DVT ppx - on heparin gtts  ====================    HEALTH ISSUES - PROBLEM Dx:  Acute kidney injury: Acute kidney injury  Proteinuria: Proteinuria  Hyperkalemia: Hyperkalemia  DANITA (acute kidney injury): DANITA (acute kidney injury)  Prophylactic measure: Prophylactic measure  Hypertension: Hypertension  Goiter diffuse, nontoxic: Goiter diffuse, nontoxic  Atrial fibrillation, chronic: Atrial fibrillation, chronic  STEMI involving right coronary artery: STEMI involving right coronary artery  Right heart failure: Right heart failure  Cardiogenic shock: Cardiogenic shock        HEALTH ISSUES - R/O PROBLEM Dx:      Spring Calderon PGY 2

## 2017-10-17 NOTE — EEG REPORT - NS EEG TEXT BOX
10/17/2017    Hx: Concern for Seizures      Medication	  No Data.	    Study Interpretation:    FINDINGS:  There was no clear posterior dominant rhythm.  The background was continuous, but not clearly reactive to environmental stimuli.  It mainly consisted of polymorphic theta frequency activity with intermixed faster frequencies.    Sleep Background:  Stages of sleep could not be delineated.    Background Slowing:  Continuous diffuse polymorphic delta activity.    Other Paroxysmal Non-Epileptiform Findings:    None.    Epileptiform Activity:   No epileptiform discharges were present.    Events:  No clinical events were recorded.  No seizures were recorded.    Activation Procedures:   -Hyperventilation was not performed.    -Photic stimulation was not performed.    Artifacts:  Intermittent myogenic and movement artifacts were noted.    Compressed Spectral Array Digital Analysis    FINDINGS:  Compressed Spectral Array (CSA) data was reviewed separately and correlated with the electroencephalographic findings detailed above.  CSA showed a variable spectral pattern.  Areas of increased power in particular were reviewed in detail, and compared with the raw EEG data.  Areas of abrupt increases in spectral power were reviewed to exclude seizures, and were determined to be artifactual in nature.    The relative ratio of the power of delta range frequencies and faster frequencies remained stable over the course of the study.  There was no definitive increase in the relative power in the delta frequency spectrum apparent in the left hemisphere versus the right hemisphere.      Compressed Spectral Array (Digital Analysis) Summary/ Impression:  No persistent hemispheric asymmetry.  Intermittent areas of increased power reviewed, without definite epileptiform activity associated on CSA.      EEG Classification:  Abnormal study  -	Moderate to severe generalized slowing    Impression:  Findings indicate non-specific moderate to severe diffuse or multifocal cerebral dysfunction. There were no epileptiform abnormalities recorded.

## 2017-10-17 NOTE — PROGRESS NOTE ADULT - PROBLEM SELECTOR PLAN 7
DVT ppx: Hep through impella machine  Diet: currently intubated, may start OG tube feeds once pt is more stable.    JACK Thakur MD-PGY1  Internal Medicine Department  Pager: [phone]
Pt takes atenolol at home.  - we will monitor closely

## 2017-10-17 NOTE — PROGRESS NOTE ADULT - PROBLEM SELECTOR PLAN 2
Patient with elevated serum potassium (5.3) in the setting of DANITA/ Anuria. Please medically manage until patient is initiated on renal replacement therapy safely

## 2017-10-17 NOTE — PROGRESS NOTE ADULT - PROBLEM SELECTOR PLAN 3
Pt's Cr, K+ going up. Pt anuric.   - we appreceate nephro recs  - pt will have ally put in today in preparation for CVVH

## 2017-10-17 NOTE — PROGRESS NOTE ADULT - PROBLEM SELECTOR PLAN 3
Patient with noted increasing proteinuria since UA on 6/2016 without an underlying etiology which is concerning in a non-diabetic patient. Recommend full workup of proteinuria/ CKD. Urine electrolytes and with urine spot protein/Cr ratio to quantify protein. Hepatitis panel, C3, C4, EYAD, DS DNA, Urine/Serum immunofixation, free light chains, HIV, ANCA. Renal sonogram once stabilized.

## 2017-10-17 NOTE — PROGRESS NOTE ADULT - PROBLEM SELECTOR PLAN 1
DANITA on CKD stage III. DANITA may be multifactorial in the setting of IV contrast (cardiac cath), rhabdomyolysis and ATN from hypotension/ cardiac arrest. Patient has unclear etiology of his original underlying CKD.   Patient remains Anuric with minimal urine output since presentation. Plan to initiate patient on CVVHDF today when non-tunnelled HD catheter can be placed safely. Discussed with Vascular surgery. Case discussed with wife at bedside at length. Recommend renal sonogram once medically stabilized. Continue to monitor intake/output, BMP and urine output. Avoid NSAIDs, RCA and nephrotoxins

## 2017-10-17 NOTE — PROGRESS NOTE ADULT - SUBJECTIVE AND OBJECTIVE BOX
Subjective:  for today, received already 750cc IVF; yesterday received 1750 cc IVF.  remaisn on  5      MAP 70, CVP9; 35/25/59; thermo CO 5.1 CI 2.4 hgb 10.2 fickCI 2.5 CO 5.7  LDh increased to 1815; Cr still rising to 4.70  AST/ ALT decreasing   trops now downtrending along with ckmb     MVo2 61    Medications:  aspirin  chewable 81 milliGRAM(s) Oral daily  dexmedetomidine Infusion 0.2 MICROgram(s)/kG/Hr IV Continuous <Continuous>  DOBUTamine Infusion 5 MICROgram(s)/kG/Min IV Continuous <Continuous>  fentaNYL    Injectable 25 MICROGram(s) IV Push once  fentaNYL   Infusion 1 MICROgram(s)/kG/Hr IV Continuous <Continuous>  heparin  Infusion 100 Unit(s)/Hr IV Continuous <Continuous>  hEPARIN 6250 Unit(s),D5W 500 milliLiter(s) 500 milliLiter(s) IV Continuous <Continuous>  influenza   Vaccine 0.5 milliLiter(s) IntraMuscular once  pantoprazole  Injectable 40 milliGRAM(s) IV Push daily  piperacillin/tazobactam IVPB. 3.375 Gram(s) IV Intermittent every 8 hours  ticagrelor 90 milliGRAM(s) Oral two times a day  vasopressin Infusion 0.04 Unit(s)/Min IV Continuous <Continuous>      Physical Exam:    Vitals:    Vital Signs Last 24 Hrs  T(C): 37.2 (17 Oct 2017 04:00), Max: 37.7 (16 Oct 2017 19:00)  T(F): 99 (17 Oct 2017 04:00), Max: 99.9 (16 Oct 2017 19:00)  HR: 76 (17 Oct 2017 06:00) (60 - 94)  BP: --  BP(mean): --  RR: 12 (17 Oct 2017 06:00) (12 - 22)  SpO2: 95% (17 Oct 2017 06:00) (93% - 99%)    Daily     Daily Weight in k.6 (17 Oct 2017 02:00)    I&O's Summary    16 Oct 2017 07:01  -  17 Oct 2017 07:00  --------------------------------------------------------  IN: 3097.5 mL / OUT: 40 mL / NET: 3057.5 mL        General: No distress. Comfortable.  HEENT: dilated fixed L pupil,intubated  Neck: Neck supple. JVP not elevated. No masses  Chest: Clear to auscultation bilaterally  CV: Normal S1 and S2. No murmurs, rub, or gallops. Radial pulses normal.  Abdomen: Soft, non-distended, non-tender  Skin: R groin imppella, L groin IABP  Neurology: can move all extremities, follows commands   Psych: Affect normal    Labs:                        10.2   13.9  )-----------( 112      ( 17 Oct 2017 06:24 )             29.7     10    138  |  105  |  65<H>  ----------------------------<  137<H>  5.3   |  15<L>  |  4.70<H>    Ca    7.2<L>      17 Oct 2017 06:24  Phos  4.7     10  Mg     1.9     10    TPro  5.2<L>  /  Alb  2.4<L>  /  TBili  1.1  /  DBili  x   /  AST  507<H>  /  ALT  353<H>  /  AlkPhos  39<L>  1017    PT/INR - ( 17 Oct 2017 06:24 )   PT: 16.5 sec;   INR: 1.50 ratio         PTT - ( 17 Oct 2017 06:24 )  PTT:50.3 sec  CARDIAC MARKERS ( 17 Oct 2017 06:24 )  x     / 13.80 ng/mL / 5124 U/L / x     / 53.6 ng/mL  CARDIAC MARKERS ( 17 Oct 2017 01:03 )  x     / 14.83 ng/mL / 5407 U/L / x     / 77.7 ng/mL  CARDIAC MARKERS ( 16 Oct 2017 16:42 )  x     / 15.09 ng/mL / 5331 U/L / x     / 152.6 ng/mL  CARDIAC MARKERS ( 16 Oct 2017 11:25 )  x     / 13.80 ng/mL / 5097 U/L / x     / 220.8 ng/mL  CARDIAC MARKERS ( 16 Oct 2017 06:38 )  x     / 12.58 ng/mL / 4583 U/L / x     / 297.6 ng/mL  CARDIAC MARKERS ( 16 Oct 2017 04:03 )  x     / 11.79 ng/mL / 4165 U/L / x     / 285.9 ng/mL  CARDIAC MARKERS ( 15 Oct 2017 20:20 )  x     / 4.70 ng/mL / 2864 U/L / x     / 283.1 ng/mL  CARDIAC MARKERS ( 15 Oct 2017 17:05 )  x     / 2.89 ng/mL / 1587 U/L / x     / 179.1 ng/mL  CARDIAC MARKERS ( 15 Oct 2017 12:13 )  .971 ng/mL / x     / 126 U/L / x     / x          Serum Pro-Brain Natriuretic Peptide: 3344 pg/mL (10-15 @ 16:48)    Oxygen Saturation, Mixed: 61 % (10-17 @ 06:18)  Oxygen Saturation, Mixed: 64 % (10-17 @ 04:26)  Oxygen Saturation, Mixed: 58 % (10-17 @ 02:24)  Oxygen Saturation, Mixed: 62 % (10-16 @ 23:02)  Oxygen Saturation, Mixed: 60 % (10-16 @ 20:32)  Oxygen Saturation, Mixed: 66 % (10-16 @ 18:48)  Oxygen Saturation, Mixed: 84 % (10-16 @ 16:36)  Oxygen Saturation, Mixed: 72 % (10-16 @ 13:53)  Oxygen Saturation, Mixed: 68 % (10-16 @ 11:23)  Oxygen Saturation, Mixed: 69 % (10-16 @ 08:54)  Oxygen Saturation, Mixed: 57 % (10-16 @ 06:36)  Oxygen Saturation, Mixed: 63 % (10-16 @ 04:21)  Oxygen Saturation, Mixed: 69 % (10-16 @ 00:41)  Oxygen Saturation, Mixed: 54 % (10-15 @ 19:59)  Oxygen Saturation, Mixed: 41 % (10-15 @ 18:26)  Oxygen Saturation, Mixed: 42 % (10-15 @ 17:22)    Lactate Dehydrogenase, Serum: 1815 U/L (10-17 @ 06:24)  Lactate Dehydrogenase, Serum: 1438 U/L (10-16 @ 04:03) Subjective:  for today, received already 750cc IVF; yesterday received 1750 cc IVF.  remaisn on  5      MAP 70, CVP9; 35/25/59; thermo CO 5.1 CI 2.4 hgb 10.2 fickCI 2.5 CO 5.7  LDh increased to 1815; Cr still rising to 4.70  AST/ ALT decreasing   trops now downtrending along with ckmb     MVo2 61    Medications:  aspirin  chewable 81 milliGRAM(s) Oral daily  dexmedetomidine Infusion 0.2 MICROgram(s)/kG/Hr IV Continuous <Continuous>  DOBUTamine Infusion 5 MICROgram(s)/kG/Min IV Continuous <Continuous>  fentaNYL    Injectable 25 MICROGram(s) IV Push once  fentaNYL   Infusion 1 MICROgram(s)/kG/Hr IV Continuous <Continuous>  heparin  Infusion 100 Unit(s)/Hr IV Continuous <Continuous>  hEPARIN 6250 Unit(s),D5W 500 milliLiter(s) 500 milliLiter(s) IV Continuous <Continuous>  influenza   Vaccine 0.5 milliLiter(s) IntraMuscular once  pantoprazole  Injectable 40 milliGRAM(s) IV Push daily  piperacillin/tazobactam IVPB. 3.375 Gram(s) IV Intermittent every 8 hours  ticagrelor 90 milliGRAM(s) Oral two times a day  vasopressin Infusion 0.04 Unit(s)/Min IV Continuous <Continuous>      Physical Exam:    Vitals:    Vital Signs Last 24 Hrs  T(C): 37.2 (17 Oct 2017 04:00), Max: 37.7 (16 Oct 2017 19:00)  T(F): 99 (17 Oct 2017 04:00), Max: 99.9 (16 Oct 2017 19:00)  HR: 76 (17 Oct 2017 06:00) (60 - 94)  BP: --  BP(mean): --  RR: 12 (17 Oct 2017 06:00) (12 - 22)  SpO2: 95% (17 Oct 2017 06:00) (93% - 99%)    Daily     Daily Weight in k.6 (17 Oct 2017 02:00)    I&O's Summary    16 Oct 2017 07:01  -  17 Oct 2017 07:00  --------------------------------------------------------  IN: 3097.5 mL / OUT: 40 mL / NET: 3057.5 mL        General: intubated   HEENT: dilated fixed L pupil,intubated  Neck: Neck supple. JVP not elevated. No masses  Chest: Clear to auscultation bilaterally  CV: Normal S1 and S2. No murmurs, rub, or gallops. Radial pulses normal.  Abdomen: Soft, non-distended, non-tender  Skin: R groin imppella, L groin IABP  Neurology: can move fingers adn toes , follows commands   Psych: Affect normal    Labs:                        10.2   13.9  )-----------( 112      ( 17 Oct 2017 06:24 )             29.7     10    138  |  105  |  65<H>  ----------------------------<  137<H>  5.3   |  15<L>  |  4.70<H>    Ca    7.2<L>      17 Oct 2017 06:24  Phos  4.7     10  Mg     1.9     10    TPro  5.2<L>  /  Alb  2.4<L>  /  TBili  1.1  /  DBili  x   /  AST  507<H>  /  ALT  353<H>  /  AlkPhos  39<L>  10-17    PT/INR - ( 17 Oct 2017 06:24 )   PT: 16.5 sec;   INR: 1.50 ratio         PTT - ( 17 Oct 2017 06:24 )  PTT:50.3 sec  CARDIAC MARKERS ( 17 Oct 2017 06:24 )  x     / 13.80 ng/mL / 5124 U/L / x     / 53.6 ng/mL  CARDIAC MARKERS ( 17 Oct 2017 01:03 )  x     / 14.83 ng/mL / 5407 U/L / x     / 77.7 ng/mL  CARDIAC MARKERS ( 16 Oct 2017 16:42 )  x     / 15.09 ng/mL / 5331 U/L / x     / 152.6 ng/mL  CARDIAC MARKERS ( 16 Oct 2017 11:25 )  x     / 13.80 ng/mL / 5097 U/L / x     / 220.8 ng/mL  CARDIAC MARKERS ( 16 Oct 2017 06:38 )  x     / 12.58 ng/mL / 4583 U/L / x     / 297.6 ng/mL  CARDIAC MARKERS ( 16 Oct 2017 04:03 )  x     / 11.79 ng/mL / 4165 U/L / x     / 285.9 ng/mL  CARDIAC MARKERS ( 15 Oct 2017 20:20 )  x     / 4.70 ng/mL / 2864 U/L / x     / 283.1 ng/mL  CARDIAC MARKERS ( 15 Oct 2017 17:05 )  x     / 2.89 ng/mL / 1587 U/L / x     / 179.1 ng/mL  CARDIAC MARKERS ( 15 Oct 2017 12:13 )  .971 ng/mL / x     / 126 U/L / x     / x          Serum Pro-Brain Natriuretic Peptide: 3344 pg/mL (10-15 @ 16:48)    Oxygen Saturation, Mixed: 61 % (10-17 @ 06:18)  Oxygen Saturation, Mixed: 64 % (10-17 @ 04:26)  Oxygen Saturation, Mixed: 58 % (10-17 @ 02:24)  Oxygen Saturation, Mixed: 62 % (10-16 @ 23:02)  Oxygen Saturation, Mixed: 60 % (10-16 @ 20:32)  Oxygen Saturation, Mixed: 66 % (10-16 @ 18:48)  Oxygen Saturation, Mixed: 84 % (10-16 @ 16:36)  Oxygen Saturation, Mixed: 72 % (10-16 @ 13:53)  Oxygen Saturation, Mixed: 68 % (10-16 @ 11:23)  Oxygen Saturation, Mixed: 69 % (10-16 @ 08:54)  Oxygen Saturation, Mixed: 57 % (10-16 @ 06:36)  Oxygen Saturation, Mixed: 63 % (10-16 @ 04:21)  Oxygen Saturation, Mixed: 69 % (10-16 @ 00:41)  Oxygen Saturation, Mixed: 54 % (10-15 @ 19:59)  Oxygen Saturation, Mixed: 41 % (10-15 @ 18:26)  Oxygen Saturation, Mixed: 42 % (10-15 @ 17:22)    Lactate Dehydrogenase, Serum: 1815 U/L (10-17 @ 06:24)  Lactate Dehydrogenase, Serum: 1438 U/L (10-16 @ 04:03)

## 2017-10-17 NOTE — PROGRESS NOTE ADULT - PROBLEM SELECTOR PLAN 3
cotn DAPT with a sa and ticagrelor. statin on hold for transaminitis.    Mary Hubbard MD  [facility]  [phone] (After 5pm and on weekends, please call [phone])

## 2017-10-17 NOTE — PROGRESS NOTE ADULT - PROBLEM SELECTOR PLAN 2
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 COnsider checking haptoglobin. pace with dark brown urine. cont monitoring for now.

## 2017-10-17 NOTE — PROGRESS NOTE ADULT - ASSESSMENT
83M pmhx of afb on pradaxa initially presented on 10/15/17 with chest pain/diaphresis/ dyspnea to OSH, [facility] emergently transferred here for C c/b VF arrest requiring IABP followed by prox RCA stent c/b now acute right heart failure and cardiogenic shock, requiring RV impella support.    - Continue CCU care  - Continue mechanical ventilation and oxygen supplementation  - Ideally, would wean IABP, and eventually Impella  - Heparin gtt for goal PTT 60-90  - Renal following for worsening ATN and oliguira.  - Continue DAPT with aspirin and Ticagrelor  - Continue  and vaso, as necessary for RV contractility and output.   - Repeat echocardiogram with confirmed significant RV dysfunction and enlargement  - Continue Antibiotics for now.  Follow up cultures  - Monitor and replete lytes  - EEG with moderate to severe background slowing

## 2017-10-17 NOTE — PROGRESS NOTE ADULT - PROBLEM SELECTOR PLAN 1
Pt currently has balloon pump at max asist, RH impella at max assist, pressures holing with pressors. Currently on Vaso and  drip.   - will continue to monitor hemodynamics closely  - cont with  and vaso drips

## 2017-10-17 NOTE — PROGRESS NOTE ADULT - SUBJECTIVE AND OBJECTIVE BOX
[facility] - Sharonda Ratliff, Lesvia, Beba, Manav Alonso Savella  Office Number:  [phone]    Intubated but responsive. Examined this morning. At time of exam, patient with RV impella and IABP support  Remained on dobutamine. Received >1.5 L NS to improve CVP overnight.    HPI:  This is an 83 year old male presented to ER at [facility] by ambulance with report of shortness of breath.  The wife reports that he has not been feeling well, and was scheduled to see his outpatient cardiologist.  He has a history of atrial fibrillation and is on Pradaxa.  He has a history of a "heart valve problem".  He has been having shortness of breath for the past few days, and yesterday was found sitting on stairs, c/o increased shortness of breath, chest pain and diaphoresis. At [facility] he was found to have an inferior wall STEMI and was brought to [facility] for cath.  In the Cath lab, pt became non responsive and had a cardiac arrest.  Patient was intubated, and pulsatile rhythm was restored.  In cath lab, pt underwent PCI to the ostial RCA.  He also has a [place], an IAPB, an Implla, and a transvenous pacing wire.  He had a right groin hematoma and got 1 unit PRBC.     ROS: negative unless otherwise mentioned.    Telemetry:      MEDICATIONS  (STANDING):  aspirin  chewable 81 milliGRAM(s) Oral daily  dexmedetomidine Infusion 0.2 MICROgram(s)/kG/Hr (4.655 mL/Hr) IV Continuous <Continuous>  DOBUTamine Infusion 5 MICROgram(s)/kG/Min (13.965 mL/Hr) IV Continuous <Continuous>  fentaNYL    Injectable 25 MICROGram(s) IV Push once  fentaNYL   Infusion 1 MICROgram(s)/kG/Hr (4.5 mL/Hr) IV Continuous <Continuous>  heparin  Infusion 100 Unit(s)/Hr (2 mL/Hr) IV Continuous <Continuous>  hEPARIN 6250 Unit(s),D5W 500 milliLiter(s) 500 milliLiter(s) (15 mL/Hr) IV Continuous <Continuous>  influenza   Vaccine 0.5 milliLiter(s) IntraMuscular once  pantoprazole  Injectable 40 milliGRAM(s) IV Push daily  piperacillin/tazobactam IVPB. 3.375 Gram(s) IV Intermittent every 12 hours  ticagrelor 90 milliGRAM(s) Oral two times a day  vasopressin Infusion 0.04 Unit(s)/Min (2.4 mL/Hr) IV Continuous <Continuous>    MEDICATIONS  (PRN):      Allergies    No Known Allergies    Intolerances        Vital Signs Last 24 Hrs  T(C): 37.1 (17 Oct 2017 07:00), Max: 37.7 (16 Oct 2017 19:00)  T(F): 98.8 (17 Oct 2017 07:00), Max: 99.9 (16 Oct 2017 19:00)  HR: 80 (17 Oct 2017 13:00) (70 - 94)  BP: --  BP(mean): --  RR: 18 (17 Oct 2017 13:00) (0 - 22)  SpO2: 96% (17 Oct 2017 13:00) (94% - 99%)    I&O's Summary    16 Oct 2017 07:01  -  17 Oct 2017 07:00  --------------------------------------------------------  IN: 3097.5 mL / OUT: 40 mL / NET: 3057.5 mL    17 Oct 2017 07:01  -  17 Oct 2017 13:26  --------------------------------------------------------  IN: 428.2 mL / OUT: 0 mL / NET: 428.2 mL        ON EXAM:    General: intubated   HEENT: dilated fixed L pupil, intubated  Neck: Neck supple. JVP not elevated. No masses  Chest: Clear to auscultation bilaterally  CV: Normal S1 and S2. No murmurs, rub, or gallops. Radial pulses normal.  Abdomen: Soft, non-distended, non-tender  Skin: R groin impella, L groin IABP  Neurology: can move fingers and toes , follows commands, despite on sedation   Psych: Affect normal    LABS: All Labs Reviewed:                        10.3   14.8  )-----------( 104      ( 17 Oct 2017 10:40 )             30.5                         10.2   13.9  )-----------( 112      ( 17 Oct 2017 06:24 )             29.7                         10.8   13.6  )-----------( 120      ( 17 Oct 2017 01:03 )             30.7     17 Oct 2017 10:40    137    |  104    |  67     ----------------------------<  136    5.5     |  17     |  5.09   17 Oct 2017 06:24    138    |  105    |  65     ----------------------------<  137    5.3     |  15     |  4.70   17 Oct 2017 01:03    137    |  106    |  61     ----------------------------<  148    5.3     |  16     |  4.39     Ca    7.6        17 Oct 2017 10:40  Ca    7.2        17 Oct 2017 06:24  Ca    7.5        17 Oct 2017 01:03  Phos  5.0       17 Oct 2017 10:40  Phos  4.7       17 Oct 2017 06:24  Phos  4.5       17 Oct 2017 01:03  Mg     1.8       17 Oct 2017 10:40  Mg     1.9       17 Oct 2017 06:24  Mg     1.7       17 Oct 2017 01:03    TPro  5.3    /  Alb  2.6    /  TBili  1.1    /  DBili  x      /  AST  473    /  ALT  347    /  AlkPhos  41     17 Oct 2017 10:40  TPro  5.2    /  Alb  2.4    /  TBili  1.1    /  DBili  x      /  AST  507    /  ALT  353    /  AlkPhos  39     17 Oct 2017 06:24  TPro  5.3    /  Alb  2.5    /  TBili  1.2    /  DBili  x      /  AST  629    /  ALT  386    /  AlkPhos  42     17 Oct 2017 01:03    PT/INR - ( 17 Oct 2017 06:24 )   PT: 16.5 sec;   INR: 1.50 ratio         PTT - ( 17 Oct 2017 12:32 )  PTT:49.2 sec  CARDIAC MARKERS ( 17 Oct 2017 06:24 )  x     / 13.80 ng/mL / 5124 U/L / x     / 53.6 ng/mL  CARDIAC MARKERS ( 17 Oct 2017 01:03 )  x     / 14.83 ng/mL / 5407 U/L / x     / 77.7 ng/mL  CARDIAC MARKERS ( 16 Oct 2017 16:42 )  x     / 15.09 ng/mL / 5331 U/L / x     / 152.6 ng/mL  CARDIAC MARKERS ( 16 Oct 2017 11:25 )  x     / 13.80 ng/mL / 5097 U/L / x     / 220.8 ng/mL  CARDIAC MARKERS ( 16 Oct 2017 06:38 )  x     / 12.58 ng/mL / 4583 U/L / x     / 297.6 ng/mL  CARDIAC MARKERS ( 16 Oct 2017 04:03 )  x     / 11.79 ng/mL / 4165 U/L / x     / 285.9 ng/mL  CARDIAC MARKERS ( 15 Oct 2017 20:20 )  x     / 4.70 ng/mL / 2864 U/L / x     / 283.1 ng/mL  CARDIAC MARKERS ( 15 Oct 2017 17:05 )  x     / 2.89 ng/mL / 1587 U/L / x     / 179.1 ng/mL      Blood Culture: Organism --  Gram Stain Blood -- Gram Stain --  Specimen Source .Blood Blood  Culture-Blood --    Organism --  Gram Stain Blood -- Gram Stain --  Specimen Source .Blood Blood  Culture-Blood --    Organism --  Gram Stain Blood -- Gram Stain --  Specimen Source .Urine Catheterized  Culture-Blood --      10-15 @ 16:48  Pro Bnp [id number]    10-15 @ 21:34  TSH: 2.40

## 2017-10-17 NOTE — PROGRESS NOTE ADULT - ATTENDING COMMENTS
Remains intubated, cardiac assist.  For catheter placement for CVVHDF  1.  ARF--multifactorial.  No emergent indications but electrolyte, volume optimization would be greatly facilitated  2.  Hyperkalemia--low 5s; trend, med management, eventually 0K+dialysate, UF replacement fluid.  3.  Respiratory failure, acute--FIO2 requirements improved and need for aggressive UF less.  4.  Coagulopathy--mitigated for safest placement of catheter

## 2017-10-17 NOTE — PROGRESS NOTE ADULT - ASSESSMENT
83 year old male with a PMH of atrial fibrillation (on Pradaxa), HTN, Thyroid Goiter, CKD stage III (dx 2016) was seen at [facility] with inferior wall STEMI and was brought to [facility] for cardiac catherization s/p PCI x 1 now with DANITA and Anuria.

## 2017-10-17 NOTE — PROGRESS NOTE ADULT - ASSESSMENT
ASSESSMENT: Patient is a 83y old m with ARF secondary to global ischemia requiring HD access    PLAN:   Hd line placed uneventfully  HD as per renal

## 2017-10-17 NOTE — PROGRESS NOTE ADULT - SUBJECTIVE AND OBJECTIVE BOX
KAMLESH PATEL  83y  Male      Patient is a 83y old  Male who presents with a chief complaint of SOB, chest pain, diaphoresis (15 Oct 2017 17:59)      INTERVAL HPI/OVERNIGHT EVENTS:  pt required 3 boluses of 250cc of NS to maintain CVP>9  pt produced no urine, bladder scan showed no urine, Gonzales removed    Medications:  aspirin  chewable 81 milliGRAM(s) Oral daily  dexmedetomidine Infusion 0.2 MICROgram(s)/kG/Hr IV Continuous <Continuous>  DOBUTamine Infusion 5 MICROgram(s)/kG/Min IV Continuous <Continuous>  fentaNYL    Injectable 25 MICROGram(s) IV Push once  fentaNYL   Infusion 1 MICROgram(s)/kG/Hr IV Continuous <Continuous>  heparin  Infusion 100 Unit(s)/Hr IV Continuous <Continuous>  hEPARIN 6250 Unit(s),D5W 500 milliLiter(s) 500 milliLiter(s) IV Continuous <Continuous>  influenza   Vaccine 0.5 milliLiter(s) IntraMuscular once  pantoprazole  Injectable 40 milliGRAM(s) IV Push daily  piperacillin/tazobactam IVPB. 3.375 Gram(s) IV Intermittent every 8 hours  ticagrelor 90 milliGRAM(s) Oral two times a day  vasopressin Infusion 0.04 Unit(s)/Min IV Continuous <Continuous>      REVIEW OF SYSTEMS:  ROS unable to obtain 2/2 intubation    T(C): 37.2 (10-17-17 @ 04:00), Max: 37.7 (10-16-17 @ 19:00)  HR: 76 (10-17-17 @ 06:00) (60 - 94)  BP: --  RR: 12 (10-17-17 @ 06:00) (12 - 22)  SpO2: 95% (10-17-17 @ 06:00) (93% - 99%)  Wt(kg): --Vital Signs Last 24 Hrs  T(C): 37.2 (17 Oct 2017 04:00), Max: 37.7 (16 Oct 2017 19:00)  T(F): 99 (17 Oct 2017 04:00), Max: 99.9 (16 Oct 2017 19:00)  HR: 76 (17 Oct 2017 06:00) (60 - 94)  BP: --  BP(mean): --  RR: 12 (17 Oct 2017 06:00) (12 - 22)  SpO2: 95% (17 Oct 2017 06:00) (93% - 99%)    PHYSICAL EXAM:  GENERAL: pt intubated  HEAD:  Atraumatic, Normocephalic  EYES: EOMI, PERRLA, conjunctiva and sclera clear  ENMT: No tonsillar erythema, exudates, or enlargement; Moist mucous membranes, Good dentition, No lesions  NECK: Supple, No JVD, Normal thyroid  NERVOUS SYSTEM:  Alert & Oriented X3, Good concentration; Motor Strength 5/5 B/L upper and lower extremities; DTRs 2+ intact and symmetric  CHEST/LUNG: Clear to percussion bilaterally; No rales, rhonchi, wheezing, or rubs  HEART: Regular rate and rhythm; No murmurs, rubs, or gallops  ABDOMEN: Soft, Nontender, Nondistended; Bowel sounds present  EXTREMITIES:  2+ Peripheral Pulses, No clubbing, cyanosis, or edema  LYMPH: No lymphadenopathy noted  SKIN: No rashes or lesions    Consultant(s) Notes Reviewed:  [x ] YES  [ ] NO  Care Discussed with Consultants/Other Providers [ x] YES  [ ] NO    LABS:                        10.2   13.9  )-----------( 112      ( 17 Oct 2017 06:24 )             29.7     10-17    138  |  105  |  65<H>  ----------------------------<  137<H>  5.3   |  15<L>  |  4.70<H>    Ca    7.2<L>      17 Oct 2017 06:24  Phos  4.7     10-17  Mg     1.9     10-17    TPro  5.2<L>  /  Alb  2.4<L>  /  TBili  1.1  /  DBili  x   /  AST  507<H>  /  ALT  353<H>  /  AlkPhos  39<L>  10-17    PT/INR - ( 17 Oct 2017 06:24 )   PT: 16.5 sec;   INR: 1.50 ratio         PTT - ( 17 Oct 2017 06:24 )  PTT:50.3 sec  Urinalysis Basic - ( 15 Oct 2017 16:48 )    Color: Yellow / Appearance: Turbid / SG: >1.030 / pH: x  Gluc: x / Ketone: Negative  / Bili: Negative / Urobili: Negative   Blood: x / Protein: >600 mg/dL / Nitrite: Negative   Leuk Esterase: Negative / RBC: >50 /HPF / WBC 3-5 /HPF   Sq Epi: x / Non Sq Epi: OCC /HPF / Bacteria: Few /HPF      CAPILLARY BLOOD GLUCOSE  126 (16 Oct 2017 11:38)      POCT Blood Glucose.: 126 mg/dL (16 Oct 2017 11:41)      ABG - ( 17 Oct 2017 06:18 )  pH: 7.31  /  pCO2: 38    /  pO2: 97    / HCO3: 19    / Base Excess: -6.5  /  SaO2: 97                Urinalysis Basic - ( 15 Oct 2017 16:48 )    Color: Yellow / Appearance: Turbid / SG: >1.030 / pH: x  Gluc: x / Ketone: Negative  / Bili: Negative / Urobili: Negative   Blood: x / Protein: >600 mg/dL / Nitrite: Negative   Leuk Esterase: Negative / RBC: >50 /HPF / WBC 3-5 /HPF   Sq Epi: x / Non Sq Epi: OCC /HPF / Bacteria: Few /HPF        RADIOLOGY & ADDITIONAL TESTS:    Imaging Personally Reviewed:  [ ] YES  [ ] NO    HEALTH ISSUES - PROBLEM Dx:  Acute kidney injury: Acute kidney injury  Proteinuria: Proteinuria  Hyperkalemia: Hyperkalemia  DANITA (acute kidney injury): DANITA (acute kidney injury)  Prophylactic measure: Prophylactic measure  Hypertension: Hypertension  Goiter diffuse, nontoxic: Goiter diffuse, nontoxic  Atrial fibrillation, chronic: Atrial fibrillation, chronic  STEMI involving right coronary artery: STEMI involving right coronary artery  Right heart failure: Right heart failure  Cardiogenic shock: Cardiogenic shock KAMLESH PATEL  83y  Male      Patient is a 83y old  Male who presents with a chief complaint of SOB, chest pain, diaphoresis (15 Oct 2017 17:59)      INTERVAL HPI/OVERNIGHT EVENTS:  pt required 3 boluses of 250cc of NS to maintain CVP>9  pt produced no urine, bladder scan showed no urine, Gonzales removed    Medications:  aspirin  chewable 81 milliGRAM(s) Oral daily  dexmedetomidine Infusion 0.2 MICROgram(s)/kG/Hr IV Continuous <Continuous>  DOBUTamine Infusion 5 MICROgram(s)/kG/Min IV Continuous <Continuous>  fentaNYL    Injectable 25 MICROGram(s) IV Push once  fentaNYL   Infusion 1 MICROgram(s)/kG/Hr IV Continuous <Continuous>  heparin  Infusion 100 Unit(s)/Hr IV Continuous <Continuous>  hEPARIN 6250 Unit(s),D5W 500 milliLiter(s) 500 milliLiter(s) IV Continuous <Continuous>  influenza   Vaccine 0.5 milliLiter(s) IntraMuscular once  pantoprazole  Injectable 40 milliGRAM(s) IV Push daily  piperacillin/tazobactam IVPB. 3.375 Gram(s) IV Intermittent every 8 hours  ticagrelor 90 milliGRAM(s) Oral two times a day  vasopressin Infusion 0.04 Unit(s)/Min IV Continuous <Continuous>      REVIEW OF SYSTEMS:  ROS unable to obtain 2/2 intubation    T(C): 37.2 (10-17-17 @ 04:00), Max: 37.7 (10-16-17 @ 19:00)  HR: 76 (10-17-17 @ 06:00) (60 - 94)  BP: --  RR: 12 (10-17-17 @ 06:00) (12 - 22)  SpO2: 95% (10-17-17 @ 06:00) (93% - 99%)  Wt(kg): --Vital Signs Last 24 Hrs  T(C): 37.2 (17 Oct 2017 04:00), Max: 37.7 (16 Oct 2017 19:00)  T(F): 99 (17 Oct 2017 04:00), Max: 99.9 (16 Oct 2017 19:00)  HR: 76 (17 Oct 2017 06:00) (60 - 94)  BP: --  BP(mean): --  RR: 12 (17 Oct 2017 06:00) (12 - 22)  SpO2: 95% (17 Oct 2017 06:00) (93% - 99%)    PHYSICAL EXAM:  GENERAL: pt intubated  HEAD:  Atraumatic, Normocephalic  EYES:  ENMT: ET tube in place  NECK: large goiter, non tender  NERVOUS SYSTEM:  pt intubated and sedated on fentanyl and Precedex 2/2 attempting to pull the tube  CHEST/LUNG:   HEART: Regular rate and rhythm; No murmurs, rubs, or gallops  ABDOMEN: Soft, Nontender, Nondistended; Bowel sounds present  EXTREMITIES:  2+ Peripheral Pulses, No clubbing, cyanosis, or edema  LYMPH: No lymphadenopathy noted  SKIN: No rashes or lesions    Consultant(s) Notes Reviewed:  [x ] YES  [ ] NO  Care Discussed with Consultants/Other Providers [ x] YES  [ ] NO    LABS:                        10.2   13.9  )-----------( 112      ( 17 Oct 2017 06:24 )             29.7     10-17    138  |  105  |  65<H>  ----------------------------<  137<H>  5.3   |  15<L>  |  4.70<H>    Ca    7.2<L>      17 Oct 2017 06:24  Phos  4.7     10-17  Mg     1.9     10-17    TPro  5.2<L>  /  Alb  2.4<L>  /  TBili  1.1  /  DBili  x   /  AST  507<H>  /  ALT  353<H>  /  AlkPhos  39<L>  10-17    PT/INR - ( 17 Oct 2017 06:24 )   PT: 16.5 sec;   INR: 1.50 ratio         PTT - ( 17 Oct 2017 06:24 )  PTT:50.3 sec  Urinalysis Basic - ( 15 Oct 2017 16:48 )    Color: Yellow / Appearance: Turbid / SG: >1.030 / pH: x  Gluc: x / Ketone: Negative  / Bili: Negative / Urobili: Negative   Blood: x / Protein: >600 mg/dL / Nitrite: Negative   Leuk Esterase: Negative / RBC: >50 /HPF / WBC 3-5 /HPF   Sq Epi: x / Non Sq Epi: OCC /HPF / Bacteria: Few /HPF      CAPILLARY BLOOD GLUCOSE  126 (16 Oct 2017 11:38)      POCT Blood Glucose.: 126 mg/dL (16 Oct 2017 11:41)      ABG - ( 17 Oct 2017 06:18 )  pH: 7.31  /  pCO2: 38    /  pO2: 97    / HCO3: 19    / Base Excess: -6.5  /  SaO2: 97                Urinalysis Basic - ( 15 Oct 2017 16:48 )    Color: Yellow / Appearance: Turbid / SG: >1.030 / pH: x  Gluc: x / Ketone: Negative  / Bili: Negative / Urobili: Negative   Blood: x / Protein: >600 mg/dL / Nitrite: Negative   Leuk Esterase: Negative / RBC: >50 /HPF / WBC 3-5 /HPF   Sq Epi: x / Non Sq Epi: OCC /HPF / Bacteria: Few /HPF        RADIOLOGY & ADDITIONAL TESTS:    Imaging Personally Reviewed:  [ ] YES  [ ] NO    HEALTH ISSUES - PROBLEM Dx:  Acute kidney injury: Acute kidney injury  Proteinuria: Proteinuria  Hyperkalemia: Hyperkalemia  DANITA (acute kidney injury): DANITA (acute kidney injury)  Prophylactic measure: Prophylactic measure  Hypertension: Hypertension  Goiter diffuse, nontoxic: Goiter diffuse, nontoxic  Atrial fibrillation, chronic: Atrial fibrillation, chronic  STEMI involving right coronary artery: STEMI involving right coronary artery  Right heart failure: Right heart failure  Cardiogenic shock: Cardiogenic shock KAMLESH PATEL  83y  Male      Patient is a 83y old  Male who presents with a chief complaint of SOB, chest pain, diaphoresis (15 Oct 2017 17:59)      INTERVAL HPI/OVERNIGHT EVENTS:  pt required 3 boluses of 250cc of NS to maintain CVP>9  pt produced no urine, bladder scan showed no urine, Gonzales removed    Medications:  aspirin  chewable 81 milliGRAM(s) Oral daily  dexmedetomidine Infusion 0.2 MICROgram(s)/kG/Hr IV Continuous <Continuous>  DOBUTamine Infusion 5 MICROgram(s)/kG/Min IV Continuous <Continuous>  fentaNYL    Injectable 25 MICROGram(s) IV Push once  fentaNYL   Infusion 1 MICROgram(s)/kG/Hr IV Continuous <Continuous>  heparin  Infusion 100 Unit(s)/Hr IV Continuous <Continuous>  hEPARIN 6250 Unit(s),D5W 500 milliLiter(s) 500 milliLiter(s) IV Continuous <Continuous>  influenza   Vaccine 0.5 milliLiter(s) IntraMuscular once  pantoprazole  Injectable 40 milliGRAM(s) IV Push daily  piperacillin/tazobactam IVPB. 3.375 Gram(s) IV Intermittent every 8 hours  ticagrelor 90 milliGRAM(s) Oral two times a day  vasopressin Infusion 0.04 Unit(s)/Min IV Continuous <Continuous>      REVIEW OF SYSTEMS:  ROS unable to obtain 2/2 intubation    T(C): 37.2 (10-17-17 @ 04:00), Max: 37.7 (10-16-17 @ 19:00)  HR: 76 (10-17-17 @ 06:00) (60 - 94)  BP: --  RR: 12 (10-17-17 @ 06:00) (12 - 22)  SpO2: 95% (10-17-17 @ 06:00) (93% - 99%)  Wt(kg): --Vital Signs Last 24 Hrs  T(C): 37.2 (17 Oct 2017 04:00), Max: 37.7 (16 Oct 2017 19:00)  T(F): 99 (17 Oct 2017 04:00), Max: 99.9 (16 Oct 2017 19:00)  HR: 76 (17 Oct 2017 06:00) (60 - 94)  BP: --  BP(mean): --  RR: 12 (17 Oct 2017 06:00) (12 - 22)  SpO2: 95% (17 Oct 2017 06:00) (93% - 99%)    PHYSICAL EXAM:  GENERAL: pt intubated  HEAD:  Atraumatic, Normocephalic  EYES:  ENMT: ET tube in place  NECK: large goiter, non tender  NERVOUS SYSTEM:  pt intubated and sedated on fentanyl and Precedex 2/2 attempting to pull the tube  CHEST/LUNG: pt intubated, difficult to ascultate over ventalator, crackles heard over lung sounds  HEART: Regular rate and rhythm; No murmurs, rubs, or gallops  ABDOMEN: Soft, Nontender, Nondistended; Bowel sounds present  EXTREMITIES:  2+ Peripheral Pulses, No clubbing, cyanosis, or edema  SKIN: all lines and accesses clear and non erythematous    Consultant(s) Notes Reviewed:  [x ] YES  [ ] NO  Care Discussed with Consultants/Other Providers [ x] YES  [ ] NO    LABS:                        10.2   13.9  )-----------( 112      ( 17 Oct 2017 06:24 )             29.7     10-17    138  |  105  |  65<H>  ----------------------------<  137<H>  5.3   |  15<L>  |  4.70<H>    Ca    7.2<L>      17 Oct 2017 06:24  Phos  4.7     10-17  Mg     1.9     10-17    TPro  5.2<L>  /  Alb  2.4<L>  /  TBili  1.1  /  DBili  x   /  AST  507<H>  /  ALT  353<H>  /  AlkPhos  39<L>  10-17    PT/INR - ( 17 Oct 2017 06:24 )   PT: 16.5 sec;   INR: 1.50 ratio         PTT - ( 17 Oct 2017 06:24 )  PTT:50.3 sec  Urinalysis Basic - ( 15 Oct 2017 16:48 )    Color: Yellow / Appearance: Turbid / SG: >1.030 / pH: x  Gluc: x / Ketone: Negative  / Bili: Negative / Urobili: Negative   Blood: x / Protein: >600 mg/dL / Nitrite: Negative   Leuk Esterase: Negative / RBC: >50 /HPF / WBC 3-5 /HPF   Sq Epi: x / Non Sq Epi: OCC /HPF / Bacteria: Few /HPF      CAPILLARY BLOOD GLUCOSE  126 (16 Oct 2017 11:38)      POCT Blood Glucose.: 126 mg/dL (16 Oct 2017 11:41)      ABG - ( 17 Oct 2017 06:18 )  pH: 7.31  /  pCO2: 38    /  pO2: 97    / HCO3: 19    / Base Excess: -6.5  /  SaO2: 97        Urinalysis Basic - ( 15 Oct 2017 16:48 )    Color: Yellow / Appearance: Turbid / SG: >1.030 / pH: x  Gluc: x / Ketone: Negative  / Bili: Negative / Urobili: Negative   Blood: x / Protein: >600 mg/dL / Nitrite: Negative   Leuk Esterase: Negative / RBC: >50 /HPF / WBC 3-5 /HPF   Sq Epi: x / Non Sq Epi: OCC /HPF / Bacteria: Few /HPF    HEALTH ISSUES - PROBLEM Dx:  Acute kidney injury: Acute kidney injury  Proteinuria: Proteinuria  Hyperkalemia: Hyperkalemia  DANITA (acute kidney injury): DANITA (acute kidney injury)  Prophylactic measure: Prophylactic measure  Hypertension: Hypertension  Goiter diffuse, nontoxic: Goiter diffuse, nontoxic  Atrial fibrillation, chronic: Atrial fibrillation, chronic  STEMI involving right coronary artery: STEMI involving right coronary artery  Right heart failure: Right heart failure  Cardiogenic shock: Cardiogenic shock KAMLESH PATEL  83y  Male      Patient is a 83y old  Male who presents with a chief complaint of SOB, chest pain, diaphoresis (15 Oct 2017 17:59)      INTERVAL HPI/OVERNIGHT EVENTS:  pt required 3 boluses of 250cc of NS to maintain CVP>9  pt produced no urine, bladder scan showed no urine, Gonzales removed    Medications:  aspirin  chewable 81 milliGRAM(s) Oral daily  dexmedetomidine Infusion 0.2 MICROgram(s)/kG/Hr IV Continuous <Continuous>  DOBUTamine Infusion 5 MICROgram(s)/kG/Min IV Continuous <Continuous>  fentaNYL    Injectable 25 MICROGram(s) IV Push once  fentaNYL   Infusion 1 MICROgram(s)/kG/Hr IV Continuous <Continuous>  heparin  Infusion 100 Unit(s)/Hr IV Continuous <Continuous>  hEPARIN 6250 Unit(s),D5W 500 milliLiter(s) 500 milliLiter(s) IV Continuous <Continuous>  influenza   Vaccine 0.5 milliLiter(s) IntraMuscular once  pantoprazole  Injectable 40 milliGRAM(s) IV Push daily  piperacillin/tazobactam IVPB. 3.375 Gram(s) IV Intermittent every 8 hours  ticagrelor 90 milliGRAM(s) Oral two times a day  vasopressin Infusion 0.04 Unit(s)/Min IV Continuous <Continuous>      REVIEW OF SYSTEMS:  ROS unable to obtain 2/2 intubation    T(C): 37.2 (10-17-17 @ 04:00), Max: 37.7 (10-16-17 @ 19:00)  HR: 76 (10-17-17 @ 06:00) (60 - 94)  BP: --  RR: 12 (10-17-17 @ 06:00) (12 - 22)  SpO2: 95% (10-17-17 @ 06:00) (93% - 99%)  Wt(kg): --Vital Signs Last 24 Hrs  T(C): 37.2 (17 Oct 2017 04:00), Max: 37.7 (16 Oct 2017 19:00)  T(F): 99 (17 Oct 2017 04:00), Max: 99.9 (16 Oct 2017 19:00)  HR: 76 (17 Oct 2017 06:00) (60 - 94)  BP: --  BP(mean): --  RR: 12 (17 Oct 2017 06:00) (12 - 22)  SpO2: 95% (17 Oct 2017 06:00) (93% - 99%)    PHYSICAL EXAM:  GENERAL: pt intubated  HEAD:  Atraumatic, Normocephalic  EYES: right pupil still blown  ENMT: ET tube in place  NECK: large goiter, non tender  NERVOUS SYSTEM:  pt intubated and sedated on fentanyl and Precedex 2/2 attempting to pull the tube  CHEST/LUNG: pt intubated, difficult to ascultate over ventalator, crackles heard over lung sounds  HEART: Regular rate and rhythm; No murmurs, rubs, or gallops  ABDOMEN: Soft, Nontender, Nondistended; Bowel sounds present  EXTREMITIES:  2+ Peripheral Pulses, No clubbing, cyanosis, or edema  SKIN: all lines and accesses clear and non erythematous    Consultant(s) Notes Reviewed:  [x ] YES  [ ] NO  Care Discussed with Consultants/Other Providers [ x] YES  [ ] NO    LABS:                        10.2   13.9  )-----------( 112      ( 17 Oct 2017 06:24 )             29.7     10-17    138  |  105  |  65<H>  ----------------------------<  137<H>  5.3   |  15<L>  |  4.70<H>    Ca    7.2<L>      17 Oct 2017 06:24  Phos  4.7     10-17  Mg     1.9     10-17    TPro  5.2<L>  /  Alb  2.4<L>  /  TBili  1.1  /  DBili  x   /  AST  507<H>  /  ALT  353<H>  /  AlkPhos  39<L>  10-17    PT/INR - ( 17 Oct 2017 06:24 )   PT: 16.5 sec;   INR: 1.50 ratio         PTT - ( 17 Oct 2017 06:24 )  PTT:50.3 sec  Urinalysis Basic - ( 15 Oct 2017 16:48 )    Color: Yellow / Appearance: Turbid / SG: >1.030 / pH: x  Gluc: x / Ketone: Negative  / Bili: Negative / Urobili: Negative   Blood: x / Protein: >600 mg/dL / Nitrite: Negative   Leuk Esterase: Negative / RBC: >50 /HPF / WBC 3-5 /HPF   Sq Epi: x / Non Sq Epi: OCC /HPF / Bacteria: Few /HPF      CAPILLARY BLOOD GLUCOSE  126 (16 Oct 2017 11:38)      POCT Blood Glucose.: 126 mg/dL (16 Oct 2017 11:41)      ABG - ( 17 Oct 2017 06:18 )  pH: 7.31  /  pCO2: 38    /  pO2: 97    / HCO3: 19    / Base Excess: -6.5  /  SaO2: 97        Urinalysis Basic - ( 15 Oct 2017 16:48 )    Color: Yellow / Appearance: Turbid / SG: >1.030 / pH: x  Gluc: x / Ketone: Negative  / Bili: Negative / Urobili: Negative   Blood: x / Protein: >600 mg/dL / Nitrite: Negative   Leuk Esterase: Negative / RBC: >50 /HPF / WBC 3-5 /HPF   Sq Epi: x / Non Sq Epi: OCC /HPF / Bacteria: Few /HPF    HEALTH ISSUES - PROBLEM Dx:  Acute kidney injury: Acute kidney injury  Proteinuria: Proteinuria  Hyperkalemia: Hyperkalemia  DANITA (acute kidney injury): DANITA (acute kidney injury)  Prophylactic measure: Prophylactic measure  Hypertension: Hypertension  Goiter diffuse, nontoxic: Goiter diffuse, nontoxic  Atrial fibrillation, chronic: Atrial fibrillation, chronic  STEMI involving right coronary artery: STEMI involving right coronary artery  Right heart failure: Right heart failure  Cardiogenic shock: Cardiogenic shock

## 2017-10-17 NOTE — PROGRESS NOTE ADULT - SUBJECTIVE AND OBJECTIVE BOX
Patient is a 83y old  Male who presents with a chief complaint of SOB, chest pain, diaphoresis (15 Oct 2017 17:59)      Vascular Surgery Attending Progress Note    Interval HPI: pt on vent support     Medications:  aspirin  chewable 81 milliGRAM(s) Oral daily  dexmedetomidine Infusion 0.2 MICROgram(s)/kG/Hr IV Continuous <Continuous>  DOBUTamine Infusion 5 MICROgram(s)/kG/Min IV Continuous <Continuous>  fentaNYL    Injectable 25 MICROGram(s) IV Push once  fentaNYL   Infusion 1 MICROgram(s)/kG/Hr IV Continuous <Continuous>  heparin  Infusion 400 Unit(s)/Hr IV Continuous <Continuous>  hEPARIN 6250 Unit(s),D5W 500 milliLiter(s) 500 milliLiter(s) IV Continuous <Continuous>  influenza   Vaccine 0.5 milliLiter(s) IntraMuscular once  pantoprazole  Injectable 40 milliGRAM(s) IV Push daily  piperacillin/tazobactam IVPB. 3.375 Gram(s) IV Intermittent every 12 hours  ticagrelor 90 milliGRAM(s) Oral two times a day  vancomycin  IVPB 1000 milliGRAM(s) IV Intermittent every 24 hours  vasopressin Infusion 0.04 Unit(s)/Min IV Continuous <Continuous>      Vital Signs Last 24 Hrs  T(C): 36.5 (17 Oct 2017 20:30), Max: 37.5 (17 Oct 2017 00:00)  T(F): 97.7 (17 Oct 2017 20:30), Max: 99.5 (17 Oct 2017 00:00)  HR: 80 (17 Oct 2017 20:32) (70 - 88)  BP: --  BP(mean): --  RR: 18 (17 Oct 2017 20:30) (0 - 20)  SpO2: 98% (17 Oct 2017 20:32) (94% - 98%)  I&O's Summary    16 Oct 2017 07:01  -  17 Oct 2017 07:00  --------------------------------------------------------  IN: 3097.5 mL / OUT: 40 mL / NET: 3057.5 mL    17 Oct 2017 07:01  -  17 Oct 2017 21:29  --------------------------------------------------------  IN: 1343.6 mL / OUT: 925 mL / NET: 418.6 mL        Physical Exam:  Neuro  A&Ox1 VSS  Vascular:  stable     LABS:                        9.8    13.9  )-----------( 98       ( 17 Oct 2017 16:17 )             29.0     10-17    139  |  104  |  69<H>  ----------------------------<  132<H>  5.7<H>   |  21<L>  |  5.51<H>    Ca    7.6<L>      17 Oct 2017 16:26  Phos  5.6     10-17  Mg     1.8     10-17    TPro  5.5<L>  /  Alb  2.7<L>  /  TBili  1.1  /  DBili  x   /  AST  425<H>  /  ALT  313<H>  /  AlkPhos  40  10-17    PT/INR - ( 17 Oct 2017 06:24 )   PT: 16.5 sec;   INR: 1.50 ratio         PTT - ( 17 Oct 2017 12:32 )  PTT:49.2 sec    CHARLY CAMERON MD  [phone]

## 2017-10-17 NOTE — PROGRESS NOTE ADULT - ATTENDING COMMENTS
Patient is seen and examined with fellow, NP and the CCU house-staff. I agree with the history, physical and the assessment and plan.  stemi with subsequent BiV failure requiring IABP with subsequent RP impella  will wean IABP to 2:1 and repeat hemodynamics  shilley catheter for CVVHD  EEG today

## 2017-10-17 NOTE — PROCEDURE NOTE - PROCEDURE
<<-----Click on this checkbox to enter Procedure Central line placement  10/17/2017    Active  [facility]ALMAY

## 2017-10-17 NOTE — PROGRESS NOTE ADULT - SUBJECTIVE AND OBJECTIVE BOX
[facility] DIVISION OF KIDNEY DISEASES AND HYPERTENSION -- FOLLOW UP NOTE  --------------------------------------------------------------------------------  Chief Complaint: DANITA with Anuria     24 hour events/subjective:  Patient remains on high dose pressors, intubated with balloon pump at max assist RH impella at max assist.       PAST HISTORY  --------------------------------------------------------------------------------  No significant changes to PMH, PSH, FHx, SHx, unless otherwise noted    ALLERGIES & MEDICATIONS  --------------------------------------------------------------------------------  Allergies    No Known Allergies    Intolerances      Standing Inpatient Medications  aspirin  chewable 81 milliGRAM(s) Oral daily  dexmedetomidine Infusion 0.2 MICROgram(s)/kG/Hr IV Continuous <Continuous>  DOBUTamine Infusion 5 MICROgram(s)/kG/Min IV Continuous <Continuous>  fentaNYL    Injectable 25 MICROGram(s) IV Push once  fentaNYL   Infusion 1 MICROgram(s)/kG/Hr IV Continuous <Continuous>  heparin  Infusion 100 Unit(s)/Hr IV Continuous <Continuous>  hEPARIN 6250 Unit(s),D5W 500 milliLiter(s) 500 milliLiter(s) IV Continuous <Continuous>  influenza   Vaccine 0.5 milliLiter(s) IntraMuscular once  pantoprazole  Injectable 40 milliGRAM(s) IV Push daily  piperacillin/tazobactam IVPB. 3.375 Gram(s) IV Intermittent every 8 hours  ticagrelor 90 milliGRAM(s) Oral two times a day  vasopressin Infusion 0.04 Unit(s)/Min IV Continuous <Continuous>    PRN Inpatient Medications      REVIEW OF SYSTEMS  --------------------------------------------------------------------------------  Unable to obtain at this time.     VITALS/PHYSICAL EXAM  --------------------------------------------------------------------------------  T(C): 37.1 (10-17-17 @ 07:00), Max: 37.7 (10-16-17 @ 19:00)  HR: 76 (10-17-17 @ 09:00) (66 - 94)  BP: --  RR: 18 (10-17-17 @ 09:00) (12 - 22)  SpO2: 97% (10-17-17 @ 09:00) (93% - 99%)  Wt(kg): --  Height (cm): 180.34 (10-15-17 @ 16:30)  Weight (kg): 93.1 (10-15-17 @ 16:30)  BMI (kg/m2): 28.6 (10-15-17 @ 16:30)  BSA (m2): 2.13 (10-15-17 @ 16:30)      10-16-17 @ 07:01  -  10-17-17 @ 07:00  --------------------------------------------------------  IN: 3097.5 mL / OUT: 40 mL / NET: 3057.5 mL    10-17-17 @ 07:01  -  10-17-17 @ 09:37  --------------------------------------------------------  IN: 181.5 mL / OUT: 0 mL / NET: 181.5 mL      Physical Exam:  	Gen: Intubated  	HEENT: +ET  	Pulm: Course BS B/L  	CV: RRR, S1S2  	Abd: +BS, soft, nontender/nondistended + Gonzales catheter   	LE: Warm, no edema  	Skin: Warm, without rashes    LABS/STUDIES  --------------------------------------------------------------------------------              10.2   13.9  >-----------<  112      [10-17-17 @ 06:24]              29.7     138  |  105  |  65  ----------------------------<  137      [10-17-17 @ 06:24]  5.3   |  15  |  4.70        Ca     7.2     [10-17-17 @ 06:24]      Mg     1.9     [10-17-17 @ 06:24]      Phos  4.7     [10-17-17 @ 06:24]    TPro  5.2  /  Alb  2.4  /  TBili  1.1  /  DBili  x   /  AST  507  /  ALT  353  /  AlkPhos  39  [10-17-17 @ 06:24]    PT/INR: PT 16.5 , INR 1.50       [10-17-17 @ 06:24]  PTT: 50.3       [10-17-17 @ 06:24]    Troponin 13.80      [10-17-17 @ 06:24]  CK 5124      [10-17-17 @ 06:24]  LDH 1815      [10-17-17 @ 06:24]    Creatinine Trend:  SCr 4.70 [10-17 @ 06:24]  SCr 4.39 [10-17 @ 01:03]  SCr 3.89 [10-16 @ 20:32]  SCr 3.71 [10-16 @ 16:42]  SCr 3.30 [10-16 @ 11:25]    Urinalysis - [10-15-17 @ 16:48]      Color Yellow / Appearance Turbid / SG >1.030 / pH 6.5      Gluc 50 / Ketone Negative  / Bili Negative / Urobili Negative       Blood Moderate / Protein >600 / Leuk Est Negative / Nitrite Negative      RBC >50 / WBC 3-5 / Hyaline  / Gran  / Sq Epi  / Non Sq Epi OCC / Bacteria Few      HbA1c 5.4      [10-15-17 @ 21:32]  TSH 2.40      [10-15-17 @ 21:34]  Lipid: chol 129, TG 45, HDL 41, LDL 79      [10-15-17 @ 21:40]

## 2017-10-18 NOTE — PROGRESS NOTE ADULT - SUBJECTIVE AND OBJECTIVE BOX
THE PATIENT WAS SEEN AND EXAMINED BY ME WITH THE HOUSESTAFF AND STROKE TEAM DURING MORNING ROUNDS.   HPI:  83 male presents to ER by ambulance with report of shortness of breath. Wife at the bedside states patient has h/o Afib on Pradaxa, has "heart valve problems", has been having shortness of breath for the past few days, today was found sitting on stairs, c/o increased shortness of breath, chest pain and diaphoresis. At OSH, he was found to have an inferior wall STEMI and was brought to [facility] for cath. Upon arrival, pt appeared unwell and fixated on chest pain. Upon going to Cath lab, pt became non responsive and went into cardiogenic arrest. advanced life support was started, pt was intubated, and ROS was achieved. In cath lab, pt received a inta venus pacing wire, baloon pump, RCA stent, a swan RH cath, and an illiac balloon to stop R groin bleeding. Pt was admitted to the CCU for further management. (15 Oct 2017 17:59)      SUBJECTIVE: Patient noted to have myoclonus activity to extremities, EEG completed.      aspirin  chewable 81 milliGRAM(s) Oral daily  dexmedetomidine Infusion 0.2 MICROgram(s)/kG/Hr IV Continuous <Continuous>  DOBUTamine Infusion 5 MICROgram(s)/kG/Min IV Continuous <Continuous>  fentaNYL    Injectable 25 MICROGram(s) IV Push once  fentaNYL   Infusion 1 MICROgram(s)/kG/Hr IV Continuous <Continuous>  heparin  Infusion 600 Unit(s)/Hr IV Continuous <Continuous>  hEPARIN 6250 Unit(s),D5W 500 milliLiter(s) 500 milliLiter(s) IV Continuous <Continuous>  influenza   Vaccine 0.5 milliLiter(s) IntraMuscular once  levETIRAcetam 500 milliGRAM(s) Oral two times a day  pantoprazole  Injectable 40 milliGRAM(s) IV Push daily  piperacillin/tazobactam IVPB. 3.375 Gram(s) IV Intermittent every 12 hours  ticagrelor 90 milliGRAM(s) Oral two times a day  vancomycin  IVPB 1000 milliGRAM(s) IV Intermittent every 24 hours  vasopressin Infusion 0.04 Unit(s)/Min IV Continuous <Continuous>      PHYSICAL EXAM:   Vital Signs Last 24 Hrs  T(C): 36.2 (18 Oct 2017 07:00), Max: 36.5 (18 Oct 2017 01:30)  T(F): 97.2 (18 Oct 2017 07:00), Max: 97.7 (18 Oct 2017 01:30)  HR: 100 (18 Oct 2017 15:00) (70 - 110)  BP: --  BP(mean): --  RR: 18 (18 Oct 2017 15:00) (6 - 23)  SpO2: 93% (18 Oct 2017 15:00) (75% - 100%)      ***Exam is limited as patient is orally intubated and sedated. Patient  just returned to unit following return to Cath Lab for [place] reinsertion.    General: Orally intubated , continues on CMV.   HEENT: Normocephalic,  atraumatic.  Abdomen: Soft, nontender, nondistended   Extremities: Generalized edema.    NEUROLOGICAL EXAM:  Mental status: Sedated on Ventilatory support for airway protection with respirations over vent setting. Difficult to arouse due to additional sedation administered in Cath Lab. Not following commands.   Cranial Nerves: No appreciable facial asymmetry. Oculocephalics intact. + cough reflex.  Motor exam: Noted to have upward jerking movement of bilateral upper extremities, with concomitant twitching of  B/L LE.  Sensation: Unable to assess  Coordination/ Gait: Unable to assess    LABS:                        9.5    15.1  )-----------( 78       ( 18 Oct 2017 09:48 )             26.4    10-18    135  |  100  |  42<H>  ----------------------------<  120<H>  4.1   |  21<L>  |  3.48<H>    Ca    8.3<L>      18 Oct 2017 09:48  Phos  4.4     10-18  Mg     1.9     10-18    TPro  5.8<L>  /  Alb  2.7<L>  /  TBili  1.3<H>  /  DBili  x   /  AST  324<H>  /  ALT  248<H>  /  AlkPhos  30<L>  10-18  PT/INR - ( 18 Oct 2017 06:01 )   PT: 12.8 sec;   INR: 1.18 ratio         PTT - ( 18 Oct 2017 09:48 )  PTT:48.5 sec  Hemoglobin A1C, Whole Blood: 5.4 % (10-15 @ 21:32)      IMAGING:    < from: CT Head No Cont (10.16.17 @ 12:58) >  EXAM:  CT BRAIN                          PROCEDURE DATE:  10/16/2017      INTERPRETATION:  CLINICAL INDICATION:  83M unequal pupils s/o arrest.   Rule out intracranial hemorrhage and/or acute infarct.    TECHNIQUE : Axial CT scanning of thebrain was obtained from the skull   base to the vertex without the administration of intravenous contrast.   Coronal and sagittal reformatted images were subsequently obtained.     COMPARISON: Head CT dated 6/22/2016    FINDINGS:      No loss of the gray-white matter distinction to indicate acute   territorial infarct. No acute hemorrhage.    There is no hydrocephalus, mass effect or midline shift. No extra-axial   collection.       Evidence for bilateral cataract surgery, otherwise the orbits are   unremarkable.    The calvarium is intact.    Moderate-sized right maxillary and bilateral sphenoid sinus mucous   retention cysts or polyps. Mild bilateral sphenoid and left frontal sinus   mucosal thickening. Debris in the nasopharynx, likely secondary to   intubation status.    Endotracheal tube is noted.    IMPRESSION:    No evidence for acute territorial infarct or hemorrhage. If clinical   concern persist recommend brain MRI if not contraindicated or short-term   follow-up head CT for further evaluation.    < end of copied text >       EEG REPORT:    EEG Report:  · EEG Report		  10/17/2017    Hx: Concern for Seizures      Medication	  No Data.	    Study Interpretation:    FINDINGS:  There was no clear posterior dominant rhythm.  The background was continuous, but not clearly reactive to environmental stimuli.  It mainly consisted of polymorphic theta frequency activity with intermixed faster frequencies.    Sleep Background:  Stages of sleep could not be delineated.    Background Slowing:  Continuous diffuse polymorphic delta activity.    Other Paroxysmal Non-Epileptiform Findings:    None.    Epileptiform Activity:   No epileptiform discharges were present.    Events:  No clinical events were recorded.  No seizures were recorded.    Activation Procedures:   -Hyperventilation was not performed.    -Photic stimulation was not performed.    Artifacts:  Intermittent myogenic and movement artifacts were noted.    Compressed Spectral Array Digital Analysis    FINDINGS:  Compressed Spectral Array (CSA) data was reviewed separately and correlated with the electroencephalographic findings detailed above.  CSA showed a variable spectral pattern.  Areas of increased power in particular were reviewed in detail, and compared with the raw EEG data.  Areas of abrupt increases in spectral power were reviewed to exclude seizures, and were determined to be artifactual in nature.    The relative ratio of the power of delta range frequencies and faster frequencies remained stable over the course of the study.  There was no definitive increase in the relative power in the delta frequency spectrum apparent in the left hemisphere versus the right hemisphere.      Compressed Spectral Array (Digital Analysis) Summary/ Impression:  No persistent hemispheric asymmetry.  Intermittent areas of increased power reviewed, without definite epileptiform activity associated on CSA.      EEG Classification:  Abnormal study  -	Moderate to severe generalized slowing    Impression:  Findings indicate non-specific moderate to severe diffuse or multifocal cerebral dysfunction. There were no epileptiform abnormalities recorded.

## 2017-10-18 NOTE — PROGRESS NOTE ADULT - ASSESSMENT
83M pmhx of afb on pradaxa initially presented on 10/15/17 with chest pain/diaphresis/ dyspnea to OSH, [facility] emergently transferred here for C c/b VF arrest requiring IABP followed by prox RCA stent c/b now acute right heart failure and cardiogenic shock, requiring RV impella support.    - Continue CCU care  - Continue mechanical ventilation and oxygen supplementation  - Ideally, would wean IABP, and eventually Impella  - The swan may need to be repositioned or replaced...? in RA.   - Heparin gtt for goal PTT 60-90  - Cont CVVHD.   - Filling pressures are low. May need more boluses to increase CVP.   - Continue DAPT with aspirin and Ticagrelor  - Continue  and vaso, as necessary for RV contractility and output.   - Repeat echocardiogram on 10/17 shows mild LV dysfunction with RV systolic dysfunction.   - Continue Antibiotics for now.  Follow up cultures  - Monitor and replete lytes  - EEG with moderate to severe background slowing  - Monitor and replete electrolytes. Keep K>4.0 and Mg>2.0.  - At this point, will defer all management to CCU team given  grave nature of patients illness. Will resume care if patient recovers. Critically ill.   - Patient at high risk for decompensation. Critically ill. >35 minutes of critical care time was spent with this patient. 83M pmhx of afb on pradaxa initially presented on 10/15/17 with chest pain/diaphresis/ dyspnea to OSH, [facility]STOPHERKARE[facility] emergently transferred here for C c/b VF arrest requiring IABP followed by prox RCA stent c/b now acute right heart failure and cardiogenic shock, requiring RV impella support.    - Continue CCU care  - Continue mechanical ventilation and oxygen supplementation  - Ideally, would wean IABP, and eventually Impella  - The swan may need to be repositioned or replaced...? in RA.   - Heparin gtt for goal PTT 60-90  - Cont CVVHD.   - Filling pressures are low. May need more boluses to increase CVP.   - Continue DAPT with aspirin and Ticagrelor  - Continue  and vaso, as necessary for RV contractility and output.   - Repeat echocardiogram on 10/17 shows mild LV dysfunction with RV systolic dysfunction.   - Continue Antibiotics for now.  Follow up cultures  - Monitor and replete lytes  - EEG with moderate to severe background slowing  - Monitor and replete electrolytes. Keep K>4.0 and Mg>2.0.  - At this point, will defer all management to CCU team given  grave nature of patients illness.     - Patient at high risk for decompensation. Critically ill. >35 minutes of critical care time was spent with this patient.

## 2017-10-18 NOTE — PROGRESS NOTE ADULT - ATTENDING COMMENTS
s/p RV inf wall MI 10.15  Still with IABP and Rpella.  SBP , mostly 115-130/40-60.   I/O: +523  RHC 5am: CVP 10, 43/31, PA 62, CI 2.7   meds: Vacno/zocyn heparin,  5, Vaso 0.04  135  |  101  |  48  ----------------------------<  128      [10-18-17 @ 04:37]  4.0   |  21  |  3.91 (decreasing)  ,  decreasing  WBC 15,000 PLT 78 (decreasing) Hb 9.8   Lactate 1.4  d/w CCU team suggest:  Address fluid balance- use CVVHD to achieve negative balance and CVP 10-12  Then IABP wean and remove today. If holding AC is an issue for Rpella could do IABP/Rpella wean today once fluid balance optimized.  Would leave patient on  5, Vaso 2 till mechanical support withdrawn.  Porter Newton

## 2017-10-18 NOTE — PROGRESS NOTE ADULT - PROBLEM SELECTOR PLAN 2
c/w vasopressor  RV impella now on P6; Hudgins repositioned in cath lab  mVO2 sat 62 this morning; cont to monitor; goal CVP 10-12  monitor swan numbers & CO/CI (Ingrid from cath lab CO/CI 6 / 2.86)  repeat CXR on readmit to ccu  Maintain Dobutamine at 5mcg/kg/min  IABP on 1:2  Neurovascular checks lower extremities

## 2017-10-18 NOTE — PROGRESS NOTE ADULT - SUBJECTIVE AND OBJECTIVE BOX
[facility] -- Sharonda Ratliff, Beba Lakhani, Manav Alonso Savella  Office # [phone]      Follow Up:  CHF    Subjective/Observations: Patient seen and examined. Events noted. Currently sedated. On CVVHD, Right heart Impella, IABP, and multiple pressors.     REVIEW OF SYSTEMS: Limited 2/2 comorbidities     PAST MEDICAL & SURGICAL HISTORY:  Vertigo  Thyroid condition  Afib  High blood pressure  No significant past surgical history      MEDICATIONS  (STANDING):  aspirin  chewable 81 milliGRAM(s) Oral daily  dexmedetomidine Infusion 0.2 MICROgram(s)/kG/Hr (4.655 mL/Hr) IV Continuous <Continuous>  DOBUTamine Infusion 5 MICROgram(s)/kG/Min (13.965 mL/Hr) IV Continuous <Continuous>  fentaNYL    Injectable 25 MICROGram(s) IV Push once  fentaNYL   Infusion 1 MICROgram(s)/kG/Hr (4.5 mL/Hr) IV Continuous <Continuous>  heparin  Infusion 600 Unit(s)/Hr (6 mL/Hr) IV Continuous <Continuous>  hEPARIN 6250 Unit(s),D5W 500 milliLiter(s) 500 milliLiter(s) (15 mL/Hr) IV Continuous <Continuous>  influenza   Vaccine 0.5 milliLiter(s) IntraMuscular once  pantoprazole  Injectable 40 milliGRAM(s) IV Push daily  piperacillin/tazobactam IVPB. 3.375 Gram(s) IV Intermittent every 12 hours  ticagrelor 90 milliGRAM(s) Oral two times a day  vancomycin  IVPB 1000 milliGRAM(s) IV Intermittent every 24 hours  vasopressin Infusion 0.04 Unit(s)/Min (2.4 mL/Hr) IV Continuous <Continuous>    MEDICATIONS  (PRN):      Allergies    No Known Allergies    Intolerances            Vital Signs Last 24 Hrs  T(C): 36.1 (18 Oct 2017 06:45), Max: 36.5 (18 Oct 2017 01:30)  T(F): 97 (18 Oct 2017 06:45), Max: 97.7 (18 Oct 2017 01:30)  HR: 72 (18 Oct 2017 06:45) (70 - 88)  BP: --  BP(mean): --  RR: 18 (18 Oct 2017 06:45) (0 - 18)  SpO2: 97% (18 Oct 2017 06:45) (95% - 99%)    I&O's Summary    17 Oct 2017 07:01  -  18 Oct 2017 07:00  --------------------------------------------------------  IN: 2310.6 mL / OUT: 1645 mL / NET: 665.6 mL          PHYSICAL EXAM:  TELE: AF 70  Constitutional: sedated  HEENT: +ETT  Pulmonary: Decreased breath sounds b/l.   Cardiovascular: Regular, S1 and S2, No murmurs, rubs, gallops or clicks  Gastrointestinal: Bowel Sounds present, soft, nontender.   Lymph: No peripheral edema. No lymphadenopathy.  Skin: No visible rashes or ulcers.  Psych:  Mood & affect appropriate    LABS: All Labs Reviewed:                        9.8    14.1  )-----------( 89       ( 18 Oct 2017 04:37 )             27.7                         10.0   15.2  )-----------( 91       ( 17 Oct 2017 22:34 )             29.5                         9.8    13.9  )-----------( 98       ( 17 Oct 2017 16:17 )             29.0     18 Oct 2017 04:37    135    |  101    |  48     ----------------------------<  128    4.0     |  21     |  3.91   17 Oct 2017 22:34    136    |  102    |  58     ----------------------------<  129    4.6     |  18     |  4.43   17 Oct 2017 16:26    139    |  104    |  69     ----------------------------<  132    5.7     |  21     |  5.51     Ca    8.0        18 Oct 2017 04:37  Ca    8.0        17 Oct 2017 22:34  Ca    7.6        17 Oct 2017 16:26  Phos  4.7       18 Oct 2017 04:37  Phos  4.8       17 Oct 2017 22:34  Phos  5.6       17 Oct 2017 16:26  Mg     2.0       18 Oct 2017 04:37  Mg     1.9       17 Oct 2017 22:34  Mg     1.8       17 Oct 2017 16:26    TPro  5.8    /  Alb  2.8    /  TBili  1.2    /  DBili  x      /  AST  341    /  ALT  274    /  AlkPhos  41     18 Oct 2017 04:37  TPro  5.7    /  Alb  2.6    /  TBili  1.3    /  DBili  x      /  AST  364    /  ALT  299    /  AlkPhos  41     17 Oct 2017 22:34  TPro  5.5    /  Alb  2.7    /  TBili  1.1    /  DBili  x      /  AST  425    /  ALT  313    /  AlkPhos  40     17 Oct 2017 16:26    PT/INR - ( 18 Oct 2017 06:01 )   PT: 12.8 sec;   INR: 1.18 ratio         PTT - ( 18 Oct 2017 06:01 )  PTT:47.4 sec  CARDIAC MARKERS ( 18 Oct 2017 04:37 )  x     / 10.87 ng/mL / 4675 U/L / x     / 27.1 ng/mL  CARDIAC MARKERS ( 17 Oct 2017 22:34 )  x     / 10.99 ng/mL / 4968 U/L / x     / 30.9 ng/mL  CARDIAC MARKERS ( 17 Oct 2017 16:26 )  x     / 13.48 ng/mL / 5078 U/L / x     / 37.8 ng/mL  CARDIAC MARKERS ( 17 Oct 2017 06:24 )  x     / 13.80 ng/mL / 5124 U/L / x     / 53.6 ng/mL  CARDIAC MARKERS ( 17 Oct 2017 01:03 )  x     / 14.83 ng/mL / 5407 U/L / x     / 77.7 ng/mL  CARDIAC MARKERS ( 16 Oct 2017 16:42 )  x     / 15.09 ng/mL / 5331 U/L / x     / 152.6 ng/mL  CARDIAC MARKERS ( 16 Oct 2017 11:25 )  x     / 13.80 ng/mL / 5097 U/L / x     / 220.8 ng/mL         < from: Limited Transthoracic Echo (10.17.17 @ 15:22) >    Patient name: KAMLESH PATEL  YOB: 1933   Age: 83 (M)   MR#: [id number]  Study Date: 10/17/2017  Location: [place] [id number]: Katelynn Ruby RDCS  Study quality: Limited  Referring Physician: PARKER BENEDICT MD  Blood Pressure: 86/50 mmHg  Height: 180 cm  Weight: 93 kg  BSA: 2.1 m2  Heart Rate: 79 mmHg  ------------------------------------------------------------------------  PROCEDURE: Limited transthoracic echocardiogram with 2-D.  M-Mode and spectral and color flow Doppler.  INDICATION: Cardiac arrest, cause unspecified (I46.9)  ------------------------------------------------------------------------  Dimensions:    Normal Values:  LA:            2.0 - 4.0 cm  Ao:     3.5    2.0 - 3.8 cm  SEPTUM: 1.0    0.6 - 1.2 cm  PWT:    1.2    0.6 - 1.1 cm  LVIDd:  4.2    3.0 - 5.6 cm  LVIDs:  2.8    1.8 - 4.0 cm  Derived variables:  LVMI: 74 g/m2  RWT: 0.57  Fractional short: 33 %  EF (Visual Estimate): 50 %  ------------------------------------------------------------------------  Observations:  Mitral Valve: Mitral annular calcification.  Aortic Valve/Aorta: Thickened aortic valve.  Normal aortic root, aortic arch and descending thoracic  aorta.  Left Ventricle: Mild segmental left ventricular systolic  dysfunction.Septal motion consistent with right  ventricular overload. The basal  inferolateral wall, the  basal inferior wall, and the mid inferior wall are  hypokinetic.  Proximal septal thickening, otherwise normal  left ventricular internal dimensions and wall thicknesses.  Right Heart: Severe right atrial enlargement. Right  ventricular enlargement with decreased right ventricular  systolic function. Impella seen in Right ventricle. Normal  tricuspid valve.  Pericardium/Pleura: No pericardial effusion seen.  Hemodynamic: Estimated right atrial pressure is 20 mm Hg.  ------------------------------------------------------------------------  Conclusions:  Limited to evaluate RV function and LV function. S/P  cardiac arrest with RV impella implantation and IABP  1. Proximal septal thickening, otherwise normal left  ventricular internal dimensions and wall thicknesses.  2. Mild segmental left ventricular systolic dysfunction.  Septal motion consistent with right ventricular overload.  The basal  inferolateral wall, the basal inferior wall, and  the mid inferior wall are hypokinetic.  3. Severe right atrial enlargement.  4. Right ventricular enlargement with decreased right  ventricular systolic function. Impella seen in Right  ventricle.  5. No pericardial effusion seen.  *** Compared with echocardiogram of 10/16/2017, there has  been interval placement of an impella in the right  ventricle.  ------------------------------------------------------------------------  Confirmed on  10/17/2017 - 19:13:21 by Pan Soni M.D.  ------------------------------------------------------------------------    < end of copied text > [facility] -- Sharonda Ratliff, Beba Lakhani Pannella, Patel, Savella  Office # [phone]      Follow Up:  CHF    Subjective/Observations: Patient seen and examined. Events noted.   On CVVHD, Right heart Impella, IABP, and multiple pressors. Responding to commands.     REVIEW OF SYSTEMS: Limited 2/2 comorbidities     PAST MEDICAL & SURGICAL HISTORY:  Vertigo  Thyroid condition  Afib  High blood pressure  No significant past surgical history      MEDICATIONS  (STANDING):  aspirin  chewable 81 milliGRAM(s) Oral daily  dexmedetomidine Infusion 0.2 MICROgram(s)/kG/Hr (4.655 mL/Hr) IV Continuous <Continuous>  DOBUTamine Infusion 5 MICROgram(s)/kG/Min (13.965 mL/Hr) IV Continuous <Continuous>  fentaNYL    Injectable 25 MICROGram(s) IV Push once  fentaNYL   Infusion 1 MICROgram(s)/kG/Hr (4.5 mL/Hr) IV Continuous <Continuous>  heparin  Infusion 600 Unit(s)/Hr (6 mL/Hr) IV Continuous <Continuous>  hEPARIN 6250 Unit(s),D5W 500 milliLiter(s) 500 milliLiter(s) (15 mL/Hr) IV Continuous <Continuous>  influenza   Vaccine 0.5 milliLiter(s) IntraMuscular once  pantoprazole  Injectable 40 milliGRAM(s) IV Push daily  piperacillin/tazobactam IVPB. 3.375 Gram(s) IV Intermittent every 12 hours  ticagrelor 90 milliGRAM(s) Oral two times a day  vancomycin  IVPB 1000 milliGRAM(s) IV Intermittent every 24 hours  vasopressin Infusion 0.04 Unit(s)/Min (2.4 mL/Hr) IV Continuous <Continuous>    MEDICATIONS  (PRN):      Allergies    No Known Allergies    Intolerances            Vital Signs Last 24 Hrs  T(C): 36.1 (18 Oct 2017 06:45), Max: 36.5 (18 Oct 2017 01:30)  T(F): 97 (18 Oct 2017 06:45), Max: 97.7 (18 Oct 2017 01:30)  HR: 72 (18 Oct 2017 06:45) (70 - 88)  BP: --  BP(mean): --  RR: 18 (18 Oct 2017 06:45) (0 - 18)  SpO2: 97% (18 Oct 2017 06:45) (95% - 99%)    I&O's Summary    17 Oct 2017 07:01  -  18 Oct 2017 07:00  --------------------------------------------------------  IN: 2310.6 mL / OUT: 1645 mL / NET: 665.6 mL          PHYSICAL EXAM:  TELE: AF 70  Constitutional: sedated  HEENT: +ETT  Pulmonary: Decreased breath sounds b/l.   Cardiovascular: Regular, S1 and S2, No murmurs, rubs, gallops or clicks  Gastrointestinal: Bowel Sounds present, soft, nontender.   Lymph: No peripheral edema. No lymphadenopathy.  Skin: No visible rashes or ulcers.  Psych:  Mood & affect appropriate    LABS: All Labs Reviewed:                        9.8    14.1  )-----------( 89       ( 18 Oct 2017 04:37 )             27.7                         10.0   15.2  )-----------( 91       ( 17 Oct 2017 22:34 )             29.5                         9.8    13.9  )-----------( 98       ( 17 Oct 2017 16:17 )             29.0     18 Oct 2017 04:37    135    |  101    |  48     ----------------------------<  128    4.0     |  21     |  3.91   17 Oct 2017 22:34    136    |  102    |  58     ----------------------------<  129    4.6     |  18     |  4.43   17 Oct 2017 16:26    139    |  104    |  69     ----------------------------<  132    5.7     |  21     |  5.51     Ca    8.0        18 Oct 2017 04:37  Ca    8.0        17 Oct 2017 22:34  Ca    7.6        17 Oct 2017 16:26  Phos  4.7       18 Oct 2017 04:37  Phos  4.8       17 Oct 2017 22:34  Phos  5.6       17 Oct 2017 16:26  Mg     2.0       18 Oct 2017 04:37  Mg     1.9       17 Oct 2017 22:34  Mg     1.8       17 Oct 2017 16:26    TPro  5.8    /  Alb  2.8    /  TBili  1.2    /  DBili  x      /  AST  341    /  ALT  274    /  AlkPhos  41     18 Oct 2017 04:37  TPro  5.7    /  Alb  2.6    /  TBili  1.3    /  DBili  x      /  AST  364    /  ALT  299    /  AlkPhos  41     17 Oct 2017 22:34  TPro  5.5    /  Alb  2.7    /  TBili  1.1    /  DBili  x      /  AST  425    /  ALT  313    /  AlkPhos  40     17 Oct 2017 16:26    PT/INR - ( 18 Oct 2017 06:01 )   PT: 12.8 sec;   INR: 1.18 ratio         PTT - ( 18 Oct 2017 06:01 )  PTT:47.4 sec  CARDIAC MARKERS ( 18 Oct 2017 04:37 )  x     / 10.87 ng/mL / 4675 U/L / x     / 27.1 ng/mL  CARDIAC MARKERS ( 17 Oct 2017 22:34 )  x     / 10.99 ng/mL / 4968 U/L / x     / 30.9 ng/mL  CARDIAC MARKERS ( 17 Oct 2017 16:26 )  x     / 13.48 ng/mL / 5078 U/L / x     / 37.8 ng/mL  CARDIAC MARKERS ( 17 Oct 2017 06:24 )  x     / 13.80 ng/mL / 5124 U/L / x     / 53.6 ng/mL  CARDIAC MARKERS ( 17 Oct 2017 01:03 )  x     / 14.83 ng/mL / 5407 U/L / x     / 77.7 ng/mL  CARDIAC MARKERS ( 16 Oct 2017 16:42 )  x     / 15.09 ng/mL / 5331 U/L / x     / 152.6 ng/mL  CARDIAC MARKERS ( 16 Oct 2017 11:25 )  x     / 13.80 ng/mL / 5097 U/L / x     / 220.8 ng/mL         < from: Limited Transthoracic Echo (10.17.17 @ 15:22) >    Patient name: KAMLESH PATEL  YOB: 1933   Age: 83 (M)   MR#: [id number]  Study Date: 10/17/2017  Location: [place] [id number]: Katelynn Ruby RDCS  Study quality: Limited  Referring Physician: PARKER BENEDICT MD  Blood Pressure: 86/50 mmHg  Height: 180 cm  Weight: 93 kg  BSA: 2.1 m2  Heart Rate: 79 mmHg  ------------------------------------------------------------------------  PROCEDURE: Limited transthoracic echocardiogram with 2-D.  M-Mode and spectral and color flow Doppler.  INDICATION: Cardiac arrest, cause unspecified (I46.9)  ------------------------------------------------------------------------  Dimensions:    Normal Values:  LA:            2.0 - 4.0 cm  Ao:     3.5    2.0 - 3.8 cm  SEPTUM: 1.0    0.6 - 1.2 cm  PWT:    1.2    0.6 - 1.1 cm  LVIDd:  4.2    3.0 - 5.6 cm  LVIDs:  2.8    1.8 - 4.0 cm  Derived variables:  LVMI: 74 g/m2  RWT: 0.57  Fractional short: 33 %  EF (Visual Estimate): 50 %  ------------------------------------------------------------------------  Observations:  Mitral Valve: Mitral annular calcification.  Aortic Valve/Aorta: Thickened aortic valve.  Normal aortic root, aortic arch and descending thoracic  aorta.  Left Ventricle: Mild segmental left ventricular systolic  dysfunction.Septal motion consistent with right  ventricular overload. The basal  inferolateral wall, the  basal inferior wall, and the mid inferior wall are  hypokinetic.  Proximal septal thickening, otherwise normal  left ventricular internal dimensions and wall thicknesses.  Right Heart: Severe right atrial enlargement. Right  ventricular enlargement with decreased right ventricular  systolic function. Impella seen in Right ventricle. Normal  tricuspid valve.  Pericardium/Pleura: No pericardial effusion seen.  Hemodynamic: Estimated right atrial pressure is 20 mm Hg.  ------------------------------------------------------------------------  Conclusions:  Limited to evaluate RV function and LV function. S/P  cardiac arrest with RV impella implantation and IABP  1. Proximal septal thickening, otherwise normal left  ventricular internal dimensions and wall thicknesses.  2. Mild segmental left ventricular systolic dysfunction.  Septal motion consistent with right ventricular overload.  The basal  inferolateral wall, the basal inferior wall, and  the mid inferior wall are hypokinetic.  3. Severe right atrial enlargement.  4. Right ventricular enlargement with decreased right  ventricular systolic function. Impella seen in Right  ventricle.  5. No pericardial effusion seen.  *** Compared with echocardiogram of 10/16/2017, there has  been interval placement of an impella in the right  ventricle.  ------------------------------------------------------------------------  Confirmed on  10/17/2017 - 19:13:21 by Pan Soni M.D.  ------------------------------------------------------------------------    < end of copied text >

## 2017-10-18 NOTE — PROGRESS NOTE ADULT - ASSESSMENT
This is an 83 male presents to ER by ambulance with rSOB. Wife at the bedside states patient has h/o Afib on Pradaxa, has "heart valve problems", has been having shortness of breath for the past few days, today was found sitting on stairs, c/o increased shortness of breath, chest pain and diaphoresis. At OSH, he was found to have an inferior wall STEMI and was brought to [facility] for cath. Upon arrival, pt appeared unwell and fixated on chest pain. Upon going to Cath lab, pt became non responsive and went into cardiogenic arrest. advanced life support was started, pt was intubated, and ROS was achieved. In cath lab, pt received a inta venus pacing wire, baloon pump, RCA stent, a swan RH cath, and an illiac balloon to stop R groin bleeding. Pt was admitted to the CCU for further management. (15 Oct 2017 17:59) This is an 83 male presents to ER w/ SOB. h/o Afib on Pradaxa, has "heart valve problems", SOB past few days, c/o increased SOB, CP & [facility]horesis[facility]. Upon arrival, pt appeared unwell and fixated on chest pain. Upon going Cath lab, pt became non responsive & into cardiogenic arrest. ACLS initiated, intubated, and ROSC achieved. In cath lab, received a intra venous pacing wire, IABP, RCA stent, & swan RH cath. Right heart failure cardiogenic shock returned to cath lab RV impella placed.

## 2017-10-18 NOTE — PROGRESS NOTE ADULT - PROBLEM SELECTOR PLAN 1
DANITA on CKD stage III. DANITA may be multifactorial in the setting of IV contrast (cardiac cath), rhabdomyolysis and ATN from hypotension/ cardiac arrest. Patient has unclear etiology of his original underlying CKD.   Patient remains Anuric with minimal urine output today. Patient initiated on CVVHDF yesterday with appropriate clearance. CK has been trending downward. Patient remains acidotic although improving. Vancomycin level appropriate. Case discussed with wife at bedside at length. Recommend renal sonogram once medically stabilized. Continue to monitor intake/output, BMP and urine output. Avoid NSAIDs, RCA and nephrotoxins

## 2017-10-18 NOTE — PROGRESS NOTE ADULT - SUBJECTIVE AND OBJECTIVE BOX
[facility] DIVISION OF KIDNEY DISEASES AND HYPERTENSION -- FOLLOW UP NOTE  --------------------------------------------------------------------------------  Chief Complaint: DANITA on CKD with Anuria requiring renal replacement therapy     24 hour events/subjective:  Patient was initiated on CVVHDF yesterday. Patient remains on high dose pressors, intubated with balloon pump and RH impella.       PAST HISTORY  --------------------------------------------------------------------------------  No significant changes to PMH, PSH, FHx, SHx, unless otherwise noted    ALLERGIES & MEDICATIONS  --------------------------------------------------------------------------------  Allergies    No Known Allergies    Intolerances      Standing Inpatient Medications  aspirin  chewable 81 milliGRAM(s) Oral daily  dexmedetomidine Infusion 0.2 MICROgram(s)/kG/Hr IV Continuous <Continuous>  DOBUTamine Infusion 5 MICROgram(s)/kG/Min IV Continuous <Continuous>  fentaNYL    Injectable 25 MICROGram(s) IV Push once  fentaNYL   Infusion 1 MICROgram(s)/kG/Hr IV Continuous <Continuous>  heparin  Infusion 600 Unit(s)/Hr IV Continuous <Continuous>  hEPARIN 6250 Unit(s),D5W 500 milliLiter(s) 500 milliLiter(s) IV Continuous <Continuous>  influenza   Vaccine 0.5 milliLiter(s) IntraMuscular once  pantoprazole  Injectable 40 milliGRAM(s) IV Push daily  piperacillin/tazobactam IVPB. 3.375 Gram(s) IV Intermittent every 12 hours  ticagrelor 90 milliGRAM(s) Oral two times a day  vancomycin  IVPB 1000 milliGRAM(s) IV Intermittent every 24 hours  vasopressin Infusion 0.04 Unit(s)/Min IV Continuous <Continuous>    PRN Inpatient Medications      REVIEW OF SYSTEMS  --------------------------------------------------------------------------------  Unable to obtain     VITALS/PHYSICAL EXAM  --------------------------------------------------------------------------------  T(C): 36.2 (10-18-17 @ 07:00), Max: 36.5 (10-18-17 @ 01:30)  HR: 94 (10-18-17 @ 08:28) (70 - 94)  BP: --  RR: 13 (10-18-17 @ 07:00) (0 - 18)  SpO2: 75% (10-18-17 @ 08:28) (75% - 99%)  Wt(kg): --        10-17-17 @ 07:01  -  10-18-17 @ 07:00  --------------------------------------------------------  IN: 2310.6 mL / OUT: 1787 mL / NET: 523.6 mL    10-18-17 @ 07:01  -  10-18-17 @ 08:35  --------------------------------------------------------  IN: 85.8 mL / OUT: 71 mL / NET: 14.8 mL      Physical Exam:  	Gen: Intubated  	HEENT: +ET  	Pulm: Course BS B/L  	CV: RRR, S1S2  	Abd: +BS, soft, nontender/nondistended + Gonzales catheter   	LE: Warm, no edema  	Skin: Warm, without rashes              Vascular Access: + RIJ non-tunnelled HD catheter     LABS/STUDIES  --------------------------------------------------------------------------------              9.8    14.1  >-----------<  89       [10-18-17 @ 04:37]              27.7     135  |  101  |  48  ----------------------------<  128      [10-18-17 @ 04:37]  4.0   |  21  |  3.91        Ca     8.0     [10-18-17 @ 04:37]      Mg     2.0     [10-18-17 @ 04:37]      Phos  4.7     [10-18-17 @ 04:37]    TPro  5.8  /  Alb  2.8  /  TBili  1.2  /  DBili  x   /  AST  341  /  ALT  274  /  AlkPhos  41  [10-18-17 @ 04:37]    PT/INR: PT 12.8 , INR 1.18       [10-18-17 @ 06:01]  PTT: 47.4       [10-18-17 @ 06:01]    Troponin 10.87      [10-18-17 @ 04:37]  CK 4675      [10-18-17 @ 04:37]  LDH 1979      [10-18-17 @ 04:37]    Creatinine Trend:  SCr 3.91 [10-18 @ 04:37]  SCr 4.43 [10-17 @ 22:34]  SCr 5.51 [10-17 @ 16:26]  SCr 5.09 [10-17 @ 10:40]  SCr 4.70 [10-17 @ 06:24]    Urinalysis - [10-15-17 @ 16:48]      Color Yellow / Appearance Turbid / SG >1.030 / pH 6.5      Gluc 50 / Ketone Negative  / Bili Negative / Urobili Negative       Blood Moderate / Protein >600 / Leuk Est Negative / Nitrite Negative      RBC >50 / WBC 3-5 / Hyaline  / Gran  / Sq Epi  / Non Sq Epi OCC / Bacteria Few      HbA1c 5.4      [10-15-17 @ 21:32]  TSH 2.40      [10-15-17 @ 21:34]  Lipid: chol 129, TG 45, HDL 41, LDL 79      [10-15-17 @ 21:40]

## 2017-10-18 NOTE — PROGRESS NOTE ADULT - SUBJECTIVE AND OBJECTIVE BOX
Patient is a 83y old  Male who presents with a chief complaint of SOB, chest pain, diaphoresis (15 Oct 2017 17:59)      Vascular Surgery Attending Progress Note    Interval HPI: pt on vent support     Medications:  aspirin  chewable 81 milliGRAM(s) Oral daily  dexmedetomidine Infusion 0.2 MICROgram(s)/kG/Hr IV Continuous <Continuous>  DOBUTamine Infusion 5 MICROgram(s)/kG/Min IV Continuous <Continuous>  fentaNYL    Injectable 25 MICROGram(s) IV Push once  fentaNYL   Infusion 1 MICROgram(s)/kG/Hr IV Continuous <Continuous>  heparin  Infusion 600 Unit(s)/Hr IV Continuous <Continuous>  hEPARIN 6250 Unit(s),D5W 500 milliLiter(s) 500 milliLiter(s) IV Continuous <Continuous>  influenza   Vaccine 0.5 milliLiter(s) IntraMuscular once  levETIRAcetam 500 milliGRAM(s) Oral two times a day  pantoprazole  Injectable 40 milliGRAM(s) IV Push daily  ticagrelor 90 milliGRAM(s) Oral two times a day  vasopressin Infusion 0.04 Unit(s)/Min IV Continuous <Continuous>      Vital Signs Last 24 Hrs  T(C): 36.5 (18 Oct 2017 16:00), Max: 36.5 (18 Oct 2017 01:30)  T(F): 97.7 (18 Oct 2017 16:00), Max: 97.7 (18 Oct 2017 01:30)  HR: 92 (18 Oct 2017 16:46) (70 - 110)  BP: --  BP(mean): --  RR: 18 (18 Oct 2017 16:30) (6 - 23)  SpO2: 91% (18 Oct 2017 16:46) (75% - 100%)  I&O's Summary    17 Oct 2017 07:01  -  18 Oct 2017 07:00  --------------------------------------------------------  IN: 2310.6 mL / OUT: 1787 mL / NET: 523.6 mL    18 Oct 2017 07:01  -  18 Oct 2017 17:03  --------------------------------------------------------  IN: 682.6 mL / OUT: 360 mL / NET: 322.6 mL        Physical Exam:  Neuro  A&Ox1 VSS  Vascular: rt kai pittman in place       LABS:                        9.3    15.7  )-----------( 83       ( 18 Oct 2017 16:09 )             25.6     10-18    135  |  100  |  43<H>  ----------------------------<  124<H>  4.0   |  20<L>  |  3.68<H>    Ca    7.9<L>      18 Oct 2017 16:09  Phos  4.6     10-18  Mg     2.2     10-18    TPro  5.8<L>  /  Alb  2.7<L>  /  TBili  1.8<H>  /  DBili  x   /  AST  324<H>  /  ALT  238<H>  /  AlkPhos  45  10-18    PT/INR - ( 18 Oct 2017 06:01 )   PT: 12.8 sec;   INR: 1.18 ratio         PTT - ( 18 Oct 2017 16:09 )  PTT:110.6 sec    CHARLY CAMERON MD  [phone]

## 2017-10-18 NOTE — CHART NOTE - NSCHARTNOTEFT_GEN_A_CORE
Patients PAC wave form changed abruptly, appears to be RV waveform. Impella RP suction alarming and wave form changed. Impella changed to P7 and alarm resolved. CXR showing PAC now in RV. Notified Cardiology Fellow Dr. Colunga, fellow discussed with attending, plan to replace PAC under flouroscopy.     VSS at this time.

## 2017-10-18 NOTE — PROGRESS NOTE ADULT - SUBJECTIVE AND OBJECTIVE BOX
Admission date: Oct 16th; Hosp day # 3  CHIEF COMPLAINT:  HPI:  83 male presents to ER by ambulance with report of shortness of breath. Wife at the bedside states patient has h/o Afib on Pradaxa, has "heart valve problems", has been having shortness of breath for the past few days, today was found sitting on stairs, c/o increased shortness of breath, chest pain and diaphoresis. At OSH, he was found to have an inferior wall STEMI and was brought to [facility] for cath. Upon arrival, pt appeared unwell and fixated on chest pain. Upon going to Cath lab, pt became non responsive and went into cardiogenic arrest. advanced life support was started, pt was intubated, and ROS was achieved. In cath lab, pt received a inta venus pacing wire, baloon pump, RCA stent, a swan RH cath, and an illiac balloon to stop R groin bleeding. Pt was admitted to the CCU for further management. (15 Oct 2017 17:59)    INTERVAL HISTORY: No events overnight At 7 am swan noted to be in RV on tracing and RP impella on P9 suction alarming.  CXR  done Impella to P7; Fellow notified    REVIEW OF SYSTEMS: Unable to assess, pt sedated / vent    MEDICATIONS  (STANDING):  aspirin  chewable 81 milliGRAM(s) Oral daily  dexmedetomidine Infusion 0.2 MICROgram(s)/kG/Hr (4.655 mL/Hr) IV Continuous <Continuous>  DOBUTamine Infusion 5 MICROgram(s)/kG/Min (13.965 mL/Hr) IV Continuous <Continuous>  fentaNYL    Injectable 25 MICROGram(s) IV Push once  fentaNYL   Infusion 1 MICROgram(s)/kG/Hr (4.5 mL/Hr) IV Continuous <Continuous>  heparin  Infusion 600 Unit(s)/Hr (6 mL/Hr) IV Continuous <Continuous>  hEPARIN 6250 Unit(s),D5W 500 milliLiter(s) 500 milliLiter(s) (15 mL/Hr) IV Continuous <Continuous>  influenza   Vaccine 0.5 milliLiter(s) IntraMuscular once  pantoprazole  Injectable 40 milliGRAM(s) IV Push daily  piperacillin/tazobactam IVPB. 3.375 Gram(s) IV Intermittent every 12 hours  ticagrelor 90 milliGRAM(s) Oral two times a day  vancomycin  IVPB 1000 milliGRAM(s) IV Intermittent every 24 hours  vasopressin Infusion 0.04 Unit(s)/Min (2.4 mL/Hr) IV Continuous <Continuous>    MEDICATIONS  (PRN):      Objective:  ICU Vital Signs Last 24 Hrs  T(C): 36.1 (18 Oct 2017 06:45), Max: 36.5 (18 Oct 2017 01:30)  T(F): 97 (18 Oct 2017 06:45), Max: 97.7 (18 Oct 2017 01:30)  HR: 72 (18 Oct 2017 06:45) (70 - 88)  BP: --  BP(mean): --  ABP: 124/58 (18 Oct 2017 06:45) (86/52 - 132/58)  ABP(mean): 88 (18 Oct 2017 06:45) (62 - 94)  RR: 18 (18 Oct 2017 06:45) (0 - 18)  SpO2: 97% (18 Oct 2017 06:45) (95% - 99%)    Mode: AC/ CMV (Assist Control/ Continuous Mandatory Ventilation)  RR (machine): 18  TV (machine): 500  FiO2: 35  PEEP: 5  ITime: 1  MAP: 9  PIP: 20    Adult Advanced Hemodynamics Last 24 Hrs  CVP(mm Hg): 10 (18 Oct 2017 06:45) (7 - 13)  CVP(cm H2O): --  CO: 5.8 (18 Oct 2017 05:00) (4.9 - 6.8)  CI: 2.7 (18 Oct 2017 05:00) (2.3 - 3.2)  PA: 46/29 (18 Oct 2017 06:45) (34/26 - 53/35)  PA(mean): 35 (18 Oct 2017 06:45) (29 - 41)  PCWP: --  SVR: 1102 (18 Oct 2017 05:00) (799 - 1206)  SVRI: 2367 (18 Oct 2017 05:00) (6957 - 2570)  PVR: --  PVRI: --      10-17 @ 07:01  -  10-18 @ 07:00  --------------------------------------------------------  IN: 2310.6 mL / OUT: 1787 mL / NET: 523.6 mL    10-18 @ 07:01  -  10-18 @ 08:24  --------------------------------------------------------  IN: 85.8 mL / OUT: 71 mL / NET: 14.8 mL      Daily     Daily Weight in k (18 Oct 2017 03:00)    PHYSICAL EXAM:  Constitutional: Intubated on sedation, responds to verbal follows commands   HEENT: Oral ET & G tube in place  Respiratory: Regular coarse breath sounds bilat  Cardiovascular:     Gastrointestinal:    Genitourinary:    Extremities:    Vascular:    Neurological:    Skin:    Lymph Nodes:    Musculoskeletal:    Psychiatric:        TELEMETRY:     EKG:     IMAGING:    Labs:  ABG - ( 18 Oct 2017 04:39 )  pH: 7.34  /  pCO2: 42    /  pO2: 123   / HCO3: 22    / Base Excess: -3.0  /  SaO2: 99                                      9.8    14.1  )-----------( 89       ( 18 Oct 2017 04:37 )             27.7     10-18    135  |  101  |  48<H>  ----------------------------<  128<H>  4.0   |  21<L>  |  3.91<H>    Ca    8.0<L>      18 Oct 2017 04:37  Phos  4.7     10-18  Mg     2.0     10-18    TPro  5.8<L>  /  Alb  2.8<L>  /  TBili  1.2  /  DBili  x   /  AST  341<H>  /  ALT  274<H>  /  AlkPhos  41  10-18    LIVER FUNCTIONS - ( 18 Oct 2017 04:37 )  Alb: 2.8 g/dL / Pro: 5.8 g/dL / ALK PHOS: 41 U/L / ALT: 274 U/L RC / AST: 341 U/L / GGT: x           PT/INR - ( 18 Oct 2017 06:01 )   PT: 12.8 sec;   INR: 1.18 ratio         PTT - ( 18 Oct 2017 06:01 )  PTT:47.4 sec  CKMB Units: 27.1 ng/mL (10-18 @ 04:37)  Creatine Kinase, Serum: 4675 U/L (10-18 @ 04:37)  Troponin T, Serum: 10.87 ng/mL (10-18 @ 04:37)  CKMB Units: 30.9 ng/mL (10-17 @ 22:34)  Creatine Kinase, Serum: 4968 U/L (10-17 @ 22:34)  Troponin T, Serum: 10.99 ng/mL (10-17 @ 22:34)  Creatine Kinase, Serum: 5078 U/L (10-17 @ 16:26)  CKMB Units: 37.8 ng/mL (10-17 @ 16:26)  Troponin T, Serum: 13.48 ng/mL (10-17 @ 16:26)        HEALTH ISSUES - PROBLEM Dx:  Proteinuria, unspecified type: Proteinuria, unspecified type  Acute kidney injury: Acute kidney injury  Proteinuria: Proteinuria  Hyperkalemia: Hyperkalemia  DANITA (acute kidney injury): DANITA (acute kidney injury)  Prophylactic measure: Prophylactic measure  Hypertension: Hypertension  Goiter diffuse, nontoxic: Goiter diffuse, nontoxic  Atrial fibrillation, chronic: Atrial fibrillation, chronic  STEMI involving right coronary artery: STEMI involving right coronary artery  Right heart failure: Right heart failure  Cardiogenic shock: Cardiogenic shock Admission date: Oct 16th; Hosp day # 3  CHIEF COMPLAINT:  HPI:  83 male presents to ER by ambulance with report of shortness of breath. Wife at the bedside states patient has h/o Afib on Pradaxa, has "heart valve problems", has been having shortness of breath for the past few days, today was found sitting on stairs, c/o increased shortness of breath, chest pain and diaphoresis. At OSH, he was found to have an inferior wall STEMI and was brought to [facility] for cath. Upon arrival, pt appeared unwell and fixated on chest pain. Upon going to Cath lab, pt became non responsive and went into cardiogenic arrest. advanced life support was started, pt was intubated, and ROS was achieved. In cath lab, pt received a inta venus pacing wire, baloon pump, RCA stent, a swan RH cath, and an illiac balloon to stop R groin bleeding. Pt was admitted to the CCU for further management. (15 Oct 2017 17:59)    INTERVAL HISTORY: No events overnight At 7 am swan noted to be in RV on tracing and RP impella on P9 suction alarming.  CXR  done Impella to P7; Fellow notified    REVIEW OF SYSTEMS: Unable to assess, pt sedated / vent    MEDICATIONS  (STANDING):  aspirin  chewable 81 milliGRAM(s) Oral daily  dexmedetomidine Infusion 0.2 MICROgram(s)/kG/Hr (4.655 mL/Hr) IV Continuous <Continuous>  DOBUTamine Infusion 5 MICROgram(s)/kG/Min (13.965 mL/Hr) IV Continuous <Continuous>  fentaNYL    Injectable 25 MICROGram(s) IV Push once  fentaNYL   Infusion 1 MICROgram(s)/kG/Hr (4.5 mL/Hr) IV Continuous <Continuous>  heparin  Infusion 600 Unit(s)/Hr (6 mL/Hr) IV Continuous <Continuous>  hEPARIN 6250 Unit(s),D5W 500 milliLiter(s) 500 milliLiter(s) (15 mL/Hr) IV Continuous <Continuous>  influenza   Vaccine 0.5 milliLiter(s) IntraMuscular once  pantoprazole  Injectable 40 milliGRAM(s) IV Push daily  piperacillin/tazobactam IVPB. 3.375 Gram(s) IV Intermittent every 12 hours  ticagrelor 90 milliGRAM(s) Oral two times a day  vancomycin  IVPB 1000 milliGRAM(s) IV Intermittent every 24 hours  vasopressin Infusion 0.04 Unit(s)/Min (2.4 mL/Hr) IV Continuous <Continuous>    MEDICATIONS  (PRN):      Objective:  ICU Vital Signs Last 24 Hrs  T(C): 36.1 (18 Oct 2017 06:45), Max: 36.5 (18 Oct 2017 01:30)  T(F): 97 (18 Oct 2017 06:45), Max: 97.7 (18 Oct 2017 01:30)  HR: 72 (18 Oct 2017 06:45) (70 - 88)  BP: --  BP(mean): --  ABP: 124/58 (18 Oct 2017 06:45) (86/52 - 132/58)  ABP(mean): 88 (18 Oct 2017 06:45) (62 - 94)  RR: 18 (18 Oct 2017 06:45) (0 - 18)  SpO2: 97% (18 Oct 2017 06:45) (95% - 99%)    Mode: AC/ CMV (Assist Control/ Continuous Mandatory Ventilation)  RR (machine): 18  TV (machine): 500  FiO2: 35  PEEP: 5  ITime: 1  MAP: 9  PIP: 20    Adult Advanced Hemodynamics Last 24 Hrs  CVP(mm Hg): 10 (18 Oct 2017 06:45) (7 - 13)  CVP(cm H2O): --  CO: 5.8 (18 Oct 2017 05:00) (4.9 - 6.8)  CI: 2.7 (18 Oct 2017 05:00) (2.3 - 3.2)  PA: 46/29 (18 Oct 2017 06:45) (34/26 - 53/35)  PA(mean): 35 (18 Oct 2017 06:45) (29 - 41)  PCWP: --  SVR: 1102 (18 Oct 2017 05:00) (799 - 1206)  SVRI: 2367 (18 Oct 2017 05:00) (1657 - 2570)  PVR: --  PVRI: --      10-17 @ 07:01  -  10-18 @ 07:00  --------------------------------------------------------  IN: 2310.6 mL / OUT: 1787 mL / NET: 523.6 mL    10-18 @ 07:01  -  10-18 @ 08:24  --------------------------------------------------------  IN: 85.8 mL / OUT: 71 mL / NET: 14.8 mL      Daily     Daily Weight in k (18 Oct 2017 03:00)    PHYSICAL EXAM:  Constitutional: Intubated on sedation, responds to verbal follows commands   HEENT: Oral ET & G tube in place  Respiratory: Regular coarse breath sounds bilat  Cardiovascular: S1 S2; 1+ edema  Right fem - RV impella P 7; groin soft no hematoma  Left fem - IABP 1:2 ECG trigger  Left fem - [place] in place (in RV on tracing & CXR)   Left groin site ok no hematoma or ecchymosis  Gastrointestinal: soft ND/NT, hypoactive bowel sounds  Genitourinary: no urine output; Currently on CVVHDF; [facility] site ok, dressing D & I  Extremities:     Vascular:    Neurological:    Skin:        TELEMETRY: No events    EKG:     IMAGING:    Labs:  ABG - ( 18 Oct 2017 04:39 )  pH: 7.34  /  pCO2: 42    /  pO2: 123   / HCO3: 22    / Base Excess: -3.0  /  SaO2: 99                                      9.8    14.1  )-----------( 89       ( 18 Oct 2017 04:37 )             27.7     10-18    135  |  101  |  48<H>  ----------------------------<  128<H>  4.0   |  21<L>  |  3.91<H>    Ca    8.0<L>      18 Oct 2017 04:37  Phos  4.7     10-18  Mg     2.0     10-18    TPro  5.8<L>  /  Alb  2.8<L>  /  TBili  1.2  /  DBili  x   /  AST  341<H>  /  ALT  274<H>  /  AlkPhos  41  10-18    LIVER FUNCTIONS - ( 18 Oct 2017 04:37 )  Alb: 2.8 g/dL / Pro: 5.8 g/dL / ALK PHOS: 41 U/L / ALT: 274 U/L RC / AST: 341 U/L / GGT: x           PT/INR - ( 18 Oct 2017 06:01 )   PT: 12.8 sec;   INR: 1.18 ratio         PTT - ( 18 Oct 2017 06:01 )  PTT:47.4 sec  CKMB Units: 27.1 ng/mL (10-18 @ 04:37)  Creatine Kinase, Serum: 4675 U/L (10-18 @ 04:37)  Troponin T, Serum: 10.87 ng/mL (10-18 @ 04:37)  CKMB Units: 30.9 ng/mL (10-17 @ 22:34)  Creatine Kinase, Serum: 4968 U/L (10-17 @ 22:34)  Troponin T, Serum: 10.99 ng/mL (10-17 @ 22:34)  Creatine Kinase, Serum: 5078 U/L (10-17 @ 16:26)  CKMB Units: 37.8 ng/mL (10-17 @ 16:26)  Troponin T, Serum: 13.48 ng/mL (10-17 @ 16:26)        HEALTH ISSUES - PROBLEM Dx:  Proteinuria, unspecified type: Proteinuria, unspecified type  Acute kidney injury: Acute kidney injury  Proteinuria: Proteinuria  Hyperkalemia: Hyperkalemia  DANITA (acute kidney injury): DANITA (acute kidney injury)  Prophylactic measure: Prophylactic measure  Hypertension: Hypertension  Goiter diffuse, nontoxic: Goiter diffuse, nontoxic  Atrial fibrillation, chronic: Atrial fibrillation, chronic  STEMI involving right coronary artery: STEMI involving right coronary artery  Right heart failure: Right heart failure  Cardiogenic shock: Cardiogenic shock Admission date: Oct 16th; Hosp day # 3  CHIEF COMPLAINT:  HPI:  83 male presents to ER by ambulance with report of shortness of breath. Wife at the bedside states patient has h/o Afib on Pradaxa, has "heart valve problems", has been having shortness of breath for the past few days, today was found sitting on stairs, c/o increased shortness of breath, chest pain and diaphoresis. At OSH, he was found to have an inferior wall STEMI and was brought to [facility] for cath. Upon arrival, pt appeared unwell and fixated on chest pain. Upon going to Cath lab, pt became non responsive and went into cardiogenic arrest. advanced life support was started, pt was intubated, and ROS was achieved. In cath lab, pt received a inta venus pacing wire, baloon pump, RCA stent, a swan RH cath, and an illiac balloon to stop R groin bleeding. Pt was admitted to the CCU for further management. (15 Oct 2017 17:59)    INTERVAL HISTORY: No events overnight. At 7 am swan noted to be in RV on tracing and RP impella on P9 suction alarming.  CXR  done, Impella decreased to P7 with suction relieved; Fellow notified    REVIEW OF SYSTEMS: Unable to assess, pt sedated / vent    MEDICATIONS  (STANDING):  aspirin  chewable 81 milliGRAM(s) Oral daily  dexmedetomidine Infusion 0.2 MICROgram(s)/kG/Hr (4.655 mL/Hr) IV Continuous <Continuous>  DOBUTamine Infusion 5 MICROgram(s)/kG/Min (13.965 mL/Hr) IV Continuous <Continuous>  fentaNYL    Injectable 25 MICROGram(s) IV Push once  fentaNYL   Infusion 1 MICROgram(s)/kG/Hr (4.5 mL/Hr) IV Continuous <Continuous>  heparin  Infusion 600 Unit(s)/Hr (6 mL/Hr) IV Continuous <Continuous>  hEPARIN 6250 Unit(s),D5W 500 milliLiter(s) 500 milliLiter(s) (15 mL/Hr) IV Continuous <Continuous>  influenza   Vaccine 0.5 milliLiter(s) IntraMuscular once  pantoprazole  Injectable 40 milliGRAM(s) IV Push daily  piperacillin/tazobactam IVPB. 3.375 Gram(s) IV Intermittent every 12 hours  ticagrelor 90 milliGRAM(s) Oral two times a day  vancomycin  IVPB 1000 milliGRAM(s) IV Intermittent every 24 hours  vasopressin Infusion 0.04 Unit(s)/Min (2.4 mL/Hr) IV Continuous <Continuous>    MEDICATIONS  (PRN):      Objective:  ICU Vital Signs Last 24 Hrs  T(C): 36.1 (18 Oct 2017 06:45), Max: 36.5 (18 Oct 2017 01:30)  T(F): 97 (18 Oct 2017 06:45), Max: 97.7 (18 Oct 2017 01:30)  HR: 72 (18 Oct 2017 06:45) (70 - 88)  BP: --  BP(mean): --  ABP: 124/58 (18 Oct 2017 06:45) (86/52 - 132/58)  ABP(mean): 88 (18 Oct 2017 06:45) (62 - 94)  RR: 18 (18 Oct 2017 06:45) (0 - 18)  SpO2: 97% (18 Oct 2017 06:45) (95% - 99%)    Mode: AC/ CMV (Assist Control/ Continuous Mandatory Ventilation)  RR (machine): 18  TV (machine): 500  FiO2: 35  PEEP: 5  ITime: 1  MAP: 9  PIP: 20    Adult Advanced Hemodynamics Last 24 Hrs  CVP(mm Hg): 10 (18 Oct 2017 06:45) (7 - 13)  CVP(cm H2O): --  CO: 5.8 (18 Oct 2017 05:00) (4.9 - 6.8)  CI: 2.7 (18 Oct 2017 05:00) (2.3 - 3.2)  PA: 46/29 (18 Oct 2017 06:45) (34/26 - 53/35)  PA(mean): 35 (18 Oct 2017 06:45) (29 - 41)  PCWP: --  SVR: 1102 (18 Oct 2017 05:00) (799 - 1206)  SVRI: 2367 (18 Oct 2017 05:00) (3577 - 2570)  PVR: --  PVRI: --      10-17 @ 07:01  -  10-18 @ 07:00  --------------------------------------------------------  IN: 2310.6 mL / OUT: 1787 mL / NET: 523.6 mL    10-18 @ 07:01  -  10-18 @ 08:24  --------------------------------------------------------  IN: 85.8 mL / OUT: 71 mL / NET: 14.8 mL      Daily     Daily Weight in k (18 Oct 2017 03:00)    PHYSICAL EXAM:  Constitutional: Intubated on sedation, responds to verbal follows commands   HEENT: Oral ET & G tube in place  Respiratory: Regular coarse breath sounds bilat  Cardiovascular: S1 S2; 1+ edema  Right fem - RV impella P 7; groin soft no hematoma  Left fem - IABP 1:2 ECG trigger  Left fem - [place] in place (in RV on tracing & CXR)   Left groin site ok no hematoma or ecchymosis  Gastrointestinal: soft ND/NT, hypoactive bowel sounds  Genitourinary: no urine output; Currently on CVVHDF; [facility] site ok, dressing D & I  Extremities:     Vascular:    Neurological:    Skin:        TELEMETRY: No events    EKG:     IMAGING:    Labs:  ABG - ( 18 Oct 2017 04:39 )  pH: 7.34  /  pCO2: 42    /  pO2: 123   / HCO3: 22    / Base Excess: -3.0  /  SaO2: 99                                      9.8    14.1  )-----------( 89       ( 18 Oct 2017 04:37 )             27.7     10-18    135  |  101  |  48<H>  ----------------------------<  128<H>  4.0   |  21<L>  |  3.91<H>    Ca    8.0<L>      18 Oct 2017 04:37  Phos  4.7     10-18  Mg     2.0     10-18    TPro  5.8<L>  /  Alb  2.8<L>  /  TBili  1.2  /  DBili  x   /  AST  341<H>  /  ALT  274<H>  /  AlkPhos  41  10-18    LIVER FUNCTIONS - ( 18 Oct 2017 04:37 )  Alb: 2.8 g/dL / Pro: 5.8 g/dL / ALK PHOS: 41 U/L / ALT: 274 U/L RC / AST: 341 U/L / GGT: x           PT/INR - ( 18 Oct 2017 06:01 )   PT: 12.8 sec;   INR: 1.18 ratio         PTT - ( 18 Oct 2017 06:01 )  PTT:47.4 sec  CKMB Units: 27.1 ng/mL (10-18 @ 04:37)  Creatine Kinase, Serum: 4675 U/L (10-18 @ 04:37)  Troponin T, Serum: 10.87 ng/mL (10-18 @ 04:37)  CKMB Units: 30.9 ng/mL (10-17 @ 22:34)  Creatine Kinase, Serum: 4968 U/L (10-17 @ 22:34)  Troponin T, Serum: 10.99 ng/mL (10-17 @ 22:34)  Creatine Kinase, Serum: 5078 U/L (10-17 @ 16:26)  CKMB Units: 37.8 ng/mL (10-17 @ 16:26)  Troponin T, Serum: 13.48 ng/mL (10-17 @ 16:26)        HEALTH ISSUES - PROBLEM Dx:  Proteinuria, unspecified type: Proteinuria, unspecified type  Acute kidney injury: Acute kidney injury  Proteinuria: Proteinuria  Hyperkalemia: Hyperkalemia  DANITA (acute kidney injury): DANITA (acute kidney injury)  Prophylactic measure: Prophylactic measure  Hypertension: Hypertension  Goiter diffuse, nontoxic: Goiter diffuse, nontoxic  Atrial fibrillation, chronic: Atrial fibrillation, chronic  STEMI involving right coronary artery: STEMI involving right coronary artery  Right heart failure: Right heart failure  Cardiogenic shock: Cardiogenic shock Admission date: Oct 16th; Hosp day # 3  CHIEF COMPLAINT:  HPI:  83 male presents to ER by ambulance with report of shortness of breath. Wife at the bedside states patient has h/o Afib on Pradaxa, has "heart valve problems", has been having shortness of breath for the past few days, today was found sitting on stairs, c/o increased shortness of breath, chest pain and diaphoresis. At OSH, he was found to have an inferior wall STEMI and was brought to [facility] for cath. Upon arrival, pt appeared unwell and fixated on chest pain. Upon going to Cath lab, pt became non responsive and went into cardiogenic arrest. advanced life support was started, pt was intubated, and ROS was achieved. In cath lab, pt received a inta venus pacing wire, baloon pump, RCA stent, a swan RH cath, and an illiac balloon to stop R groin bleeding. Pt was admitted to the CCU for further management. (15 Oct 2017 17:59)    INTERVAL HISTORY: No events overnight. At 7 am swan noted to be in RV on tracing and RP impella on P9 suction alarming.  CXR  done, Impella decreased to P7 with suction relieved; Fellow notified    REVIEW OF SYSTEMS: Unable to assess, pt sedated / vent    MEDICATIONS  (STANDING):  aspirin  chewable 81 milliGRAM(s) Oral daily  dexmedetomidine Infusion 0.2 MICROgram(s)/kG/Hr (4.655 mL/Hr) IV Continuous <Continuous>  DOBUTamine Infusion 5 MICROgram(s)/kG/Min (13.965 mL/Hr) IV Continuous <Continuous>  fentaNYL    Injectable 25 MICROGram(s) IV Push once  fentaNYL   Infusion 1 MICROgram(s)/kG/Hr (4.5 mL/Hr) IV Continuous <Continuous>  heparin  Infusion 600 Unit(s)/Hr (6 mL/Hr) IV Continuous <Continuous>  hEPARIN 6250 Unit(s),D5W 500 milliLiter(s) 500 milliLiter(s) (15 mL/Hr) IV Continuous <Continuous>  influenza   Vaccine 0.5 milliLiter(s) IntraMuscular once  pantoprazole  Injectable 40 milliGRAM(s) IV Push daily  piperacillin/tazobactam IVPB. 3.375 Gram(s) IV Intermittent every 12 hours  ticagrelor 90 milliGRAM(s) Oral two times a day  vancomycin  IVPB 1000 milliGRAM(s) IV Intermittent every 24 hours  vasopressin Infusion 0.04 Unit(s)/Min (2.4 mL/Hr) IV Continuous <Continuous>    MEDICATIONS  (PRN):      Objective:  ICU Vital Signs Last 24 Hrs  T(C): 36.1 (18 Oct 2017 06:45), Max: 36.5 (18 Oct 2017 01:30)  T(F): 97 (18 Oct 2017 06:45), Max: 97.7 (18 Oct 2017 01:30)  HR: 72 (18 Oct 2017 06:45) (70 - 88)  BP: --  BP(mean): --  ABP: 124/58 (18 Oct 2017 06:45) (86/52 - 132/58)  ABP(mean): 88 (18 Oct 2017 06:45) (62 - 94)  RR: 18 (18 Oct 2017 06:45) (0 - 18)  SpO2: 97% (18 Oct 2017 06:45) (95% - 99%)    Mode: AC/ CMV (Assist Control/ Continuous Mandatory Ventilation)  RR (machine): 18  TV (machine): 500  FiO2: 35  PEEP: 5  ITime: 1  MAP: 9  PIP: 20    Adult Advanced Hemodynamics Last 24 Hrs  CVP(mm Hg): 10 (18 Oct 2017 06:45) (7 - 13)  CVP(cm H2O): --  CO: 5.8 (18 Oct 2017 05:00) (4.9 - 6.8)  CI: 2.7 (18 Oct 2017 05:00) (2.3 - 3.2)  PA: 46/29 (18 Oct 2017 06:45) (34/26 - 53/35)  PA(mean): 35 (18 Oct 2017 06:45) (29 - 41)  PCWP: --  SVR: 1102 (18 Oct 2017 05:00) (799 - 1206)  SVRI: 2367 (18 Oct 2017 05:00) (7617 - 2570)  PVR: --  PVRI: --      10-17 @ 07:01  -  10-18 @ 07:00  --------------------------------------------------------  IN: 2310.6 mL / OUT: 1787 mL / NET: 523.6 mL    10-18 @ 07:01  -  10-18 @ 08:24  --------------------------------------------------------  IN: 85.8 mL / OUT: 71 mL / NET: 14.8 mL      Daily     Daily Weight in k (18 Oct 2017 03:00)    PHYSICAL EXAM:  Constitutional: Intubated on sedation, responds to verbal follows commands   HEENT: Oral ET & G tube in place  Respiratory: Regular coarse breath sounds bilat; Vent  / 18 / 35% / 5  Cardiovascular: S1 S2; 1+ edema  Right fem - RV impella P 7; right groin site soft no hematoma  Left fem - IABP 1:2 ECG trigger  Left fem - [place] in place (in RV on tracing & CXR)   Left groin site ok no hematoma or ecchymosis  Gastrointestinal: soft ND/NT, hypoactive bowel sounds  Genitourinary: no urine output; Currently on CVVHDF; [facility] site ok, dressing D & I  Extremities: warm, tight hand clamp to fist bilat;  right hand to command, attempts left hand but fingers clamped to fist position. occasional twitch movement noted upper extremities, less with lower ext  Vascular: warm peripherally +DP  Neurological: sedated on Fentanyl & Precedex, responded to verbal stimuli and grasp right hand to command but not letting go to command or opening up left hand  Skin: warm dry intact    A/P  Neuro - c/w sedation; f/u neuro recs  Pulm - on 35% good ABG, cont monitor  CV STEMI - c/w ASA & Brilinta; no statin 2/2 transaminitis - improving cont to monitor  Cardiogenic shock - c/w Vasopressin inf & Dobutamine inf  RP Impella in place P level decreased 2/2 suction - pt to cath lab to reposition swan  Monitor swan numbers last Ingrid this am CO/CI 5.8 / 2.7;   IABP @ 1:2 cont neurovasc checks  lactate 1.3; Monitor sVO2, lactate   GI - oral Gtube (drained 50 overnight) hypoactive bowel sounds; cont holding feeding for now reassess residual & bowel sounds; c/w Protonix   - no urine output; currently on CVVHDF I?O +523/24hrs BUN/Creat improving  ID - no growth on blood cultures, afebrile will watch off of antibiotics       TELEMETRY: No events    EKG:     IMAGING:    Labs:  ABG - ( 18 Oct 2017 04:39 )  pH: 7.34  /  pCO2: 42    /  pO2: 123   / HCO3: 22    / Base Excess: -3.0  /  SaO2: 99                                      9.8    14.1  )-----------( 89       ( 18 Oct 2017 04:37 )             27.7     10-18    135  |  101  |  48<H>  ----------------------------<  128<H>  4.0   |  21<L>  |  3.91<H>    Ca    8.0<L>      18 Oct 2017 04:37  Phos  4.7     10-18  Mg     2.0     10-18    TPro  5.8<L>  /  Alb  2.8<L>  /  TBili  1.2  /  DBili  x   /  AST  341<H>  /  ALT  274<H>  /  AlkPhos  41  10-18    LIVER FUNCTIONS - ( 18 Oct 2017 04:37 )  Alb: 2.8 g/dL / Pro: 5.8 g/dL / ALK PHOS: 41 U/L / ALT: 274 U/L RC / AST: 341 U/L / GGT: x           PT/INR - ( 18 Oct 2017 06:01 )   PT: 12.8 sec;   INR: 1.18 ratio         PTT - ( 18 Oct 2017 06:01 )  PTT:47.4 sec  CKMB Units: 27.1 ng/mL (10-18 @ 04:37)  Creatine Kinase, Serum: 4675 U/L (10-18 @ 04:37)  Troponin T, Serum: 10.87 ng/mL (10-18 @ 04:37)  CKMB Units: 30.9 ng/mL (10-17 @ 22:34)  Creatine Kinase, Serum: 4968 U/L (10-17 @ 22:34)  Troponin T, Serum: 10.99 ng/mL (10-17 @ 22:34)  Creatine Kinase, Serum: 5078 U/L (10-17 @ 16:26)  CKMB Units: 37.8 ng/mL (10-17 @ 16:26)  Troponin T, Serum: 13.48 ng/mL (10-17 @ 16:26)        HEALTH ISSUES - PROBLEM Dx:  Proteinuria, unspecified type: Proteinuria, unspecified type  Acute kidney injury: Acute kidney injury  Proteinuria: Proteinuria  Hyperkalemia: Hyperkalemia  DANITA (acute kidney injury): DANITA (acute kidney injury)  Prophylactic measure: Prophylactic measure  Hypertension: Hypertension  Goiter diffuse, nontoxic: Goiter diffuse, nontoxic  Atrial fibrillation, chronic: Atrial fibrillation, chronic  STEMI involving right coronary artery: STEMI involving right coronary artery  Right heart failure: Right heart failure  Cardiogenic shock: Cardiogenic shock Admission date: Oct 16th; Hosp day # 3  CHIEF COMPLAINT:  HPI:  83 male presents to ER by ambulance with report of shortness of breath. Wife at the bedside states patient has h/o Afib on Pradaxa, has "heart valve problems", has been having shortness of breath for the past few days, today was found sitting on stairs, c/o increased shortness of breath, chest pain and diaphoresis. At OSH, he was found to have an inferior wall STEMI and was brought to [facility] for cath. Upon arrival, pt appeared unwell and fixated on chest pain. Upon going to Cath lab, pt became non responsive and went into cardiogenic arrest. advanced life support was started, pt was intubated, and ROS was achieved. In cath lab, pt received a inta venus pacing wire, baloon pump, RCA stent, a swan RH cath, and an illiac balloon to stop R groin bleeding. Pt was admitted to the CCU for further management. (15 Oct 2017 17:59)    INTERVAL HISTORY: No events overnight. At 7 am swan noted to be in RV on tracing and RP impella on P9 suction alarming.  CXR  done, Impella decreased to P7 with suction relieved; Fellow notified    REVIEW OF SYSTEMS: Unable to assess, pt sedated / vent    MEDICATIONS  (STANDING):  aspirin  chewable 81 milliGRAM(s) Oral daily  dexmedetomidine Infusion 0.2 MICROgram(s)/kG/Hr (4.655 mL/Hr) IV Continuous <Continuous>  DOBUTamine Infusion 5 MICROgram(s)/kG/Min (13.965 mL/Hr) IV Continuous <Continuous>  fentaNYL    Injectable 25 MICROGram(s) IV Push once  fentaNYL   Infusion 1 MICROgram(s)/kG/Hr (4.5 mL/Hr) IV Continuous <Continuous>  heparin  Infusion 600 Unit(s)/Hr (6 mL/Hr) IV Continuous <Continuous>  hEPARIN 6250 Unit(s),D5W 500 milliLiter(s) 500 milliLiter(s) (15 mL/Hr) IV Continuous <Continuous>  influenza   Vaccine 0.5 milliLiter(s) IntraMuscular once  pantoprazole  Injectable 40 milliGRAM(s) IV Push daily  piperacillin/tazobactam IVPB. 3.375 Gram(s) IV Intermittent every 12 hours  ticagrelor 90 milliGRAM(s) Oral two times a day  vancomycin  IVPB 1000 milliGRAM(s) IV Intermittent every 24 hours  vasopressin Infusion 0.04 Unit(s)/Min (2.4 mL/Hr) IV Continuous <Continuous>    MEDICATIONS  (PRN):      Objective:  ICU Vital Signs Last 24 Hrs  T(C): 36.1 (18 Oct 2017 06:45), Max: 36.5 (18 Oct 2017 01:30)  T(F): 97 (18 Oct 2017 06:45), Max: 97.7 (18 Oct 2017 01:30)  HR: 72 (18 Oct 2017 06:45) (70 - 88)  BP: --  BP(mean): --  ABP: 124/58 (18 Oct 2017 06:45) (86/52 - 132/58)  ABP(mean): 88 (18 Oct 2017 06:45) (62 - 94)  RR: 18 (18 Oct 2017 06:45) (0 - 18)  SpO2: 97% (18 Oct 2017 06:45) (95% - 99%)    Mode: AC/ CMV (Assist Control/ Continuous Mandatory Ventilation)  RR (machine): 18  TV (machine): 500  FiO2: 35  PEEP: 5  ITime: 1  MAP: 9  PIP: 20    Adult Advanced Hemodynamics Last 24 Hrs  CVP(mm Hg): 10 (18 Oct 2017 06:45) (7 - 13)  CVP(cm H2O): --  CO: 5.8 (18 Oct 2017 05:00) (4.9 - 6.8)  CI: 2.7 (18 Oct 2017 05:00) (2.3 - 3.2)  PA: 46/29 (18 Oct 2017 06:45) (34/26 - 53/35)  PA(mean): 35 (18 Oct 2017 06:45) (29 - 41)  PCWP: --  SVR: 1102 (18 Oct 2017 05:00) (799 - 1206)  SVRI: 2367 (18 Oct 2017 05:00) (4667 - 2570)  PVR: --  PVRI: --      10-17 @ 07:01  -  10-18 @ 07:00  --------------------------------------------------------  IN: 2310.6 mL / OUT: 1787 mL / NET: 523.6 mL    10-18 @ 07:01  -  10-18 @ 08:24  --------------------------------------------------------  IN: 85.8 mL / OUT: 71 mL / NET: 14.8 mL      Daily     Daily Weight in k (18 Oct 2017 03:00)    PHYSICAL EXAM:  Constitutional: Intubated on sedation, responds to verbal follows commands   HEENT: Oral ET & G tube in place  Respiratory: Regular coarse breath sounds bilat; Vent  / 18 / 35% / 5  Cardiovascular: S1 S2; 1+ edema  Right fem - RV impella P 7; right groin site soft no hematoma; large ecchymosis from site lateral to hip/buttocks, soft  Left fem - IABP 1:2 ECG trigger  Left fem - Bena in place (in RV on tracing & CXR)   Left groin site ok no hematoma or ecchymosis  Gastrointestinal: soft ND/NT, hypoactive bowel sounds  Genitourinary: no urine output; Currently on CVVHDF; [facility] site ok, dressing D & I  Extremities: warm, tight hand clamp to fist bilat;  right hand to command, attempts left hand but fingers clamped to fist position. occasional twitch movement noted upper extremities, less with lower ext  Vascular: warm peripherally +DP  Neurological: sedated on Fentanyl & Precedex, responded to verbal stimuli and grasp right hand to command but not letting go to command or opening up left hand  Skin: warm dry intact    A/P  Neuro - c/w sedation; f/u neuro recs  Pulm - on 35% good ABG, cont monitor  CV STEMI - c/w ASA & Brilinta; no statin 2/2 transaminitis - improving cont to monitor  Cardiogenic shock - c/w Vasopressin inf & Dobutamine inf  RP Impella in place P level decreased 2/2 suction - pt to cath lab to reposition swan  Monitor swan numbers last Ingrid this am CO/CI 5.8 / 2.7;   IABP @ 1:2 cont neurovasc checks  lactate 1.3; Monitor sVO2, lactate   GI - oral Gtube (drained 50 overnight) hypoactive bowel sounds; cont holding feeding for now reassess residual & bowel sounds; c/w Protonix   - no urine output; currently on CVVHDF I?O +523/24hrs BUN/Creat improving  ID - no growth on blood cultures, afebrile will watch off of antibiotics       TELEMETRY: No events    EKG:     IMAGING:    Labs:  ABG - ( 18 Oct 2017 04:39 )  pH: 7.34  /  pCO2: 42    /  pO2: 123   / HCO3: 22    / Base Excess: -3.0  /  SaO2: 99                                      9.8    14.1  )-----------( 89       ( 18 Oct 2017 04:37 )             27.7     10-18    135  |  101  |  48<H>  ----------------------------<  128<H>  4.0   |  21<L>  |  3.91<H>    Ca    8.0<L>      18 Oct 2017 04:37  Phos  4.7     10-18  Mg     2.0     10-18    TPro  5.8<L>  /  Alb  2.8<L>  /  TBili  1.2  /  DBili  x   /  AST  341<H>  /  ALT  274<H>  /  AlkPhos  41  10-18    LIVER FUNCTIONS - ( 18 Oct 2017 04:37 )  Alb: 2.8 g/dL / Pro: 5.8 g/dL / ALK PHOS: 41 U/L / ALT: 274 U/L RC / AST: 341 U/L / GGT: x           PT/INR - ( 18 Oct 2017 06:01 )   PT: 12.8 sec;   INR: 1.18 ratio         PTT - ( 18 Oct 2017 06:01 )  PTT:47.4 sec  CKMB Units: 27.1 ng/mL (10-18 @ 04:37)  Creatine Kinase, Serum: 4675 U/L (10-18 @ 04:37)  Troponin T, Serum: 10.87 ng/mL (10-18 @ 04:37)  CKMB Units: 30.9 ng/mL (10-17 @ 22:34)  Creatine Kinase, Serum: 4968 U/L (10-17 @ 22:34)  Troponin T, Serum: 10.99 ng/mL (10-17 @ 22:34)  Creatine Kinase, Serum: 5078 U/L (10-17 @ 16:26)  CKMB Units: 37.8 ng/mL (10-17 @ 16:26)  Troponin T, Serum: 13.48 ng/mL (10-17 @ 16:26)        HEALTH ISSUES - PROBLEM Dx:  Proteinuria, unspecified type: Proteinuria, unspecified type  Acute kidney injury: Acute kidney injury  Proteinuria: Proteinuria  Hyperkalemia: Hyperkalemia  DANITA (acute kidney injury): DANITA (acute kidney injury)  Prophylactic measure: Prophylactic measure  Hypertension: Hypertension  Goiter diffuse, nontoxic: Goiter diffuse, nontoxic  Atrial fibrillation, chronic: Atrial fibrillation, chronic  STEMI involving right coronary artery: STEMI involving right coronary artery  Right heart failure: Right heart failure  Cardiogenic shock: Cardiogenic shock

## 2017-10-18 NOTE — PROGRESS NOTE ADULT - SUBJECTIVE AND OBJECTIVE BOX
24H hour events:  swan tracing showing RV tracing, CXR confirm swan cath out of place. plan to return to cath lab for revision, RP impella changed to P7  pt awake following some commands    05:00: cvp 10 PA 43/31 CO 5.8 CI 2.7 mixed O2 62 (prev 59)  I/O 2310/1787 (+523). wt: 99kg    AST/ALT downtrending. LDH and myoglobin downtrending. Cr downtrending. CK downtrending. Trop stable    RP impella  IABP  CVVH  Intubated    MEDICATIONS:  aspirin  chewable 81 milliGRAM(s) Oral daily  DOBUTamine Infusion 5 MICROgram(s)/kG/Min IV Continuous <Continuous>  heparin  Infusion 600 Unit(s)/Hr IV Continuous <Continuous>  ticagrelor 90 milliGRAM(s) Oral two times a day    piperacillin/tazobactam IVPB. 3.375 Gram(s) IV Intermittent every 12 hours  vancomycin  IVPB 1000 milliGRAM(s) IV Intermittent every 24 hours      dexmedetomidine Infusion 0.2 MICROgram(s)/kG/Hr IV Continuous <Continuous>  fentaNYL    Injectable 25 MICROGram(s) IV Push once  fentaNYL   Infusion 1 MICROgram(s)/kG/Hr IV Continuous <Continuous>    pantoprazole  Injectable 40 milliGRAM(s) IV Push daily    vasopressin Infusion 0.04 Unit(s)/Min IV Continuous <Continuous>    influenza   Vaccine 0.5 milliLiter(s) IntraMuscular once      REVIEW OF SYSTEMS:  Complete 10point ROS negative.    PHYSICAL EXAM:  T(C): 36.2 (10-18-17 @ 07:00), Max: 36.5 (10-18-17 @ 01:30)  HR: 80 (10-18-17 @ 07:00) (70 - 88)  BP: --  RR: 13 (10-18-17 @ 07:00) (0 - 18)  SpO2: 97% (10-18-17 @ 07:00) (95% - 99%)  Wt(kg): --  I&O's Summary    17 Oct 2017 07:01  -  18 Oct 2017 07:00  --------------------------------------------------------  IN: 2310.6 mL / OUT: 1787 mL / NET: 523.6 mL    18 Oct 2017 07:01  -  18 Oct 2017 08:28  --------------------------------------------------------  IN: 85.8 mL / OUT: 71 mL / NET: 14.8 mL        Appearance: Normal	  HEENT:   Normal oral mucosa, PERRL, EOMI	  Lymphatic: No lymphadenopathy  Cardiovascular: Normal S1 S2, No JVD, No murmurs, 2+ edema  Respiratory: coarse BS b/l	  Psychiatry: A & O x 3, Mood & affect appropriate  Gastrointestinal:  Soft, Non-tender, + BS	  Skin: No rashes, No ecchymoses, No cyanosis	  Neurologic: Non-focal  Extremities: warm totouch  Vascular: DP pulses palpable 2+ bilaterally        LABS:	 	    CBC Full  -  ( 18 Oct 2017 04:37 )  WBC Count : 14.1 K/uL  Hemoglobin : 9.8 g/dL  Hematocrit : 27.7 %  Platelet Count - Automated : 89 K/uL  Mean Cell Volume : 101.0 fl  Mean Cell Hemoglobin : 35.7 pg  Mean Cell Hemoglobin Concentration : 35.2 gm/dL  Auto Neutrophil # : 12.0 K/uL  Auto Lymphocyte # : 0.9 K/uL  Auto Monocyte # : 1.2 K/uL  Auto Eosinophil # : 0.0 K/uL  Auto Basophil # : 0.0 K/uL  Auto Neutrophil % : 84.7 %  Auto Lymphocyte % : 6.6 %  Auto Monocyte % : 8.6 %  Auto Eosinophil % : 0.1 %  Auto Basophil % : 0.0 %    10-18    135  |  101  |  48<H>  ----------------------------<  128<H>  4.0   |  21<L>  |  3.91<H>  10-17    136  |  102  |  58<H>  ----------------------------<  129<H>  4.6   |  18<L>  |  4.43<H>    Ca    8.0<L>      18 Oct 2017 04:37  Ca    8.0<L>      17 Oct 2017 22:34  Phos  4.7     10-18  Phos  4.8     10-17  Mg     2.0     10-18  Mg     1.9     10-17    TPro  5.8<L>  /  Alb  2.8<L>  /  TBili  1.2  /  DBili  x   /  AST  341<H>  /  ALT  274<H>  /  AlkPhos  41  10-18  TPro  5.7<L>  /  Alb  2.6<L>  /  TBili  1.3<H>  /  DBili  x   /  AST  364<H>  /  ALT  299<H>  /  AlkPhos  41  10-17      proBNP: Serum Pro-Brain Natriuretic Peptide: 3344 pg/mL (10-15-17 @ 16:48)    Lipid Profile:   HgA1c:   TSH:       CARDIAC MARKERS:  Troponin I, Serum: .971 ng/mL (10-15 @ 12:13)            TELEMETRY: 	    ECG:  	  RADIOLOGY:  OTHER: 	    PREVIOUS DIAGNOSTIC TESTING:    [ ] Echocardiogram:  [ ]  Catheterization:  [ ] Stress Test:  	  	  ASSESSMENT/PLAN: 	    83M pmhx of afb on pradaxa initially presented on 10/15/17 with chest pain/diaphresis/ dyspnea to OSH, [facility]ract[facility] emergently transferred here for C c/b VF arrest requiring IABP followed by oRCA stent c/b now acute right heart failure and cardiogenic shock.      Problem/Plan - 1:  ·  Problem: Right heart failure.  Plan: - with TTE showing TAPSE at 0.9 and basal 5.0cm. LV slightly compromised with VTI of 13cm.   - would cont RP impella at this time, aiming for CVP of around 8-10. impella now at P7. plan to adjust swan in cath lab.   - maintain  at 5. cont vaso for now     Problem/Plan - 2:  ·  Problem: DANITA (acute kidney injury).  Plan: ATN +/- pigment nephropathy given elevated LDH. COnsider checking haptoglobin. pace with dark brown urine. cont monitoring for now.      Problem/Plan - 3:  ·  Problem: STEMI involving right coronary artery.  Plan: cotn DAPT with a sa and ticagrelor. statin on hold for transaminitis.        Lukasz Barajas MD  [phone]

## 2017-10-18 NOTE — PROGRESS NOTE ADULT - ASSESSMENT
ASSESSMENT: Patient is a 83y old m with ARF secondary to global ischemia requiring HD access    PLAN:   Hd line placed uneventfully  HD as per renal   reconsult prn

## 2017-10-18 NOTE — PROGRESS NOTE ADULT - ASSESSMENT
ASSESSMENT: 83 male presented  to OSH  with report of shortness of breath chest pain and diaphoresis. Patient has h/o Afib on Pradaxa as well as H/O "heart valve problems". Patient was found to have an inferior wall STEMI and was brought to [facility] for cath. Upon arrival, pt appeared unwell with c/o persistent chest pain. Upon going to Cath lab, pt became non responsive and went into cardiogenic arrest. Advanced life support was started, pt was intubated, with ROSC of 30 mins.  Patient  s/p cardiac cath and LV assist device on systemic heparin gtt, code stroke 10/16 called for asymmetric pupils R>L in setting of PTT >200,  third nerve palsy. In the setting of elevated PTT, concern for brainstem ICH. Imaging shows: No evidence for acute territorial infarct or hemorrhage.  Patient noted to have recurrent  b/l upper extremity myoclonic movement, with concomitant twitching of B/L LE. EEG completed, findings indicate non-specific moderate to severe diffuse or multifocal cerebral dysfunction. There were no epileptiform abnormalities recorded. Despite negative capture of epileptiform activity, patient continues to demonstrate myoclonic activity.   Recommend Keppra 500 mg BID. Plan discussed with Primary Cardiology Team NP Arhcana Verde.

## 2017-10-18 NOTE — PROGRESS NOTE ADULT - ATTENDING COMMENTS
s/p RV inf wall MI 10.15  Still with IABP and Rpella.  SBP , mostly 115-130/40-60.   I/O: +523  RHC 5am: CVP 10, 43/31, PA 62, CI 2.7   meds: Vacno/zocyn heparin,  5, Vaso 0.04  135  |  101  |  48  ----------------------------<  128      [10-18-17 @ 04:37]  4.0   |  21  |  3.91 (decreasing)  ,  decreasing  WBC 15,000 PLT 78 (decreasing) Hb 9.8   Lactate 1.4 Pt seen and discussed am and pm.    1.  ARF--on CVVHDF.  Hemodynamic optimization key to eventual recovery.  Decent clearances interrupted by S-G reposition  2.  CHF--right heart cath with PCW in mid/high teens with excellent right sided concordance.  Would consider UF increase with CVVHDF approach if filling pressures increase.  SVR not very high.  Consistent decline in mixed venous O2 sat since removal of IABP c/w decline in CO.   Lactate is however normal and would review options with CHF group.

## 2017-10-18 NOTE — CHART NOTE - NSCHARTNOTEFT_GEN_A_CORE
====================  CCU MIDNIGHT ROUNDS  ====================    KAMLESH PATEL  [id number]    ====================  SUMMARY: 83/M with Afib on Pradaxa who came to [facility] c/o SOB found to have STEMI and transferred to [facility] for cath. Upon going Cath lab, pt became non responsive 2/2 arrest, ACLS initiated and patient was intubated, and ROSC achieved after 20 minutes. In cath lab, received a intra venous pacing wire, IABP, RCA stent, & swan RH cath. Patient now with cardiogenic shock 2/2 right heart failure returned to cath lab and RV impella was placed.  ====================        ====================  NEW EVENTS: Yesterday swan   ====================        ====================  VITALS (Last 12 hrs):  ====================    T(C): 35.7 (10-18-17 @ 23:00), Max: 36.5 (10-18-17 @ 16:00)  HR: 86 (10-18-17 @ 23:00) (74 - 110)  BP: 123/74 (10-18-17 @ 19:30) (123/74 - 123/74)  BP(mean): 91 (10-18-17 @ 19:30) (91 - 91)  ABP: 149/62 (10-18-17 @ 23:00) (96/60 - 154/70)  ABP(mean): 94 (10-18-17 @ 23:00) (74 - 104)  RR: 18 (10-18-17 @ 23:00) (18 - 23)  SpO2: 99% (10-18-17 @ 23:00) (91% - 99%)  Wt(kg): --  CVP(mm Hg): 10 (10-18-17 @ 23:00) (8 - 15)  CO: --  CI: --  PA:  (35/16 - 60/38)  PA(mean): 29 (10-18-17 @ 23:00) (22 - 46)  PA(direct): --  PCWP: --  SVR: --    TELEMETRY:    Mode: AC/ CMV (Assist Control/ Continuous Mandatory Ventilation)  RR (machine): 18  TV (machine): 500  FiO2: 35  PEEP: 5  ITime: 1  MAP: 9  PIP: 21      *BLOOD GAS/ARTERIAL/MIXED/VENOUS  *LACTATE    I&O's Summary    17 Oct 2017 07:01  -  18 Oct 2017 07:00  --------------------------------------------------------  IN: 2310.6 mL / OUT: 1787 mL / NET: 523.6 mL    18 Oct 2017 07:01  -  18 Oct 2017 23:03  --------------------------------------------------------  IN: 979.1 mL / OUT: 743 mL / NET: 236.1 mL        ====================  PLAN:  ====================    HEALTH ISSUES - PROBLEM Dx:  Proteinuria, unspecified type: Proteinuria, unspecified type  Acute kidney injury: Acute kidney injury  Proteinuria: Proteinuria  Hyperkalemia: Hyperkalemia  DANITA (acute kidney injury): DANITA (acute kidney injury)  Prophylactic measure: Prophylactic measure  Hypertension: Hypertension  Goiter diffuse, nontoxic: Goiter diffuse, nontoxic  Atrial fibrillation, chronic: Atrial fibrillation, chronic  STEMI involving right coronary artery: STEMI involving right coronary artery  Right heart failure: Right heart failure  Cardiogenic shock: Cardiogenic shock        HEALTH ISSUES - R/O PROBLEM Dx:      Spring Calderon PGY 2 ====================  CCU MIDNIGHT ROUNDS  ====================    KAMLESH PATEL  [id number]    ====================  SUMMARY: 83/M with Afib on Pradaxa who came to [facility] c/o SOB found to have STEMI and transferred to [facility] for cath. Upon going Cath lab, pt became non responsive 2/2 arrest, ACLS initiated and patient was intubated, and ROSC achieved after 20 minutes. In cath lab, received a intra venous pacing wire, IABP, RCA stent, & swan RH cath. Patient now with cardiogenic shock 2/2 right heart failure returned to cath lab and RV impella was placed. With course c/b renal failure and oliguria requiring CVVH.   ====================        ====================  NEW EVENTS: Today swan repositioned in cath lab, IABP discontinued.   ====================        ====================  VITALS (Last 12 hrs):  ====================    T(C): 35.7 (10-18-17 @ 23:00), Max: 36.5 (10-18-17 @ 16:00)  HR: 86 (10-18-17 @ 23:00) (74 - 110)  BP: 123/74 (10-18-17 @ 19:30) (123/74 - 123/74)  BP(mean): 91 (10-18-17 @ 19:30) (91 - 91)  ABP: 149/62 (10-18-17 @ 23:00) (96/60 - 154/70)  ABP(mean): 94 (10-18-17 @ 23:00) (74 - 104)  RR: 18 (10-18-17 @ 23:00) (18 - 23)  SpO2: 99% (10-18-17 @ 23:00) (91% - 99%)  Wt(kg): --  CVP(mm Hg): 10 (10-18-17 @ 23:00) (8 - 15)  CO: --  CI: --  PA:  (35/16 - 60/38)  PA(mean): 29 (10-18-17 @ 23:00) (22 - 46)  PA(direct): --  PCWP: --  SVR: --    TELEMETRY:    Mode: AC/ CMV (Assist Control/ Continuous Mandatory Ventilation)  RR (machine): 18  TV (machine): 500  FiO2: 35  PEEP: 5  ITime: 1  MAP: 9  PIP: 21      *BLOOD GAS/ARTERIAL/MIXED/VENOUS  *LACTATE    I&O's Summary    17 Oct 2017 07:01  -  18 Oct 2017 07:00  --------------------------------------------------------  IN: 2310.6 mL / OUT: 1787 mL / NET: 523.6 mL    18 Oct 2017 07:01  -  18 Oct 2017 23:03  --------------------------------------------------------  IN: 979.1 mL / OUT: 743 mL / NET: 236.1 mL        ====================  PLAN:  ====================    HEALTH ISSUES - PROBLEM Dx:  Proteinuria, unspecified type: Proteinuria, unspecified type  Acute kidney injury: Acute kidney injury  Proteinuria: Proteinuria  Hyperkalemia: Hyperkalemia  DANITA (acute kidney injury): DANITA (acute kidney injury)  Prophylactic measure: Prophylactic measure  Hypertension: Hypertension  Goiter diffuse, nontoxic: Goiter diffuse, nontoxic  Atrial fibrillation, chronic: Atrial fibrillation, chronic  STEMI involving right coronary artery: STEMI involving right coronary artery  Right heart failure: Right heart failure  Cardiogenic shock: Cardiogenic shock        HEALTH ISSUES - R/O PROBLEM Dx:      Spring Calderon PGY 2 ====================  CCU MIDNIGHT ROUNDS  ====================    KAMLESH PATEL  [id number]    ====================  SUMMARY: 83/M with Afib on Pradaxa who came to [facility] c/o SOB found to have STEMI and transferred to [facility] for cath. Upon going Cath lab, pt became non responsive 2/2 arrest, ACLS initiated and patient was intubated, and ROSC achieved after 20 minutes. In cath lab, received a intra venous pacing wire, IABP, RCA stent, & swan RH cath. Patient now with cardiogenic shock 2/2 right heart failure returned to cath lab and RV impella was placed. With course c/b renal failure and oliguria/ARF requiring CVVH.   ====================        ====================  NEW EVENTS: Today swan repositioned in cath lab, IABP discontinued. Patient hypertensive on levo; attempted to downtitrate and it was dc. Pt then hypotensive with SBP in 60s. Levo restarted and BP recovered.   ====================        ====================  VITALS (Last 12 hrs):  ====================    T(C): 35.7 (10-18-17 @ 23:00), Max: 36.5 (10-18-17 @ 16:00)  HR: 86 (10-18-17 @ 23:00) (74 - 110)  BP: 123/74 (10-18-17 @ 19:30) (123/74 - 123/74)  BP(mean): 91 (10-18-17 @ 19:30) (91 - 91)  ABP: 149/62 (10-18-17 @ 23:00) (96/60 - 154/70)  ABP(mean): 94 (10-18-17 @ 23:00) (74 - 104)  RR: 18 (10-18-17 @ 23:00) (18 - 23)  SpO2: 99% (10-18-17 @ 23:00) (91% - 99%)  Wt(kg): --  CVP(mm Hg): 10 (10-18-17 @ 23:00) (8 - 15)  CO: --  CI: --  PA:  (35/16 - 60/38)  PA(mean): 29 (10-18-17 @ 23:00) (22 - 46)  PA(direct): --  PCWP: --  SVR: --    TELEMETRY:    Mode: AC/ CMV (Assist Control/ Continuous Mandatory Ventilation)  RR (machine): 18  TV (machine): 500  FiO2: 35  PEEP: 5  ITime: 1  MAP: 9  PIP: 21      *BLOOD GAS/ARTERIAL/MIXED/VENOUS  *LACTATE    I&O's Summary    17 Oct 2017 07:01  -  18 Oct 2017 07:00  --------------------------------------------------------  IN: 2310.6 mL / OUT: 1787 mL / NET: 523.6 mL    18 Oct 2017 07:01  -  18 Oct 2017 23:03  --------------------------------------------------------  IN: 979.1 mL / OUT: 743 mL / NET: 236.1 mL        ====================  PLAN:  #STEMI  -c/w DAPT, hold lipitor for transaminitis    #Cardiogenic shock 2/2 RV Failure  -RV impella on P6, swan repositioned in cath lab  -IABP on 1:2  -monitor CO/CI every six hours  -maintain CVP 10-12; supplement with IV fluid if need    #Atrial Fibrillation  -on heparin gtts for impella  -rate controlled    #ARF  -likely 2/2 cardiogenic shock and arrest  -c/w CVVH, avoid nephrotoxins, renally dose medications    #DVT ppx - on heparin gtts  ====================    HEALTH ISSUES - PROBLEM Dx:  Proteinuria, unspecified type: Proteinuria, unspecified type  Acute kidney injury: Acute kidney injury  Proteinuria: Proteinuria  Hyperkalemia: Hyperkalemia  DANITA (acute kidney injury): DANITA (acute kidney injury)  Prophylactic measure: Prophylactic measure  Hypertension: Hypertension  Goiter diffuse, nontoxic: Goiter diffuse, nontoxic  Atrial fibrillation, chronic: Atrial fibrillation, chronic  STEMI involving right coronary artery: STEMI involving right coronary artery  Right heart failure: Right heart failure  Cardiogenic shock: Cardiogenic shock        HEALTH ISSUES - R/O PROBLEM Dx:      Spring Calderon PGY 2 ====================  CCU MIDNIGHT ROUNDS  ====================    KAMLESH PATEL  [id number]    ====================  SUMMARY: 83/M with Afib on Pradaxa who came to [facility] c/o SOB found to have STEMI and transferred to [facility] for cath. Upon going Cath lab, pt became non responsive 2/2 arrest, ACLS initiated and patient was intubated, and ROSC achieved after 20 minutes. In cath lab, received a intra venous pacing wire, IABP, RCA stent, & swan RH cath. Patient now with cardiogenic shock 2/2 right heart failure returned to cath lab and RV impella was placed. With course c/b renal failure and oliguria/ARF requiring CVVH.   ====================        ====================  NEW EVENTS: Today swan repositioned in cath lab, IABP discontinued. Patient hypertensive on vaso; attempted to downtitrate and it was dc. Pt then hypotensive with SBP in 60s. Vaso restarted and BP recovered.   ====================        ====================  VITALS (Last 12 hrs):  ====================    T(C): 35.7 (10-18-17 @ 23:00), Max: 36.5 (10-18-17 @ 16:00)  HR: 86 (10-18-17 @ 23:00) (74 - 110)  BP: 123/74 (10-18-17 @ 19:30) (123/74 - 123/74)  BP(mean): 91 (10-18-17 @ 19:30) (91 - 91)  ABP: 149/62 (10-18-17 @ 23:00) (96/60 - 154/70)  ABP(mean): 94 (10-18-17 @ 23:00) (74 - 104)  RR: 18 (10-18-17 @ 23:00) (18 - 23)  SpO2: 99% (10-18-17 @ 23:00) (91% - 99%)  Wt(kg): --  CVP(mm Hg): 10 (10-18-17 @ 23:00) (8 - 15)  CO: --  CI: --  PA:  (35/16 - 60/38)  PA(mean): 29 (10-18-17 @ 23:00) (22 - 46)  PA(direct): --  PCWP: --  SVR: --    TELEMETRY:    Mode: AC/ CMV (Assist Control/ Continuous Mandatory Ventilation)  RR (machine): 18  TV (machine): 500  FiO2: 35  PEEP: 5  ITime: 1  MAP: 9  PIP: 21      *BLOOD GAS/ARTERIAL/MIXED/VENOUS  *LACTATE    I&O's Summary    17 Oct 2017 07:01  -  18 Oct 2017 07:00  --------------------------------------------------------  IN: 2310.6 mL / OUT: 1787 mL / NET: 523.6 mL    18 Oct 2017 07:01  -  18 Oct 2017 23:03  --------------------------------------------------------  IN: 979.1 mL / OUT: 743 mL / NET: 236.1 mL        ====================  PLAN:  #STEMI  -c/w DAPT, hold lipitor for transaminitis    #Cardiogenic shock 2/2 RV Failure  -RV impella on P6, swan repositioned in cath lab  -IABP on 1:2  -monitor CO/CI every six hours  -maintain CVP 10-12; supplement with IV fluid if need    #Atrial Fibrillation  -on heparin gtts for impella  -rate controlled    #ARF  -likely 2/2 cardiogenic shock and arrest  -c/w CVVH, avoid nephrotoxins, renally dose medications    #DVT ppx - on heparin gtts  ====================    HEALTH ISSUES - PROBLEM Dx:  Proteinuria, unspecified type: Proteinuria, unspecified type  Acute kidney injury: Acute kidney injury  Proteinuria: Proteinuria  Hyperkalemia: Hyperkalemia  DANITA (acute kidney injury): DANITA (acute kidney injury)  Prophylactic measure: Prophylactic measure  Hypertension: Hypertension  Goiter diffuse, nontoxic: Goiter diffuse, nontoxic  Atrial fibrillation, chronic: Atrial fibrillation, chronic  STEMI involving right coronary artery: STEMI involving right coronary artery  Right heart failure: Right heart failure  Cardiogenic shock: Cardiogenic shock        HEALTH ISSUES - R/O PROBLEM Dx:      Spring Calderon PGY 2

## 2017-10-18 NOTE — CHART NOTE - NSCHARTNOTEFT_GEN_A_CORE
====================  CCU MIDNIGHT ROUNDS  ====================    KAMLESH PATEL  [id number]    ====================  SUMMARY: 83 year old male with HTN (on atenolol), Afib (on Pradaxa), and goiter (last checked in August) presents with IWSTEMI complicated by cardiogenic shock. Pt had presented to ER at [place] by ambulance with report of shortness of breath.  At [place] he was found to have an inferior wall STEMI and was brought to [facility] for cath.  In the Cath lab, pt became non responsive and had a cardiac arrest.  Patient was intubated, and pulsatile rhythm was restored.  In cath lab, pt underwent PCI to the ostial RCA.  [place], IAPB, impella placed as well as [place]. Patient currently on CVVH for ARF and anuria and appears to be hemolyzing based on recent lab work.   ====================      ====================  VITALS (Last 12 hrs):  ====================    T(C): 35.9 (10-17-17 @ 23:30), Max: 35.9 (10-17-17 @ 23:30)  HR: 72 (10-17-17 @ 23:38) (70 - 88)  BP: --  BP(mean): --  ABP: 126/56 (10-17-17 @ 23:30) (90/52 - 128/52)  ABP(mean): 90 (10-17-17 @ 23:30) (64 - 90)  RR: 18 (10-17-17 @ 23:30) (16 - 18)  SpO2: 97% (10-17-17 @ 23:38) (95% - 99%)  Wt(kg): --  CVP(mm Hg): 10 (10-17-17 @ 23:30) (8 - 13)  CO: 5.4 (10-17-17 @ 22:45) (5.2 - 6.8)  CI: 2.5 (10-17-17 @ 22:45) (2.5 - 3.2)  PA:  (37/28 - 53/35)  PA(mean): 38 (10-17-17 @ 23:30) (31 - 41)  PA(direct): --  PCWP: --  SVR: 1124 (10-17-17 @ 22:45) (799 - 1124)    TELEMETRY:    Mode: AC/ CMV (Assist Control/ Continuous Mandatory Ventilation)  RR (machine): 18  TV (machine): 500  FiO2: 35  PEEP: 5  ITime: 1  MAP: 9  PIP: 19      *BLOOD GAS/ARTERIAL/MIXED/VENOUS  *LACTATE    I&O's Summary    16 Oct 2017 07:01  -  17 Oct 2017 07:00  --------------------------------------------------------  IN: 3097.5 mL / OUT: 40 mL / NET: 3057.5 mL    17 Oct 2017 07:01  -  18 Oct 2017 00:04  --------------------------------------------------------  IN: 1463.2 mL / OUT: 1014 mL / NET: 449.2 mL        ====================  PLAN:  -continue to monitor downtrending platelets, hemolysis labs support active hemolysis  -hgb relatively stable  -patient on CVVH with creatinine improving  -is s/p 250 cc bolus x 2 today  -continue with broad spectrum antibiotics for lines placed during code  -consider change lines when possible.   ====================    HEALTH ISSUES - PROBLEM Dx:  Proteinuria, unspecified type: Proteinuria, unspecified type  Acute kidney injury: Acute kidney injury  Proteinuria: Proteinuria  Hyperkalemia: Hyperkalemia  DANITA (acute kidney injury): DANITA (acute kidney injury)  Prophylactic measure: Prophylactic measure  Hypertension: Hypertension  Goiter diffuse, nontoxic: Goiter diffuse, nontoxic  Atrial fibrillation, chronic: Atrial fibrillation, chronic  STEMI involving right coronary artery: STEMI involving right coronary artery  Right heart failure: Right heart failure  Cardiogenic shock: Cardiogenic shock        HEALTH ISSUES - R/O PROBLEM Dx:      Spring Calderon PGY 2

## 2017-10-18 NOTE — PROGRESS NOTE ADULT - ASSESSMENT
83 year old male with a PMH of atrial fibrillation (on Pradaxa), HTN, Thyroid Goiter, CKD stage III (dx 2016) was seen at [facility] with inferior wall STEMI and was brought to [facility] for cardiac catherization s/p PCI x 1 now with DANITA and Anuria.

## 2017-10-19 NOTE — PROGRESS NOTE ADULT - PROBLEM SELECTOR PLAN 2
c/w vasopressin decrease to 0.02u; monitor BP wean off as tolerated  RV impella now on P6; [place] in place  mVO2 sat 59 this morning; cont to monitor; goal CVP 10-12  monitor swan numbers & CO/CI (Ingrid  CO/CI )  repeat CXR on readmit to ccu  Maintain Dobutamine at 5mcg/kg/min  Neurovascular checks lower extremities.

## 2017-10-19 NOTE — PROGRESS NOTE ADULT - ASSESSMENT
83M pmhx of afb on pradaxa initially presented on 10/15/17 with chest pain/diaphresis/ dyspnea to [facility] emergently transferred here for C c/b VF arrest requiring IABP followed by oRCA stent c/b now acute right heart failure and cardiogenic shock. 83M pmhx of afb on pradaxa initially presented on 10/15/17 with chest pain/diaphresis/ dyspnea [facility] emerg[facility] transferred here for C c/b VF arrest requiring IABP followed by oRCA stent c/b now acute right heart failure and cardiogenic shock sp RP impella.

## 2017-10-19 NOTE — DIETITIAN INITIAL EVALUATION ADULT. - ENERGY NEEDS
Pt seen for length of stay in CCU. Pt with PMH including afib admitted with shortness of breath found to have inferior wall STEMI transferred to [facility] for cath pt became unresponsive now with cardiogenic shock due to R heart failure, pt returned to cath lab for RV impella. Course complicated by DANITA with anuria requiring CVVHD-renal following. Pt weaned off sedation today, remains intubated.    Ht:5'11" , Wt: 198 lbs, BMI:27.6 kg/m2, IBW: 172lbs +/- 10%, %IBW: 115%  2+ generalized edema, Skin intact

## 2017-10-19 NOTE — PROGRESS NOTE ADULT - ATTENDING COMMENTS
Remains intubated, sedated, IABP d/osmar  1.  ARF--multifactorial including pigment nephropathy, hemodynamic.  CVVHDF with good clearances.  2.  Volume overload--filling pressures modestly elevated and would not aggressively ultrafilter.  3.  CHF--inferior with prominent RV infarct although potential for associated LV modest involvement.  Mixed venous O2 sats declined in part from IABP removal while other factors being addressed

## 2017-10-19 NOTE — DIETITIAN INITIAL EVALUATION ADULT. - PROBLEM SELECTOR PLAN 7
DVT ppx: Hep drip  Diet: currently intubated, may start OG tube feeds once pt is more stable.    JACK Thakur MD-PGY1  Internal Medicine Department  Pager: [phone]

## 2017-10-19 NOTE — PROGRESS NOTE ADULT - PROBLEM SELECTOR PLAN 3
cotn DAPT with a sa and ticagrelor. statin on hold for transaminitis.    Mary Hubbard MD  [facility]  [phone] (After 5pm and on weekends, please call [phone])

## 2017-10-19 NOTE — PROGRESS NOTE ADULT - SUBJECTIVE AND OBJECTIVE BOX
[facility] DIVISION OF KIDNEY DISEASES AND HYPERTENSION -- FOLLOW UP NOTE  --------------------------------------------------------------------------------  Chief Complaint: DANITA with Anuria requiring renal replacement therapy    24 hour events/subjective:  Patient remained on CVVHDF overnight.       PAST HISTORY  --------------------------------------------------------------------------------  No significant changes to PMH, PSH, FHx, SHx, unless otherwise noted    ALLERGIES & MEDICATIONS  --------------------------------------------------------------------------------  Allergies    No Known Allergies    Intolerances      Standing Inpatient Medications  aspirin  chewable 81 milliGRAM(s) Oral daily  DOBUTamine Infusion 5 MICROgram(s)/kG/Min IV Continuous <Continuous>  fentaNYL    Injectable 25 MICROGram(s) IV Push once  fentaNYL   Infusion 1 MICROgram(s)/kG/Hr IV Continuous <Continuous>  heparin  Infusion 600 Unit(s)/Hr IV Continuous <Continuous>  hEPARIN 6250 Unit(s),D5W 500 milliLiter(s),Heparin 6,250 units  in d5w 500 ml 500 milliLiter(s) 500 milliLiter(s) IV Continuous <Continuous>  influenza   Vaccine 0.5 milliLiter(s) IntraMuscular once  levETIRAcetam 500 milliGRAM(s) Oral two times a day  midazolam Infusion 2 mG/Hr IV Continuous <Continuous>  pantoprazole  Injectable 40 milliGRAM(s) IV Push daily  potassium chloride  10 mEq/100 mL IVPB 10 milliEquivalent(s) IV Intermittent once  ticagrelor 90 milliGRAM(s) Oral two times a day  vasopressin Infusion 0.02 Unit(s)/Min IV Continuous <Continuous>    PRN Inpatient Medications      REVIEW OF SYSTEMS  --------------------------------------------------------------------------------  Unable to obtain     VITALS/PHYSICAL EXAM  --------------------------------------------------------------------------------  T(C): 36.9 (10-19-17 @ 06:30), Max: 36.9 (10-19-17 @ 06:30)  HR: 102 (10-19-17 @ 08:41) (67 - 110)  BP: 71/51 (10-18-17 @ 23:55) (71/51 - 123/74)  RR: 18 (10-19-17 @ 08:00) (6 - 23)  SpO2: 97% (10-19-17 @ 08:41) (91% - 100%)  Wt(kg): --        10-18-17 @ 07:01  -  10-19-17 @ 07:00  --------------------------------------------------------  IN: 1568.1 mL / OUT: 1320 mL / NET: 248.1 mL    10-19-17 @ 07:01  -  10-19-17 @ 09:29  --------------------------------------------------------  IN: 42.2 mL / OUT: 51 mL / NET: -8.8 mL    Physical Exam:  	Gen: Intubated  	HEENT: +ET  	Pulm: Course BS B/L  	CV: RRR, S1S2  	Abd: +BS, soft, nontender/nondistended + Gonzales catheter   	LE: Warm, no edema  	Skin: Warm, without rashes              Vascular Access: + RIJ non-tunnelled HD catheter       LABS/STUDIES  --------------------------------------------------------------------------------              8.6    15.5  >-----------<  85       [10-19-17 @ 04:32]              23.7     134  |  99  |  34  ----------------------------<  127      [10-19-17 @ 04:32]  3.3   |  22  |  2.97        Ca     8.4     [10-19-17 @ 04:32]      Mg     2.2     [10-19-17 @ 04:32]      Phos  4.2     [10-19-17 @ 04:32]    TPro  5.7  /  Alb  2.7  /  TBili  2.3  /  DBili  x   /  AST  318  /  ALT  206  /  AlkPhos  44  [10-19-17 @ 04:32]    PT/INR: PT 11.8 , INR 1.08       [10-19-17 @ 06:59]  PTT: 42.0       [10-19-17 @ 06:59]    Troponin 10.87      [10-18-17 @ 04:37]  CK 4675      [10-18-17 @ 04:37]  LDH 2720      [10-19-17 @ 04:32]    Creatinine Trend:  SCr 2.97 [10-19 @ 04:32]  SCr 3.27 [10-18 @ 22:17]  SCr 3.68 [10-18 @ 16:09]  SCr 3.48 [10-18 @ 09:48]  SCr 3.91 [10-18 @ 04:37]    Urinalysis - [10-15-17 @ 16:48]      Color Yellow / Appearance Turbid / SG >1.030 / pH 6.5      Gluc 50 / Ketone Negative  / Bili Negative / Urobili Negative       Blood Moderate / Protein >600 / Leuk Est Negative / Nitrite Negative      RBC >50 / WBC 3-5 / Hyaline  / Gran  / Sq Epi  / Non Sq Epi OCC / Bacteria Few      HbA1c 5.4      [10-15-17 @ 21:32]  TSH 2.40      [10-15-17 @ 21:34]  Lipid: chol 129, TG 45, HDL 41, LDL 79      [10-15-17 @ 21:40]

## 2017-10-19 NOTE — PROGRESS NOTE ADULT - PROBLEM SELECTOR PLAN 3
c/w impella P6  TTE this am assess LV off IABP; f/u results  swan maintain CVP 10-12  c/w Dobutamine @ 5

## 2017-10-19 NOTE — PROGRESS NOTE ADULT - SUBJECTIVE AND OBJECTIVE BOX
Subjective:  maintained on CVVHD net even  on  5 vaso 0.02  RP impella at P6  LDh remains elevated around 2700 hapto <20   CVP 10 47/24/32 MVo2 59 thermo CO 5.9 CI 2.58    Medications:  aspirin  chewable 81 milliGRAM(s) Oral daily  DOBUTamine Infusion 5 MICROgram(s)/kG/Min IV Continuous <Continuous>  fentaNYL    Injectable 25 MICROGram(s) IV Push once  fentaNYL   Infusion 1 MICROgram(s)/kG/Hr IV Continuous <Continuous>  heparin  Infusion 600 Unit(s)/Hr IV Continuous <Continuous>  hEPARIN 6250 Unit(s),D5W 500 milliLiter(s),Heparin 6,250 units  in d5w 500 ml 500 milliLiter(s) 500 milliLiter(s) IV Continuous <Continuous>  influenza   Vaccine 0.5 milliLiter(s) IntraMuscular once  levETIRAcetam 500 milliGRAM(s) Oral two times a day  midazolam Infusion 2 mG/Hr IV Continuous <Continuous>  pantoprazole  Injectable 40 milliGRAM(s) IV Push daily  ticagrelor 90 milliGRAM(s) Oral two times a day  vasopressin Infusion 0.02 Unit(s)/Min IV Continuous <Continuous>      Physical Exam:    Vitals:  T(C): 36.9 (10-19-17 @ 06:30), Max: 36.9 (10-19-17 @ 06:30)  HR: 98 (10-19-17 @ 08:00) (67 - 110)  BP: 71/51 (10-18-17 @ 23:55) (71/51 - 123/74)  BP(mean): 56 (10-18-17 @ 23:55) (56 - 91)  ABP: 158/62 (10-19-17 @ 08:00) (70/41 - 168/68)  ABP(mean): 90 (10-19-17 @ 08:00) (51 - 104)  RR: 18 (10-19-17 @ 08:00) (6 - 23)  SpO2: 92% (10-19-17 @ 08:00) (91% - 100%)  Wt(kg): --  CVP(cm H2O): --  CO: 5.9 (10-19-17 @ 05:00) (3.6 - 5.9)  CI: 2.8 (10-19-17 @ 05:00) (1.7 - 2.8)  PA: 47/24 (10-19-17 @ 08:00) (35/16 - 60/38)  PA(mean): 32 (10-19-17 @ 08:00) (21 - 46)  PCWP: --  SVR: 1029 (10-19-17 @ 05:00) (1029 - 1307)  PVR: --  Vital Signs Last 24 Hrs  T(C): 36.9 (19 Oct 2017 06:30), Max: 36.9 (19 Oct 2017 06:30)  T(F): 98.4 (19 Oct 2017 06:30), Max: 98.4 (19 Oct 2017 06:30)  HR: 98 (19 Oct 2017 08:00) (67 - 110)  BP: 71/51 (18 Oct 2017 23:55) (71/51 - 123/74)  BP(mean): 56 (18 Oct 2017 23:55) (56 - 91)  RR: 18 (19 Oct 2017 08:00) (6 - 23)  SpO2: 92% (19 Oct 2017 08:00) (91% - 100%)    Daily     Daily Weight in k.7 (19 Oct 2017 02:30)    I&O's Summary    18 Oct 2017 07:  -  19 Oct 2017 07:00  --------------------------------------------------------  IN: 1568.1 mL / OUT: 1320 mL / NET: 248.1 mL    19 Oct 2017 07:01  -  19 Oct 2017 08:29  --------------------------------------------------------  IN: 42.2 mL / OUT: 51 mL / NET: -8.8 mL        General: No distress. Comfortable.  HEENT: EOM intact.  Neck: Neck supple. JVP not elevated. No masses  Chest: Clear to auscultation bilaterally  CV: Normal S1 and S2. No murmurs, rub, or gallops. Radial pulses normal.  Abdomen: Soft, non-distended, non-tender  Skin: No rashes or skin breakdown  Neurology: Alert and oriented times three. Sensation intact  Psych: Affect normal    Labs:                        8.6    15.5  )-----------( 85       ( 19 Oct 2017 04:32 )             23.7     10-    134<L>  |  99  |  34<H>  ----------------------------<  127<H>  3.3<L>   |  22  |  2.97<H>    Ca    8.4      19 Oct 2017 04:32  Phos  4.2     10-19  Mg     2.2     10-19    TPro  5.7<L>  /  Alb  2.7<L>  /  TBili  2.3<H>  /  DBili  x   /  AST  318<H>  /  ALT  206<H>  /  AlkPhos  44  10-19    PT/INR - ( 19 Oct 2017 06:59 )   PT: 11.8 sec;   INR: 1.08 ratio         PTT - ( 19 Oct 2017 06:59 )  PTT:42.0 sec  CARDIAC MARKERS ( 18 Oct 2017 04:37 )  x     / 10.87 ng/mL / 4675 U/L / x     / 27.1 ng/mL  CARDIAC MARKERS ( 17 Oct 2017 22:34 )  x     / 10.99 ng/mL / 4968 U/L / x     / 30.9 ng/mL  CARDIAC MARKERS ( 17 Oct 2017 16:26 )  x     / 13.48 ng/mL / 5078 U/L / x     / 37.8 ng/mL      Serum Pro-Brain Natriuretic Peptide: 3344 pg/mL (10-15 @ 16:48)    Oxygen Saturation, Mixed: 59 % (10-19 @ 04:36)  Oxygen Saturation, Mixed: 50 % (10-18 @ 22:13)  Oxygen Saturation, Mixed: 47 % (10-18 @ 16:07)  Oxygen Saturation, Mixed: 48 % (10-18 @ 14:14)  Oxygen Saturation, Mixed: 62 % (10-18 @ 04:39)  Oxygen Saturation, Mixed: 59 % (10-17 @ 22:31)  Oxygen Saturation, Mixed: 63 % (10-17 @ 16:08)  Oxygen Saturation, Mixed: 68 % (10-17 @ 12:31)  Oxygen Saturation, Mixed: 67 % (10-17 @ 10:35)  Oxygen Saturation, Mixed: 61 % (10-17 @ 06:18)  Oxygen Saturation, Mixed: 64 % (10-17 @ 04:26)  Oxygen Saturation, Mixed: 58 % (10-17 @ 02:24)  Oxygen Saturation, Mixed: 62 % (10-16 @ 23:02)  Oxygen Saturation, Mixed: 60 % (10-16 @ 20:32)  Oxygen Saturation, Mixed: 66 % (10-16 @ 18:48)  Oxygen Saturation, Mixed: 84 % (10-16 @ 16:36)  Oxygen Saturation, Mixed: 72 % (10-16 @ 13:53)  Oxygen Saturation, Mixed: 68 % (10-16 @ 11:23)  Oxygen Saturation, Mixed: 69 % (10-16 @ 08:54)    Lactate Dehydrogenase, Serum: 2720 U/L (10-19 @ 04:32)  Lactate Dehydrogenase, Serum: 2950 U/L (10-18 @ 22:17)  Lactate Dehydrogenase, Serum: 2990 U/L (10-18 @ 16:09)  Lactate Dehydrogenase, Serum: 2432 U/L (10-18 @ 09:48)  Lactate Dehydrogenase, Serum: 1979 U/L (10-18 @ 04:37)  Lactate Dehydrogenase, Serum: 2048 U/L (10-17 @ 22:34)  Lactate Dehydrogenase, Serum: 1932 U/L (10-17 @ 16:26)  Lactate Dehydrogenase, Serum: 1924 U/L (10-17 @ 10:40)  Lactate Dehydrogenase, Serum: 1815 U/L (10-17 @ 06:24) Subjective:  maintained on CVVHD net even  on  5 vaso 0.02  RP impella at P6  LDh remains elevated around 2700 hapto <20   CVP 10 47/24/32 MVo2 59 thermo CO 5.9 CI 2.58    Medications:  aspirin  chewable 81 milliGRAM(s) Oral daily  DOBUTamine Infusion 5 MICROgram(s)/kG/Min IV Continuous <Continuous>  fentaNYL    Injectable 25 MICROGram(s) IV Push once  fentaNYL   Infusion 1 MICROgram(s)/kG/Hr IV Continuous <Continuous>  heparin  Infusion 600 Unit(s)/Hr IV Continuous <Continuous>  hEPARIN 6250 Unit(s),D5W 500 milliLiter(s),Heparin 6,250 units  in d5w 500 ml 500 milliLiter(s) 500 milliLiter(s) IV Continuous <Continuous>  influenza   Vaccine 0.5 milliLiter(s) IntraMuscular once  levETIRAcetam 500 milliGRAM(s) Oral two times a day  midazolam Infusion 2 mG/Hr IV Continuous <Continuous>  pantoprazole  Injectable 40 milliGRAM(s) IV Push daily  ticagrelor 90 milliGRAM(s) Oral two times a day  vasopressin Infusion 0.02 Unit(s)/Min IV Continuous <Continuous>      Physical Exam:    Vitals:  T(C): 36.9 (10-19-17 @ 06:30), Max: 36.9 (10-19-17 @ 06:30)  HR: 98 (10-19-17 @ 08:00) (67 - 110)  BP: 71/51 (10-18-17 @ 23:55) (71/51 - 123/74)  BP(mean): 56 (10-18-17 @ 23:55) (56 - 91)  ABP: 158/62 (10-19-17 @ 08:00) (70/41 - 168/68)  ABP(mean): 90 (10-19-17 @ 08:00) (51 - 104)  RR: 18 (10-19-17 @ 08:00) (6 - 23)  SpO2: 92% (10-19-17 @ 08:00) (91% - 100%)  Wt(kg): --  CVP(cm H2O): --  CO: 5.9 (10-19-17 @ 05:00) (3.6 - 5.9)  CI: 2.8 (10-19-17 @ 05:00) (1.7 - 2.8)  PA: 47/24 (10-19-17 @ 08:00) (35/16 - 60/38)  PA(mean): 32 (10-19-17 @ 08:00) (21 - 46)  PCWP: --  SVR: 1029 (10-19-17 @ 05:00) (1029 - 1307)  PVR: --  Vital Signs Last 24 Hrs  T(C): 36.9 (19 Oct 2017 06:30), Max: 36.9 (19 Oct 2017 06:30)  T(F): 98.4 (19 Oct 2017 06:30), Max: 98.4 (19 Oct 2017 06:30)  HR: 98 (19 Oct 2017 08:00) (67 - 110)  BP: 71/51 (18 Oct 2017 23:55) (71/51 - 123/74)  BP(mean): 56 (18 Oct 2017 23:55) (56 - 91)  RR: 18 (19 Oct 2017 08:00) (6 - 23)  SpO2: 92% (19 Oct 2017 08:00) (91% - 100%)    Daily     Daily Weight in k.7 (19 Oct 2017 02:30)    I&O's Summary    18 Oct 2017 07:  -  19 Oct 2017 07:00  --------------------------------------------------------  IN: 1568.1 mL / OUT: 1320 mL / NET: 248.1 mL    19 Oct 2017 07:01  -  19 Oct 2017 08:29  --------------------------------------------------------  IN: 42.2 mL / OUT: 51 mL / NET: -8.8 mL        General: intubated sedated   HEENT: EOM intact.  Neck: Neck supple. JVP not elevated. No masses  Chest: Clear to auscultation bilaterally  CV: Normal S1 and S2. No murmurs, rub, or gallops. Radial pulses normal.  Abdomen: Soft, non-distended, non-tender  Skin: No rashes or skin breakdown  Neurology: unable to assess  ext: R RP impella   Labs:                        8.6    15.5  )-----------( 85       ( 19 Oct 2017 04:32 )             23.7     10    134<L>  |  99  |  34<H>  ----------------------------<  127<H>  3.3<L>   |  22  |  2.97<H>    Ca    8.4      19 Oct 2017 04:32  Phos  4.2     10-19  Mg     2.2     10-19    TPro  5.7<L>  /  Alb  2.7<L>  /  TBili  2.3<H>  /  DBili  x   /  AST  318<H>  /  ALT  206<H>  /  AlkPhos  44  10-19    PT/INR - ( 19 Oct 2017 06:59 )   PT: 11.8 sec;   INR: 1.08 ratio         PTT - ( 19 Oct 2017 06:59 )  PTT:42.0 sec  CARDIAC MARKERS ( 18 Oct 2017 04:37 )  x     / 10.87 ng/mL / 4675 U/L / x     / 27.1 ng/mL  CARDIAC MARKERS ( 17 Oct 2017 22:34 )  x     / 10.99 ng/mL / 4968 U/L / x     / 30.9 ng/mL  CARDIAC MARKERS ( 17 Oct 2017 16:26 )  x     / 13.48 ng/mL / 5078 U/L / x     / 37.8 ng/mL      Serum Pro-Brain Natriuretic Peptide: 3344 pg/mL (10-15 @ 16:48)    Oxygen Saturation, Mixed: 59 % (10-19 @ 04:36)  Oxygen Saturation, Mixed: 50 % (10-18 @ 22:13)  Oxygen Saturation, Mixed: 47 % (10-18 @ 16:07)  Oxygen Saturation, Mixed: 48 % (10-18 @ 14:14)  Oxygen Saturation, Mixed: 62 % (10-18 @ 04:39)  Oxygen Saturation, Mixed: 59 % (10-17 @ 22:31)  Oxygen Saturation, Mixed: 63 % (10-17 @ 16:08)  Oxygen Saturation, Mixed: 68 % (10-17 @ 12:31)  Oxygen Saturation, Mixed: 67 % (10-17 @ 10:35)  Oxygen Saturation, Mixed: 61 % (10-17 @ 06:18)  Oxygen Saturation, Mixed: 64 % (10-17 @ 04:26)  Oxygen Saturation, Mixed: 58 % (10-17 @ 02:24)  Oxygen Saturation, Mixed: 62 % (10-16 @ 23:02)  Oxygen Saturation, Mixed: 60 % (10-16 @ 20:32)  Oxygen Saturation, Mixed: 66 % (10-16 @ 18:48)  Oxygen Saturation, Mixed: 84 % (10-16 @ 16:36)  Oxygen Saturation, Mixed: 72 % (10-16 @ 13:53)  Oxygen Saturation, Mixed: 68 % (10-16 @ 11:23)  Oxygen Saturation, Mixed: 69 % (10-16 @ 08:54)    Lactate Dehydrogenase, Serum: 2720 U/L (10-19 @ 04:32)  Lactate Dehydrogenase, Serum: 2950 U/L (10-18 @ 22:17)  Lactate Dehydrogenase, Serum: 2990 U/L (10-18 @ 16:09)  Lactate Dehydrogenase, Serum: 2432 U/L (10-18 @ 09:48)  Lactate Dehydrogenase, Serum: 1979 U/L (10-18 @ 04:37)  Lactate Dehydrogenase, Serum: 2048 U/L (10-17 @ 22:34)  Lactate Dehydrogenase, Serum: 1932 U/L (10-17 @ 16:26)  Lactate Dehydrogenase, Serum: 1924 U/L (10-17 @ 10:40)  Lactate Dehydrogenase, Serum: 1815 U/L (10-17 @ 06:24)

## 2017-10-19 NOTE — CHART NOTE - NSCHARTNOTEFT_GEN_A_CORE
====================  CCU MIDNIGHT ROUNDS  ====================    KAMLESH PATEL  [id number]    ====================  SUMMARY: 83/M with Afib on Pradaxa who came to [facility] c/o SOB found to have STEMI and transferred to [facility] for cath. Upon going Cath lab, pt became non responsive 2/2 arrest, ACLS initiated and patient was intubated, and ROSC achieved after 20 minutes. In cath lab, received a intra venous pacing wire, IABP, RCA stent, & swan RH cath. Patient now with cardiogenic shock 2/2 right heart failure returned to cath lab and RV impella was placed. With course c/b renal failure and oliguria/ARF requiring CVVH.   ====================        ====================  NEW EVENTS:   ====================        ====================  VITALS (Last 12 hrs):  ====================    T(C): 36.1 (10-19-17 @ 22:00), Max: 36.4 (10-19-17 @ 19:30)  HR: 90 (10-19-17 @ 22:30) (68 - 120)  BP: --  BP(mean): --  ABP: 116/48 (10-19-17 @ 22:30) (104/46 - 156/64)  ABP(mean): 66 (10-19-17 @ 22:30) (62 - 92)  RR: 16 (10-19-17 @ 22:30) (8 - 22)  SpO2: 100% (10-19-17 @ 21:00) (90% - 100%)  Wt(kg): --  CVP(mm Hg): 12 (10-19-17 @ 22:30) (11 - 20)  CO: --  CI: --  PA:  (35/21 - 140/117)  PA(mean): 27 (10-19-17 @ 22:30) (26 - 126)  PA(direct): --  PCWP: --  SVR: --    TELEMETRY:    Mode: AC/ CMV (Assist Control/ Continuous Mandatory Ventilation)  RR (machine): 18  TV (machine): 500  FiO2: 35  PEEP: 5  ITime: 1  MAP: 10  PIP: 20      *BLOOD GAS/ARTERIAL/MIXED/VENOUS  *LACTATE    I&O's Summary    18 Oct 2017 07:01  -  19 Oct 2017 07:00  --------------------------------------------------------  IN: 1568.1 mL / OUT: 1320 mL / NET: 248.1 mL    19 Oct 2017 07:01  -  19 Oct 2017 23:27  --------------------------------------------------------  IN: 949.9 mL / OUT: 1440 mL / NET: -490.1 mL        ====================  PLAN:  ====================    HEALTH ISSUES - PROBLEM Dx:  Proteinuria, unspecified type: Proteinuria, unspecified type  Acute kidney injury: Acute kidney injury  Proteinuria: Proteinuria  Hyperkalemia: Hyperkalemia  DANITA (acute kidney injury): DANITA (acute kidney injury)  Prophylactic measure: Prophylactic measure  Hypertension: Hypertension  Goiter diffuse, nontoxic: Goiter diffuse, nontoxic  Atrial fibrillation, chronic: Atrial fibrillation, chronic  STEMI involving right coronary artery: STEMI involving right coronary artery  Right heart failure: Right heart failure  Cardiogenic shock: Cardiogenic shock        HEALTH ISSUES - R/O PROBLEM Dx:      Spring Calderon PGY 2 ====================  CCU MIDNIGHT ROUNDS  ====================    KAMLESH PATEL  [id number]    ====================  SUMMARY: 83/M with Afib on Pradaxa who came to [facility] c/o SOB found to have STEMI and transferred to [facility] for cath. Upon going Cath lab, pt became non responsive 2/2 arrest, ACLS initiated and patient was intubated, and ROSC achieved after 20 minutes. In cath lab, received a intra venous pacing wire, IABP, RCA stent, & swan RH cath. Patient now with cardiogenic shock 2/2 right heart failure returned to cath lab and RV impella was placed. With course c/b renal failure and oliguria/ARF requiring CVVH.   ====================        ====================  NEW EVENTS: IABP sheath removed during day. Patient off pressors. Stable BP  ====================        ====================  VITALS (Last 12 hrs):  ====================    T(C): 36.1 (10-19-17 @ 22:00), Max: 36.4 (10-19-17 @ 19:30)  HR: 90 (10-19-17 @ 22:30) (68 - 120)  BP: --  BP(mean): --  ABP: 116/48 (10-19-17 @ 22:30) (104/46 - 156/64)  ABP(mean): 66 (10-19-17 @ 22:30) (62 - 92)  RR: 16 (10-19-17 @ 22:30) (8 - 22)  SpO2: 100% (10-19-17 @ 21:00) (90% - 100%)  Wt(kg): --  CVP(mm Hg): 12 (10-19-17 @ 22:30) (11 - 20)  CO: --  CI: --  PA:  (35/21 - 140/117)  PA(mean): 27 (10-19-17 @ 22:30) (26 - 126)  PA(direct): --  PCWP: --  SVR: --    TELEMETRY:    Mode: AC/ CMV (Assist Control/ Continuous Mandatory Ventilation)  RR (machine): 18  TV (machine): 500  FiO2: 35  PEEP: 5  ITime: 1  MAP: 10  PIP: 20      *BLOOD GAS/ARTERIAL/MIXED/VENOUS  *LACTATE    I&O's Summary    18 Oct 2017 07:01  -  19 Oct 2017 07:00  --------------------------------------------------------  IN: 1568.1 mL / OUT: 1320 mL / NET: 248.1 mL    19 Oct 2017 07:01  -  19 Oct 2017 23:27  --------------------------------------------------------  IN: 949.9 mL / OUT: 1440 mL / NET: -490.1 mL        ====================  PLAN:   #STEMI  -c/w DAPT, lipitor    #Cardiogenic shock 2/2 RV Failure  -RV impella on P6  -monitor CO/CI every six hours  -maintain CVP 10-12; supplement with IV fluid if need or remove by CVVH    #Atrial Fibrillation  -on heparin gtts for impella  -rate controlled    #ARF  -likely 2/2 cardiogenic shock and arrest  -c/w CVVH, avoid nephrotoxins, renally dose medications    #DVT ppx - on heparin gtts  ====================    HEALTH ISSUES - PROBLEM Dx:  Proteinuria, unspecified type: Proteinuria, unspecified type  Acute kidney injury: Acute kidney injury  Proteinuria: Proteinuria  Hyperkalemia: Hyperkalemia  DANITA (acute kidney injury): DANITA (acute kidney injury)  Prophylactic measure: Prophylactic measure  Hypertension: Hypertension  Goiter diffuse, nontoxic: Goiter diffuse, nontoxic  Atrial fibrillation, chronic: Atrial fibrillation, chronic  STEMI involving right coronary artery: STEMI involving right coronary artery  Right heart failure: Right heart failure  Cardiogenic shock: Cardiogenic shock        HEALTH ISSUES - R/O PROBLEM Dx:      Spring Calderon PGY 2 ====================  CCU MIDNIGHT ROUNDS  ====================    KAMLESH PATEL  [id number]    ====================  SUMMARY: 83/M with Afib on Pradaxa who came to [facility] c/o SOB found to have STEMI and transferred to [facility] for cath. Upon going Cath lab, pt became non responsive 2/2 arrest, ACLS initiated and patient was intubated, and ROSC achieved after 20 minutes. In cath lab, received a intra venous pacing wire, IABP, RCA stent, & swan RH cath. Patient now with cardiogenic shock 2/2 right heart failure returned to cath lab and RV impella was placed. With course c/b renal failure and oliguria/ARF requiring CVVH.   ====================        ====================  NEW EVENTS: IABP sheath removed during day. Patient off pressors. Stable BP  ====================        ====================  VITALS (Last 12 hrs):  ====================    T(C): 36.1 (10-19-17 @ 22:00), Max: 36.4 (10-19-17 @ 19:30)  HR: 90 (10-19-17 @ 22:30) (68 - 120)  BP: --  BP(mean): --  ABP: 116/48 (10-19-17 @ 22:30) (104/46 - 156/64)  ABP(mean): 66 (10-19-17 @ 22:30) (62 - 92)  RR: 16 (10-19-17 @ 22:30) (8 - 22)  SpO2: 100% (10-19-17 @ 21:00) (90% - 100%)  Wt(kg): --  CVP(mm Hg): 12 (10-19-17 @ 22:30) (11 - 20)  CO: --  CI: --  PA:  (35/21 - 140/117)  PA(mean): 27 (10--17 @ 22:30) (26 - 126)  PA(direct): --  PCWP: --  SVR: --    TELEMETRY:    Mode: AC/ CMV (Assist Control/ Continuous Mandatory Ventilation)  RR (machine): 18  TV (machine): 500  FiO2: 35  PEEP: 5  ITime: 1  MAP: 10  PIP: 20      *BLOOD GAS/ARTERIAL/MIXED/VENOUS  *LACTATE    I&O's Summary    18 Oct 2017 07:  -  19 Oct 2017 07:00  --------------------------------------------------------  IN: 1568.1 mL / OUT: 1320 mL / NET: 248.1 mL    19 Oct 2017 07:  -  19 Oct 2017 23:27  --------------------------------------------------------  IN: 949.9 mL / OUT: 1440 mL / NET: -490.1 mL        ====================  PLAN:   #STEMI  -c/w DAPT, lipitor    #Cardiogenic shock 2/2 RV Failure  -RV impella on P6  -monitor CO/CI every six hours  -maintain CVP 10-12; supplement with IV fluid if need or remove by CVVH    #Atrial Fibrillation  -on heparin gtts for impella  -rate controlled    #ARF  -likely 2/2 cardiogenic shock and arrest  -c/w CVVH, avoid nephrotoxins, renally dose medications    #DVT ppx - on heparin gtts  ====================    HEALTH ISSUES - PROBLEM Dx:  Proteinuria, unspecified type: Proteinuria, unspecified type  Acute kidney injury: Acute kidney injury  Proteinuria: Proteinuria  Hyperkalemia: Hyperkalemia  DANITA (acute kidney injury): DANITA (acute kidney injury)  Prophylactic measure: Prophylactic measure  Hypertension: Hypertension  Goiter diffuse, nontoxic: Goiter diffuse, nontoxic  Atrial fibrillation, chronic: Atrial fibrillation, chronic  STEMI involving right coronary artery: STEMI involving right coronary artery  Right heart failure: Right heart failure  Cardiogenic shock: Cardiogenic shock        HEALTH ISSUES - R/O PROBLEM Dx:      Spring Calderon PGY 2      Fellow Addendum:   83M pmhx of afb on pradaxa initially presented on 10/15/17 with chest pain/diaphresis/ dyspnea to OSH, IWSTEMI emergently transferred here for C c/b VF arrest requiring IABP followed by oRCA stent c/b now acute right heart failure and cardiogenic shock sp RP impella.    - Currently on CVVH at 50; Monitoring CVP closely goal 10-12; CVP this am 7 will give 250cc bolus  - Titrated off vaso, c/w   - CO 6.65 increased from 5.5 and CI 3.13 from 2.8   - Monitor Hb/plts and hemolysis labs.

## 2017-10-19 NOTE — PROGRESS NOTE ADULT - ASSESSMENT
83/M with Afib on Pradaxa who came to [facility] c/o SOB found to have STEMI and transferred to [facility] for cath. Upon going Cath lab, pt became non responsive 2/2 arrest, ACLS initiated and patient was intubated, and ROSC achieved after 20 minutes. In cath lab, received a intra venous pacing wire, IABP, RCA stent, & swan RH cath. Patient now with cardiogenic shock 2/2 right heart failure returned to cath lab and RV impella was placed. With course c/b renal failure and oliguria/ARF requiring CVVH.

## 2017-10-19 NOTE — DIETITIAN INITIAL EVALUATION ADULT. - OTHER INFO
Per wife pt with no recent changes in weight, report UBW of 198 lbs, noted in house weight elevated 205.2 lbs (10/15), 217.5 lbs (10/19) elevation likely related to fluids. Pt with no previous difficulty chewing/swallowing. No N+V in house, last BM ~3 days ago per wife. Pt NPO since 10/15, per NP possible plan to start tube feeding.

## 2017-10-19 NOTE — PROGRESS NOTE ADULT - SUBJECTIVE AND OBJECTIVE BOX
Admission date: Oct 15th Hosp day #5  CHIEF COMPLAINT:  HPI: 83 male presents to ER by ambulance with report of shortness of breath. Wife at the bedside states patient has h/o Afib on Pradaxa, has "heart valve problems", has been having shortness of breath for the past few days, today was found sitting on stairs, c/o increased shortness of breath, chest pain and diaphoresis. At OSH, he was found to have an inferior wall STEMI and was brought to [facility] for cath. Upon arrival, pt appeared unwell and fixated on chest pain. Upon going to Cath lab, pt became non responsive and went into cardiogenic arrest. advanced life support was started, pt was intubated, and ROS was achieved. In cath lab, pt received a inta venus pacing wire, baloon pump, RCA stent, a swan RH cath, and an illiac balloon to stop R groin bleeding. Pt was admitted to the CCU for further management. (15 Oct 2017 17:59)    INTERVAL HISTORY: Overnight hypertensive vasopressin stopped became hypotensive to 60 sys vasopressin restarted. Continued with impella @P6; sedated on Fentanyl & Versed    REVIEW OF SYSTEMS: Unable to assess - ventilated & sedated    MEDICATIONS  (STANDING):  aspirin  chewable 81 milliGRAM(s) Oral daily  DOBUTamine Infusion 5 MICROgram(s)/kG/Min (13.965 mL/Hr) IV Continuous <Continuous>  fentaNYL    Injectable 25 MICROGram(s) IV Push once  fentaNYL   Infusion 1 MICROgram(s)/kG/Hr (4.5 mL/Hr) IV Continuous <Continuous>  heparin  Infusion 600 Unit(s)/Hr (7 mL/Hr) IV Continuous <Continuous>  hEPARIN 6250 Unit(s),D5W 500 milliLiter(s),Heparin 6,250 units  in d5w 500 ml 500 milliLiter(s) 500 milliLiter(s) (15 mL/Hr) IV Continuous <Continuous>  influenza   Vaccine 0.5 milliLiter(s) IntraMuscular once  levETIRAcetam 500 milliGRAM(s) Oral two times a day  midazolam Infusion 2 mG/Hr (2 mL/Hr) IV Continuous <Continuous>  pantoprazole  Injectable 40 milliGRAM(s) IV Push daily  ticagrelor 90 milliGRAM(s) Oral two times a day  vasopressin Infusion 0.02 Unit(s)/Min (1.2 mL/Hr) IV Continuous <Continuous>    MEDICATIONS  (PRN):      Objective:  ICU Vital Signs Last 24 Hrs  T(C): 36.9 (19 Oct 2017 06:30), Max: 36.9 (19 Oct 2017 06:30)  T(F): 98.4 (19 Oct 2017 06:30), Max: 98.4 (19 Oct 2017 06:30)  HR: 84 (19 Oct 2017 06:30) (67 - 110)  BP: 71/51 (18 Oct 2017 23:55) (71/51 - 123/74)  BP(mean): 56 (18 Oct 2017 23:55) (56 - 91)  ABP: 110/48 (19 Oct 2017 06:30) (70/41 - 154/70)  ABP(mean): 66 (19 Oct 2017 06:30) (51 - 104)  RR: 18 (19 Oct 2017 06:30) (6 - 23)  SpO2: 97% (19 Oct 2017 06:30) (75% - 100%)    Mode: AC/ CMV (Assist Control/ Continuous Mandatory Ventilation)  RR (machine): 18  TV (machine): 500  FiO2: 35  PEEP: 5  ITime: 1  MAP: 9  PIP: 18    Adult Advanced Hemodynamics Last 24 Hrs  CVP(mm Hg): 10 (19 Oct 2017 06:30) (8 - 15)  CVP(cm H2O): --  CO: 5.9 (19 Oct 2017 05:00) (3.6 - 5.9)  CI: 2.8 (19 Oct 2017 05:00) (1.7 - 2.8)  PA: 45/25 (19 Oct 2017 06:30) (35/16 - 60/38)  PA(mean): 31 (19 Oct 2017 06:30) (21 - 46)  PCWP: --  SVR: 1029 (19 Oct 2017 05:00) (1029 - 1307)  SVRI: 2168 (19 Oct 2017 05:00) (2795 - 9350)  PVR: --  PVRI: --      10-18 @ 07:01  -  10-19 @ 07:00  --------------------------------------------------------  IN: 1568.1 mL / OUT: 1320 mL / NET: 248.1 mL      Daily     Daily Weight in k.7 (19 Oct 2017 02:30)    PHYSICAL EXAM:  Constitutional:   HEENT: Oral ET to vent; oral Gtube   Respiratory:     Cardiovascular:    Gastrointestinal:    Genitourinary:    Extremities:    Vascular:    Neurological:    Skin:          TELEMETRY:     EKG:     IMAGING:    Labs:  ABG - ( 19 Oct 2017 04:36 )  pH: 7.41  /  pCO2: 38    /  pO2: 135   / HCO3: 24    / Base Excess: .1    /  SaO2: 100                                     8.6    15.5  )-----------( 85       ( 19 Oct 2017 04:32 )             23.7     10-19    134<L>  |  99  |  34<H>  ----------------------------<  127<H>  3.3<L>   |  22  |  2.97<H>    Ca    8.4      19 Oct 2017 04:32  Phos  4.2     10-19  Mg     2.2     10-19    TPro  5.7<L>  /  Alb  2.7<L>  /  TBili  2.3<H>  /  DBili  x   /  AST  318<H>  /  ALT  206<H>  /  AlkPhos  44  10-19    LIVER FUNCTIONS - ( 19 Oct 2017 04:32 )  Alb: 2.7 g/dL / Pro: 5.7 g/dL / ALK PHOS: 44 U/L / ALT: 206 U/L RC / AST: 318 U/L / GGT: x           PT/INR - ( 19 Oct 2017 06:59 )   PT: 11.8 sec;   INR: 1.08 ratio         PTT - ( 19 Oct 2017 06:59 )  PTT:42.0 sec        HEALTH ISSUES - PROBLEM Dx:  Proteinuria, unspecified type: Proteinuria, unspecified type  Acute kidney injury: Acute kidney injury  Proteinuria: Proteinuria  Hyperkalemia: Hyperkalemia  DANITA (acute kidney injury): DANITA (acute kidney injury)  Prophylactic measure: Prophylactic measure  Hypertension: Hypertension  Goiter diffuse, nontoxic: Goiter diffuse, nontoxic  Atrial fibrillation, chronic: Atrial fibrillation, chronic  STEMI involving right coronary artery: STEMI involving right coronary artery  Right heart failure: Right heart failure  Cardiogenic shock: Cardiogenic shock Admission date: Oct 15th Hosp day #5  CHIEF COMPLAINT:  HPI: 83 male presents to ER by ambulance with report of shortness of breath. Wife at the bedside states patient has h/o Afib on Pradaxa, has "heart valve problems", has been having shortness of breath for the past few days, today was found sitting on stairs, c/o increased shortness of breath, chest pain and diaphoresis. At OSH, he was found to have an inferior wall STEMI and was brought to [facility] for cath. Upon arrival, pt appeared unwell and fixated on chest pain. Upon going to Cath lab, pt became non responsive and went into cardiogenic arrest. advanced life support was started, pt was intubated, and ROS was achieved. In cath lab, pt received a inta venus pacing wire, baloon pump, RCA stent, a swan RH cath, and an illiac balloon to stop R groin bleeding. Pt was admitted to the CCU for further management. (15 Oct 2017 17:59)    INTERVAL HISTORY: Overnight hypertensive vasopressin stopped became hypotensive to 60 sys vasopressin restarted. Continued with impella @P6; sedated on Fentanyl & Versed    REVIEW OF SYSTEMS: Unable to assess - ventilated & sedated    MEDICATIONS  (STANDING):  aspirin  chewable 81 milliGRAM(s) Oral daily  DOBUTamine Infusion 5 MICROgram(s)/kG/Min (13.965 mL/Hr) IV Continuous <Continuous>  fentaNYL    Injectable 25 MICROGram(s) IV Push once  fentaNYL   Infusion 1 MICROgram(s)/kG/Hr (4.5 mL/Hr) IV Continuous <Continuous>  heparin  Infusion 600 Unit(s)/Hr (7 mL/Hr) IV Continuous <Continuous>  hEPARIN 6250 Unit(s),D5W 500 milliLiter(s),Heparin 6,250 units  in d5w 500 ml 500 milliLiter(s) 500 milliLiter(s) (15 mL/Hr) IV Continuous <Continuous>  influenza   Vaccine 0.5 milliLiter(s) IntraMuscular once  levETIRAcetam 500 milliGRAM(s) Oral two times a day  midazolam Infusion 2 mG/Hr (2 mL/Hr) IV Continuous <Continuous>  pantoprazole  Injectable 40 milliGRAM(s) IV Push daily  ticagrelor 90 milliGRAM(s) Oral two times a day  vasopressin Infusion 0.02 Unit(s)/Min (1.2 mL/Hr) IV Continuous <Continuous>    MEDICATIONS  (PRN):      Objective:  ICU Vital Signs Last 24 Hrs  T(C): 36.9 (19 Oct 2017 06:30), Max: 36.9 (19 Oct 2017 06:30)  T(F): 98.4 (19 Oct 2017 06:30), Max: 98.4 (19 Oct 2017 06:30)  HR: 84 (19 Oct 2017 06:30) (67 - 110)  BP: 71/51 (18 Oct 2017 23:55) (71/51 - 123/74)  BP(mean): 56 (18 Oct 2017 23:55) (56 - 91)  ABP: 110/48 (19 Oct 2017 06:30) (70/41 - 154/70)  ABP(mean): 66 (19 Oct 2017 06:30) (51 - 104)  RR: 18 (19 Oct 2017 06:30) (6 - 23)  SpO2: 97% (19 Oct 2017 06:30) (75% - 100%)    Mode: AC/ CMV (Assist Control/ Continuous Mandatory Ventilation)  RR (machine): 18  TV (machine): 500  FiO2: 35  PEEP: 5  ITime: 1  MAP: 9  PIP: 18    Adult Advanced Hemodynamics Last 24 Hrs  CVP(mm Hg): 10 (19 Oct 2017 06:30) (8 - 15)  CVP(cm H2O): --  CO: 5.9 (19 Oct 2017 05:00) (3.6 - 5.9)  CI: 2.8 (19 Oct 2017 05:00) (1.7 - 2.8)  PA: 45/25 (19 Oct 2017 06:30) (35/16 - 60/38)  PA(mean): 31 (19 Oct 2017 06:30) (21 - 46)  PCWP: --  SVR: 1029 (19 Oct 2017 05:00) (1029 - 1307)  SVRI: 2168 (19 Oct 2017 05:00) (0922 - 3444)  PVR: --  PVRI: --      10-18 @ 07:01  -  10-19 @ 07:00  --------------------------------------------------------  IN: 1568.1 mL / OUT: 1320 mL / NET: 248.1 mL      Daily     Daily Weight in k.7 (19 Oct 2017 02:30)    PHYSICAL EXAM:  Constitutional: Intubated, sedated  HEENT: Oral ET to vent; oral Gtube   Respiratory: CTA bilat  Cardiovascular: S1 S2,   Right fem - RV impella P 6; right groin site soft no hematoma; large ecchymosis from site lateral to hip/buttocks, soft  Left fem - arterial 8f sheath in place  Left fem - [place] in place    Left groin site ok no hematoma or ecchymosis  Gastrointestinal: soft ND/NT, hypoactive bowel sounds  Genitourinary: no urine output; Currently on CVVHDF; [facility] site ok, dressing D & I  Extremities: warm, tight hand clamp to fist bilat;  bilat hands to command, no longer noting twitch movements upper extremities  Vascular: warm peripherally +DP  Neurological: sedated on Fentanyl & Versed, responded to verbal stimuli and grasp right hand to command   Skin: dry intact no rash, cyanosis      TELEMETRY: no events    EKG: afib    Labs:  ABG - ( 19 Oct 2017 04:36 )  pH: 7.41  /  pCO2: 38    /  pO2: 135   / HCO3: 24    / Base Excess: .1    /  SaO2: 100                             8.6    15.5  )-----------( 85       ( 19 Oct 2017 04:32 )             23.7     10-19    134<L>  |  99  |  34<H>  ----------------------------<  127<H>  3.3<L>   |  22  |  2.97<H>    Ca    8.4      19 Oct 2017 04:32  Phos  4.2     10-19  Mg     2.2     10-    TPro  5.7<L>  /  Alb  2.7<L>  /  TBili  2.3<H>  /  DBili  x   /  AST  318<H>  /  ALT  206<H>  /  AlkPhos  44  10-19    LIVER FUNCTIONS - ( 19 Oct 2017 04:32 )  Alb: 2.7 g/dL / Pro: 5.7 g/dL / ALK PHOS: 44 U/L / ALT: 206 U/L RC / AST: 318 U/L / GGT: x           PT/INR - ( 19 Oct 2017 06:59 )   PT: 11.8 sec;   INR: 1.08 ratio         PTT - ( 19 Oct 2017 06:59 )  PTT:42.0 sec        HEALTH ISSUES - PROBLEM Dx:  Proteinuria, unspecified type: Proteinuria, unspecified type  Acute kidney injury: Acute kidney injury  Proteinuria: Proteinuria  Hyperkalemia: Hyperkalemia  DANITA (acute kidney injury): DANITA (acute kidney injury)  Prophylactic measure: Prophylactic measure  Hypertension: Hypertension  Goiter diffuse, nontoxic: Goiter diffuse, nontoxic  Atrial fibrillation, chronic: Atrial fibrillation, chronic  STEMI involving right coronary artery: STEMI involving right coronary artery  Right heart failure: Right heart failure  Cardiogenic shock: Cardiogenic shock

## 2017-10-19 NOTE — PROGRESS NOTE ADULT - PROBLEM SELECTOR PLAN 1
DANITA on CKD stage III. DANITA may be multifactorial in the setting of IV contrast (cardiac cath), rhabdomyolysis and ATN from hypotension/ cardiac arrest. Patient has unclear etiology of his original underlying CKD.   Patient initiated on CVVHDF yesterday with appropriate clearance. Patient remains Anuric today with Scr of 2.94. Patient's potassium low today bc new orders from yesterday were not used (potassium was changed in bath). Will reinforce new orders today. Continue to check daily serum phosphorus (may be low in the use of CVVHDF). Continue to renally dose all medications. Continue to monitor intake/output, BMP and urine output. Avoid NSAIDs, RCA and nephrotoxins. Renal sonogram once stabilized

## 2017-10-19 NOTE — PROGRESS NOTE ADULT - PROBLEM SELECTOR PLAN 1
- with TTE showing TAPSE at 0.9 and basal 5.0cm. LV slightly compromised with VTI of 13cm.   - would cont impella at this time, aiming for CVP of around 8-10. Can consider removing iabp given decreased troponins.  - maintain  at 5 - with TTE showing TAPSE at 0.9 and basal 5.0cm. LV slightly compromised with VTI of 13cm.   - would cont impella at this time, aiming for CVP of around 8-10. Now at P6, can repeat TTE with decreased Power and eval for RV fxn, can consider removal today or tomw. Would aim for CVP of 10 at time of removal  - maintain  at 5

## 2017-10-19 NOTE — PROGRESS NOTE ADULT - ATTENDING COMMENTS
Patient is seen and examined with fellow, NP and the CCU house-staff. I agree with the history, physical and the assessment and plan.  stemi with RV infarct/shock with RP impella  TTE reviewed  wean off of vasopressin  maintain negative fluid balance ton maintain CVP ~10

## 2017-10-19 NOTE — PROGRESS NOTE ADULT - ASSESSMENT
83 year old male with a PMH of atrial fibrillation (on Pradaxa), HTN, Thyroid Goiter, CKD stage III (dx 2016) was seen at [facility] with inferior wall STEMI and was brought to [facility] for cardiac catherization s/p PCI x 1 now with DANITA and Anuria.

## 2017-10-19 NOTE — PROGRESS NOTE ADULT - ATTENDING COMMENTS
IABP out. RPella P6.  5. On CVVH net positive 200.   RA 14 PA 50/30 PA sat 59 this am.   BP labile dependent on level of sedation. Sill on low dose Vaso, but capable of mounting   Bedside Rpella wean No significant change in CVP or drop in PA pressures. PA sat sent.   Suggest:  CVVHD to remove 1L over next few hours. Then repeat Rpella wean with consideration of removal.  Porter Celis

## 2017-10-19 NOTE — DIETITIAN INITIAL EVALUATION ADULT. - NS AS NUTRI INTERV ENTERAL NUTRITION
1. As medically feasible consider initiating Vital 1.2 [place] at 10 mL/h and increasing as tolerated to goal of 75 mL/h x 24 h. This regimen at goal provides 1800 mL total volume,2160 Kcal, 135g protein, 1459 mL free water (24 Kcal/Kg, 1.5g protein/Kg based on usual weight 90 Kg), 2. Monitor tube feed infusion/tolerance, wt trends, labs, and skin, 3. RD to remain available as needed

## 2017-10-19 NOTE — CHART NOTE - NSCHARTNOTEFT_GEN_A_CORE
Removal of Femoral Sheath    Pulses in the left lower extremity are  audible by doppler. The patient was placed in the supine position. The insertion site was identified and the sutures were removed per protocol.  The 7 Pashto femoral sheath was then removed. Direct pressure was applied for  35  minutes.     Monitoring of the (left) groin and both lower extremities including neuro-vascular checks and vital signs every 15 minutes x 4, then every 30 minutes x 2, then every 1 hour was ordered.    Complications: None/Other    Comments:  patient has a deep femoral pulse in the inguinal area.

## 2017-10-20 NOTE — PROGRESS NOTE ADULT - PROBLEM SELECTOR PROBLEM 5
Acute kidney injury
Atrial fibrillation, chronic
DANITA (acute kidney injury)
Atrial fibrillation, chronic

## 2017-10-20 NOTE — PROGRESS NOTE ADULT - SUBJECTIVE AND OBJECTIVE BOX
[facility] DIVISION OF KIDNEY DISEASES AND HYPERTENSION -- FOLLOW UP NOTE  --------------------------------------------------------------------------------  Chief Complaint: DANITA with Anuria requiring renal replacement therapy     24 hour events/subjective:  Patient remains on CVVHDF overnight without complications.       PAST HISTORY  --------------------------------------------------------------------------------  No significant changes to PMH, PSH, FHx, SHx, unless otherwise noted    ALLERGIES & MEDICATIONS  --------------------------------------------------------------------------------  Allergies    No Known Allergies    Intolerances      Standing Inpatient Medications  artificial  tears Solution 1 Drop(s) Both EYES every 6 hours  aspirin  chewable 81 milliGRAM(s) Oral daily  atorvastatin 40 milliGRAM(s) Oral at bedtime  chlorhexidine 0.12% Liquid 15 milliLiter(s) Swish and Spit two times a day  DOBUTamine Infusion 5 MICROgram(s)/kG/Min IV Continuous <Continuous>  fentaNYL    Injectable 25 MICROGram(s) IV Push once  fentaNYL    Injectable 25 MICROGram(s) IV Push once  fentaNYL   Infusion 1 MICROgram(s)/kG/Hr IV Continuous <Continuous>  heparin  Infusion 700 Unit(s)/Hr IV Continuous <Continuous>  hEPARIN 6250 Unit(s),D5W 500 milliLiter(s),Heparin 6,250 units  in d5w 500 ml 500 milliLiter(s) 500 milliLiter(s) IV Continuous <Continuous>  influenza   Vaccine 0.5 milliLiter(s) IntraMuscular once  levETIRAcetam 500 milliGRAM(s) Oral two times a day  midazolam Infusion 2 mG/Hr IV Continuous <Continuous>  pantoprazole  Injectable 40 milliGRAM(s) IV Push daily  petrolatum Ophthalmic Ointment 1 Application(s) Both EYES every 6 hours  potassium chloride  20 mEq/100 mL IVPB 20 milliEquivalent(s) IV Intermittent every 1 hour  potassium phosphate IVPB 15 milliMole(s) IV Intermittent once  ticagrelor 90 milliGRAM(s) Oral two times a day    PRN Inpatient Medications  heparin  Injectable 4000 Unit(s) IV Push every 6 hours PRN      REVIEW OF SYSTEMS  --------------------------------------------------------------------------------  Unable to obtain   VITALS/PHYSICAL EXAM  --------------------------------------------------------------------------------  T(C): 36.1 (10-20-17 @ 06:00), Max: 36.4 (10-19-17 @ 19:30)  HR: 98 (10-20-17 @ 06:30) (68 - 120)  BP: --  RR: 18 (10-20-17 @ 06:30) (8 - 25)  SpO2: 94% (10-20-17 @ 06:00) (73% - 100%)  Wt(kg): --        10-19-17 @ 07:01  -  10-20-17 @ 07:00  --------------------------------------------------------  IN: 1582.7 mL / OUT: 1911 mL / NET: -328.3 mL    10-20-17 @ 07:01  -  10-20-17 @ 08:30  --------------------------------------------------------  IN: 291.9 mL / OUT: 300 mL / NET: -8.1 mL      Physical Exam:  	Gen: Intubated  	HEENT: +ET  	Pulm: Course BS B/L  	CV: RRR, S1S2  	Abd: +BS, soft, nontender/nondistended + Gonzales catheter   	LE: Warm, + edema  	Skin: Warm, without rashes              Vascular Access: + RIJ non-tunnelled HD catheter     LABS/STUDIES  --------------------------------------------------------------------------------              7.8    16.5  >-----------<  95       [10-20-17 @ 04:12]              22.5     137  |  102  |  26  ----------------------------<  104      [10-20-17 @ 04:12]  3.3   |  24  |  2.26        Ca     8.5     [10-20-17 @ 04:12]      Mg     2.5     [10-20-17 @ 04:12]      Phos  2.0     [10-20-17 @ 04:12]    TPro  5.6  /  Alb  2.5  /  TBili  2.0  /  DBili  x   /  AST  231  /  ALT  144  /  AlkPhos  51  [10-20-17 @ 04:12]    PT/INR: PT 11.8 , INR 1.09       [10-20-17 @ 04:12]  PTT: 42.0       [10-20-17 @ 04:12]    LDH 2411      [10-20-17 @ 04:12]    Creatinine Trend:  SCr 2.26 [10-20 @ 04:12]  SCr 2.36 [10-20 @ 00:36]  SCr 2.46 [10-19 @ 20:58]  SCr 2.27 [10-19 @ 14:43]  SCr 2.81 [10-19 @ 13:30]    Urinalysis - [10-15-17 @ 16:48]      Color Yellow / Appearance Turbid / SG >1.030 / pH 6.5      Gluc 50 / Ketone Negative  / Bili Negative / Urobili Negative       Blood Moderate / Protein >600 / Leuk Est Negative / Nitrite Negative      RBC >50 / WBC 3-5 / Hyaline  / Gran  / Sq Epi  / Non Sq Epi OCC / Bacteria Few      HbA1c 5.4      [10-15-17 @ 21:32]  TSH 2.40      [10-15-17 @ 21:34]  Lipid: chol 129, TG 45, HDL 41, LDL 79      [10-15-17 @ 21:40]    HBsAg Nonreact      [10-19-17 @ 07:25]  HCV 0.15, Nonreact      [10-19-17 @ 07:25]  HIV Nonreact      [10-19-17 @ 07:25]    dsDNA <12      [10-19-17 @ 07:25]  C3 Complement 72      [10-19-17 @ 07:25]  C4 Complement 14      [10-19-17 @ 07:25]  Free Light Chains: kappa 15.20, lambda 10.50, ratio = 1.45      [10-19 @ 07:25]

## 2017-10-20 NOTE — PROGRESS NOTE ADULT - ASSESSMENT
83M pmhx of afb on pradaxa initially presented on 10/15/17 with chest pain/diaphresis/ dyspnea to OSH, KENDY emergently transferred here for C c/b VF arrest requiring IABP followed by oRCA stent c/b now acute right heart failure and cardiogenic shock sp RP impella.

## 2017-10-20 NOTE — PROGRESS NOTE ADULT - ASSESSMENT
83 year old male with a PMH of atrial fibrillation (on Pradaxa), HTN, Thyroid Goiter, CKD stage III (dx 2016) was seen at [facility] with inferior wall STEMI and was brought to [facility] for cardiac catherization s/p PCI x 1 now with DANITA and Anuria.

## 2017-10-20 NOTE — PROGRESS NOTE ADULT - PROBLEM SELECTOR PLAN 3
cotn DAPT with a sa and ticagrelor. ator 40  Mary Hubbard MD  [facility]  [phone] (After 5pm and on weekends, please call [phone])

## 2017-10-20 NOTE — PROGRESS NOTE ADULT - PROBLEM SELECTOR PLAN 1
DANITA on CKD stage III. DANITA may be multifactorial in the setting of IV contrast (cardiac cath), rhabdomyolysis and ATN from hypotension/ cardiac arrest. Patient has unclear etiology of his original underlying CKD.   Patient remains on CVVHDF today with appropriate clearance. Patient remains Anuric today with Scr of 2.26. Patient's potassium remains low in the setting of 4K bath. May replete with goal potassium of 4. Continue to check daily serum phosphorus (may be low in the use of CVVHDF). Continue to renally dose all medications. Continue to monitor intake/output, BMP and urine output. Avoid NSAIDs, RCA and nephrotoxins.

## 2017-10-20 NOTE — PROGRESS NOTE ADULT - PROBLEM SELECTOR PLAN 1
- with TTE showing TAPSE at 0.9 and basal 5.0cm. LV slightly compromised with VTI of 13cm.   - Turned down to P2 with normal RV filling #s and MVo2 drawn at that time was adequate at 57. Would do another weaning trial today and aim for removal later today.   - maintain  at 5, will d/w atdg re: possibly starting milrinone prior to removing impella

## 2017-10-20 NOTE — PROGRESS NOTE ADULT - ATTENDING COMMENTS
ATN, AMI, CHF, respiratory failure  1.  ATN--multifactorial includes hemodynamic, pigment nephropathy  2.  CHF--progressive improvement with substantially better hemodynamic.  Decent mixed venous O2 sats OFF impella  3.  Respiratory failure--low FIO2, better lung compliance.  NO indication for aggressive UF

## 2017-10-20 NOTE — PROGRESS NOTE ADULT - ATTENDING COMMENTS
IABP removed yesterday. Rpella removed today.   Intubated and sedated.  RA 12 PA 41/19 26 PA sat 57 CI 2.8   I/O CVVHD even   5  -152/50-70   Bilat LE edema ++  CXR clear lungs  WBC 18 (rising) Hb 9.2 Plt 31.    LDH 2718. Cr 2.26 Anuric  Impression: making good progress after withdrawal of mechanical support.   Suggest initiate oral vasodilators as BP allows (HDZN 10 to 20 tid)  Blood cultures. CVVHD for gentle negativ balance.   Kiana Celis

## 2017-10-20 NOTE — PROGRESS NOTE ADULT - ASSESSMENT
83/M with Afib on Pradaxa who came to [facility] c/o SOB found to have STEMI and transferred to [facility] for cath. Upon going Cath lab, pt became non responsive 2/2 arrest, ACLS initiated and patient was intubated, and ROSC achieved after 20 minutes. In cath lab, received a intra venous pacing wire, IABP, RCA stent, & swan RH cath. Patient now with cardiogenic shock 2/2 right heart failure returned to cath lab and RV impella was placed. With course c/b renal failure and oliguria/ARF requiring CVVH. 83/M with Afib on Pradaxa who came to [facility] c/o SOB found to have STEMI and transferred to [facility] for cath. Upon going Cath lab, pt became non responsive 2/2 arrest, ACLS initiated and patient was intubated, and ROSC achieved after 30 minutes. In cath lab, received a intra venous pacing wire, IABP, RCA stent, & SWLINDA, RH cath. Patient now with cardiogenic shock 2/2 right heart failure returned to cath lab and RV impella was placed. With course c/b renal failure and oliguria/ARF requiring CVVH.

## 2017-10-20 NOTE — PROGRESS NOTE ADULT - SUBJECTIVE AND OBJECTIVE BOX
Patient is a 83y old  Male who presents with a chief complaint of SOB, chest pain, diaphoresis (15 Oct 2017 17:59)      Overnight Event:    REVIEW OF SYSTEMS:  	    MEDICATIONS  (STANDING):  artificial  tears Solution 1 Drop(s) Both EYES every 6 hours  aspirin  chewable 81 milliGRAM(s) Oral daily  atorvastatin 40 milliGRAM(s) Oral at bedtime  chlorhexidine 0.12% Liquid 15 milliLiter(s) Swish and Spit two times a day  DOBUTamine Infusion 5 MICROgram(s)/kG/Min (13.965 mL/Hr) IV Continuous <Continuous>  fentaNYL    Injectable 25 MICROGram(s) IV Push once  fentaNYL    Injectable 25 MICROGram(s) IV Push once  fentaNYL   Infusion 1 MICROgram(s)/kG/Hr (4.5 mL/Hr) IV Continuous <Continuous>  heparin  Infusion 700 Unit(s)/Hr (7 mL/Hr) IV Continuous <Continuous>  hEPARIN 6250 Unit(s),D5W 500 milliLiter(s),Heparin 6,250 units  in d5w 500 ml 500 milliLiter(s) 500 milliLiter(s) (15 mL/Hr) IV Continuous <Continuous>  influenza   Vaccine 0.5 milliLiter(s) IntraMuscular once  levETIRAcetam 500 milliGRAM(s) Oral two times a day  midazolam Infusion 2 mG/Hr (2 mL/Hr) IV Continuous <Continuous>  pantoprazole  Injectable 40 milliGRAM(s) IV Push daily  petrolatum Ophthalmic Ointment 1 Application(s) Both EYES every 6 hours  potassium chloride  20 mEq/100 mL IVPB 20 milliEquivalent(s) IV Intermittent every 1 hour  potassium phosphate IVPB 15 milliMole(s) IV Intermittent once  ticagrelor 90 milliGRAM(s) Oral two times a day    MEDICATIONS  (PRN):  heparin  Injectable 4000 Unit(s) IV Push every 6 hours PRN For aPTT less than 40        PHYSICAL EXAM:  Vital Signs Last 24 Hrs  T(C): 36.1 (20 Oct 2017 06:00), Max: 36.4 (19 Oct 2017 19:30)  T(F): 97 (20 Oct 2017 06:00), Max: 97.5 (19 Oct 2017 19:30)  HR: 98 (20 Oct 2017 06:30) (68 - 120)  BP: --  BP(mean): --  RR: 18 (20 Oct 2017 06:30) (8 - 25)  SpO2: 94% (20 Oct 2017 06:00) (73% - 100%)  I&O's Summary    19 Oct 2017 07:01  -  20 Oct 2017 07:00  --------------------------------------------------------  IN: 1582.7 mL / OUT: 1911 mL / NET: -328.3 mL    Mode: AC/ CMV (Assist Control/ Continuous Mandatory Ventilation)  RR (machine): 18  TV (machine): 500  FiO2: 35  PEEP: 5  ITime: 1  MAP: 10  PIP: 19    Adult Advanced Hemodynamics Last 24 Hrs  CVP(mm Hg): 11 (10-20-17 @ 06:30) (7 - 20)  CVP(cm H2O): --  CO: --  CI: --  PA: 35/21 (10-20-17 @ 06:30) (28/17 - 57/29)  PA(mean): 26 (10-20-17 @ 06:30) (20 - 40)  PCWP: --  SVR: --  PVR: --    Appearance: Normal	  HEENT:   Normal oral mucosa, PERRL, EOMI	  Lymphatic: No lymphadenopathy  Cardiovascular: Normal S1 S2, No JVD, No murmurs, No edema  Respiratory: Lungs clear to auscultation	  Psychiatry: A & O x 3, Mood & affect appropriate  Gastrointestinal:  Soft, Non-tender, + BS	  Skin: No rashes, No ecchymoses, No cyanosis	  Neurologic: Non-focal  Extremities: Normal range of motion, No clubbing, cyanosis or edema  Vascular: Peripheral pulses palpable 2+ bilaterally    LABS:	 	                        7.8    16.5  )-----------( 95       ( 20 Oct 2017 04:12 )             22.5     Auto Eosinophil # 0.1   / Auto Eosinophil % 1.0   / Auto Neutrophil # 13.8  / Auto Neutrophil % 75.0  / BANDS % 3.0                          8.0    18.4  )-----------( 98       ( 20 Oct 2017 00:36 )             23.3     Auto Eosinophil # x     / Auto Eosinophil % x     / Auto Neutrophil # x     / Auto Neutrophil % x     / BANDS % x                            8.0    18.0  )-----------( 94       ( 19 Oct 2017 20:58 )             23.3     Auto Eosinophil # x     / Auto Eosinophil % x     / Auto Neutrophil # x     / Auto Neutrophil % x     / BANDS % x        INR: 1.09 ratio (10-20 @ 04:12)  INR: 1.08 ratio (10-19 @ 06:59)  INR: 1.13 ratio (10-19 @ 01:05)    10-20    137  |  102  |  26<H>  ----------------------------<  104<H>  3.3<L>   |  24  |  2.26<H>  10-20    137  |  102  |  27<H>  ----------------------------<  101<H>  3.5   |  22  |  2.36<H>  10-19    137  |  101  |  27<H>  ----------------------------<  108<H>  3.6   |  23  |  2.46<H>    Ca    8.5      20 Oct 2017 04:12  Mg     2.5     10-20  Phos  2.0     10-20  TPro  5.6<L>  /  Alb  2.5<L>  /  TBili  2.0<H>  /  DBili  x   /  AST  231<H>  /  ALT  144<H>  /  AlkPhos  51  10-20  TPro  5.5<L>  /  Alb  2.4<L>  /  TBili  2.2<H>  /  DBili  x   /  AST  241<H>  /  ALT  153<H>  /  AlkPhos  51  10-20  TPro  5.8<L>  /  Alb  2.6<L>  /  TBili  2.1<H>  /  DBili  x   /  AST  279<H>  /  ALT  162<H>  /  AlkPhos  52  10-19        proBNP: Serum Pro-Brain Natriuretic Peptide: 3344 pg/mL (10-15 @ 16:48)    Lipid Profile: 10-15 Chol 129 LDL 79 HDL 41 Trig 45  HgA1c: 5.4 % (10-15 @ 21:32)    TSH: Thyroid Stimulating Hormone, Serum: 2.40 uIU/mL (10-15 @ 21:34)      CARDIAC MARKERS:   18 Oct 2017 04:37 Troponin 10.87 ng/mL / Creatine Kinase 4675 U/L /  CKMB 27.1 ng/mL / CPK Mass Assay % 0.6 %   17 Oct 2017 22:34 Troponin 10.99 ng/mL / Creatine Kinase 4968 U/L /  CKMB 30.9 ng/mL / CPK Mass Assay % 0.6 %   17 Oct 2017 16:26 Troponin 13.48 ng/mL / Creatine Kinase 5078 U/L /  CKMB 37.8 ng/mL / CPK Mass Assay % 0.7 %        TELEMETRY: 	    ECG:  	  RADIOLOGY:  OTHER: 	    PREVIOUS DIAGNOSTIC TESTING:    [ ] Echocardiogram:  [ ]  Catheterization:  [ ] Stress Test:  	  	  JERRY Lemon  Contact # Patient is a 83y old  Male who presents with a chief complaint of SOB, chest pain, diaphoresis (15 Oct 2017 17:59)      Overnight Event: Vaso off, H/H low given 1 unit PRBC    REVIEW OF SYSTEMS: intubated, sedation off   	    MEDICATIONS  (STANDING):  artificial  tears Solution 1 Drop(s) Both EYES every 6 hours  aspirin  chewable 81 milliGRAM(s) Oral daily  atorvastatin 40 milliGRAM(s) Oral at bedtime  chlorhexidine 0.12% Liquid 15 milliLiter(s) Swish and Spit two times a day  DOBUTamine Infusion 5 MICROgram(s)/kG/Min (13.965 mL/Hr) IV Continuous <Continuous>  fentaNYL    Injectable 25 MICROGram(s) IV Push once  fentaNYL    Injectable 25 MICROGram(s) IV Push once  fentaNYL   Infusion 1 MICROgram(s)/kG/Hr (4.5 mL/Hr) IV Continuous <Continuous>  heparin  Infusion 700 Unit(s)/Hr (7 mL/Hr) IV Continuous <Continuous>  hEPARIN 6250 Unit(s),D5W 500 milliLiter(s),Heparin 6,250 units  in d5w 500 ml 500 milliLiter(s) 500 milliLiter(s) (15 mL/Hr) IV Continuous <Continuous>  influenza   Vaccine 0.5 milliLiter(s) IntraMuscular once  levETIRAcetam 500 milliGRAM(s) Oral two times a day  midazolam Infusion 2 mG/Hr (2 mL/Hr) IV Continuous <Continuous>  pantoprazole  Injectable 40 milliGRAM(s) IV Push daily  petrolatum Ophthalmic Ointment 1 Application(s) Both EYES every 6 hours  potassium chloride  20 mEq/100 mL IVPB 20 milliEquivalent(s) IV Intermittent every 1 hour  potassium phosphate IVPB 15 milliMole(s) IV Intermittent once  ticagrelor 90 milliGRAM(s) Oral two times a day    MEDICATIONS  (PRN):  heparin  Injectable 4000 Unit(s) IV Push every 6 hours PRN For aPTT less than 40        PHYSICAL EXAM:  Vital Signs Last 24 Hrs  T(C): 36.1 (20 Oct 2017 06:00), Max: 36.4 (19 Oct 2017 19:30)  T(F): 97 (20 Oct 2017 06:00), Max: 97.5 (19 Oct 2017 19:30)  HR: 98 (20 Oct 2017 06:30) (68 - 120)  BP: --  BP(mean): --  RR: 18 (20 Oct 2017 06:30) (8 - 25)  SpO2: 94% (20 Oct 2017 06:00) (73% - 100%)  I&O's Summary    19 Oct 2017 07:01  -  20 Oct 2017 07:00  --------------------------------------------------------  IN: 1582.7 mL / OUT: 1911 mL / NET: -328.3 mL    Mode: AC/ CMV (Assist Control/ Continuous Mandatory Ventilation)  RR (machine): 18  TV (machine): 500  FiO2: 35  PEEP: 5  ITime: 1  MAP: 10  PIP: 19    Adult Advanced Hemodynamics Last 24 Hrs  CVP(mm Hg): 11 (10-20-17 @ 06:30) (7 - 20)  CVP(cm H2O): --  CO: --6.5  CI: --3.05  PA: 35/21 (10-20-17 @ 06:30) (28/17 - 57/29)  PA(mean): 26 (10-20-17 @ 06:30) (20 - 40)  SVR: --627  PVR: --49    Appearance: Intubated, comfortable	  HEENT:   Normal oral mucosa, PERRL, EOMI	  Lymphatic: No lymphadenopathy  Cardiovascular: Normal S1 S2, No JVD, No murmurs, No edema  Respiratory: Course breath sounds  Psychiatry: A & O x 3, Mood & affect appropriate  Gastrointestinal:  Soft, Non-tender, + BS	  Skin: No rashes, No ecchymoses, No cyanosis, warm, dry  Neurologic: Non-focal  Extremities: Normal range of motion, No clubbing, cyanosis, + anasarca  Vascular: Peripheral pulses palpable 2+ bilaterally    LABS:	 	                        7.8    16.5  )-----------( 95       ( 20 Oct 2017 04:12 )             22.5     Auto Eosinophil # 0.1   / Auto Eosinophil % 1.0   / Auto Neutrophil # 13.8  / Auto Neutrophil % 75.0  / BANDS % 3.0                          8.0    18.4  )-----------( 98       ( 20 Oct 2017 00:36 )             23.3     Auto Eosinophil # x     / Auto Eosinophil % x     / Auto Neutrophil # x     / Auto Neutrophil % x     / BANDS % x                            8.0    18.0  )-----------( 94       ( 19 Oct 2017 20:58 )             23.3     Auto Eosinophil # x     / Auto Eosinophil % x     / Auto Neutrophil # x     / Auto Neutrophil % x     / BANDS % x        INR: 1.09 ratio (10-20 @ 04:12)  INR: 1.08 ratio (10-19 @ 06:59)  INR: 1.13 ratio (10-19 @ 01:05)    10-20    137  |  102  |  26<H>  ----------------------------<  104<H>  3.3<L>   |  24  |  2.26<H>  10-20    137  |  102  |  27<H>  ----------------------------<  101<H>  3.5   |  22  |  2.36<H>  10-19    137  |  101  |  27<H>  ----------------------------<  108<H>  3.6   |  23  |  2.46<H>    Ca    8.5      20 Oct 2017 04:12  Mg     2.5     10-20  Phos  2.0     10-20  TPro  5.6<L>  /  Alb  2.5<L>  /  TBili  2.0<H>  /  DBili  x   /  AST  231<H>  /  ALT  144<H>  /  AlkPhos  51  10-20  TPro  5.5<L>  /  Alb  2.4<L>  /  TBili  2.2<H>  /  DBili  x   /  AST  241<H>  /  ALT  153<H>  /  AlkPhos  51  10-20  TPro  5.8<L>  /  Alb  2.6<L>  /  TBili  2.1<H>  /  DBili  x   /  AST  279<H>  /  ALT  162<H>  /  AlkPhos  52  10-19    TELEMETRY:    ECG:  	A. fib   RADIOLOGY:    PREVIOUS DIAGNOSTIC TESTING:    [ ] Echocardiogram:  [ ]  Catheterization:  [ ] Stress Test:  	  	  ALLEN LemonDec[facility]  Contact #[phone] Patient is a 83y old  Male who presents with a chief complaint of SOB, chest pain, diaphoresis (15 Oct 2017 17:59)      Overnight Event: Vaso off, H/H low given 1 unit PRBC    REVIEW OF SYSTEMS: intubated, sedation off   	    MEDICATIONS  (STANDING):  artificial  tears Solution 1 Drop(s) Both EYES every 6 hours  aspirin  chewable 81 milliGRAM(s) Oral daily  atorvastatin 40 milliGRAM(s) Oral at bedtime  chlorhexidine 0.12% Liquid 15 milliLiter(s) Swish and Spit two times a day  DOBUTamine Infusion 5 MICROgram(s)/kG/Min (13.965 mL/Hr) IV Continuous <Continuous>  fentaNYL    Injectable 25 MICROGram(s) IV Push once  fentaNYL    Injectable 25 MICROGram(s) IV Push once  fentaNYL   Infusion 1 MICROgram(s)/kG/Hr (4.5 mL/Hr) IV Continuous <Continuous>  heparin  Infusion 700 Unit(s)/Hr (7 mL/Hr) IV Continuous <Continuous>  hEPARIN 6250 Unit(s),D5W 500 milliLiter(s),Heparin 6,250 units  in d5w 500 ml 500 milliLiter(s) 500 milliLiter(s) (15 mL/Hr) IV Continuous <Continuous>  influenza   Vaccine 0.5 milliLiter(s) IntraMuscular once  levETIRAcetam 500 milliGRAM(s) Oral two times a day  midazolam Infusion 2 mG/Hr (2 mL/Hr) IV Continuous <Continuous>  pantoprazole  Injectable 40 milliGRAM(s) IV Push daily  petrolatum Ophthalmic Ointment 1 Application(s) Both EYES every 6 hours  potassium chloride  20 mEq/100 mL IVPB 20 milliEquivalent(s) IV Intermittent every 1 hour  potassium phosphate IVPB 15 milliMole(s) IV Intermittent once  ticagrelor 90 milliGRAM(s) Oral two times a day    MEDICATIONS  (PRN):  heparin  Injectable 4000 Unit(s) IV Push every 6 hours PRN For aPTT less than 40        PHYSICAL EXAM:  Vital Signs Last 24 Hrs  T(C): 36.1 (20 Oct 2017 06:00), Max: 36.4 (19 Oct 2017 19:30)  T(F): 97 (20 Oct 2017 06:00), Max: 97.5 (19 Oct 2017 19:30)  HR: 98 (20 Oct 2017 06:30) (68 - 120)  BP: --  BP(mean): --  RR: 18 (20 Oct 2017 06:30) (8 - 25)  SpO2: 94% (20 Oct 2017 06:00) (73% - 100%)  I&O's Summary    19 Oct 2017 07:01  -  20 Oct 2017 07:00  --------------------------------------------------------  IN: 1582.7 mL / OUT: 1911 mL / NET: -328.3 mL    Mode: AC/ CMV (Assist Control/ Continuous Mandatory Ventilation)  RR (machine): 18  TV (machine): 500  FiO2: 35  PEEP: 5  ITime: 1  MAP: 10  PIP: 19    Adult Advanced Hemodynamics Last 24 Hrs  CVP(mm Hg): 11 (10-20-17 @ 06:30) (7 - 20)  CVP(cm H2O): --  CO: --6.5  CI: --3.05  PA: 35/21 (10-20-17 @ 06:30) (28/17 - 57/29)  PA(mean): 26 (10-20-17 @ 06:30) (20 - 40)  SVR: --627  PVR: --49    Appearance: Intubated, comfortable	  HEENT:   Normal oral mucosa, PERRL, EOMI	  Lymphatic: No lymphadenopathy  Cardiovascular: Normal S1 S2, No JVD, No murmurs, No edema  Respiratory: Course breath sounds  Psychiatry: A & O x 3, Mood & affect appropriate  Gastrointestinal:  Soft, Non-tender, + BS	  Skin: No rashes, No ecchymoses, No cyanosis, warm, dry  Neurologic: Non-focal  Extremities: Normal range of motion, No clubbing, cyanosis, + anasarca  Vascular: Peripheral pulses palpable 2+ bilaterally    LABS:	 	                        7.8    16.5  )-----------( 95       ( 20 Oct 2017 04:12 )             22.5     Auto Eosinophil # 0.1   / Auto Eosinophil % 1.0   / Auto Neutrophil # 13.8  / Auto Neutrophil % 75.0  / BANDS % 3.0                          8.0    18.4  )-----------( 98       ( 20 Oct 2017 00:36 )             23.3     Auto Eosinophil # x     / Auto Eosinophil % x     / Auto Neutrophil # x     / Auto Neutrophil % x     / BANDS % x                            8.0    18.0  )-----------( 94       ( 19 Oct 2017 20:58 )             23.3     Auto Eosinophil # x     / Auto Eosinophil % x     / Auto Neutrophil # x     / Auto Neutrophil % x     / BANDS % x        INR: 1.09 ratio (10-20 @ 04:12)  INR: 1.08 ratio (10-19 @ 06:59)  INR: 1.13 ratio (10-19 @ 01:05)    10-20    137  |  102  |  26<H>  ----------------------------<  104<H>  3.3<L>   |  24  |  2.26<H>  10-20    137  |  102  |  27<H>  ----------------------------<  101<H>  3.5   |  22  |  2.36<H>  10-19    137  |  101  |  27<H>  ----------------------------<  108<H>  3.6   |  23  |  2.46<H>    Ca    8.5      20 Oct 2017 04:12  Mg     2.5     10-20  Phos  2.0     10-20  TPro  5.6<L>  /  Alb  2.5<L>  /  TBili  2.0<H>  /  DBili  x   /  AST  231<H>  /  ALT  144<H>  /  AlkPhos  51  10-20  TPro  5.5<L>  /  Alb  2.4<L>  /  TBili  2.2<H>  /  DBili  x   /  AST  241<H>  /  ALT  153<H>  /  AlkPhos  51  10-20  TPro  5.8<L>  /  Alb  2.6<L>  /  TBili  2.1<H>  /  DBili  x   /  AST  279<H>  /  ALT  162<H>  /  AlkPhos  52  10-19    TELEMETRY:    ECG:  	A. fib   RADIOLOGY: < from: Xray Chest 1 View AP -PORTABLE-Routine (10.20.17 @ 09:09) >  FINDINGS:   LINES/TUBES AND POSTSURGICAL CHANGES:  Endotracheal tube terminates above the mandy.  Enteric tube with tip in the stomach.  Femoral [place] catheter with tip deep in the right pulmonary artery.  Right IJ central venous catheter with tip in the SVC.  Impella device seen within the left pulmonary artery.  Pacing pads are seen overlying the patient.    HEART AND MEDIASTINUM:  The cardiac silhouette is not enlarged.    LUNGS/PLEURA:  Small left pleural effusion with associated atelectasis.  No pneumothorax.       OSSEOUS STRUCTURES:  No acute osseous pathology.      IMPRESSION:   Clear lungs.    PREVIOUS DIAGNOSTIC TESTING:    [ ] Echocardiogram:   [ ]  Catheterization:  	  	  JERRY Lemon  Contact #[phone]

## 2017-10-20 NOTE — PROGRESS NOTE ADULT - ATTENDING COMMENTS
Patient is seen and examined with fellow, NP and the CCU house-staff. I agree with the history, physical and the assessment and plan.  plan to remove RP impella  c/w dobutamine support and CVVH with removal of fluid to maintain euvolemia  CPAP trial post impella removal

## 2017-10-20 NOTE — PROGRESS NOTE ADULT - SUBJECTIVE AND OBJECTIVE BOX
Subjective:  vaso off, on dob5 MAPS 60-80s   CVP 11 41/19/26/ mvo2 57 hgb7.8 CI 2.8 CO 6.0    remains elevated LDh in  hapto <20  bilis slowly downtrending 2.0  AST/ ALT downtrending    Medications:  artificial  tears Solution 1 Drop(s) Both EYES every 6 hours  aspirin  chewable 81 milliGRAM(s) Oral daily  atorvastatin 40 milliGRAM(s) Oral at bedtime  chlorhexidine 0.12% Liquid 15 milliLiter(s) Swish and Spit two times a day  dexmedetomidine Infusion 0.215 MICROgram(s)/kG/Hr IV Continuous <Continuous>  DOBUTamine Infusion 5 MICROgram(s)/kG/Min IV Continuous <Continuous>  fentaNYL    Injectable 25 MICROGram(s) IV Push once  heparin  Infusion 700 Unit(s)/Hr IV Continuous <Continuous>  heparin  Injectable 4000 Unit(s) IV Push every 6 hours PRN  hEPARIN 6250 Unit(s),D5W 500 milliLiter(s),Heparin 6,250 units  in d5w 500 ml 500 milliLiter(s) 500 milliLiter(s) IV Continuous <Continuous>  influenza   Vaccine 0.5 milliLiter(s) IntraMuscular once  levETIRAcetam 500 milliGRAM(s) Oral two times a day  pantoprazole  Injectable 40 milliGRAM(s) IV Push daily  petrolatum Ophthalmic Ointment 1 Application(s) Both EYES every 6 hours  potassium chloride  20 mEq/100 mL IVPB 20 milliEquivalent(s) IV Intermittent every 1 hour  potassium phosphate IVPB 15 milliMole(s) IV Intermittent once  ticagrelor 90 milliGRAM(s) Oral two times a day      Physical Exam:    Vitals:  T(C): 36.4 (10-20-17 @ 07:00), Max: 36.4 (10-19-17 @ 19:30)  HR: 118 (10-20-17 @ 08:30) (68 - 120)  BP: 113/64 (10-20-17 @ 08:00) (113/64 - 113/64)  BP(mean): 73 (10-20-17 @ 08:00) (73 - 73)  ABP: 154/46 (10-20-17 @ 08:30) (96/42 - 158/64)  ABP(mean): 76 (10-20-17 @ 08:30) (56 - 98)  RR: 14 (10-20-17 @ 08:30) (8 - 25)  SpO2: 98% (10-20-17 @ 08:30) (73% - 100%)  Wt(kg): --  CVP(cm H2O): --  CO: --  CI: --  PA: 41/19 (10-20-17 @ 08:30) (28/17 - 57/29)  PA(mean): 26 (10-20-17 @ 08:30) (20 - 40)  PCWP: --  SVR: --  PVR: --  Vital Signs Last 24 Hrs  T(C): 36.4 (20 Oct 2017 07:00), Max: 36.4 (19 Oct 2017 19:30)  T(F): 97.5 (20 Oct 2017 07:00), Max: 97.5 (19 Oct 2017 19:30)  HR: 118 (20 Oct 2017 08:30) (68 - 120)  BP: 113/64 (20 Oct 2017 08:00) (113/64 - 113/64)  BP(mean): 73 (20 Oct 2017 08:00) (73 - 73)  RR: 14 (20 Oct 2017 08:30) (8 - 25)  SpO2: 98% (20 Oct 2017 08:30) (73% - 100%)    Daily     Daily Weight in k (20 Oct 2017 01:30)    I&O's Summary    19 Oct 2017 07:  -  20 Oct 2017 07:00  --------------------------------------------------------  IN: 1582.7 mL / OUT: 1911 mL / NET: -328.3 mL    20 Oct 2017 07:  -  20 Oct 2017 08:45  --------------------------------------------------------  IN: 291.9 mL / OUT: 300 mL / NET: -8.1 mL        General: No distress. Comfortable.  HEENT: EOM intact.  Neck: Neck supple. JVP not elevated. No masses  Chest: Clear to auscultation bilaterally  CV: Normal S1 and S2. No murmurs, rub, or gallops. Radial pulses normal.  Abdomen: Soft, non-distended, non-tender  Skin: No rashes or skin breakdown  Neurology: Alert and oriented times three. Sensation intact  ext RP impella     Labs:                        7.8    16.5  )-----------( 95       ( 20 Oct 2017 04:12 )             22.5     10-20    137  |  102  |  26<H>  ----------------------------<  104<H>  3.3<L>   |  24  |  2.26<H>    Ca    8.5      20 Oct 2017 04:12  Phos  2.0     10-20  Mg     2.5     10-20    TPro  5.6<L>  /  Alb  2.5<L>  /  TBili  2.0<H>  /  DBili  x   /  AST  231<H>  /  ALT  144<H>  /  AlkPhos  51  10-20    PT/INR - ( 20 Oct 2017 04:12 )   PT: 11.8 sec;   INR: 1.09 ratio         PTT - ( 20 Oct 2017 04:12 )  PTT:42.0 sec      Serum Pro-Brain Natriuretic Peptide: 3344 pg/mL (10-15 @ 16:48)    Oxygen Saturation, Mixed: 57 % (10-20 @ 04:20)  Oxygen Saturation, Mixed: 60 % (10-19 @ 20:57)  Oxygen Saturation, Mixed: 52 % (10-19 @ 13:07)  Oxygen Saturation, Mixed: 48 % (10-19 @ 10:20)  Oxygen Saturation, Mixed: 59 % (10-19 @ 04:36)  Oxygen Saturation, Mixed: 50 % (10-18 @ 22:13)  Oxygen Saturation, Mixed: 47 % (10-18 @ 16:07)  Oxygen Saturation, Mixed: 48 % (10-18 @ 14:14)  Oxygen Saturation, Mixed: 62 % (10-18 @ 04:39)  Oxygen Saturation, Mixed: 59 % (10-17 @ 22:31)  Oxygen Saturation, Mixed: 63 % (10-17 @ 16:08)  Oxygen Saturation, Mixed: 68 % (10-17 @ 12:31)  Oxygen Saturation, Mixed: 67 % (10-17 @ 10:35)    Lactate Dehydrogenase, Serum: 2411 U/L (10-20 @ 04:12)  Lactate Dehydrogenase, Serum: 2740 U/L (10-19 @ 20:58)  Lactate Dehydrogenase, Serum: 2452 U/L (10-19 @ 14:43)  Lactate Dehydrogenase, Serum: 2720 U/L (10-19 @ 04:32)  Lactate Dehydrogenase, Serum: 2950 U/L (10-18 @ 22:17)  Lactate Dehydrogenase, Serum: 2990 U/L (10-18 @ 16:09)  Lactate Dehydrogenase, Serum: 2432 U/L (10-18 @ 09:48)  Lactate Dehydrogenase, Serum: 1979 U/L (10-18 @ 04:37)  Lactate Dehydrogenase, Serum: 2048 U/L (10-17 @ 22:34)  Lactate Dehydrogenase, Serum: 1932 U/L (10-17 @ 16:26)  Lactate Dehydrogenase, Serum: 1924 U/L (10-17 @ 10:40) Subjective:  vaso off, on dob5 MAPS 60-80s   CVP 11 41/19/26/ mvo2 57 hgb7.8 CI 2.8 CO 6.0    remains elevated LDh in  hapto <20  bilis slowly downtrending 2.0  AST/ ALT downtrending    Medications:  artificial  tears Solution 1 Drop(s) Both EYES every 6 hours  aspirin  chewable 81 milliGRAM(s) Oral daily  atorvastatin 40 milliGRAM(s) Oral at bedtime  chlorhexidine 0.12% Liquid 15 milliLiter(s) Swish and Spit two times a day  dexmedetomidine Infusion 0.215 MICROgram(s)/kG/Hr IV Continuous <Continuous>  DOBUTamine Infusion 5 MICROgram(s)/kG/Min IV Continuous <Continuous>  fentaNYL    Injectable 25 MICROGram(s) IV Push once  heparin  Infusion 700 Unit(s)/Hr IV Continuous <Continuous>  heparin  Injectable 4000 Unit(s) IV Push every 6 hours PRN  hEPARIN 6250 Unit(s),D5W 500 milliLiter(s),Heparin 6,250 units  in d5w 500 ml 500 milliLiter(s) 500 milliLiter(s) IV Continuous <Continuous>  influenza   Vaccine 0.5 milliLiter(s) IntraMuscular once  levETIRAcetam 500 milliGRAM(s) Oral two times a day  pantoprazole  Injectable 40 milliGRAM(s) IV Push daily  petrolatum Ophthalmic Ointment 1 Application(s) Both EYES every 6 hours  potassium chloride  20 mEq/100 mL IVPB 20 milliEquivalent(s) IV Intermittent every 1 hour  potassium phosphate IVPB 15 milliMole(s) IV Intermittent once  ticagrelor 90 milliGRAM(s) Oral two times a day      Physical Exam:    Vitals:  T(C): 36.4 (10-20-17 @ 07:00), Max: 36.4 (10-19-17 @ 19:30)  HR: 118 (10-20-17 @ 08:30) (68 - 120)  BP: 113/64 (10-20-17 @ 08:00) (113/64 - 113/64)  BP(mean): 73 (10-20-17 @ 08:00) (73 - 73)  ABP: 154/46 (10-20-17 @ 08:30) (96/42 - 158/64)  ABP(mean): 76 (10-20-17 @ 08:30) (56 - 98)  RR: 14 (10-20-17 @ 08:30) (8 - 25)  SpO2: 98% (10-20-17 @ 08:30) (73% - 100%)  Wt(kg): --  CVP(cm H2O): --  CO: --  CI: --  PA: 41/19 (10-20-17 @ 08:30) (28/17 - 57/29)  PA(mean): 26 (10-20-17 @ 08:30) (20 - 40)  PCWP: --  SVR: --  PVR: --  Vital Signs Last 24 Hrs  T(C): 36.4 (20 Oct 2017 07:00), Max: 36.4 (19 Oct 2017 19:30)  T(F): 97.5 (20 Oct 2017 07:00), Max: 97.5 (19 Oct 2017 19:30)  HR: 118 (20 Oct 2017 08:30) (68 - 120)  BP: 113/64 (20 Oct 2017 08:00) (113/64 - 113/64)  BP(mean): 73 (20 Oct 2017 08:00) (73 - 73)  RR: 14 (20 Oct 2017 08:30) (8 - 25)  SpO2: 98% (20 Oct 2017 08:30) (73% - 100%)    Daily     Daily Weight in k (20 Oct 2017 01:30)    I&O's Summary    19 Oct 2017 07:  -  20 Oct 2017 07:00  --------------------------------------------------------  IN: 1582.7 mL / OUT: 1911 mL / NET: -328.3 mL    20 Oct 2017 07:  -  20 Oct 2017 08:45  --------------------------------------------------------  IN: 291.9 mL / OUT: 300 mL / NET: -8.1 mL        General: intubated on cpap trial of 10  HEENT: EOM intact.  Neck: Neck supple. JVP not elevated. No masses  Chest: Clear to auscultation bilaterally  CV: Normal S1 and S2. No murmurs, rub, or gallops. Radial pulses normal.  Abdomen: Soft, non-distended, non-tender  Skin: No rashes or skin breakdown  Neurology: nods appropriately to questions  ext RP impella R groin    Labs:                        7.8    16.5  )-----------( 95       ( 20 Oct 2017 04:12 )             22.5     10-20    137  |  102  |  26<H>  ----------------------------<  104<H>  3.3<L>   |  24  |  2.26<H>    Ca    8.5      20 Oct 2017 04:12  Phos  2.0     10-20  Mg     2.5     10-20    TPro  5.6<L>  /  Alb  2.5<L>  /  TBili  2.0<H>  /  DBili  x   /  AST  231<H>  /  ALT  144<H>  /  AlkPhos  51  10-20    PT/INR - ( 20 Oct 2017 04:12 )   PT: 11.8 sec;   INR: 1.09 ratio         PTT - ( 20 Oct 2017 04:12 )  PTT:42.0 sec      Serum Pro-Brain Natriuretic Peptide: 3344 pg/mL (10-15 @ 16:48)    Oxygen Saturation, Mixed: 57 % (10-20 @ 04:20)  Oxygen Saturation, Mixed: 60 % (10-19 @ 20:57)  Oxygen Saturation, Mixed: 52 % (10-19 @ 13:07)  Oxygen Saturation, Mixed: 48 % (10-19 @ 10:20)  Oxygen Saturation, Mixed: 59 % (10-19 @ 04:36)  Oxygen Saturation, Mixed: 50 % (10-18 @ 22:13)  Oxygen Saturation, Mixed: 47 % (10-18 @ 16:07)  Oxygen Saturation, Mixed: 48 % (10-18 @ 14:14)  Oxygen Saturation, Mixed: 62 % (10-18 @ 04:39)  Oxygen Saturation, Mixed: 59 % (10-17 @ 22:31)  Oxygen Saturation, Mixed: 63 % (10-17 @ 16:08)  Oxygen Saturation, Mixed: 68 % (10-17 @ 12:31)  Oxygen Saturation, Mixed: 67 % (10-17 @ 10:35)    Lactate Dehydrogenase, Serum: 2411 U/L (10-20 @ 04:12)  Lactate Dehydrogenase, Serum: 2740 U/L (10-19 @ 20:58)  Lactate Dehydrogenase, Serum: 2452 U/L (10-19 @ 14:43)  Lactate Dehydrogenase, Serum: 2720 U/L (10-19 @ 04:32)  Lactate Dehydrogenase, Serum: 2950 U/L (10-18 @ 22:17)  Lactate Dehydrogenase, Serum: 2990 U/L (10-18 @ 16:09)  Lactate Dehydrogenase, Serum: 2432 U/L (10-18 @ 09:48)  Lactate Dehydrogenase, Serum: 1979 U/L (10-18 @ 04:37)  Lactate Dehydrogenase, Serum: 2048 U/L (10-17 @ 22:34)  Lactate Dehydrogenase, Serum: 1932 U/L (10-17 @ 16:26)  Lactate Dehydrogenase, Serum: 1924 U/L (10-17 @ 10:40)

## 2017-10-21 NOTE — PROGRESS NOTE ADULT - ATTENDING COMMENTS
Patient is seen and examined with fellow, NP and the CCU house-staff. I agree with the history, physical and the assessment and plan.  s/p RV impella removal and extubation overnight  keep CVP 10-12  remove about 50cc with CVVHD  c/w dobutamine and wean pressors

## 2017-10-21 NOTE — PROGRESS NOTE ADULT - PROBLEM SELECTOR PLAN 1
DANITA on CKD stage III. DANITA may be multifactorial in the setting of IV contrast (cardiac cath), rhabdomyolysis and ATN from hypotension/ cardiac arrest. Patient has unclear etiology of his original underlying CKD.   Patient remains on CVVHDF today with appropriate clearance. Patient with improved Scr to 1.98. Patient now making urine, recommend placing pace catheter to obtain output. Continue to check daily serum phosphorus -replete if low(may be low in the use of CVVHDF). Continue to renally dose all medications. Continue to monitor intake/output, BMP and urine output. Avoid NSAIDs, RCA and nephrotoxins.

## 2017-10-21 NOTE — PROGRESS NOTE ADULT - PROBLEM SELECTOR PROBLEM 4
Acute kidney injury
Atrial fibrillation, chronic
Atrial fibrillation with RVR
Atrial fibrillation, chronic
STEMI involving right coronary artery
STEMI involving right coronary artery

## 2017-10-21 NOTE — AIRWAY REMOVAL NOTE  ADULT & PEDS - ARTIFICAL AIRWAY REMOVAL COMMENTS
Written order for extubation verified. The patient was identified by full name and birth date compared to the identification band. Present during the procedure was CASSIUS reyna and CHRISTA Hassan

## 2017-10-21 NOTE — PROGRESS NOTE ADULT - SUBJECTIVE AND OBJECTIVE BOX
[facility] DIVISION OF KIDNEY DISEASES AND HYPERTENSION -- FOLLOW UP NOTE  --------------------------------------------------------------------------------  Chief Complaint: DANITA with anuria requiring renal replament therapy     24 hour events/subjective:  Patient remained on CVVHDF overnight. Patient currently started making urine overnight. Patient is s/p extubation       PAST HISTORY  --------------------------------------------------------------------------------  No significant changes to PMH, PSH, FHx, SHx, unless otherwise noted    ALLERGIES & MEDICATIONS  --------------------------------------------------------------------------------  Allergies    No Known Allergies    Intolerances      Standing Inpatient Medications  amiodarone Infusion 1 mG/Min IV Continuous <Continuous>  artificial  tears Solution 1 Drop(s) Both EYES every 6 hours  aspirin  chewable 81 milliGRAM(s) Oral daily  atorvastatin 40 milliGRAM(s) Oral at bedtime  chlorhexidine 0.12% Liquid 15 milliLiter(s) Swish and Spit two times a day  DOBUTamine Infusion 5 MICROgram(s)/kG/Min IV Continuous <Continuous>  heparin  Infusion. 1000 Unit(s)/Hr IV Continuous <Continuous>  hydrALAZINE 10 milliGRAM(s) Oral every 8 hours  influenza   Vaccine 0.5 milliLiter(s) IntraMuscular once  levETIRAcetam  IVPB 500 milliGRAM(s) IV Intermittent every 12 hours  norepinephrine Infusion 0.01 MICROgram(s)/kG/Min IV Continuous <Continuous>  pantoprazole  Injectable 40 milliGRAM(s) IV Push daily  petrolatum Ophthalmic Ointment 1 Application(s) Both EYES every 6 hours  ticagrelor 90 milliGRAM(s) Oral two times a day    PRN Inpatient Medications  ALBUTerol/ipratropium for Nebulization 3 milliLiter(s) Nebulizer every 6 hours PRN  heparin  Injectable 7500 Unit(s) IV Push every 6 hours PRN  heparin  Injectable 3500 Unit(s) IV Push every 6 hours PRN      REVIEW OF SYSTEMS  --------------------------------------------------------------------------------  Unable to obtain     VITALS/PHYSICAL EXAM  --------------------------------------------------------------------------------  T(C): 36.4 (10-21-17 @ 06:00), Max: 37.2 (10-20-17 @ 12:00)  HR: 102 (10-21-17 @ 09:30) (86 - 148)  BP: 109/70 (10-20-17 @ 13:00) (109/70 - 109/70)  RR: 21 (10-21-17 @ 09:30) (7 - 32)  SpO2: 94% (10-21-17 @ 09:30) (80% - 100%)  Wt(kg): --        10-20-17 @ 07:01  -  10-21-17 @ 07:00  --------------------------------------------------------  IN: 2300.7 mL / OUT: 2160 mL / NET: 140.7 mL    10-21-17 @ 07:01  -  10-21-17 @ 09:37  --------------------------------------------------------  IN: 154.1 mL / OUT: 273 mL / NET: -118.9 mL      Physical Exam:  	Gen: Awake  	Pulm: Course BS B/L  	CV: RRR, S1S2  	Abd: +BS, soft, nontender/nondistended   	LE: Warm, + edema  	Skin: Warm, without rashes              Vascular Access: + RIJ non-tunnelled HD catheter     LABS/STUDIES  --------------------------------------------------------------------------------              9.2    21.4  >-----------<  118      [10-21-17 @ 04:08]              26.7     137  |  102  |  28  ----------------------------<  138      [10-21-17 @ 04:08]  4.3   |  22  |  1.98        Ca     8.3     [10-21-17 @ 04:08]      Mg     2.5     [10-21-17 @ 04:08]      Phos  1.9     [10-21-17 @ 04:08]    TPro  6.2  /  Alb  2.7  /  TBili  3.3  /  DBili  x   /  AST  214  /  ALT  130  /  AlkPhos  68  [10-21-17 @ 04:08]    PT/INR: PT 11.8 , INR 1.09       [10-20-17 @ 04:12]  PTT: 35.2       [10-21-17 @ 02:40]    LDH 2718      [10-20-17 @ 11:45]    Creatinine Trend:  SCr 1.98 [10-21 @ 04:08]  SCr 2.07 [10-21 @ 00:13]  SCr 2.18 [10-20 @ 17:01]  SCr 2.26 [10-20 @ 04:12]  SCr 2.36 [10-20 @ 00:36]    Urinalysis - [10-15-17 @ 16:48]      Color Yellow / Appearance Turbid / SG >1.030 / pH 6.5      Gluc 50 / Ketone Negative  / Bili Negative / Urobili Negative       Blood Moderate / Protein >600 / Leuk Est Negative / Nitrite Negative      RBC >50 / WBC 3-5 / Hyaline  / Gran  / Sq Epi  / Non Sq Epi OCC / Bacteria Few      HbA1c 5.4      [10-15-17 @ 21:32]  TSH 2.40      [10-15-17 @ 21:34]  Lipid: chol 129, TG 45, HDL 41, LDL 79      [10-15-17 @ 21:40]    HBsAg Nonreact      [10-19-17 @ 07:25]  HCV 0.15, Nonreact      [10-19-17 @ 07:25]  HIV Nonreact      [10-19-17 @ 07:25]    EYAD: titer 1:640, pattern Homogeneous      [10-19-17 @ 07:25]  dsDNA <12      [10-19-17 @ 07:25]  C3 Complement 72      [10-19-17 @ 07:25]  C4 Complement 14      [10-19-17 @ 07:25]  Free Light Chains: kappa 15.20, lambda 10.50, ratio = 1.45      [10-19 @ 07:25]

## 2017-10-21 NOTE — PROGRESS NOTE ADULT - ATTENDING COMMENTS
DANITA on CKD 3- oligoanuric DANITA ATN, possible increase in Urine output- would place pace to further quantify UO  Lytes stable  cont CRRT  check bladder scan for volume in meantime  -if good UO consider holding CRRT and monitoring    hypophos-replete phos prn

## 2017-10-21 NOTE — PROGRESS NOTE ADULT - ASSESSMENT
83M pmhx of afb on pradaxa initially presented on 10/15/17 with chest pain/diaphresis/ dyspnea to OSH, IW-STEMI emergently transferred here for LHC c/b VF arrest requiring IABP followed by oRCA stent c/b now acute right heart failure and cardiogenic shock sp RP impella, for RV support, now explanted.

## 2017-10-21 NOTE — PROGRESS NOTE ADULT - SUBJECTIVE AND OBJECTIVE BOX
Patient is a 83y old  Male who presents with a chief complaint of SOB, chest pain, diaphoresis (15 Oct 2017 17:59)      Overnight Event: Attempted to self extubate, tolerated PST and extubated successfully c/b A. fib w/ RVR and hypotension, started on levophed for hypotension and amiodarone for A. fib.    REVIEW OF SYSTEMS:  	  MEDICATIONS  (STANDING):  amiodarone Infusion 1 mG/Min (33.333 mL/Hr) IV Continuous <Continuous>  artificial  tears Solution 1 Drop(s) Both EYES every 6 hours  aspirin  chewable 81 milliGRAM(s) Oral daily  atorvastatin 40 milliGRAM(s) Oral at bedtime  chlorhexidine 0.12% Liquid 15 milliLiter(s) Swish and Spit two times a day  DOBUTamine Infusion 5 MICROgram(s)/kG/Min (13.965 mL/Hr) IV Continuous <Continuous>  heparin  Infusion. 1000 Unit(s)/Hr (10 mL/Hr) IV Continuous <Continuous>  hydrALAZINE 10 milliGRAM(s) Oral every 8 hours  influenza   Vaccine 0.5 milliLiter(s) IntraMuscular once  levETIRAcetam  IVPB 500 milliGRAM(s) IV Intermittent every 12 hours  norepinephrine Infusion 0.01 MICROgram(s)/kG/Min (1.746 mL/Hr) IV Continuous <Continuous>  pantoprazole  Injectable 40 milliGRAM(s) IV Push daily  petrolatum Ophthalmic Ointment 1 Application(s) Both EYES every 6 hours  ticagrelor 90 milliGRAM(s) Oral two times a day    MEDICATIONS  (PRN):  ALBUTerol/ipratropium for Nebulization 3 milliLiter(s) Nebulizer every 6 hours PRN Shortness of Breath and/or Wheezing  heparin  Injectable 7500 Unit(s) IV Push every 6 hours PRN For aPTT less than 40  heparin  Injectable 3500 Unit(s) IV Push every 6 hours PRN For aPTT between 40 - 57    PHYSICAL EXAM:  Vital Signs Last 24 Hrs  T(C): 36.4 (21 Oct 2017 06:00), Max: 37.2 (20 Oct 2017 12:00)  T(F): 97.5 (21 Oct 2017 06:00), Max: 99 (20 Oct 2017 12:00)  HR: 116 (21 Oct 2017 06:32) (86 - 148)  BP: 109/70 (20 Oct 2017 13:00) (109/70 - 113/64)  BP(mean): 85 (20 Oct 2017 13:00) (73 - 85)  RR: 16 (21 Oct 2017 06:30) (7 - 32)  SpO2: 100% (21 Oct 2017 06:32) (80% - 100%)  I&O's Summary    20 Oct 2017 07:01  -  21 Oct 2017 07:00  --------------------------------------------------------  IN: 2239.4 mL / OUT: 2160 mL / NET: 79.4 mL    Appearance: Normal	  HEENT:   Normal oral mucosa, PERRL, EOMI	  Lymphatic: No lymphadenopathy  Cardiovascular: Normal S1 S2, No JVD, No murmurs, No edema  Respiratory: Lungs clear to auscultation	  Psychiatry: A & O x 3, Mood & affect appropriate  Gastrointestinal:  Soft, Non-tender, + BS	  Skin: No rashes, No ecchymoses, No cyanosis	  Neurologic: Non-focal  Extremities: Normal range of motion, No clubbing, cyanosis or edema  Vascular: Peripheral pulses palpable 2+ bilaterally    LABS:	 	                        9.2    21.4  )-----------( 118      ( 21 Oct 2017 04:08 )             26.7     Auto Eosinophil # x     / Auto Eosinophil % x     / Auto Neutrophil # x     / Auto Neutrophil % x     / BANDS % x                            9.3    18.7  )-----------( 109      ( 21 Oct 2017 00:14 )             26.6     Auto Eosinophil # x     / Auto Eosinophil % x     / Auto Neutrophil # x     / Auto Neutrophil % x     / BANDS % x                            8.6    19.3  )-----------( 92       ( 20 Oct 2017 17:01 )             24.5     Auto Eosinophil # x     / Auto Eosinophil % x     / Auto Neutrophil # x     / Auto Neutrophil % x     / BANDS % x        INR: 1.09 ratio (10-20 @ 04:12)  INR: 1.08 ratio (10-19 @ 06:59)  INR: 1.13 ratio (10-19 @ 01:05)    10-21    137  |  102  |  28<H>  ----------------------------<  138<H>  4.3   |  22  |  1.98<H>  10-21    136  |  102  |  25<H>  ----------------------------<  121<H>  4.5   |  22  |  2.07<H>  10-20    137  |  102  |  25<H>  ----------------------------<  116<H>  3.9   |  24  |  2.18<H>    Ca    8.3<L>      21 Oct 2017 04:08  Mg     2.5     10-21  Phos  1.9     10-21  TPro  6.2  /  Alb  2.7<L>  /  TBili  3.3<H>  /  DBili  x   /  AST  214<H>  /  ALT  130<H>  /  AlkPhos  68  10-21  TPro  6.3  /  Alb  2.8<L>  /  TBili  3.2<H>  /  DBili  x   /  AST  236<H>  /  ALT  138<H>  /  AlkPhos  66  10-21  TPro  5.9<L>  /  Alb  2.6<L>  /  TBili  3.1<H>  /  DBili  x   /  AST  236<H>  /  ALT  132<H>  /  AlkPhos  51  10-20      TELEMETRY: 	    ECG:  	  RADIOLOGY:	    PREVIOUS DIAGNOSTIC TESTING:    [ ] Echocardiogram:  [ ]  Catheterization:	  	  Aydee Lam [facility]  Contact #[phone] Patient is a 83y old  Male who presents with a chief complaint of SOB, chest pain, diaphoresis (15 Oct 2017 17:59)      Overnight Event: Attempted to self extubate, tolerated PST and extubated successfully c/b A. fib w/ RVR and hypotension, started on levophed for hypotension and amiodarone for A. fib.    REVIEW OF SYSTEMS: no compliants of pain  	  MEDICATIONS  (STANDING):  amiodarone Infusion 1 mG/Min (33.333 mL/Hr) IV Continuous <Continuous>  artificial  tears Solution 1 Drop(s) Both EYES every 6 hours  aspirin  chewable 81 milliGRAM(s) Oral daily  atorvastatin 40 milliGRAM(s) Oral at bedtime  chlorhexidine 0.12% Liquid 15 milliLiter(s) Swish and Spit two times a day  DOBUTamine Infusion 5 MICROgram(s)/kG/Min (13.965 mL/Hr) IV Continuous <Continuous>  heparin  Infusion. 1000 Unit(s)/Hr (10 mL/Hr) IV Continuous <Continuous>  hydrALAZINE 10 milliGRAM(s) Oral every 8 hours  influenza   Vaccine 0.5 milliLiter(s) IntraMuscular once  levETIRAcetam  IVPB 500 milliGRAM(s) IV Intermittent every 12 hours  norepinephrine Infusion 0.01 MICROgram(s)/kG/Min (1.746 mL/Hr) IV Continuous <Continuous>  pantoprazole  Injectable 40 milliGRAM(s) IV Push daily  petrolatum Ophthalmic Ointment 1 Application(s) Both EYES every 6 hours  ticagrelor 90 milliGRAM(s) Oral two times a day    MEDICATIONS  (PRN):  ALBUTerol/ipratropium for Nebulization 3 milliLiter(s) Nebulizer every 6 hours PRN Shortness of Breath and/or Wheezing  heparin  Injectable 7500 Unit(s) IV Push every 6 hours PRN For aPTT less than 40  heparin  Injectable 3500 Unit(s) IV Push every 6 hours PRN For aPTT between 40 - 57    PHYSICAL EXAM:  Vital Signs Last 24 Hrs  T(C): 36.4 (21 Oct 2017 06:00), Max: 37.2 (20 Oct 2017 12:00)  T(F): 97.5 (21 Oct 2017 06:00), Max: 99 (20 Oct 2017 12:00)  HR: 116 (21 Oct 2017 06:32) (86 - 148)  BP: 109/70 (20 Oct 2017 13:00) (109/70 - 113/64)  BP(mean): 85 (20 Oct 2017 13:00) (73 - 85)  RR: 16 (21 Oct 2017 06:30) (7 - 32)  SpO2: 100% (21 Oct 2017 06:32) (80% - 100%)  I&O's Summary    20 Oct 2017 07:01  -  21 Oct 2017 07:00  --------------------------------------------------------  IN: 2239.4 mL / OUT: 2160 mL / NET: 79.4 mL    Appearance: Normal	  HEENT:   Normal oral mucosa, PERRL, EOMI	  Lymphatic: No lymphadenopathy  Cardiovascular: Normal S1 S2, No JVD, No murmurs, No edema  Respiratory: Lungs clear to auscultation	  Psychiatry: A & O x 3, Mood & affect appropriate  Gastrointestinal:  Soft, Non-tender, + BS	  Skin: No rashes, No ecchymoses, No cyanosis	  Neurologic: Non-focal  Extremities: Normal range of motion, No clubbing, cyanosis or edema  Vascular: Peripheral pulses palpable 2+ bilaterally    LABS:	 	                        9.2    21.4  )-----------( 118      ( 21 Oct 2017 04:08 )             26.7     Auto Eosinophil # x     / Auto Eosinophil % x     / Auto Neutrophil # x     / Auto Neutrophil % x     / BANDS % x                            9.3    18.7  )-----------( 109      ( 21 Oct 2017 00:14 )             26.6     Auto Eosinophil # x     / Auto Eosinophil % x     / Auto Neutrophil # x     / Auto Neutrophil % x     / BANDS % x                            8.6    19.3  )-----------( 92       ( 20 Oct 2017 17:01 )             24.5     Auto Eosinophil # x     / Auto Eosinophil % x     / Auto Neutrophil # x     / Auto Neutrophil % x     / BANDS % x        INR: 1.09 ratio (10-20 @ 04:12)  INR: 1.08 ratio (10-19 @ 06:59)  INR: 1.13 ratio (10-19 @ 01:05)    10-21    137  |  102  |  28<H>  ----------------------------<  138<H>  4.3   |  22  |  1.98<H>  10-21    136  |  102  |  25<H>  ----------------------------<  121<H>  4.5   |  22  |  2.07<H>  10-20    137  |  102  |  25<H>  ----------------------------<  116<H>  3.9   |  24  |  2.18<H>    Ca    8.3<L>      21 Oct 2017 04:08  Mg     2.5     10-21  Phos  1.9     10-21  TPro  6.2  /  Alb  2.7<L>  /  TBili  3.3<H>  /  DBili  x   /  AST  214<H>  /  ALT  130<H>  /  AlkPhos  68  10-21  TPro  6.3  /  Alb  2.8<L>  /  TBili  3.2<H>  /  DBili  x   /  AST  236<H>  /  ALT  138<H>  /  AlkPhos  66  10-21  TPro  5.9<L>  /  Alb  2.6<L>  /  TBili  3.1<H>  /  DBili  x   /  AST  236<H>  /  ALT  132<H>  /  AlkPhos  51  10-20      TELEMETRY: No ectopy on monitor   ECG:  A. Fib  RADIOLOGY:	< from: Xray Chest 1 View AP -PORTABLE-Routine (10.21.17 @ 09:06) >  FINDINGS:   LINES/TUBES AND POSTSURGICAL CHANGES:  Right IJ central venous catheter with tip in the SVC.  Femoral McCamey-Leeann catheter with tip deep in the right pulmonary artery  Pacer pads are seen overlying the patient.    HEART ANDMEDIASTINUM:  The cardiac silhouette is not enlarged.  LUNGS/PLEURA:  Small left pleural effusion with associated atelectasis.  No pneumothorax.   OSSEOUS STRUCTURES:  No acute osseous pathology.    PREVIOUS DIAGNOSTIC TESTING:    [ ] Echocardiogram: < from: Limited Transthoracic Echo (10.21.17 @ 06:00) >  Conclusions:  Technically difficult limited study to reevaluate LV and RV  function. s/p removal of RV impella.  1. Endocardium not well visualized; grossly hyperdynamic  left ventricular systolic function.  2. Right ventricular enlargement with severely decreased  right ventricular systolic function.A catheter is seen the  RV.  3. Normal pericardium with no pericardial effusion.    < end of copied text >  < from: Limited Transthoracic Echo (10.21.17 @ 06:00) >  Conclusions:  Technically difficult limited study to reevaluate LV and RV  function. s/p removal of RV impella.  1. Endocardium not well visualized; grossly hyperdynamic  left ventricular systolic function.  2. Right ventricular enlargement with severely decreased  right ventricular systolic function.A catheter is seen the  RV.  3. Normal pericardium with no pericardial effusion.  	  [facility]  Contact #[phone]

## 2017-10-21 NOTE — PROGRESS NOTE ADULT - PROBLEM SELECTOR PROBLEM 3
STEMI involving right coronary artery
Atrial fibrillation, chronic
Proteinuria, unspecified type
Right heart failure
STEMI involving right coronary artery
Acute kidney injury
DANITA (acute kidney injury)
Right heart failure
Acute kidney injury

## 2017-10-21 NOTE — PROGRESS NOTE ADULT - PROBLEM SELECTOR PLAN 1
-check TTE to evaluate RV aftre Inpella removal  -continue dobutamine  -check hemodynamics, follow PVR, SVR

## 2017-10-21 NOTE — CHART NOTE - NSCHARTNOTEFT_GEN_A_CORE
====================  NEW EVENTS:  ====================  Pt attempted to self extubate. BS still present B/L. Patient CPAPed well yesterday, will attempt CPAP trail to possible extubation if ABG is stable    ====================  SUMMARY:  ====================  83/M with Afib on Pradaxa who came to [facility] c/o SOB found to have STEMI and transferred to [facility] for cath. Upon going Cath lab, pt became non responsive 2/2 arrest, ACLS initiated and patient was intubated, and ROSC achieved after 30 minutes. In cath lab, received a intra venous pacing wire, IABP, RCA stent, & SWLINDA, RH cath. Patient now with cardiogenic shock 2/2 right heart failure returned to cath lab and RV impella was placed. With course c/b renal failure and oliguria/ARF requiring CVVH.     ====================  VITALS:  ====================    ICU Vital Signs Last 24 Hrs  T(C): 36 (20 Oct 2017 23:00), Max: 37.2 (20 Oct 2017 12:00)  T(F): 96.8 (20 Oct 2017 23:00), Max: 99 (20 Oct 2017 12:00)  HR: 112 (21 Oct 2017 00:00) (86 - 120)  BP: 109/70 (20 Oct 2017 13:00) (109/70 - 113/64)  BP(mean): 85 (20 Oct 2017 13:00) (73 - 85)  ABP: 136/76 (21 Oct 2017 00:00) (84/52 - 166/72)  ABP(mean): 96 (21 Oct 2017 00:00) (56 - 112)  RR: 7 (21 Oct 2017 00:00) (7 - 25)  SpO2: 94% (21 Oct 2017 00:00) (80% - 100%)      I&O's Summary    19 Oct 2017 07:01  -  20 Oct 2017 07:00  --------------------------------------------------------  IN: 1582.7 mL / OUT: 2025 mL / NET: -442.3 mL    20 Oct 2017 07:01  -  21 Oct 2017 00:13  --------------------------------------------------------  IN: 1865.5 mL / OUT: 1942 mL / NET: -76.5 mL        Adult Advanced Hemodynamics Last 24 Hrs  CVP(mm Hg): 10 (21 Oct 2017 00:00) (7 - 15)  CVP(cm H2O): --  CO: 4.5 (20 Oct 2017 17:48) (3.9 - 4.5)  CI: 2.1 (20 Oct 2017 17:48) (1.8 - 2.1)  PA: 43/19 (21 Oct 2017 00:00) (28/17 - 49/27)  PA(mean): 27 (21 Oct 2017 00:00) (20 - 34)  PCWP: --  SVR: --  SVRI: --  PVR: --  PVRI: --    Mode: AC/ CMV (Assist Control/ Continuous Mandatory Ventilation)  RR (machine): 18  TV (machine): 500  FiO2: 35  PEEP: 5  ITime: 1  MAP: 10  PIP: 32      ====================  LABS:  ====================                          8.6    19.3  )-----------( 92       ( 20 Oct 2017 17:01 )             24.5     10-20    137  |  102  |  25<H>  ----------------------------<  116<H>  3.9   |  24  |  2.18<H>    Ca    8.3<L>      20 Oct 2017 17:01  Phos  1.9     10-20  Mg     2.5     10-20    TPro  5.9<L>  /  Alb  2.6<L>  /  TBili  3.1<H>  /  DBili  x   /  AST  236<H>  /  ALT  132<H>  /  AlkPhos  51  10-20    PT/INR - ( 20 Oct 2017 04:12 )   PT: 11.8 sec;   INR: 1.09 ratio         PTT - ( 20 Oct 2017 17:01 )  PTT:29.7 sec    ABG - ( 20 Oct 2017 17:00 )  pH: 7.47  /  pCO2: 35    /  pO2: 118   / HCO3: 25    / Base Excess: 2.1   /  SaO2: 99                  ====================  PLAN:  ====================  - RP impella removed. Will remove sutures in AM  - Will start CPAP trail  - c/w , hydralazine 10 q8  - c/w Brillinta, ASA, Lipitor

## 2017-10-21 NOTE — PROGRESS NOTE ADULT - PROBLEM SELECTOR PLAN 1
- with TTE showing TAPSE at 0.9 and basal 5.0cm. LV slightly compromised with VTI of 13cm.   - S/p RP Impella, with RA:PCWP about .75  - maintain  at 5, given decreased RV function on TTE but adequate RV filling pressures; monitor MAP and CVP and maintain adequate perfusion pressure to decrease chances of RV ischemia which could worsen RV function  - Would slowly wean  as RV function improves

## 2017-10-21 NOTE — PROGRESS NOTE ADULT - ATTENDING COMMENTS
Remains on CVVHD, anuric.  PA sat 42%. Lactate (-). Will monitor but may need more inotropic support.  Aim for net 0.5 - 1 L (-).

## 2017-10-21 NOTE — PROGRESS NOTE ADULT - ASSESSMENT
83 year old male with a PMH of atrial fibrillation (on Pradaxa), HTN, Thyroid Goiter, CKD stage III (dx 2016) was seen at [facility] with inferior wall STEMI and was brought to [facility] for cardiac catherization s/p PCI x 1 now with DANITA and Anuria.

## 2017-10-21 NOTE — PROGRESS NOTE ADULT - ASSESSMENT
83/M with Afib on Pradaxa who came to [facility] c/o SOB found to have STEMI and transferred to [facility] for cath. Upon going Cath lab, pt became non responsive 2/2 arrest, ACLS initiated and patient was intubated, and ROSC achieved after 30 minutes. In cath lab, received a intra venous pacing wire, IABP, RCA stent, & [facility] cath[facility]2 right heart failure requiring RV impella. With course c/b renal failure and oliguria/ARF requiring CVVH. Now extubated and improved RV/LV function and DANITA.

## 2017-10-21 NOTE — PROGRESS NOTE ADULT - SUBJECTIVE AND OBJECTIVE BOX
Subjective:  Now extubated  vaso off, continues on dob5 MAPS 60-70s   Cr slightly improved  AST/ ALT downtrending    Medications:  amiodarone Infusion 1 mG/Min IV Continuous <Continuous>  aspirin  chewable 81 milliGRAM(s) Oral daily  DOBUTamine Infusion 5 MICROgram(s)/kG/Min IV Continuous <Continuous>  heparin  Infusion. 1000 Unit(s)/Hr IV Continuous <Continuous>  heparin  Injectable 7500 Unit(s) IV Push every 6 hours PRN  heparin  Injectable 3500 Unit(s) IV Push every 6 hours PRN  hydrALAZINE 10 milliGRAM(s) Oral every 8 hours  norepinephrine Infusion 0.01 MICROgram(s)/kG/Min IV Continuous <Continuous>  ticagrelor 90 milliGRAM(s) Oral two times a day  ALBUTerol/ipratropium for Nebulization 3 milliLiter(s) Nebulizer every 6 hours PRN  pantoprazole  Injectable 40 milliGRAM(s) IV Push daily  atorvastatin 40 milliGRAM(s) Oral at bedtime  artificial  tears Solution 1 Drop(s) Both EYES every 6 hours  chlorhexidine 0.12% Liquid 15 milliLiter(s) Swish and Spit two times a day  influenza   Vaccine 0.5 milliLiter(s) IntraMuscular once  petrolatum Ophthalmic Ointment 1 Application(s) Both EYES every 6 hours      Physical Exam:    Vitals:  T(C): 36.4 (10-21-17 @ 06:00), Max: 37.2 (10-20-17 @ 12:00)  HR: 108 (10-21-17 @ 11:00) (86 - 148)  BP: 109/70 (10-20-17 @ 13:00) (109/70 - 109/70)  RR: 18 (10-21-17 @ 11:00) (7 - 32)  SpO2: 99% (10-21-17 @ 11:00) (80% - 100%)  Wt(kg): --  I&O's Summary    20 Oct 2017 07:01  -  21 Oct 2017 07:00  --------------------------------------------------------  IN: 2300.7 mL / OUT: 2160 mL / NET: 140.7 mL    21 Oct 2017 07:01  -  21 Oct 2017 11:53  --------------------------------------------------------  IN: 390.1 mL / OUT: 516 mL / NET: -125.9 mL          General: Extubated  HEENT: EOM intact.  Neck: Neck supple. JVP not elevated. Neck mass  Chest: Clear to auscultation anteriorly  CV: Normal S1 and S2. No murmurs, rub, or gallops. Radial pulses normal.  Abdomen: Soft, non-distended, non-tender  Skin: No rashes or skin breakdown  Neurology: nods appropriately to questions    Labs:                        7.8    16.5  )-----------( 95       ( 20 Oct 2017 04:12 )             22.5     10-20    137  |  102  |  26<H>  ----------------------------<  104<H>  3.3<L>   |  24  |  2.26<H>    Ca    8.5      20 Oct 2017 04:12  Phos  2.0     10-20  Mg     2.5     10-20    TPro  5.6<L>  /  Alb  2.5<L>  /  TBili  2.0<H>  /  DBili  x   /  AST  231<H>  /  ALT  144<H>  /  AlkPhos  51  10-20    PT/INR - ( 20 Oct 2017 04:12 )   PT: 11.8 sec;   INR: 1.09 ratio         PTT - ( 20 Oct 2017 04:12 )  PTT:42.0 sec      Serum Pro-Brain Natriuretic Peptide: 3344 pg/mL (10-15 @ 16:48)    Oxygen Saturation, Mixed: 57 % (10-20 @ 04:20)  Oxygen Saturation, Mixed: 60 % (10-19 @ 20:57)  Oxygen Saturation, Mixed: 52 % (10-19 @ 13:07)  Oxygen Saturation, Mixed: 48 % (10-19 @ 10:20)  Oxygen Saturation, Mixed: 59 % (10-19 @ 04:36)  Oxygen Saturation, Mixed: 50 % (10-18 @ 22:13)  Oxygen Saturation, Mixed: 47 % (10-18 @ 16:07)  Oxygen Saturation, Mixed: 48 % (10-18 @ 14:14)  Oxygen Saturation, Mixed: 62 % (10-18 @ 04:39)  Oxygen Saturation, Mixed: 59 % (10-17 @ 22:31)  Oxygen Saturation, Mixed: 63 % (10-17 @ 16:08)  Oxygen Saturation, Mixed: 68 % (10-17 @ 12:31)  Oxygen Saturation, Mixed: 67 % (10-17 @ 10:35)    Lactate Dehydrogenase, Serum: 2411 U/L (10-20 @ 04:12)  Lactate Dehydrogenase, Serum: 2740 U/L (10-19 @ 20:58)  Lactate Dehydrogenase, Serum: 2452 U/L (10-19 @ 14:43)  Lactate Dehydrogenase, Serum: 2720 U/L (10-19 @ 04:32)  Lactate Dehydrogenase, Serum: 2950 U/L (10-18 @ 22:17)  Lactate Dehydrogenase, Serum: 2990 U/L (10-18 @ 16:09)  Lactate Dehydrogenase, Serum: 2432 U/L (10-18 @ 09:48)  Lactate Dehydrogenase, Serum: 1979 U/L (10-18 @ 04:37)  Lactate Dehydrogenase, Serum: 2048 U/L (10-17 @ 22:34)  Lactate Dehydrogenase, Serum: 1932 U/L (10-17 @ 16:26)  Lactate Dehydrogenase, Serum: 1924 U/L (10-17 @ 10:40)

## 2017-10-22 NOTE — PROGRESS NOTE ADULT - SUBJECTIVE AND OBJECTIVE BOX
Patient is a 83y old  Male who presents with a chief complaint of SOB, chest pain, diaphoresis (15 Oct 2017 17:59)      Overnight Event:    REVIEW OF SYSTEMS:  	    MEDICATIONS  (STANDING):  amiodarone Infusion 1 mG/Min (33.333 mL/Hr) IV Continuous <Continuous>  artificial  tears Solution 1 Drop(s) Both EYES every 6 hours  aspirin  chewable 81 milliGRAM(s) Oral daily  atorvastatin 40 milliGRAM(s) Oral at bedtime  chlorhexidine 0.12% Liquid 15 milliLiter(s) Swish and Spit two times a day  digoxin  Injectable 0.25 milliGRAM(s) IV Push every 6 hours  DOBUTamine Infusion 5 MICROgram(s)/kG/Min (13.965 mL/Hr) IV Continuous <Continuous>  heparin  Infusion. 1000 Unit(s)/Hr (10 mL/Hr) IV Continuous <Continuous>  influenza   Vaccine 0.5 milliLiter(s) IntraMuscular once  norepinephrine Infusion 0.01 MICROgram(s)/kG/Min (1.746 mL/Hr) IV Continuous <Continuous>  pantoprazole  Injectable 40 milliGRAM(s) IV Push daily  petrolatum Ophthalmic Ointment 1 Application(s) Both EYES every 6 hours  ticagrelor 90 milliGRAM(s) Oral two times a day    MEDICATIONS  (PRN):  ALBUTerol/ipratropium for Nebulization 3 milliLiter(s) Nebulizer every 6 hours PRN Shortness of Breath and/or Wheezing  heparin  Injectable 7500 Unit(s) IV Push every 6 hours PRN For aPTT less than 40  heparin  Injectable 3500 Unit(s) IV Push every 6 hours PRN For aPTT between 40 - 57        PHYSICAL EXAM:  Vital Signs Last 24 Hrs  T(C): 36.1 (22 Oct 2017 05:30), Max: 36.4 (21 Oct 2017 23:30)  T(F): 97 (22 Oct 2017 05:30), Max: 97.5 (21 Oct 2017 23:30)  HR: 116 (22 Oct 2017 06:30) (100 - 132)  BP: --  BP(mean): --  RR: 24 (22 Oct 2017 06:30) (8 - 36)  SpO2: 100% (22 Oct 2017 06:30) (90% - 100%)  I&O's Summary    21 Oct 2017 07:01  -  22 Oct 2017 07:00  --------------------------------------------------------  IN: 2165.4 mL / OUT: 2955 mL / NET: -789.6 mL        Appearance: Normal	  HEENT:   Normal oral mucosa, PERRL, EOMI	  Lymphatic: No lymphadenopathy  Cardiovascular: Normal S1 S2, No JVD, No murmurs, No edema  Respiratory: Lungs clear to auscultation	  Psychiatry: A & O x 3, Mood & affect appropriate  Gastrointestinal:  Soft, Non-tender, + BS	  Skin: No rashes, No ecchymoses, No cyanosis	  Neurologic: Non-focal  Extremities: Normal range of motion, No clubbing, cyanosis or edema  Vascular: Peripheral pulses palpable 2+ bilaterally    LABS:	 	                        8.8    21.9  )-----------( 142      ( 22 Oct 2017 05:13 )             26.2     Auto Eosinophil # x     / Auto Eosinophil % x     / Auto Neutrophil # x     / Auto Neutrophil % x     / BANDS % x                            9.0    23.5  )-----------( 139      ( 21 Oct 2017 22:53 )             25.8     Auto Eosinophil # x     / Auto Eosinophil % x     / Auto Neutrophil # x     / Auto Neutrophil % x     / BANDS % x                            8.7    25.0  )-----------( 126      ( 21 Oct 2017 11:52 )             25.3     Auto Eosinophil # 0.0   / Auto Eosinophil % 0.0   / Auto Neutrophil # 22.4  / Auto Neutrophil % 62.0  / BANDS % 5.0      INR: 1.18 ratio (10-22 @ 05:13)  INR: 1.09 ratio (10-20 @ 04:12)  INR: 1.08 ratio (10-19 @ 06:59)    10-22    139  |  103  |  24<H>  ----------------------------<  113<H>  4.4   |  22  |  1.88<H>  10-21    137  |  102  |  25<H>  ----------------------------<  117<H>  4.3   |  21<L>  |  1.92<H>  10-21    137  |  102  |  28<H>  ----------------------------<  138<H>  4.3   |  22  |  1.98<H>    Ca    8.1<L>      22 Oct 2017 05:13  Mg     2.6     10-22  Phos  2.1     10-22  TPro  6.1  /  Alb  2.7<L>  /  TBili  2.0<H>  /  DBili  x   /  AST  80<H>  /  ALT  106<H>  /  AlkPhos  75  10-22  TPro  6.2  /  Alb  2.7<L>  /  TBili  2.3<H>  /  DBili  x   /  AST  109<H>  /  ALT  116<H>  /  AlkPhos  71  10-21  TPro  6.2  /  Alb  2.7<L>  /  TBili  3.3<H>  /  DBili  x   /  AST  214<H>  /  ALT  130<H>  /  AlkPhos  68  10-21      proBNP:   Lipid Profile: 10-15 Chol 129 LDL 79 HDL 41 Trig 45  HgA1c: 5.4 % (10-15 @ 21:32)    TSH:     CARDIAC MARKERS:   18 Oct 2017 04:37 Troponin 10.87 ng/mL / Creatine Kinase 4675 U/L /  CKMB 27.1 ng/mL / CPK Mass Assay % 0.6 %   17 Oct 2017 22:34 Troponin 10.99 ng/mL / Creatine Kinase 4968 U/L /  CKMB 30.9 ng/mL / CPK Mass Assay % 0.6 %   17 Oct 2017 16:26 Troponin 13.48 ng/mL / Creatine Kinase 5078 U/L /  CKMB 37.8 ng/mL / CPK Mass Assay % 0.7 %      TELEMETRY: 	    ECG:  	  RADIOLOGY: 	    PREVIOUS DIAGNOSTIC TESTING:    [ ] Echocardiogram:  [ ]  Catheterization:	  	  [facility]  Contact #[phone] Patient is a 83y old  Male who presents with a chief complaint of SOB, chest pain, diaphoresis (15 Oct 2017 17:59)      Overnight Event: A. Fib 130's Dig loaded     REVIEW OF SYSTEMS:  Throat irritation, No chest pain or SOB    MEDICATIONS  (STANDING):  amiodarone Infusion 1 mG/Min (33.333 mL/Hr) IV Continuous <Continuous>  artificial  tears Solution 1 Drop(s) Both EYES every 6 hours  aspirin  chewable 81 milliGRAM(s) Oral daily  atorvastatin 40 milliGRAM(s) Oral at bedtime  chlorhexidine 0.12% Liquid 15 milliLiter(s) Swish and Spit two times a day  digoxin  Injectable 0.25 milliGRAM(s) IV Push every 6 hours  DOBUTamine Infusion 5 MICROgram(s)/kG/Min (13.965 mL/Hr) IV Continuous <Continuous>  heparin  Infusion. 1000 Unit(s)/Hr (10 mL/Hr) IV Continuous <Continuous>  influenza   Vaccine 0.5 milliLiter(s) IntraMuscular once  norepinephrine Infusion 0.01 MICROgram(s)/kG/Min (1.746 mL/Hr) IV Continuous <Continuous>  pantoprazole  Injectable 40 milliGRAM(s) IV Push daily  petrolatum Ophthalmic Ointment 1 Application(s) Both EYES every 6 hours  ticagrelor 90 milliGRAM(s) Oral two times a day    MEDICATIONS  (PRN):  ALBUTerol/ipratropium for Nebulization 3 milliLiter(s) Nebulizer every 6 hours PRN Shortness of Breath and/or Wheezing  heparin  Injectable 7500 Unit(s) IV Push every 6 hours PRN For aPTT less than 40  heparin  Injectable 3500 Unit(s) IV Push every 6 hours PRN For aPTT between 40 - 57        PHYSICAL EXAM:  Vital Signs Last 24 Hrs  T(C): 36.1 (22 Oct 2017 05:30), Max: 36.4 (21 Oct 2017 23:30)  T(F): 97 (22 Oct 2017 05:30), Max: 97.5 (21 Oct 2017 23:30)  HR: 116 (22 Oct 2017 06:30) (100 - 132)  BP: --  BP(mean): --  RR: 24 (22 Oct 2017 06:30) (8 - 36)  SpO2: 100% (22 Oct 2017 06:30) (90% - 100%)  I&O's Summary    21 Oct 2017 07:01  -  22 Oct 2017 07:00  --------------------------------------------------------  IN: 2165.4 mL / OUT: 2955 mL / NET: -789.6 mL    Adult Advanced Hemodynamics Last 24 Hrs  CVP(mm Hg): 9   CO: 5  CI: 2.4   PAP: 35/9 (21)  SVR: 1038   PVR: 96     Appearance: Normal	  HEENT:   Normal oral mucosa, PERRL, EOMI	  Lymphatic: No lymphadenopathy  Cardiovascular: Normal S1 S2, No JVD, No murmurs, No edema  Respiratory: Lungs clear to auscultation	  Psychiatry: A & O x 3, Mood & affect appropriate  Gastrointestinal:  Soft, Non-tender, + BS	  Skin: No rashes, No ecchymoses, No cyanosis	  Neurologic: Non-focal  Extremities: Normal range of motion, No clubbing, cyanosis or edema  Vascular: Peripheral pulses palpable 2+ bilaterally    LABS:	 	                        8.8    21.9  )-----------( 142      ( 22 Oct 2017 05:13 )             26.2     Auto Eosinophil # x     / Auto Eosinophil % x     / Auto Neutrophil # x     / Auto Neutrophil % x     / BANDS % x                            9.0    23.5  )-----------( 139      ( 21 Oct 2017 22:53 )             25.8     Auto Eosinophil # x     / Auto Eosinophil % x     / Auto Neutrophil # x     / Auto Neutrophil % x     / BANDS % x                            8.7    25.0  )-----------( 126      ( 21 Oct 2017 11:52 )             25.3     Auto Eosinophil # 0.0   / Auto Eosinophil % 0.0   / Auto Neutrophil # 22.4  / Auto Neutrophil % 62.0  / BANDS % 5.0      INR: 1.18 ratio (10-22 @ 05:13)  INR: 1.09 ratio (10-20 @ 04:12)  INR: 1.08 ratio (10-19 @ 06:59)    10-22    139  |  103  |  24<H>  ----------------------------<  113<H>  4.4   |  22  |  1.88<H>  10-21    137  |  102  |  25<H>  ----------------------------<  117<H>  4.3   |  21<L>  |  1.92<H>  10-21    137  |  102  |  28<H>  ----------------------------<  138<H>  4.3   |  22  |  1.98<H>    Ca    8.1<L>      22 Oct 2017 05:13  Mg     2.6     10-22  Phos  2.1     10-22  TPro  6.1  /  Alb  2.7<L>  /  TBili  2.0<H>  /  DBili  x   /  AST  80<H>  /  ALT  106<H>  /  AlkPhos  75  10-22  TPro  6.2  /  Alb  2.7<L>  /  TBili  2.3<H>  /  DBili  x   /  AST  109<H>  /  ALT  116<H>  /  AlkPhos  71  10-21  TPro  6.2  /  Alb  2.7<L>  /  TBili  3.3<H>  /  DBili  x   /  AST  214<H>  /  ALT  130<H>  /  AlkPhos  68  10-21    TELEMETRY: 	    ECG:  	  RADIOLOGY: 	    PREVIOUS DIAGNOSTIC TESTING:    [ ] Echocardiogram:  [ ]  Catheterization:	  	  [facility]  Contact #[phone]

## 2017-10-22 NOTE — PROGRESS NOTE ADULT - PROBLEM SELECTOR PLAN 1
DANITA on CKD stage III. DANITA may be multifactorial in the setting of IV contrast (cardiac cath), rhabdomyolysis and ATN from hypotension/ cardiac arrest. Patient has unclear etiology of his original underlying CKD.   Patient remains on CVVHDF today with appropriate clearance. Patient with improved Scr to 1.88 and BUN 24. Patient now making minimal urine (120cc) overnight. Plan to continue CVVHDF with minimal UF as per CCU. Once patient stabilizes off pressor support, may consider transition to IHD.    Continue to check daily serum phosphorus -replete if low(may be low in the use of CVVHDF). Continue to renally dose all medications. Continue to monitor intake/output, BMP and urine output. Avoid NSAIDs, RCA and nephrotoxins.

## 2017-10-22 NOTE — PROGRESS NOTE ADULT - PROBLEM SELECTOR PLAN 2
Patient with noted increasing proteinuria since UA on 6/2016 without an underlying etiology which is concerning in a non-diabetic patient. Recommend full workup of proteinuria/ CKD. Urine electrolytes and with urine spot protein/Cr ratio to quantify protein (once patient makes urine). Renal sonogram once stabilized. Please order immunofixation or SPEP/UPEP. HBsAg Nonreact; HCV 0.15, Nonreact; HIV Nonreact; EYAD: titer 1:640 (elevated), pattern Homogeneous; dsDNA <12 ;C3 Complement 72; C4 Complement 14; Free Light Chains: kappa 15.20, lambda 10.50, ratio = 1.45

## 2017-10-22 NOTE — PROGRESS NOTE ADULT - ASSESSMENT
83 year old male with a PMH of atrial fibrillation (on Pradaxa), HTN, Thyroid Goiter, CKD stage III (dx 2016) was seen at [facility] with inferior wall STEMI and was brought to [facility] for cardiac catherization s/p PCI x 1 now with DANITA and Anuria.

## 2017-10-22 NOTE — CHART NOTE - NSCHARTNOTEFT_GEN_A_CORE
====================  CCU MIDNIGHT ROUNDS  ====================    KAMLESH PATEL  [id number]  Patient is a 83y old  Male who presents with a chief complaint of SOB, chest pain, diaphoresis (15 Oct 2017 17:59)    ====================  SUMMARY:  ====================      ====================  NEW EVENTS:  ====================      MEDICATIONS  (STANDING):  amiodarone Infusion 1 mG/Min (33.333 mL/Hr) IV Continuous <Continuous>  artificial  tears Solution 1 Drop(s) Both EYES every 6 hours  aspirin  chewable 81 milliGRAM(s) Oral daily  atorvastatin 40 milliGRAM(s) Oral at bedtime  chlorhexidine 0.12% Liquid 15 milliLiter(s) Swish and Spit two times a day  DOBUTamine Infusion 5 MICROgram(s)/kG/Min (13.965 mL/Hr) IV Continuous <Continuous>  heparin  Infusion. 1000 Unit(s)/Hr (10 mL/Hr) IV Continuous <Continuous>  influenza   Vaccine 0.5 milliLiter(s) IntraMuscular once  norepinephrine Infusion 0.01 MICROgram(s)/kG/Min (1.746 mL/Hr) IV Continuous <Continuous>  pantoprazole  Injectable 40 milliGRAM(s) IV Push daily  petrolatum Ophthalmic Ointment 1 Application(s) Both EYES every 6 hours  ticagrelor 90 milliGRAM(s) Oral two times a day    MEDICATIONS  (PRN):  ALBUTerol/ipratropium for Nebulization 3 milliLiter(s) Nebulizer every 6 hours PRN Shortness of Breath and/or Wheezing  heparin  Injectable 7500 Unit(s) IV Push every 6 hours PRN For aPTT less than 40  heparin  Injectable 3500 Unit(s) IV Push every 6 hours PRN For aPTT between 40 - 57      ====================  VITALS (Last 12 hrs):  ====================    T(C): 36.4 (10-21-17 @ 23:30), Max: 36.4 (10-21-17 @ 23:30)  T(F): 97.5 (10-21-17 @ 23:30), Max: 97.5 (10-21-17 @ 23:30)  HR: 114 (10-21-17 @ 23:30) (114 - 132)  BP: --  BP(mean): --  ABP: 116/60 (10-21-17 @ 23:30) (88/52 - 144/88)  ABP(mean): 80 (10-21-17 @ 23:30) (64 - 106)  RR: 15 (10-21-17 @ 23:30) (14 - 29)  SpO2: 100% (10-21-17 @ 23:30) (90% - 100%)  Wt(kg): --  CVP(mm Hg): 10 (10-21-17 @ 23:30) (7 - 14)  CVP(cm H2O): --  CO: 5 (10-21-17 @ 17:33) (5 - 5)  CI: 2.4 (10-21-17 @ 17:33) (2.4 - 2.4)  PA: 37/11 (10-21-17 @ 23:30) (31/12 - 43/25)  PA(mean): 23 (10-21-17 @ 23:30) (20 - 28)  PCWP: 16 (10-21-17 @ 17:33) (16 - 16)  SVR: 1039 (10-21-17 @ 17:33) (1039 - 1039)  PVR: 96 (10-21-17 @ 17:33) (96 - 96)    I&O's Summary    20 Oct 2017 07:01  -  21 Oct 2017 07:00  --------------------------------------------------------  IN: 2300.7 mL / OUT: 2160 mL / NET: 140.7 mL    21 Oct 2017 07:01  -  22 Oct 2017 00:15  --------------------------------------------------------  IN: 1605.5 mL / OUT: 2283 mL / NET: -677.5 mL        Mode: CPAP with PS  FiO2: 35  PEEP: 5  PS: 5  MAP: 9  PIP: 17      ====================  NEW LABS:  ====================      10-21    137  |  102  |  25<H>  ----------------------------<  117<H>  4.3   |  21<L>  |  1.92<H>    Ca    8.3<L>      21 Oct 2017 16:58  Phos  2.0     10-21  Mg     2.5     10-21    TPro  6.2  /  Alb  2.7<L>  /  TBili  2.3<H>  /  DBili  x   /  AST  109<H>  /  ALT  116<H>  /  AlkPhos  71  10-21    PT/INR - ( 20 Oct 2017 04:12 )   PT: 11.8 sec;   INR: 1.09 ratio         PTT - ( 21 Oct 2017 16:58 )  PTT:84.7 sec      ABG - ( 21 Oct 2017 22:39 )  pH: 7.55  /  pCO2: 28    /  pO2: 159   / HCO3: 24    / Base Excess: 2.0   /  SaO2: 100       ====================  PLAN:  ====================  -       Terra Enriquez CCU NP  Beeper #[id number]  Spectra # [phone] ====================  CCU MIDNIGHT ROUNDS  ====================    KAMLESH PATEL  [id number]  Patient is a 83y old  Male who presents with a chief complaint of SOB, chest pain, diaphoresis (15 Oct 2017 17:59)    ====================  SUMMARY:  ====================      ====================  NEW EVENTS:  ====================      MEDICATIONS  (STANDING):  amiodarone Infusion 1 mG/Min (33.333 mL/Hr) IV Continuous <Continuous>  artificial  tears Solution 1 Drop(s) Both EYES every 6 hours  aspirin  chewable 81 milliGRAM(s) Oral daily  atorvastatin 40 milliGRAM(s) Oral at bedtime  chlorhexidine 0.12% Liquid 15 milliLiter(s) Swish and Spit two times a day  DOBUTamine Infusion 5 MICROgram(s)/kG/Min (13.965 mL/Hr) IV Continuous <Continuous>  heparin  Infusion. 1000 Unit(s)/Hr (10 mL/Hr) IV Continuous <Continuous>  influenza   Vaccine 0.5 milliLiter(s) IntraMuscular once  norepinephrine Infusion 0.01 MICROgram(s)/kG/Min (1.746 mL/Hr) IV Continuous <Continuous>  pantoprazole  Injectable 40 milliGRAM(s) IV Push daily  petrolatum Ophthalmic Ointment 1 Application(s) Both EYES every 6 hours  ticagrelor 90 milliGRAM(s) Oral two times a day    MEDICATIONS  (PRN):  ALBUTerol/ipratropium for Nebulization 3 milliLiter(s) Nebulizer every 6 hours PRN Shortness of Breath and/or Wheezing  heparin  Injectable 7500 Unit(s) IV Push every 6 hours PRN For aPTT less than 40  heparin  Injectable 3500 Unit(s) IV Push every 6 hours PRN For aPTT between 40 - 57      ====================  VITALS (Last 12 hrs):  ====================    T(C): 36.4 (10-21-17 @ 23:30), Max: 36.4 (10-21-17 @ 23:30)  T(F): 97.5 (10-21-17 @ 23:30), Max: 97.5 (10-21-17 @ 23:30)  HR: 114 (10-21-17 @ 23:30) (114 - 132)  BP: --  BP(mean): --  ABP: 116/60 (10-21-17 @ 23:30) (88/52 - 144/88)  ABP(mean): 80 (10-21-17 @ 23:30) (64 - 106)  RR: 15 (10-21-17 @ 23:30) (14 - 29)  SpO2: 100% (10-21-17 @ 23:30) (90% - 100%)  Wt(kg): --  CVP(mm Hg): 10 (10-21-17 @ 23:30) (7 - 14)  CVP(cm H2O): --  CO: 5 (10-21-17 @ 17:33) (5 - 5)  CI: 2.4 (10-21-17 @ 17:33) (2.4 - 2.4)  PA: 37/11 (10-21-17 @ 23:30) (31/12 - 43/25)  PA(mean): 23 (10-21-17 @ 23:30) (20 - 28)  PCWP: 16 (10-21-17 @ 17:33) (16 - 16)  SVR: 1039 (10-21-17 @ 17:33) (1039 - 1039)  PVR: 96 (10-21-17 @ 17:33) (96 - 96)    I&O's Summary    20 Oct 2017 07:01  -  21 Oct 2017 07:00  --------------------------------------------------------  IN: 2300.7 mL / OUT: 2160 mL / NET: 140.7 mL    21 Oct 2017 07:01  -  22 Oct 2017 00:15  --------------------------------------------------------  IN: 1605.5 mL / OUT: 2283 mL / NET: -677.5 mL        Mode: CPAP with PS  FiO2: 35  PEEP: 5  PS: 5  MAP: 9  PIP: 17      ====================  NEW LABS:  ====================      10-21    137  |  102  |  25<H>  ----------------------------<  117<H>  4.3   |  21<L>  |  1.92<H>    Ca    8.3<L>      21 Oct 2017 16:58  Phos  2.0     10-21  Mg     2.5     10-21    TPro  6.2  /  Alb  2.7<L>  /  TBili  2.3<H>  /  DBili  x   /  AST  109<H>  /  ALT  116<H>  /  AlkPhos  71  10-21    PT/INR - ( 20 Oct 2017 04:12 )   PT: 11.8 sec;   INR: 1.09 ratio         PTT - ( 21 Oct 2017 16:58 )  PTT:84.7 sec      ABG - ( 21 Oct 2017 22:39 )  pH: 7.55  /  pCO2: 28    /  pO2: 159   / HCO3: 24    / Base Excess: 2.0   /  SaO2: 100       ====================  PLAN:  ====================  - Successfully extubated, now on nasal cannula  - Dobutamine at 5 mcg/kg/min and amiodarone drip.    - Still on Afib with moderately rapid VR (100's-120's)  - Given Digoxin 0.5 mg IV x 1; may need to complete total load of 1 mg  - Remains on Levo for BP support; titrate to keep MAP ~65      Terra Enriquez CCU NP  Beeper #[id number]  Spectra # [phone] ====================  CCU MIDNIGHT ROUNDS  ====================    KAMLESH PATEL  [id number]  Patient is a 83y old  Male who presents with a chief complaint of SOB, chest pain, diaphoresis (15 Oct 2017 17:59)    ====================  SUMMARY:  ====================      ====================  NEW EVENTS:  ====================      MEDICATIONS  (STANDING):  amiodarone Infusion 1 mG/Min (33.333 mL/Hr) IV Continuous <Continuous>  artificial  tears Solution 1 Drop(s) Both EYES every 6 hours  aspirin  chewable 81 milliGRAM(s) Oral daily  atorvastatin 40 milliGRAM(s) Oral at bedtime  chlorhexidine 0.12% Liquid 15 milliLiter(s) Swish and Spit two times a day  DOBUTamine Infusion 5 MICROgram(s)/kG/Min (13.965 mL/Hr) IV Continuous <Continuous>  heparin  Infusion. 1000 Unit(s)/Hr (10 mL/Hr) IV Continuous <Continuous>  influenza   Vaccine 0.5 milliLiter(s) IntraMuscular once  norepinephrine Infusion 0.01 MICROgram(s)/kG/Min (1.746 mL/Hr) IV Continuous <Continuous>  pantoprazole  Injectable 40 milliGRAM(s) IV Push daily  petrolatum Ophthalmic Ointment 1 Application(s) Both EYES every 6 hours  ticagrelor 90 milliGRAM(s) Oral two times a day    MEDICATIONS  (PRN):  ALBUTerol/ipratropium for Nebulization 3 milliLiter(s) Nebulizer every 6 hours PRN Shortness of Breath and/or Wheezing  heparin  Injectable 7500 Unit(s) IV Push every 6 hours PRN For aPTT less than 40  heparin  Injectable 3500 Unit(s) IV Push every 6 hours PRN For aPTT between 40 - 57      ====================  VITALS (Last 12 hrs):  ====================    T(C): 36.4 (10-21-17 @ 23:30), Max: 36.4 (10-21-17 @ 23:30)  T(F): 97.5 (10-21-17 @ 23:30), Max: 97.5 (10-21-17 @ 23:30)  HR: 114 (10-21-17 @ 23:30) (114 - 132)  BP: --  BP(mean): --  ABP: 116/60 (10-21-17 @ 23:30) (88/52 - 144/88)  ABP(mean): 80 (10-21-17 @ 23:30) (64 - 106)  RR: 15 (10-21-17 @ 23:30) (14 - 29)  SpO2: 100% (10-21-17 @ 23:30) (90% - 100%)  Wt(kg): --  CVP(mm Hg): 10 (10-21-17 @ 23:30) (7 - 14)  CVP(cm H2O): --  CO: 5 (10-21-17 @ 17:33) (5 - 5)  CI: 2.4 (10-21-17 @ 17:33) (2.4 - 2.4)  PA: 37/11 (10-21-17 @ 23:30) (31/12 - 43/25)  PA(mean): 23 (10-21-17 @ 23:30) (20 - 28)  PCWP: 16 (10-21-17 @ 17:33) (16 - 16)  SVR: 1039 (10-21-17 @ 17:33) (1039 - 1039)  PVR: 96 (10-21-17 @ 17:33) (96 - 96)    I&O's Summary    20 Oct 2017 07:01  -  21 Oct 2017 07:00  --------------------------------------------------------  IN: 2300.7 mL / OUT: 2160 mL / NET: 140.7 mL    21 Oct 2017 07:01  -  22 Oct 2017 00:15  --------------------------------------------------------  IN: 1605.5 mL / OUT: 2283 mL / NET: -677.5 mL        Mode: CPAP with PS  FiO2: 35  PEEP: 5  PS: 5  MAP: 9  PIP: 17      ====================  NEW LABS:  ====================      10-21    137  |  102  |  25<H>  ----------------------------<  117<H>  4.3   |  21<L>  |  1.92<H>    Ca    8.3<L>      21 Oct 2017 16:58  Phos  2.0     10-21  Mg     2.5     10-21    TPro  6.2  /  Alb  2.7<L>  /  TBili  2.3<H>  /  DBili  x   /  AST  109<H>  /  ALT  116<H>  /  AlkPhos  71  10-21    PT/INR - ( 20 Oct 2017 04:12 )   PT: 11.8 sec;   INR: 1.09 ratio         PTT - ( 21 Oct 2017 16:58 )  PTT:84.7 sec      ABG - ( 21 Oct 2017 22:39 )  pH: 7.55  /  pCO2: 28    /  pO2: 159   / HCO3: 24    / Base Excess: 2.0   /  SaO2: 100       ====================  PLAN:  ====================  - Successfully extubated, now on nasal cannula  - Dobutamine at 5 mcg/kg/min and amiodarone drip.    - Still on Afib with moderately rapid VR (100's-120's)  - Given Digoxin 0.5 mg IV x 1; may need to complete total load of 1 mg  - Remains on Levo for BP support; titrate to keep MAP ~65  -     Terra Enriquez CCU NP  Beeper #[id number]  Spectra # [phone]

## 2017-10-22 NOTE — PROGRESS NOTE ADULT - SUBJECTIVE AND OBJECTIVE BOX
[facility] DIVISION OF KIDNEY DISEASES AND HYPERTENSION -- FOLLOW UP NOTE  --------------------------------------------------------------------------------  Chief Complaint: DANITA with Anuria requiring renal replacement therapy     24 hour events/subjective:  Patient remained on CVVHDF overnight. He remains extubated. Poor urine output overnight.       PAST HISTORY  --------------------------------------------------------------------------------  No significant changes to PMH, PSH, FHx, SHx, unless otherwise noted    ALLERGIES & MEDICATIONS  --------------------------------------------------------------------------------  Allergies    No Known Allergies    Intolerances      Standing Inpatient Medications  amiodarone Infusion 1 mG/Min IV Continuous <Continuous>  artificial  tears Solution 1 Drop(s) Both EYES every 6 hours  aspirin  chewable 81 milliGRAM(s) Oral daily  atorvastatin 40 milliGRAM(s) Oral at bedtime  chlorhexidine 0.12% Liquid 15 milliLiter(s) Swish and Spit two times a day  digoxin  Injectable 0.25 milliGRAM(s) IV Push every 6 hours  DOBUTamine Infusion 5 MICROgram(s)/kG/Min IV Continuous <Continuous>  heparin  Infusion. 1000 Unit(s)/Hr IV Continuous <Continuous>  influenza   Vaccine 0.5 milliLiter(s) IntraMuscular once  norepinephrine Infusion 0.01 MICROgram(s)/kG/Min IV Continuous <Continuous>  pantoprazole  Injectable 40 milliGRAM(s) IV Push daily  petrolatum Ophthalmic Ointment 1 Application(s) Both EYES every 6 hours  ticagrelor 90 milliGRAM(s) Oral two times a day    PRN Inpatient Medications  ALBUTerol/ipratropium for Nebulization 3 milliLiter(s) Nebulizer every 6 hours PRN  heparin  Injectable 7500 Unit(s) IV Push every 6 hours PRN  heparin  Injectable 3500 Unit(s) IV Push every 6 hours PRN      REVIEW OF SYSTEMS  --------------------------------------------------------------------------------  Unable to obtain     VITALS/PHYSICAL EXAM  --------------------------------------------------------------------------------  T(C): 36.1 (10-22-17 @ 05:30), Max: 36.4 (10-21-17 @ 23:30)  HR: 108 (10-22-17 @ 08:00) (100 - 132)  BP: --  RR: 25 (10-22-17 @ 08:00) (8 - 36)  SpO2: 100% (10-22-17 @ 08:00) (90% - 100%)  Wt(kg): --        10-21-17 @ 07:01  -  10-22-17 @ 07:00  --------------------------------------------------------  IN: 2165.4 mL / OUT: 3056 mL / NET: -890.6 mL    10-22-17 @ 07:01  -  10-22-17 @ 08:36  --------------------------------------------------------  IN: 51.2 mL / OUT: 0 mL / NET: 51.2 mL      Physical Exam:  	Gen: Awake  	Pulm: Course BS B/L  	CV: RRR, S1S2  	Abd: +BS, soft, nontender/nondistended   	LE: Warm, + edema  	Skin: Warm, without rashes              Vascular Access: + RIJ non-tunnelled HD catheter     LABS/STUDIES  --------------------------------------------------------------------------------              8.8    21.9  >-----------<  142      [10-22-17 @ 05:13]              26.2     139  |  103  |  24  ----------------------------<  113      [10-22-17 @ 05:13]  4.4   |  22  |  1.88        Ca     8.1     [10-22-17 @ 05:13]      Mg     2.6     [10-22-17 @ 05:13]      Phos  2.1     [10-22-17 @ 05:13]    TPro  6.1  /  Alb  2.7  /  TBili  2.0  /  DBili  x   /  AST  80  /  ALT  106  /  AlkPhos  75  [10-22-17 @ 05:13]    PT/INR: PT 12.8 , INR 1.18       [10-22-17 @ 05:13]  PTT: 87.3       [10-22-17 @ 00:32]    LDH 2718      [10-20-17 @ 11:45]    Creatinine Trend:  SCr 1.88 [10-22 @ 05:13]  SCr 1.92 [10-21 @ 16:58]  SCr 1.98 [10-21 @ 04:08]  SCr 2.07 [10-21 @ 00:13]  SCr 2.18 [10-20 @ 17:01]    Urinalysis - [10-15-17 @ 16:48]      Color Yellow / Appearance Turbid / SG >1.030 / pH 6.5      Gluc 50 / Ketone Negative  / Bili Negative / Urobili Negative       Blood Moderate / Protein >600 / Leuk Est Negative / Nitrite Negative      RBC >50 / WBC 3-5 / Hyaline  / Gran  / Sq Epi  / Non Sq Epi OCC / Bacteria Few      HbA1c 5.4      [10-15-17 @ 21:32]  TSH 2.40      [10-15-17 @ 21:34]  Lipid: chol 129, TG 45, HDL 41, LDL 79      [10-15-17 @ 21:40]    HBsAg Nonreact      [10-19-17 @ 07:25]  HCV 0.15, Nonreact      [10-19-17 @ 07:25]  HIV Nonreact      [10-19-17 @ 07:25]    EYAD: titer 1:640, pattern Homogeneous      [10-19-17 @ 07:25]  dsDNA <12      [10-19-17 @ 07:25]  C3 Complement 72      [10-19-17 @ 07:25]  C4 Complement 14      [10-19-17 @ 07:25]  Free Light Chains: kappa 15.20, lambda 10.50, ratio = 1.45      [10-19 @ 07:25]

## 2017-10-22 NOTE — PROGRESS NOTE ADULT - SUBJECTIVE AND OBJECTIVE BOX
24H hour events:   -remains extubated w/o supp oxygen requirements, however, unintelligible speech and weak swallow   -norepi 0.02 weaning off, continues  @ 5,  MAPS 68-98  -amiodarone @ 0.5  -Cr improving, LFTs improving, Lactate resolved,   -balance -890, w negligible urine output via pace cath; continues CVVHD    10/22 630am: CVP 11, FORITNO 18, SVR 1000, CI 2.4, CO 5.0 on  5 and levo 0.02    MEDICATIONS:  amiodarone Infusion 1 mG/Min IV Continuous <Continuous>  aspirin  chewable 81 milliGRAM(s) Oral daily  digoxin  Injectable 0.25 milliGRAM(s) IV Push every 6 hours  DOBUTamine Infusion 5 MICROgram(s)/kG/Min IV Continuous <Continuous>  heparin  Infusion. 1000 Unit(s)/Hr IV Continuous <Continuous>  heparin  Injectable 7500 Unit(s) IV Push every 6 hours PRN  heparin  Injectable 3500 Unit(s) IV Push every 6 hours PRN  norepinephrine Infusion 0.01 MICROgram(s)/kG/Min IV Continuous <Continuous>  ticagrelor 90 milliGRAM(s) Oral two times a day    ALBUTerol/ipratropium for Nebulization 3 milliLiter(s) Nebulizer every 6 hours PRN    pantoprazole  Injectable 40 milliGRAM(s) IV Push daily    atorvastatin 40 milliGRAM(s) Oral at bedtime    artificial  tears Solution 1 Drop(s) Both EYES every 6 hours  chlorhexidine 0.12% Liquid 15 milliLiter(s) Swish and Spit two times a day  influenza   Vaccine 0.5 milliLiter(s) IntraMuscular once  petrolatum Ophthalmic Ointment 1 Application(s) Both EYES every 6 hours      REVIEW OF SYSTEMS:  Complete 10point ROS negative.    PHYSICAL EXAM:  T(C): 36.1 (10-22-17 @ 05:30), Max: 36.4 (10-21-17 @ 23:30)  HR: 108 (10-22-17 @ 08:00) (100 - 132)  BP: --  RR: 25 (10-22-17 @ 08:00) (8 - 36)  SpO2: 100% (10-22-17 @ 08:00) (90% - 100%)  Wt(kg): --  I&O's Summary    21 Oct 2017 07:01  -  22 Oct 2017 07:00  --------------------------------------------------------  IN: 2165.4 mL / OUT: 3056 mL / NET: -890.6 mL        Appearance: Normal	  HEENT:   Normal oral mucosa, PERRL, EOMI	  Lymphatic: No lymphadenopathy  Cardiovascular: Normal S1 S2, No JVD, No murmurs, No edema  Respiratory: Lungs clear to auscultation midaxillary  Gastrointestinal:  Soft, Non-tender, + BS	  Skin: No rashes, No ecchymoses, No cyanosis	  Neurologic: dysarthria  Extremities: Normal range of motion, No clubbing, cyanosis or edema  Vascular: Peripheral pulses palpable 2+ bilaterally        LABS:	 	    CBC Full  -  ( 22 Oct 2017 05:13 )  WBC Count : 21.9 K/uL  Hemoglobin : 8.8 g/dL  Hematocrit : 26.2 %  Platelet Count - Automated : 142 K/uL  Mean Cell Volume : 98.7 fl  Mean Cell Hemoglobin : 33.2 pg  Mean Cell Hemoglobin Concentration : 33.7 gm/dL  Auto Neutrophil # : x  Auto Lymphocyte # : x  Auto Monocyte # : x  Auto Eosinophil # : x  Auto Basophil # : x  Auto Neutrophil % : x  Auto Lymphocyte % : x  Auto Monocyte % : x  Auto Eosinophil % : x  Auto Basophil % : x    10    139  |  103  |  24<H>  ----------------------------<  113<H>  4.4   |  22  |  1.88<H>  10-    137  |  102  |  25<H>  ----------------------------<  117<H>  4.3   |  21<L>  |  1.92<H>    Ca    8.1<L>      22 Oct 2017 05:13  Ca    8.3<L>      21 Oct 2017 16:58  Phos  2.1     10-  Phos  2.0     10-  Mg     2.6     10-22  Mg     2.5     10-21    TPro  6.1  /  Alb  2.7<L>  /  TBili  2.0<H>  /  DBili  x   /  AST  80<H>  /  ALT  106<H>  /  AlkPhos  75  10-  TPro  6.2  /  Alb  2.7<L>  /  TBili  2.3<H>  /  DBili  x   /  AST  109<H>  /  ALT  116<H>  /  AlkPhos  71  10-        TELEMETRY:  -120s      < from: Transthoracic Echocardiogram (10.21.17 @ 10:51) >  ------------------------------------------------------------------------  Dimensions:    Normal Values:  LA:     3.7    2.0 - 4.0 cm  Ao:     3.5    2.0 - 3.8 cm  SEPTUM: 0.9    0.6 - 1.2 cm  PWT:   0.9    0.6 - 1.1 cm  LVIDd:  5.0    3.0 - 5.6 cm  LVIDs:  3.4    1.8 - 4.0 cm  Derived variables:  LVMI: 75 g/m2  RWT: 0.36  Fractional short: 32 %  EF (Antony): 60 %  ------------------------------------------------------------------------    < end of copied text >  < from: Transthoracic Echocardiogram (10.21.17 @ 10:51) >  ------------------------------------------------------------------------  Conclusions:  1. Mitral annular calcification, otherwise normal mitral  valve. Mild-moderate mitral regurgitation.  2. Aortic valve leaflet morphology not well visualized.   Mild aortic regurgitation.  3. Normal left ventricular internal dimensions and wall  thicknesses.  4. Endocardium not well visualized; grossly normal left  ventricular systolic function.  5. Moderate right atrial enlargement.  6. Right ventricular enlargement with decreased right  ventricular systolic function.  ------------------------------------------------------------------------    < end of copied text >    	  ASSESSMENT/PLAN: 	    83M pmhx of AF on pradaxa initially presented on 10/15/17 with chest pain/diaphresis/ dyspnea to OSH, IW-STEMI emergently transferred here for C c/b VF arrest requiring IABP followed by oRCA stent c/b now acute right heart failure and cardiogenic shock sp RP impella, for RV support, now explanted.      Problem/Plan - 1:  ·  Problem: Right heart failure.    - attempt wean off norepi first as MAPs remain stable  - for now maintain  at 5, given decreased RV function on TTE but adequate RV filling pressures; monitor MAP and CVP and maintain adequate perfusion pressure to decrease chances of RV ischemia which could worsen RV function  - maintain CVP 5-10    Problem/Plan - 2:  ·  Problem: DANITA (acute kidney injury).  Plan: ATN +/- pigment nephropathy given elevated LDH. remains anuric on cvvhd.     Problem/Plan - 3:  ·  Problem: STEMI involving right coronary artery.  Plan: cotn DAPT with a sa and ticagrelor. ator [id number]. [id number]  evening [phone]

## 2017-10-22 NOTE — PROGRESS NOTE ADULT - ATTENDING COMMENTS
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

## 2017-10-23 NOTE — SWALLOW BEDSIDE ASSESSMENT ADULT - SLP PERTINENT HISTORY OF CURRENT PROBLEM
83/M with Afib on Pradaxa who came to [facility] c/o SOB found to have IWSTEMI, transferred to [facility] for cath, h/c by VF arrest requiring IABP, RCA stent & SWAN, h/c c/b cardiogenic shock 2/2 right heart failure s/p impella (now removed), w/ h/c c/b renal failure requiring CVVH, Pt extubated (10/21/17).

## 2017-10-23 NOTE — PROGRESS NOTE ADULT - SUBJECTIVE AND OBJECTIVE BOX
Interval History:  - doing well  - remains on  5    Medications:  ALBUTerol/ipratropium for Nebulization 3 milliLiter(s) Nebulizer every 6 hours PRN  amiodarone    Tablet 400 milliGRAM(s) Oral every 8 hours  aspirin  chewable 81 milliGRAM(s) Oral daily  atorvastatin 40 milliGRAM(s) Oral at bedtime  chlorhexidine 0.12% Liquid 15 milliLiter(s) Swish and Spit two times a day  DOBUTamine Infusion 5 MICROgram(s)/kG/Min IV Continuous <Continuous>  heparin  Infusion. 1000 Unit(s)/Hr IV Continuous <Continuous>  heparin  Injectable 7500 Unit(s) IV Push every 6 hours PRN  heparin  Injectable 3500 Unit(s) IV Push every 6 hours PRN  influenza   Vaccine 0.5 milliLiter(s) IntraMuscular once  pantoprazole  Injectable 40 milliGRAM(s) IV Push daily  ticagrelor 90 milliGRAM(s) Oral two times a day    Vitals:  T(C): 36.9 (10-23-17 @ 08:00), Max: 37 (10-23-17 @ 02:00)  HR: 98 (10-23-17 @ 18:45) (86 - 112)  BP: 91/50 (10-23-17 @ 08:00) (91/50 - 91/50)  BP(mean): 60 (10-23-17 @ 08:00) (60 - 60)  ABP: 104/54 (10-23-17 @ 18:45) (86/48 - 126/58)  ABP(mean): 68 (10-23-17 @ 18:45) (60 - 78)  RR: 22 (10-23-17 @ 18:45) (12 - 32)  SpO2: 100% (10-23-17 @ 18:45) (98% - 100%)  Wt(kg): --  CVP(cm H2O): --  CO: 5.6 (10-23-17 @ 13:28) (4.4 - 5.6)  CI: 2.6 (10-23-17 @ 13:28) (2 - 2.6)  PA: 35/11 (10-23-17 @ 18:30) (35/11 - 47/19)  PA(mean): 19 (10-23-17 @ 18:30) (19 - 29)    Daily     Daily Weight in k.7 (23 Oct 2017 06:00)      I&O's Summary    22 Oct 2017 07:  -  23 Oct 2017 07:00  --------------------------------------------------------  IN: 2384 mL / OUT: 3463 mL / NET: -1079 mL    23 Oct 2017 07:  -  23 Oct 2017 21:10  --------------------------------------------------------  IN: 1525 mL / OUT: 1545 mL / NET: -20 mL        Physical Exam:  Appearance: No Acute Distress  HEENT: MMM  Neck: R shiley  Cardiovascular: Normal S1 S2, No murmurs/rubs/gallops  Respiratory: Clear to auscultation bilaterally  Gastrointestinal: Soft, Non-tender	  Skin: No cyanosis	  Neurologic: Non-focal  Extremities: anasarca  Psychiatry: A & O x 3, Mood & affect appropriate    Labs:                        9.3    16.4  )-----------( 172      ( 23 Oct 2017 05:18 )             27.8     10-23    138  |  102  |  28<H>  ----------------------------<  114<H>  5.0   |  23  |  1.85<H>    Ca    8.2<L>      23 Oct 2017 13:46  Phos  2.3     10-23  Mg     2.6     10-23    TPro  6.0  /  Alb  2.8<L>  /  TBili  2.0<H>  /  DBili  x   /  AST  63<H>  /  ALT  91<H>  /  AlkPhos  100  10-23    PT/INR - ( 23 Oct 2017 05:18 )   PT: 13.5 sec;   INR: 1.23 ratio         PTT - ( 23 Oct 2017 05:18 )  PTT:76.9 sec        Oxygen Saturation, Mixed: 52 % (10-23 @ 16:09)  Oxygen Saturation, Mixed: 49 % (10-23 @ 13:43)  Oxygen Saturation, Mixed: 50 % (10-23 @ 05:17)  Oxygen Saturation, Mixed: 53 % (10-22 @ 23:17)  Oxygen Saturation, Mixed: 53 % (10-22 @ 12:11)  Oxygen Saturation, Mixed: 49 % (10-22 @ 05:09)  Oxygen Saturation, Mixed: 46 % (10-21 @ 22:39)  Oxygen Saturation, Mixed: 50 % (10-21 @ 16:54)  Oxygen Saturation, Mixed: 42 % (10-21 @ 09:43)  Oxygen Saturation, Mixed: 53 % (10-21 @ 04:03)  Oxygen Saturation, Mixed: 41 % (10-21 @ 02:50)  Oxygen Saturation, Mixed: 52 % (10-21 @ 00:00)

## 2017-10-23 NOTE — PROGRESS NOTE ADULT - ATTENDING COMMENTS
DANITA/ATN requiring CRRT    seen & examined on CRRT.  + edema    Cont CRRT, goal net even for now, would hope for net negative soon given volume overload.

## 2017-10-23 NOTE — PROGRESS NOTE ADULT - PROBLEM SELECTOR PLAN 1
DANITA on CKD stage III. DANITA may be multifactorial in the setting of IV contrast (cardiac cath), rhabdomyolysis and ATN from hypotension/ cardiac arrest. Patient has unclear etiology of his original underlying CKD.   Patient remains on CVVHDF. Patient with improved Scr to 1.91 and BUN 26. Patient remains anuric. Plan to continue CVVHDF with UF as per CCU. Once patient stabilizes off pressor support, may consider transition to IHD.    Continue to check daily serum phosphorus -replete today- remains low (may be low in the use of CVVHDF). Continue to renally dose all medications. Continue to monitor intake/output, BMP and urine output. Avoid NSAIDs, RCA and nephrotoxins.

## 2017-10-23 NOTE — PROGRESS NOTE ADULT - SUBJECTIVE AND OBJECTIVE BOX
[facility] DIVISION OF KIDNEY DISEASES AND HYPERTENSION -- FOLLOW UP NOTE  --------------------------------------------------------------------------------  Chief Complaint: DANITA requiring renal replacement therapy     24 hour events/subjective:  Patient remains on CVVHDF overnight without complications. Patient remains anuric.       PAST HISTORY  --------------------------------------------------------------------------------  No significant changes to PMH, PSH, FHx, SHx, unless otherwise noted    ALLERGIES & MEDICATIONS  --------------------------------------------------------------------------------  Allergies    No Known Allergies    Intolerances      Standing Inpatient Medications  amiodarone    Tablet 400 milliGRAM(s) Oral every 8 hours  aspirin  chewable 81 milliGRAM(s) Oral daily  atorvastatin 40 milliGRAM(s) Oral at bedtime  chlorhexidine 0.12% Liquid 15 milliLiter(s) Swish and Spit two times a day  DOBUTamine Infusion 5 MICROgram(s)/kG/Min IV Continuous <Continuous>  heparin  Infusion. 1000 Unit(s)/Hr IV Continuous <Continuous>  influenza   Vaccine 0.5 milliLiter(s) IntraMuscular once  pantoprazole  Injectable 40 milliGRAM(s) IV Push daily  ticagrelor 90 milliGRAM(s) Oral two times a day    PRN Inpatient Medications  ALBUTerol/ipratropium for Nebulization 3 milliLiter(s) Nebulizer every 6 hours PRN  heparin  Injectable 7500 Unit(s) IV Push every 6 hours PRN  heparin  Injectable 3500 Unit(s) IV Push every 6 hours PRN      REVIEW OF SYSTEMS  --------------------------------------------------------------------------------  Gen: No weakness  Head/Eyes/Ears/Mouth: No headache  Respiratory: No dyspnea  CV: No chest pain  GI: No abdominal pain  MSK: No edema    All other systems were reviewed and are negative, except as noted.    VITALS/PHYSICAL EXAM  --------------------------------------------------------------------------------  T(C): 36.9 (10-23-17 @ 08:00), Max: 37 (10-23-17 @ 02:00)  HR: 88 (10-23-17 @ 10:00) (86 - 108)  BP: 91/50 (10-23-17 @ 08:00) (91/50 - 91/50)  RR: 17 (10-23-17 @ 10:00) (12 - 32)  SpO2: 100% (10-23-17 @ 10:00) (100% - 100%)  Wt(kg): --        10-22-17 @ 07:01  -  10-23-17 @ 07:00  --------------------------------------------------------  IN: 2384 mL / OUT: 3463 mL / NET: -1079 mL    10-23-17 @ 07:01  -  10-23-17 @ 10:18  --------------------------------------------------------  IN: 380 mL / OUT: 445 mL / NET: -65 mL    Physical Exam:  	Gen: Awake  	Pulm: Course BS B/L  	CV: RRR, S1S2  	Abd: +BS, soft, nontender/nondistended   	LE: Warm, + edema  	Skin: Warm, without rashes              Vascular Access: + RIJ non-tunnelled HD catheter       LABS/STUDIES  --------------------------------------------------------------------------------              9.3    16.4  >-----------<  172      [10-23-17 @ 05:18]              27.8     140  |  102  |  26  ----------------------------<  126      [10-23-17 @ 05:18]  4.9   |  24  |  1.91        Ca     8.2     [10-23-17 @ 05:18]      Mg     2.6     [10-23-17 @ 05:18]      Phos  1.8     [10-23-17 @ 05:18]    TPro  6.1  /  Alb  2.8  /  TBili  2.0  /  DBili  x   /  AST  66  /  ALT  95  /  AlkPhos  97  [10-23-17 @ 05:18]    PT/INR: PT 13.5 , INR 1.23       [10-23-17 @ 05:18]  PTT: 76.9       [10-23-17 @ 05:18]      Creatinine Trend:  SCr 1.91 [10-23 @ 05:18]  SCr 1.83 [10-22 @ 18:34]  SCr 1.88 [10-22 @ 05:13]  SCr 1.92 [10-21 @ 16:58]  SCr 1.98 [10-21 @ 04:08]    Urinalysis - [10-15-17 @ 16:48]      Color Yellow / Appearance Turbid / SG >1.030 / pH 6.5      Gluc 50 / Ketone Negative  / Bili Negative / Urobili Negative       Blood Moderate / Protein >600 / Leuk Est Negative / Nitrite Negative      RBC >50 / WBC 3-5 / Hyaline  / Gran  / Sq Epi  / Non Sq Epi OCC / Bacteria Few      HbA1c 5.4      [10-15-17 @ 21:32]  TSH 2.40      [10-15-17 @ 21:34]  Lipid: chol 129, TG 45, HDL 41, LDL 79      [10-15-17 @ 21:40]    HBsAg Nonreact      [10-19-17 @ 07:25]  HCV 0.15, Nonreact      [10-19-17 @ 07:25]  HIV Nonreact      [10-19-17 @ 07:25]    EYAD: titer 1:640, pattern Homogeneous      [10-19-17 @ 07:25]  dsDNA <12      [10-19-17 @ 07:25]  C3 Complement 72      [10-19-17 @ 07:25]  C4 Complement 14      [10-19-17 @ 07:25]  Free Light Chains: kappa 15.20, lambda 10.50, ratio = 1.45      [10-19 @ 07:25]

## 2017-10-23 NOTE — SWALLOW BEDSIDE ASSESSMENT ADULT - COMMENTS
10/17 Nsg 6500cc brown gastric content from NGT into canister.  10/18/18 Neuro: Patient  s/p cardiac cath and LV assist device on systemic heparin gtt, code stroke 10/16 called for asymmetric pupils R>L in setting of PTT >200,  third nerve palsy. In the setting of elevated PTT, concern for brainstem ICH. Imaging shows: No evidence for acute territorial infarct or hemorrhage.  Patient noted to have recurrent  b/l upper extremity myoclonic movement, with concomitant twitching of B/L LE. EEG completed, findings indicate non-specific moderate to severe diffuse or multifocal cerebral dysfunction. There were no epileptiform abnormalities recorded. Despite negative capture of epileptiform activity, patient continues to demonstrate myoclonic activity.   Recommend 10/22 Per MD, Pt remains extubated w/o supp oxygen requirements, however, unintelligible speech and weak swallow.  CXR: IMPRESSION: Small left pleural effusion with adjacent atelectasis. 10/17 Nsg 6500cc brown gastric content from NGT into canister.  10/18/18 Neuro: Patient  s/p cardiac cath and LV assist device on systemic heparin gtt, code stroke 10/16 called for asymmetric pupils R>L in setting of PTT >200,  third nerve palsy. In the setting of elevated PTT, concern for brainstem ICH. Imaging shows: No evidence for acute territorial infarct or hemorrhage.  Patient noted to have recurrent  b/l upper extremity myoclonic movement, with concomitant twitching of B/L LE. EEG completed, findings indicate non-specific moderate to severe diffuse or multifocal cerebral dysfunction. There were no epileptiform abnormalities recorded. Despite negative capture of epileptiform activity, patient continues to demonstrate myoclonic activity.  10/22 Per MD, Pt remains extubated w/o supp oxygen requirements, however, unintelligible speech and weak swallow.  CXR: IMPRESSION: Small left pleural effusion with adjacent atelectasis. 10/17 Nsg 6500cc brown gastric content from NGT into canister.  10/18/18 Neuro: Patient  s/p cardiac cath and LV assist device on systemic heparin gtt, code stroke 10/16 called for asymmetric pupils R>L in setting of PTT >200,  third nerve palsy. In the setting of elevated PTT, concern for brainstem ICH. Imaging shows: No evidence for acute territorial infarct or hemorrhage.  Patient noted to have recurrent  b/l upper extremity myoclonic movement, with concomitant twitching of B/L LE. EEG completed, findings indicate non-specific moderate to severe diffuse or multifocal cerebral dysfunction. There were no epileptiform abnormalities recorded. Despite negative capture of epileptiform activity, patient continues to demonstrate myoclonic activity.  10/21/17 Pt switched from Bipap to 4L Nasal canula. 10/22 Per MD, Pt remains extubated w/o supp oxygen requirements, however, unintelligible speech and weak swallow.  CXR: IMPRESSION: Small left pleural effusion with adjacent atelectasis.

## 2017-10-23 NOTE — PROGRESS NOTE ADULT - PROBLEM SELECTOR PLAN 2
Patient with noted increasing proteinuria since UA on 6/2016 without an underlying etiology which is concerning in a non-diabetic patient. Recommend full workup of proteinuria/ CKD. Urine electrolytes and with urine spot protein/Cr ratio to quantify protein (once patient makes urine). Please repeat complements once patient stabilizes or in one week.   Renal sonogram once stabilized. Please order immunofixation or SPEP/UPEP. HBsAg Nonreact; HCV 0.15, Nonreact; HIV Nonreact; EYAD: titer 1:640 (elevated), pattern Homogeneous; dsDNA <12 ;C3 Complement 72; C4 Complement 14; Free Light Chains: kappa 15.20, lambda 10.50, ratio = 1.45

## 2017-10-23 NOTE — CHART NOTE - NSCHARTNOTEFT_GEN_A_CORE
====================  CCU MIDNIGHT ROUNDS  ====================    KAMLESH PATEL  [id number]  Patient is a 83y old  Male who presents with a chief complaint of SOB, chest pain, diaphoresis (15 Oct 2017 17:59)    ====================  SUMMARY:  ====================  83/M with Afib on Pradaxa who came to [facility] c/o SOB found to have STEMI and transferred to [facility] for cath. Upon going Cath lab, pt became non responsive 2/2 arrest, ACLS initiated and patient was intubated, and ROSC achieved after 30 minutes. In cath lab, received a intra venous pacing wire, IABP, RCA stent, & SWAN, RH cath. Patient now with cardiogenic shock 2/2 right heart failure returned to cath lab and RV impella was placed. With course c/b renal failure and oliguria/ARF requiring CVVH.     ====================  NEW EVENTS:  ====================  S/P extubation, comfortable on RA, however, in respiratory alkalosis.  Remains in Afib with rate better controlled s/p Dig load.  Failed dysphagia, now on tube feeds    MEDICATIONS  (STANDING):  amiodarone    Tablet 400 milliGRAM(s) Oral every 8 hours  aspirin  chewable 81 milliGRAM(s) Oral daily  atorvastatin 40 milliGRAM(s) Oral at bedtime  chlorhexidine 0.12% Liquid 15 milliLiter(s) Swish and Spit two times a day  DOBUTamine Infusion 5 MICROgram(s)/kG/Min (13.965 mL/Hr) IV Continuous <Continuous>  heparin  Infusion. 1000 Unit(s)/Hr (10 mL/Hr) IV Continuous <Continuous>  influenza   Vaccine 0.5 milliLiter(s) IntraMuscular once  pantoprazole  Injectable 40 milliGRAM(s) IV Push daily  ticagrelor 90 milliGRAM(s) Oral two times a day    MEDICATIONS  (PRN):  ALBUTerol/ipratropium for Nebulization 3 milliLiter(s) Nebulizer every 6 hours PRN Shortness of Breath and/or Wheezing  heparin  Injectable 7500 Unit(s) IV Push every 6 hours PRN For aPTT less than 40  heparin  Injectable 3500 Unit(s) IV Push every 6 hours PRN For aPTT between 40 - 57    ====================  VITALS (Last 12 hrs):  ====================    T(C): 36.7 (10-22-17 @ 19:00), Max: 36.7 (10-22-17 @ 18:00)  T(F): 98.1 (10-22-17 @ 19:00), Max: 98.1 (10-22-17 @ 18:00)  HR: 100 (10-23-17 @ 01:00) (90 - 106)  BP: --  BP(mean): --  ABP: 100/52 (10-23-17 @ 01:00) (100/52 - 132/62)  ABP(mean): 68 (10-23-17 @ 01:00) (68 - 82)  RR: 23 (10-23-17 @ 01:00) (16 - 25)  SpO2: 100% (10-23-17 @ 01:00) (100% - 100%)  Wt(kg): --  CVP(mm Hg): 11 (10-23-17 @ 01:00) (11 - 13)  CVP(cm H2O): --  CO: --  CI: --  PA: 40/15 (10-23-17 @ 01:00) (40/15 - 46/19)  PA(mean): 23 (10-23-17 @ 01:00) (23 - 29)  PCWP: --  SVR: --  PVR: --    I&O's Summary    21 Oct 2017 07:01  -  22 Oct 2017 07:00  --------------------------------------------------------  IN: 2165.4 mL / OUT: 3056 mL / NET: -890.6 mL    22 Oct 2017 07:01  -  23 Oct 2017 02:12  --------------------------------------------------------  IN: 1864 mL / OUT: 2449 mL / NET: -585 mL    ====================  NEW LABS:  ====================    10-22    137  |  101  |  25<H>  ----------------------------<  105<H>  4.7   |  23  |  1.83<H>    Ca    8.5      22 Oct 2017 18:34  Phos  2.8     10-22  Mg     2.6     10-22    TPro  6.3  /  Alb  3.0<L>  /  TBili  2.1<H>  /  DBili  x   /  AST  69<H>  /  ALT  102<H>  /  AlkPhos  92  10-22    PT/INR - ( 22 Oct 2017 05:13 )   PT: 12.8 sec;   INR: 1.18 ratio      PTT - ( 22 Oct 2017 00:32 )  PTT:87.3 sec    ABG - ( 23 Oct 2017 01:19 )  pH: 7.56  /  pCO2: 28    /  pO2: 202   / HCO3: 25    / Base Excess: 3.1   /  SaO2: 100       ====================  PLAN:  ====================  - Successfully extubated, now on nasal cannula  - Continue Dobutamine at 5 mcg/kg/min and amiodarone drip.    - Still on Afib, rate better controlled {90's-100's)  - S/P Digoxinload  - Remains on Levo for BP support; titrate to keep MAP ~65        [facility] NP  Beeper #[id number]  Spectra # [phone] ====================  CCU MIDNIGHT ROUNDS  ====================    KAMLESH PATEL  [id number]  Patient is a 83y old  Male who presents with a chief complaint of SOB, chest pain, diaphoresis (15 Oct 2017 17:59)    ====================  SUMMARY:  ====================  83/M with Afib on Pradaxa who came to [facility] c/o SOB found to have STEMI and transferred to [facility] for cath. Upon going Cath lab, pt became non responsive 2/2 arrest, ACLS initiated and patient was intubated, and ROSC achieved after 30 minutes. In cath lab, received a intra venous pacing wire, IABP, RCA stent, & SWAN, RH cath. Patient now with cardiogenic shock 2/2 right heart failure returned to cath lab and RV impella was placed. With course c/b renal failure and oliguria/ARF requiring CVVH.     ====================  NEW EVENTS:  ====================  S/P extubation, comfortable on RA, however, in respiratory alkalosis.  Remains in Afib with rate better controlled s/p Dig load.  Failed dysphagia, now on tube feeds    MEDICATIONS  (STANDING):  amiodarone    Tablet 400 milliGRAM(s) Oral every 8 hours  aspirin  chewable 81 milliGRAM(s) Oral daily  atorvastatin 40 milliGRAM(s) Oral at bedtime  chlorhexidine 0.12% Liquid 15 milliLiter(s) Swish and Spit two times a day  DOBUTamine Infusion 5 MICROgram(s)/kG/Min (13.965 mL/Hr) IV Continuous <Continuous>  heparin  Infusion. 1000 Unit(s)/Hr (10 mL/Hr) IV Continuous <Continuous>  influenza   Vaccine 0.5 milliLiter(s) IntraMuscular once  pantoprazole  Injectable 40 milliGRAM(s) IV Push daily  ticagrelor 90 milliGRAM(s) Oral two times a day    MEDICATIONS  (PRN):  ALBUTerol/ipratropium for Nebulization 3 milliLiter(s) Nebulizer every 6 hours PRN Shortness of Breath and/or Wheezing  heparin  Injectable 7500 Unit(s) IV Push every 6 hours PRN For aPTT less than 40  heparin  Injectable 3500 Unit(s) IV Push every 6 hours PRN For aPTT between 40 - 57    ====================  VITALS (Last 12 hrs):  ====================    T(C): 36.7 (10-22-17 @ 19:00), Max: 36.7 (10-22-17 @ 18:00)  T(F): 98.1 (10-22-17 @ 19:00), Max: 98.1 (10-22-17 @ 18:00)  HR: 100 (10-23-17 @ 01:00) (90 - 106)  BP: --  BP(mean): --  ABP: 100/52 (10-23-17 @ 01:00) (100/52 - 132/62)  ABP(mean): 68 (10-23-17 @ 01:00) (68 - 82)  RR: 23 (10-23-17 @ 01:00) (16 - 25)  SpO2: 100% (10-23-17 @ 01:00) (100% - 100%)  Wt(kg): --  CVP(mm Hg): 11 (10-23-17 @ 01:00) (11 - 13)  CVP(cm H2O): --  CO: --  CI: --  PA: 40/15 (10-23-17 @ 01:00) (40/15 - 46/19)  PA(mean): 23 (10-23-17 @ 01:00) (23 - 29)  PCWP: --  SVR: --  PVR: --    I&O's Summary    21 Oct 2017 07:01  -  22 Oct 2017 07:00  --------------------------------------------------------  IN: 2165.4 mL / OUT: 3056 mL / NET: -890.6 mL    22 Oct 2017 07:01  -  23 Oct 2017 02:12  --------------------------------------------------------  IN: 1864 mL / OUT: 2449 mL / NET: -585 mL    ====================  NEW LABS:  ====================    10-22    137  |  101  |  25<H>  ----------------------------<  105<H>  4.7   |  23  |  1.83<H>    Ca    8.5      22 Oct 2017 18:34  Phos  2.8     10-22  Mg     2.6     10-22    TPro  6.3  /  Alb  3.0<L>  /  TBili  2.1<H>  /  DBili  x   /  AST  69<H>  /  ALT  102<H>  /  AlkPhos  92  10-22    PT/INR - ( 22 Oct 2017 05:13 )   PT: 12.8 sec;   INR: 1.18 ratio      PTT - ( 22 Oct 2017 00:32 )  PTT:87.3 sec    ABG - ( 23 Oct 2017 01:19 )  pH: 7.56  /  pCO2: 28    /  pO2: 202   / HCO3: 25    / Base Excess: 3.1   /  SaO2: 100       ====================  PLAN:  ====================  - Successfully extubated, now on nasal cannula  - Continue Dobutamine at 5 mcg/kg/min and amiodarone PO (total 5 Gm load).    - Monitor hemodynamics: CO=5.1 CI=2.4, CVP=10;   - Still on Afib, rate better controlled {90's-100's)  - S/P Digoxin load  - Remains off Levo, SBP range: high 80's to 90's while asleep  - Continue Heparin drip for Afib  - Continue CVVH; follow renal recs  - Failed dysphagia; continue tube feeds  - Aspiration precaution    [facility] NP  Beeper #[id number]  Spectra # [phone]

## 2017-10-23 NOTE — PROGRESS NOTE ADULT - ASSESSMENT
83/M with Afib on Pradaxa who came to [facility] c/o SOB found to have STEMI and transferred to [facility] for cath. Upon going Cath lab, pt became non responsive 2/2 arrest, ACLS initiated and patient was intubated, and ROSC achieved after 30 minutes. In cath lab, received a intra venous pacing wire, IABP, RCA stent, & [facility] cath. [facility] 2/2 right heart failure requiring RV impella. With course c/b renal failure and oliguria/ARF requiring CVVH. Now extubated and improved RV/LV function and DANITA. 83/M with Afib on Pradaxa who came to [facility] c/o SOB found to have IWSTEMI, transferred to [facility] for cath, h/c by VF arrest requiring IABP, RCA stent & SWAN, h/c c/b cardiogenic shock 2/2 right heart failure s/p impella (now removed), w/ h/c c/b renal failure requiring CVVH, extubated 83/M with Afib on Pradaxa who came to [facility] c/o SOB found to have IWSTEMI, transferred to [facility] for cath, h/c by VF arrest requiring IABP, RCA stent & SWAN, h/c c/b cardiogenic shock 2/2 right heart failure s/p impella (now removed), w/ h/c c/b renal failure requiring CVVH, extubated    #Neuro:   ax o x 3, no issues, stable    #CV:   -s/p RCA stent, c/w hep gtt, ASA, brillinta, lipitor  -a.fib, currently on amio load, HR controlled  -continued on  5    #Resp:   -slightly tachypneic, pH 7.60, PCO2 26  -on RA, has component of metabolic acidosis  -will continue CVH    #GI:   - Hgb stable,  -failed dysphagia screen, currently on NGT, vital    #Metabolic/Renal  - continue w/ CVVH    #Heme  - H&H drop from 8.1 to 7.4, FOBT, anemia work-up  - HDS, T&S    #DVT ppx: HSQ  #Dispo - Planned for staged cath possibly today, monitoring H&H's 83/M with Afib on Pradaxa who came to [facility] c/o SOB found to have IWSTEMI, transferred to [facility] for cath, h/c by VF arrest requiring IABP, RCA stent & SWAN, h/c c/b cardiogenic shock 2/2 right heart failure s/p impella (now removed), w/ h/c c/b renal failure requiring CVVH, extubated    #Neuro:   ax o x 3, no issues, stable    #CV:   -s/p RCA stent, c/w hep gtt, ASA, brillinta, lipitor  -a.fib, currently on amio load, HR controlled  -continued on  5  -will take out swan today    #Resp:   -slightly tachypneic, pH 7.60, PCO2 26  -on RA, has component of metabolic acidosis  -will continue CVVH    #GI:   - Hgb stable,  -failed dysphagia screen, currently on NGTv w/ tube feeds-vital    #Metabolic/Renal  - continue w/ CVVH, net negative: 1Liter    #Heme  - H&H stable  - HDS    #DVT ppx: hep gtt  #Dispo - PT

## 2017-10-23 NOTE — PROGRESS NOTE ADULT - ASSESSMENT
83M pmhx of AF on pradaxa initially presented on 10/15/17 with chest pain/diaphoresis/ dyspnea to OSH, IW-STEMI emergently transferred here for [facility] c/b VF arrest requiring IABP followed by pRCA stent c/b now acute right heart failure and cardiogenic shock s/p IABP (explanted 10/19), RP impella for RV support (explanted 10/20), remains on  5 with borderline low CI. RVSWI low at 4.4.   - continue  5  - can gently pull fluid to keep net negative 500 cc as has extravascular fluid and needs to be mobilized  - unable to do dig given CVVH  - can remove femoral [place]

## 2017-10-23 NOTE — SWALLOW BEDSIDE ASSESSMENT ADULT - ASR SWALLOW LINGUAL MOBILITY
impaired right lateral movement/mildly reduced lateralization and strength/impaired left lateral movement

## 2017-10-23 NOTE — SWALLOW BEDSIDE ASSESSMENT ADULT - CONSISTENCIES ADMINISTERED
Pt was not directly fed given limited ability to sit upright; Pt deemed to be at risk for aspiration with any PO intake.

## 2017-10-23 NOTE — SWALLOW BEDSIDE ASSESSMENT ADULT - SWALLOW EVAL: DIAGNOSIS
Pt is not deemed to be a candidate for oral intake at this time as he is unable to positioned upright.  In addition, significant hypophonia noted.  Pt also with h/o goiter, although Pt's wife denies h/o dysphagia prior to this admission.  Therefore, Pt is deemed to be at risk for aspiration with any PO intake at this time.

## 2017-10-23 NOTE — PROGRESS NOTE ADULT - SUBJECTIVE AND OBJECTIVE BOX
CONTACT INFO:  Sharon Reina MD  Internal Medicine PGY2  Pager:  [phone]      HPI / INTERVAL HISTORY:  Patient seen and examined at bedside.      OBJECTIVE:  VITAL SIGNS:  ICU Vital Signs Last 24 Hrs  T(C): 37 (23 Oct 2017 02:00), Max: 37 (23 Oct 2017 02:00)  T(F): 98.6 (23 Oct 2017 02:00), Max: 98.6 (23 Oct 2017 02:00)  HR: 102 (23 Oct 2017 06:00) (89 - 108)  BP: 102/69 (22 Oct 2017 08:30) (102/69 - 102/69)  BP(mean): 80 (22 Oct 2017 08:30) (80 - 80)  ABP: 122/56 (23 Oct 2017 06:00) (86/48 - 132/62)  ABP(mean): 78 (23 Oct 2017 06:00) (62 - 82)  RR: 32 (23 Oct 2017 06:00) (12 - 32)  SpO2: 100% (23 Oct 2017 06:00) (99% - 100%)        10-22 @ 07:01  -  10-23 @ 07:00  --------------------------------------------------------  IN: 2299 mL / OUT: 3463 mL / NET: -1164 mL      CAPILLARY BLOOD GLUCOSE          PHYSICAL EXAM:  Gen:   HEENT: NC/AT; PERRL, anicteric sclera  Neck: supple, no JVD  Resp: clear to ausculation B/L; no wheezes, rales or rhonchi  Cardiovasc: S1S2 normal; RRR; no murmurs, rubs or gallops  GI: soft, nondistended, nontender; +BS  Extr: warm, well-perfused, PT/DP pulses 2+ B/L; no LE edema  Skin: normal color and turgor  Neuro:     LABS:                        9.3    16.4  )-----------( 172      ( 23 Oct 2017 05:18 )             27.8     10-23    140  |  102  |  26<H>  ----------------------------<  126<H>  4.9   |  24  |  1.91<H>    Ca    8.2<L>      23 Oct 2017 05:18  Phos  1.8     10-23  Mg     2.6     10-23    TPro  6.1  /  Alb  2.8<L>  /  TBili  2.0<H>  /  DBili  x   /  AST  66<H>  /  ALT  95<H>  /  AlkPhos  97  10-23    LIVER FUNCTIONS - ( 23 Oct 2017 05:18 )  Alb: 2.8 g/dL / Pro: 6.1 g/dL / ALK PHOS: 97 U/L / ALT: 95 U/L RC / AST: 66 U/L / GGT: x           PT/INR - ( 23 Oct 2017 05:18 )   PT: 13.5 sec;   INR: 1.23 ratio         PTT - ( 23 Oct 2017 05:18 )  PTT:76.9 sec            RADIOLOGY & ADDITIONAL TESTS:       MEDICATIONS:  ALBUTerol/ipratropium for Nebulization 3 milliLiter(s) Nebulizer every 6 hours PRN  amiodarone    Tablet 400 milliGRAM(s) Oral every 8 hours  aspirin  chewable 81 milliGRAM(s) Oral daily  atorvastatin 40 milliGRAM(s) Oral at bedtime  chlorhexidine 0.12% Liquid 15 milliLiter(s) Swish and Spit two times a day  DOBUTamine Infusion 5 MICROgram(s)/kG/Min IV Continuous <Continuous>  heparin  Infusion. 1000 Unit(s)/Hr IV Continuous <Continuous>  heparin  Injectable 7500 Unit(s) IV Push every 6 hours PRN  heparin  Injectable 3500 Unit(s) IV Push every 6 hours PRN  influenza   Vaccine 0.5 milliLiter(s) IntraMuscular once  pantoprazole  Injectable 40 milliGRAM(s) IV Push daily  potassium phosphate IVPB 15 milliMole(s) IV Intermittent once  ticagrelor 90 milliGRAM(s) Oral two times a day      ALLERGIES:  No Known Allergies CONTACT INFO:  Sharon Reina MD  Internal Medicine PGY2  Pager:  [phone]      HPI / INTERVAL HISTORY:  Patient seen and examined at bedside. No complaints of CP, SOB, palpitations, fevers/chills, abdominal pain, changes in BM. All other ROS is negative      OBJECTIVE:  VITAL SIGNS:  ICU Vital Signs Last 24 Hrs  T(C): 37 (23 Oct 2017 02:00), Max: 37 (23 Oct 2017 02:00)  T(F): 98.6 (23 Oct 2017 02:00), Max: 98.6 (23 Oct 2017 02:00)  HR: 102 (23 Oct 2017 06:00) (89 - 108)  BP: 102/69 (22 Oct 2017 08:30) (102/69 - 102/69)  BP(mean): 80 (22 Oct 2017 08:30) (80 - 80)  ABP: 122/56 (23 Oct 2017 06:00) (86/48 - 132/62)  ABP(mean): 78 (23 Oct 2017 06:00) (62 - 82)  RR: 32 (23 Oct 2017 06:00) (12 - 32)  SpO2: 100% (23 Oct 2017 06:00) (99% - 100%)        10-22 @ 07:01  -  10-23 @ 07:00  --------------------------------------------------------  IN: 2299 mL / OUT: 3463 mL / NET: -1164 mL      CAPILLARY BLOOD GLUCOSE          PHYSICAL EXAM:  Gen:   HEENT: NC/AT; PERRL, anicteric sclera  Neck: supple  Resp: clear to ausculation B/L; no wheezes, rales or rhonchi  Cardiovasc: S1S2 normal; irregular rate and rhythm; no murmurs, rubs or gallops  GI: soft, nondistended, nontender; +BS  Extr: warm, well-perfused, PT/DP pulses 2+ B/L; bilat LE edema 2-3+  Skin: normal color and turgor, had tenderness around site of impella removal-Right groin, no warmth, induration, hematoma/fluid collection  Neuro: a x o x 3    LABS:                        9.3    16.4  )-----------( 172      ( 23 Oct 2017 05:18 )             27.8     10-23    140  |  102  |  26<H>  ----------------------------<  126<H>  4.9   |  24  |  1.91<H>    Ca    8.2<L>      23 Oct 2017 05:18  Phos  1.8     10-23  Mg     2.6     10-23    TPro  6.1  /  Alb  2.8<L>  /  TBili  2.0<H>  /  DBili  x   /  AST  66<H>  /  ALT  95<H>  /  AlkPhos  97  10-23    LIVER FUNCTIONS - ( 23 Oct 2017 05:18 )  Alb: 2.8 g/dL / Pro: 6.1 g/dL / ALK PHOS: 97 U/L / ALT: 95 U/L RC / AST: 66 U/L / GGT: x           PT/INR - ( 23 Oct 2017 05:18 )   PT: 13.5 sec;   INR: 1.23 ratio         PTT - ( 23 Oct 2017 05:18 )  PTT:76.9 sec            RADIOLOGY & ADDITIONAL TESTS:       MEDICATIONS:  ALBUTerol/ipratropium for Nebulization 3 milliLiter(s) Nebulizer every 6 hours PRN  amiodarone    Tablet 400 milliGRAM(s) Oral every 8 hours  aspirin  chewable 81 milliGRAM(s) Oral daily  atorvastatin 40 milliGRAM(s) Oral at bedtime  chlorhexidine 0.12% Liquid 15 milliLiter(s) Swish and Spit two times a day  DOBUTamine Infusion 5 MICROgram(s)/kG/Min IV Continuous <Continuous>  heparin  Infusion. 1000 Unit(s)/Hr IV Continuous <Continuous>  heparin  Injectable 7500 Unit(s) IV Push every 6 hours PRN  heparin  Injectable 3500 Unit(s) IV Push every 6 hours PRN  influenza   Vaccine 0.5 milliLiter(s) IntraMuscular once  pantoprazole  Injectable 40 milliGRAM(s) IV Push daily  potassium phosphate IVPB 15 milliMole(s) IV Intermittent once  ticagrelor 90 milliGRAM(s) Oral two times a day      ALLERGIES:  No Known Allergies CONTACT INFO:  Sharon Reina MD  Internal Medicine PGY2  Pager:  [phone]      HPI / INTERVAL HISTORY:  Patient seen and examined at bedside. No complaints of CP, SOB, palpitations, fevers/chills, abdominal pain, changes in BM. All other ROS is negative      OBJECTIVE:  VITAL SIGNS:  ICU Vital Signs Last 24 Hrs  T(C): 37 (23 Oct 2017 02:00), Max: 37 (23 Oct 2017 02:00)  T(F): 98.6 (23 Oct 2017 02:00), Max: 98.6 (23 Oct 2017 02:00)  HR: 102 (23 Oct 2017 06:00) (89 - 108)  BP: 102/69 (22 Oct 2017 08:30) (102/69 - 102/69)  BP(mean): 80 (22 Oct 2017 08:30) (80 - 80)  ABP: 122/56 (23 Oct 2017 06:00) (86/48 - 132/62)  ABP(mean): 78 (23 Oct 2017 06:00) (62 - 82)  RR: 32 (23 Oct 2017 06:00) (12 - 32)  SpO2: 100% (23 Oct 2017 06:00) (99% - 100%)        10-22 @ 07:01  -  10-23 @ 07:00  --------------------------------------------------------  IN: 2299 mL / OUT: 3463 mL / NET: -1164 mL      CAPILLARY BLOOD GLUCOSE          PHYSICAL EXAM:  Gen: NAD  HEENT: NC/AT; PERRL, anicteric sclera  Neck: supple  Resp: clear to ausculation B/L; no wheezes, rales or rhonchi  Cardiovasc: S1S2 normal; irregular rate and rhythm; no murmurs, rubs or gallops  GI: soft, nondistended, nontender; +BS  Extr: warm, well-perfused, PT/DP pulses 2+ B/L; bilat LE edema 2-3+  Skin: normal color and turgor, had tenderness around site of impella removal-Right groin, no warmth, induration, hematoma/fluid collection  Neuro: a x o x 3    LABS:                        9.3    16.4  )-----------( 172      ( 23 Oct 2017 05:18 )             27.8     10-23    140  |  102  |  26<H>  ----------------------------<  126<H>  4.9   |  24  |  1.91<H>    Ca    8.2<L>      23 Oct 2017 05:18  Phos  1.8     10-23  Mg     2.6     10-23    TPro  6.1  /  Alb  2.8<L>  /  TBili  2.0<H>  /  DBili  x   /  AST  66<H>  /  ALT  95<H>  /  AlkPhos  97  10-23    LIVER FUNCTIONS - ( 23 Oct 2017 05:18 )  Alb: 2.8 g/dL / Pro: 6.1 g/dL / ALK PHOS: 97 U/L / ALT: 95 U/L RC / AST: 66 U/L / GGT: x           PT/INR - ( 23 Oct 2017 05:18 )   PT: 13.5 sec;   INR: 1.23 ratio         PTT - ( 23 Oct 2017 05:18 )  PTT:76.9 sec            RADIOLOGY & ADDITIONAL TESTS:       MEDICATIONS:  ALBUTerol/ipratropium for Nebulization 3 milliLiter(s) Nebulizer every 6 hours PRN  amiodarone    Tablet 400 milliGRAM(s) Oral every 8 hours  aspirin  chewable 81 milliGRAM(s) Oral daily  atorvastatin 40 milliGRAM(s) Oral at bedtime  chlorhexidine 0.12% Liquid 15 milliLiter(s) Swish and Spit two times a day  DOBUTamine Infusion 5 MICROgram(s)/kG/Min IV Continuous <Continuous>  heparin  Infusion. 1000 Unit(s)/Hr IV Continuous <Continuous>  heparin  Injectable 7500 Unit(s) IV Push every 6 hours PRN  heparin  Injectable 3500 Unit(s) IV Push every 6 hours PRN  influenza   Vaccine 0.5 milliLiter(s) IntraMuscular once  pantoprazole  Injectable 40 milliGRAM(s) IV Push daily  potassium phosphate IVPB 15 milliMole(s) IV Intermittent once  ticagrelor 90 milliGRAM(s) Oral two times a day      ALLERGIES:  No Known Allergies

## 2017-10-23 NOTE — PROGRESS NOTE ADULT - ASSESSMENT
83 year old male with a PMH of atrial fibrillation (on Pradaxa), HTN, Thyroid Goiter, CKD stage III (dx 2016) was seen at [facility] with inferior wall STEMI and was brought to [facility] for cardiac catherization s/p PCI x 1 now with DANITA and Anuria.

## 2017-10-23 NOTE — SWALLOW BEDSIDE ASSESSMENT ADULT - ADDITIONAL RECOMMENDATIONS
Reevaluation as Pt's status improves and Pt is able to tolerate upright position (ie per d/w Nsg, removal of swan maria elena catheter will allow Pt better positioning and Pt may be a candidate for reeval tomorrow).

## 2017-10-23 NOTE — SWALLOW BEDSIDE ASSESSMENT ADULT - SLP GENERAL OBSERVATIONS
Pt awake, seated semi-reclined in bed; receiving CVVHD with SP02 100%; significany Hypophonia noted; +goiter- which Pt's wife denied h/o dysphagia PTA.  Pt awake, alert and cooperative; following one step commands and answering simple questions.  Per d/w Nsg at b/s, the Pt can not be seated upright at this time given presence of swan maria elena catheter.  +NGT in place.

## 2017-10-24 NOTE — PROGRESS NOTE ADULT - ATTENDING COMMENTS
83M pmhx of AF on pradaxa initially presented on 10/15/17 with chest pain/diaphoresis/ dyspnea to OSH, IW-STEMI emergently transferred here for [facility] c/b VF arrest requiring IABP followed by pRCA stent c/b now acute right heart failure and cardiogenic shock s/p IABP (explanted 10/19), RP impella for RV support (explanted 10/20), remains on  5 with borderline low CI. RVSWI low at 4.4. TTE reviewed and appears to have hyperdynamic LV with moderate RV dysfunction.    - place central line and check venous sats; attempt do reduce  to 2.5 and repeat   - can gently pull fluid to keep net negative 500 cc as has extravascular fluid and needs to be mobilized

## 2017-10-24 NOTE — PROCEDURE NOTE - NSPOSTPRCRAD_GEN_A_CORE
central line located in the
no pneumothorax/central line located in the superior vena cava
central line located in the/central line located in the superior vena cava/no pneumothorax

## 2017-10-24 NOTE — PROGRESS NOTE ADULT - SUBJECTIVE AND OBJECTIVE BOX
CONTACT INFO:  Walter Caldera PGY2  Pager [phone]/ [phone]      HPI / INTERVAL HISTORY:  Patient seen and examined at bedside. Patient reports he is feeling well this morning compared to yesterday. Continues to have cough with sputum production, relieved by suctioning. Denies chest pain, SOB, abdominal pain.       OBJECTIVE:  VITAL SIGNS:  ICU Vital Signs Last 24 Hrs  T(C): 36.9 (24 Oct 2017 07:00), Max: 37.7 (23 Oct 2017 23:00)  T(F): 98.4 (24 Oct 2017 07:00), Max: 99.8 (23 Oct 2017 23:00)  HR: 84 (24 Oct 2017 07:00) (84 - 112)  BP: 98/60 (24 Oct 2017 06:00) (93/58 - 102/52)  BP(mean): 77 (24 Oct 2017 06:00) (61 - 77)  ABP: 118/44 (24 Oct 2017 07:00) (60/50 - 126/58)  ABP(mean): 62 (24 Oct 2017 07:00) (48 - 80)  RR: 24 (24 Oct 2017 07:00) (14 - 36)  SpO2: 100% (24 Oct 2017 07:00) (98% - 100%)        10-23 @ 07:01  -  10-24 @ 07:00  --------------------------------------------------------  IN: 2994 mL / OUT: 3110 mL / NET: -116 mL      CAPILLARY BLOOD GLUCOSE          PHYSICAL EXAM:  Gen:   HEENT: anicteric sclera, R pupil reacts slower but equal to L pupil  Neck: goiter  Resp: clear to ausculation anteriorly  Cardiovasc: S1S2 normal; RRR; no murmurs, rubs or gallops  GI: soft, nondistended, nontender; +BS  Extr: warm, well-perfused, mild ankle edema   Skin: normal color and turgor  Neuro: alert and conversive     LABS:                        9.2    14.2  )-----------( 200      ( 24 Oct 2017 03:50 )             27.3     10-24    137  |  102  |  34<H>  ----------------------------<  118<H>  5.1   |  22  |  1.96<H>    Ca    8.1<L>      24 Oct 2017 03:50  Phos  1.6     10-24  Mg     2.5     10-24    TPro  5.8<L>  /  Alb  2.6<L>  /  TBili  1.7<H>  /  DBili  x   /  AST  69<H>  /  ALT  87<H>  /  AlkPhos  98  10-24    LIVER FUNCTIONS - ( 24 Oct 2017 03:50 )  Alb: 2.6 g/dL / Pro: 5.8 g/dL / ALK PHOS: 98 U/L / ALT: 87 U/L RC / AST: 69 U/L / GGT: x           PT/INR - ( 24 Oct 2017 03:50 )   PT: 13.3 sec;   INR: 1.23 ratio         PTT - ( 24 Oct 2017 03:50 )  PTT:53.6 sec            RADIOLOGY & ADDITIONAL TESTS:       MEDICATIONS:  ALBUTerol/ipratropium for Nebulization 3 milliLiter(s) Nebulizer every 6 hours PRN  amiodarone    Tablet 400 milliGRAM(s) Oral every 8 hours  aspirin  chewable 81 milliGRAM(s) Oral daily  atorvastatin 40 milliGRAM(s) Oral at bedtime  chlorhexidine 0.12% Liquid 15 milliLiter(s) Swish and Spit two times a day  DOBUTamine Infusion 5 MICROgram(s)/kG/Min IV Continuous <Continuous>  heparin  Infusion. 1000 Unit(s)/Hr IV Continuous <Continuous>  heparin  Injectable 7500 Unit(s) IV Push every 6 hours PRN  heparin  Injectable 3500 Unit(s) IV Push every 6 hours PRN  influenza   Vaccine 0.5 milliLiter(s) IntraMuscular once  pantoprazole  Injectable 40 milliGRAM(s) IV Push daily  potassium phosphate IVPB 15 milliMole(s) IV Intermittent once  ticagrelor 90 milliGRAM(s) Oral two times a day      ALLERGIES:  No Known Allergies

## 2017-10-24 NOTE — PROVIDER CONTACT NOTE (OTHER) - ASSESSMENT
Pt in no acute distress Pt in no acute distress Neuro check completed as ordered on initial assessment. Rt eye size 3mm slower reaction to light than left eye 3 mm

## 2017-10-24 NOTE — PROCEDURE NOTE - NSINFORMCONSENT_GEN_A_CORE
Benefits, risks, and possible complications of procedure explained to patient/caregiver who verbalized understanding and gave written consent.

## 2017-10-24 NOTE — PROGRESS NOTE ADULT - PROBLEM SELECTOR PLAN 2
Patient with noted increasing proteinuria since UA on 6/2016 without an underlying etiology which is concerning in a non-diabetic patient. Recommend full workup of proteinuria/ CKD. Order urine electrolytes and with urine spot protein/Cr ratio to quantify protein (once patient makes urine). Please repeat complements once patient stabilizes or in one week.   Renal sonogram once stabilized. Please order repeat SPEP/UPEP. HBsAg Nonreact; HCV 0.15, Nonreact; HIV Nonreact; EYAD: titer 1:640 (elevated), pattern Homogeneous; dsDNA <12 ;C3 Complement 72; C4 Complement 14; Free Light Chains: kappa 15.20, lambda 10.50, ratio = 1.45

## 2017-10-24 NOTE — PROCEDURE NOTE - NSPOSTCAREGUIDE_GEN_A_CORE
Verbal/written post procedure instructions were given to patient/caregiver

## 2017-10-24 NOTE — SWALLOW BEDSIDE ASSESSMENT ADULT - ADDITIONAL RECOMMENDATIONS
Consider reevaluation of swallowing at b/s as Pt's status improves with possible objective swallowing assessment pending findings.

## 2017-10-24 NOTE — CHART NOTE - NSCHARTNOTEFT_GEN_A_CORE
====================  CCU MIDNIGHT ROUNDS  ====================    KAMLESH PATEL  [id number]    ====================  SUMMARY: HPI:  83 male presents to ER by ambulance with report of shortness of breath. Wife at the bedside states patient has h/o Afib on Pradaxa, has "heart valve problems", has been having shortness of breath for the past few days, today was found sitting on stairs, c/o increased shortness of breath, chest pain and diaphoresis. At OSH, he was found to have an inferior wall STEMI and was brought to [facility] for cath. Upon arrival, pt appeared unwell and fixated on chest pain. Upon going to Cath lab, pt became non responsive and went into cardiogenic arrest. advanced life support was started, pt was intubated, and ROS was achieved. In cath lab, pt received a inta venus pacing wire, baloon pump, RCA stent, a swan RH cath, and an illiac balloon to stop R groin bleeding. Pt was admitted to the CCU for further management. (15 Oct 2017 17:59)    ====================        ====================  NEW EVENTS:  ====================        ====================  VITALS (Last 12 hrs):  ====================    T(C): 37.7 (10-23-17 @ 23:00), Max: 37.7 (10-23-17 @ 23:00)  HR: 100 (10-23-17 @ 23:00) (94 - 112)  BP: --  BP(mean): --  ABP: 104/50 (10-23-17 @ 23:00) (100/64 - 126/58)  ABP(mean): 68 (10-23-17 @ 23:00) (66 - 80)  RR: 18 (10-23-17 @ 23:00) (14 - 29)  SpO2: 100% (10-23-17 @ 23:00) (98% - 100%)  Wt(kg): --  CVP(mm Hg): 4 (10-23-17 @ 18:30) (4 - 11)  CO: 5.6 (10-23-17 @ 13:28) (5.6 - 5.6)  CI: 2.6 (10-23-17 @ 13:28) (2.6 - 2.6)  PA:  (35/11 - 43/17)  PA(mean): 19 (10-23-17 @ 18:30) (19 - 26)  PA(direct): --  PCWP: --  SVR: 970 (10-23-17 @ 13:28) (970 - 970)    TELEMETRY:        *BLOOD GAS/ARTERIAL/MIXED/VENOUS  *LACTATE    I&O's Summary    22 Oct 2017 07:01  -  23 Oct 2017 07:00  --------------------------------------------------------  IN: 2384 mL / OUT: 3463 mL / NET: -1079 mL    23 Oct 2017 07:01  -  24 Oct 2017 00:10  --------------------------------------------------------  IN: 2255 mL / OUT: 2135 mL / NET: 120 mL        ====================  PLAN:  ====================    HEALTH ISSUES - PROBLEM Dx:  Atrial fibrillation with RVR: Atrial fibrillation with RVR  Cardiac arrest: Cardiac arrest  Proteinuria, unspecified type: Proteinuria, unspecified type  Acute kidney injury: Acute kidney injury  Proteinuria: Proteinuria  Hyperkalemia: Hyperkalemia  DANITA (acute kidney injury): DANITA (acute kidney injury)  Prophylactic measure: Prophylactic measure  Hypertension: Hypertension  Goiter diffuse, nontoxic: Goiter diffuse, nontoxic  Atrial fibrillation, chronic: Atrial fibrillation, chronic  STEMI involving right coronary artery: STEMI involving right coronary artery  Right heart failure: Right heart failure  Cardiogenic shock: Cardiogenic shock        HEALTH ISSUES - R/O PROBLEM Dx:      Jeannine Catalan, CCU NP   # ====================  CCU MIDNIGHT ROUNDS  ====================    KAMLESH PATEL  [id number]    ====================  SUMMARY:   84 yo M w/ hx of Afib (previously on Pradaxa) admitted from OSH for IWSTEMI/cath.  Upon going Cath lab, pt went into v-fib arrest requiring intubation.  RCA stent placed as well as IABP, swan and subsequent RP impella due to cardiogenic shock (IWSTEMI w/ RV infarct/RV shock).  Hosp course complicated by renal failure requiring dialysis/CRRT.  Pt extubated, off IABP/RP impella.  New swan numbers:  CI 2.07, CO4.4, PAD 15-17.    ====================    ====================  NEW EVENTS: No acute events. Pt continued to be on CVVHD for goal net even due to soft BPs. Pulm catheter removed. Will monitor central venous from shiley cath.   ====================    ====================  VITALS (Last 12 hrs):  ====================    T(C): 37.7 (10-23-17 @ 23:00), Max: 37.7 (10-23-17 @ 23:00)  HR: 100 (10-23-17 @ 23:00) (94 - 112)  ABP: 104/50 (10-23-17 @ 23:00) (100/64 - 126/58)  ABP(mean): 68 (10-23-17 @ 23:00) (66 - 80)  RR: 18 (10-23-17 @ 23:00) (14 - 29)  SpO2: 100% (10-23-17 @ 23:00) (98% - 100%)  CVP(mm Hg): 4 (10-23-17 @ 18:30) (4 - 11)  CO: 5.6 (10-23-17 @ 13:28) (5.6 - 5.6)  CI: 2.6 (10-23-17 @ 13:28) (2.6 - 2.6)  PA:  (35/11 - 43/17)  PA(mean): 19 (10-23-17 @ 18:30) (19 - 26)  SVR: 970 (10-23-17 @ 13:28) (970 - 970)    TELEMETRY:        *BLOOD GAS/ARTERIAL/MIXED/VENOUS  *LACTATE    I&O's Summary    22 Oct 2017 07:01  -  23 Oct 2017 07:00  --------------------------------------------------------  IN: 2384 mL / OUT: 3463 mL / NET: -1079 mL    23 Oct 2017 07:01  -  24 Oct 2017 00:10  --------------------------------------------------------  IN: 2255 mL / OUT: 2135 mL / NET: 120 mL        ====================  PLAN:  ====================    HEALTH ISSUES - PROBLEM Dx:  Atrial fibrillation with RVR: Atrial fibrillation with RVR  Cardiac arrest: Cardiac arrest  Proteinuria, unspecified type: Proteinuria, unspecified type  Acute kidney injury: Acute kidney injury  Proteinuria: Proteinuria  Hyperkalemia: Hyperkalemia  DANITA (acute kidney injury): DANITA (acute kidney injury)  Prophylactic measure: Prophylactic measure  Hypertension: Hypertension  Goiter diffuse, nontoxic: Goiter diffuse, nontoxic  Atrial fibrillation, chronic: Atrial fibrillation, chronic  STEMI involving right coronary artery: STEMI involving right coronary artery  Right heart failure: Right heart failure  Cardiogenic shock: Cardiogenic shock        HEALTH ISSUES - R/O PROBLEM Dx:      YNES Marcano NP   # ====================  CCU MIDNIGHT ROUNDS  ====================    KAMLESH PATEL  [id number]    ====================  SUMMARY:   82 yo M w/ hx of Afib (previously on Pradaxa) admitted from OSH for IWSTEMI/cath.  Upon going Cath lab, pt went into v-fib arrest requiring intubation.  RCA stent placed as well as IABP, swan and subsequent RP impella due to cardiogenic shock (IWSTEMI w/ RV infarct/RV shock).  Hosp course complicated by renal failure requiring dialysis/CRRT.  Pt extubated, off IABP/RP impella.  New swan numbers:  CI 2.07, CO4.4, PAD 15-17.    ====================    ====================  NEW EVENTS: No acute events. Pt continued to be on CVVHD for goal net even due to soft BPs. Pulm catheter removed. Will monitor central venous from shiley cath.   ====================    ====================  VITALS (Last 12 hrs):  ====================    T(C): 37.7 (10-23-17 @ 23:00), Max: 37.7 (10-23-17 @ 23:00)  HR: 100 (10-23-17 @ 23:00) (94 - 112)  ABP: 104/50 (10-23-17 @ 23:00) (100/64 - 126/58)  ABP(mean): 68 (10-23-17 @ 23:00) (66 - 80)  RR: 18 (10-23-17 @ 23:00) (14 - 29)  SpO2: 100% (10-23-17 @ 23:00) (98% - 100%)  CVP(mm Hg): 4 (10-23-17 @ 18:30) (4 - 11)  CO: 5.6 (10-23-17 @ 13:28) (5.6 - 5.6)  CI: 2.6 (10-23-17 @ 13:28) (2.6 - 2.6)  PA:  (35/11 - 43/)  PA(mean): 19 (10-23-17 @ 18:30) (19 - 26)  SVR: 970 (10-23-17 @ 13:28) (970 - 970)    TELEMETRY: AF 78    I&O's Summary    22 Oct 2017 07:  -  23 Oct 2017 07:00  --------------------------------------------------------  IN: 2384 mL / OUT: 3463 mL / NET: -1079 mL    23 Oct 2017 07:  -  24 Oct 2017 00:10  --------------------------------------------------------  IN: 2255 mL / OUT: 2135 mL / NET: 120 mL    ====================  PLAN:  ====================  ADHF c/b DANITA requiring HD: Pulm cath removed. Trend Central venous gases on  @ 5. Currently net even on CVVHD due to MAPs 60s.    Jeannine Catalan, CCU NP   #[id number]

## 2017-10-24 NOTE — PROGRESS NOTE ADULT - PROBLEM SELECTOR PLAN 1
DANITA on CKD stage III. DANITA may be multifactorial in the setting of IV contrast (cardiac cath), rhabdomyolysis and ATN from hypotension/ cardiac arrest. Patient has unclear etiology of his original underlying CKD.   Patient remains on CVVHDF. Patient with stable Scr to 1.96 and BUN 34. Patient remains anuric. Plan to continue CVVHDF with UF as per CCU. Once patient stabilizes off pressor support, may consider transition to IHD.    Continue to check daily serum phosphorus -replete today- remains low at 1.6 (may be low in the use of CVVHDF). Continue to renally dose all medications. Continue to monitor intake/output, BMP and urine output. Avoid NSAIDs, RCA and nephrotoxins.

## 2017-10-24 NOTE — SWALLOW BEDSIDE ASSESSMENT ADULT - SLP GENERAL OBSERVATIONS
Pt awake, alert and oriented x 3 (reduced recall to day); Pt's family at bedside.  SP02 99% upon encounter with RR 16 prior to oral intake; SP02 decreased intermittently to 96% and respiratory increased to 33 on one trial with return to 16 within a few minutes.  Hoarse/harsh vocal quality perceived.  Pt/family report coughing pta which they attribute to the presence of the goiter and deny h/o dysphagia. Pt awake, alert and oriented x 3 (reduced recall to day); Pt's family at bedside.  Pt seated upright in bed.  SP02 99% upon encounter with RR 16 prior to oral intake; SP02 decreased intermittently to 96% and respiratory increased to 33 on one trial with return to 16 within a few minutes.  Hoarse/harsh vocal quality perceived.  Pt/family report coughing pta which they attribute to the presence of the goiter and deny h/o dysphagia.  Pt requesting to drink water upon encounter.  Pt provided with ice chips prior to PO trials; although no s/s of aspiration evident, silent aspiration not ruled out. Pt awake, alert and oriented x 3 (reduced recall to day); Pt's family at bedside.  Pt seated upright in bed.  SP02 99% upon encounter with RR 16 prior to oral intake; SP02 decreased intermittently to 96% and respiratory increased to 33 on one trial with return to 16 within a few minutes.  Hoarse/harsh vocal quality perceived.  Pt/family report coughing pta which they attribute to the presence of the goiter and deny h/o dysphagia.  Pt requesting to drink water upon encounter.  Pt provided with ice chips prior to PO trials; although no s/s of aspiration evident, silent aspiration not ruled out.  +NGT; supplemental 02 via nasal canula.

## 2017-10-24 NOTE — PROGRESS NOTE ADULT - ASSESSMENT
83/M with Afib on Pradaxa who came to [facility] c/o SOB found to have IWSTEMI, transferred to [facility] for cath, h/c by VF arrest requiring IABP, RCA stent & SWAN, h/c c/b cardiogenic shock 2/2 right heart failure s/p impella (now removed), w/ h/c c/b renal failure requiring CVVH, extubated    # Neuro:   AAOx3, no issues, stable    # CV:   - CAD s/p RCA stent: continue hep gtt, aspirin, brillinta, lipitor  - Atrial fibrillation: currently on amiodarone load, plan for completion 10/26, HR controlled  - Cardiogenic shock s/p TVP and impella s/ removal: Continue dobutmine 5      #Respiratory: CXR Small left pleural effusion with adjacent atelectasis.  - Slightly tachypneic, pH 7.52, PCO2 33 on RA, but comfortable   - Continue suctioning     #GI:   - failed dysphagia screen, currently on NGT w/ tube feeds-vital    #Metabolic/Renal  - Rhabdo: continue CVVH, net negative: 1Liter  - Patient is anuric at this time, will continue CCVH until HD stable as per Renal Team  - Replete Mg, Ph, K as needed     #Heme  - H&H stable      #DVT ppx: hep gtt  #Dispo - PT consulted

## 2017-10-24 NOTE — CHART NOTE - NSCHARTNOTEFT_GEN_A_CORE
Source: Patient [ ]    Family [ ]     other [ x] Medical records, RN; Per chart, pt with PMH of Afib, admitted with IWSTEMI and right heart failure with cardiogenic shock, s/p impella now removed, DANITA on CKD 3 requiring CVVHD, s/p SLP swallow evaluation recommend to continue NPO, pending SLP swallow re-evaluation. Per RN, enteral feed was at goal 75cc/hr x 24hrs, but has been off 2/2 pt developing multiple loose BM over night.     Diet : NPO      Patient reports [ ] nausea  [ ] vomiting [x ] diarrhea [ ] constipation  [ ]chewing problems [ ] swallowing issues  [ ] other: Pt with multiple loose BMs       Enteral /Parenteral Nutrition: Current enteral feed of Vital 1.2 [place] at goal of 75 mL/h x 24 h. This regimen at goal provides 1800 mL total volume,2160 Kcal, 135g protein, 1459 mL free water (24 Kcal/Kg, 1.5g protein/Kg based on usual weight 90 Kg)      Current Weight: Dosing wt 205 pounds, 207 pounds today  % Weight Change    Pertinent Medications: MEDICATIONS  (STANDING):  amiodarone    Tablet 400 milliGRAM(s) Oral every 8 hours  aspirin  chewable 81 milliGRAM(s) Oral daily  atorvastatin 40 milliGRAM(s) Oral at bedtime  chlorhexidine 0.12% Liquid 15 milliLiter(s) Swish and Spit two times a day  DOBUTamine Infusion 5 MICROgram(s)/kG/Min (13.965 mL/Hr) IV Continuous <Continuous>  influenza   Vaccine 0.5 milliLiter(s) IntraMuscular once  pantoprazole  Injectable 40 milliGRAM(s) IV Push daily  piperacillin/tazobactam IVPB. 3.375 Gram(s) IV Intermittent every 12 hours  sodium phosphate IVPB 15 milliMole(s) IV Intermittent once  ticagrelor 90 milliGRAM(s) Oral two times a day  vancomycin  IVPB 1000 milliGRAM(s) IV Intermittent every 24 hours    MEDICATIONS  (PRN):  ALBUTerol/ipratropium for Nebulization 3 milliLiter(s) Nebulizer every 6 hours PRN Shortness of Breath and/or Wheezing    Pertinent Labs:  10-24 Na138 mmol/L Glu 103 mg/dL<H> K+ 5.0 mmol/L Cr  2.56 mg/dL<H> BUN 44 mg/dL<H> Phos n/a   Alb 2.8 g/dL<L> PAB n/a         Skin: +2 bilateral ankles, +3 right arm, anasarca noted, no pressure injuries    Estimated Needs:   [ x] no change since previous assessment  [ ] recalculated:       Previous Nutrition Diagnosis:     [ x] Inadequate Energy Intake [ ]Inadequate Oral Intake [ ] Excessive Energy Intake     [ ] Underweight [ ] Increased Nutrient Needs [ ] Overweight/Obesity     [ ] Altered GI Function [ ] Unintended Weight Loss [ ] Food & Nutrition Related Knowledge Deficit [ ] Malnutrition          Nutrition Diagnosis is [ ] ongoing  [x ] resolved [ ] not applicable          New Nutrition Diagnosis: Swallow difficulty    [ ] Inadequate Protein Energy Intake [ ]Inadequate Oral Intake [ ] Excessive Energy Intake     [ ] Underweight [ ] Increased Nutrient Needs [ ] Overweight/Obesity     [ ] Altered GI Function [ ] Unintended Weight Loss [ ] Food & Nutrition Related Knowledge Deficit[ ] Limited Adherence to nutrition related recommendations [ ] Malnutrition  [ ] other: Free text       Related to: dysphagia     As evidenced by: s/p SLP swallow evaluation recommends NPO, pending SLP swallow re-evaluation, on enteral feed     Interventions:     Recommend    [ ] Change Diet To:    [ ] Nutrition Supplement    [x ] Nutrition Support: Recommend enteral feed change to Pivot 1.5 at starting rate of 30cc/hr, increase 10cc every 4 hours or as tolerated to goal rate of 55cc/hr x 24 hrs while pt remains on CVVHD. Enteral feed to provide 1954kcal and 123g protein (25kcal/kg and 1.5g/kg based on IBW 78.18kg)    [ ] Other:        Monitoring and Evaluation:     [ ] PO intake [ ] Tolerance to diet prescription [ ] weights [ ] follow up per protocol    [x ] other: enteral feed tolerance Source: Patient [ ]    Family [ ]     other [ x] Medical records, RN; Nutrition consult received for enteral feed assessment as pt with diarrhea with current enteral feed. Per chart, pt with PMH of Afib, admitted with IWSTEMI and right heart failure with cardiogenic shock, s/p impella now removed, DANITA on CKD 3 requiring CVVHD, s/p SLP swallow evaluation recommend to continue NPO, pending SLP swallow re-evaluation. Per RN, enteral feed was at goal 75cc/hr x 24hrs, but has been off 2/2 pt developing multiple loose BM over night.     Diet : NPO      Patient reports [ ] nausea  [ ] vomiting [x ] diarrhea [ ] constipation  [ ]chewing problems [ ] swallowing issues  [ ] other: Pt with multiple loose BMs       Enteral /Parenteral Nutrition: Current enteral feed of Vital 1.2 [place] at goal of 75 mL/h x 24 h. This regimen at goal provides 1800 mL total volume,2160 Kcal, 135g protein, 1459 mL free water (24 Kcal/Kg, 1.5g protein/Kg based on usual weight 90 Kg)      Current Weight: Dosing wt 205 pounds, 207 pounds today  % Weight Change    Pertinent Medications: MEDICATIONS  (STANDING):  amiodarone    Tablet 400 milliGRAM(s) Oral every 8 hours  aspirin  chewable 81 milliGRAM(s) Oral daily  atorvastatin 40 milliGRAM(s) Oral at bedtime  chlorhexidine 0.12% Liquid 15 milliLiter(s) Swish and Spit two times a day  DOBUTamine Infusion 5 MICROgram(s)/kG/Min (13.965 mL/Hr) IV Continuous <Continuous>  influenza   Vaccine 0.5 milliLiter(s) IntraMuscular once  pantoprazole  Injectable 40 milliGRAM(s) IV Push daily  piperacillin/tazobactam IVPB. 3.375 Gram(s) IV Intermittent every 12 hours  sodium phosphate IVPB 15 milliMole(s) IV Intermittent once  ticagrelor 90 milliGRAM(s) Oral two times a day  vancomycin  IVPB 1000 milliGRAM(s) IV Intermittent every 24 hours    MEDICATIONS  (PRN):  ALBUTerol/ipratropium for Nebulization 3 milliLiter(s) Nebulizer every 6 hours PRN Shortness of Breath and/or Wheezing    Pertinent Labs:  10-24 Na138 mmol/L Glu 103 mg/dL<H> K+ 5.0 mmol/L Cr  2.56 mg/dL<H> BUN 44 mg/dL<H> Phos n/a   Alb 2.8 g/dL<L> PAB n/a         Skin: +2 bilateral ankles, +3 right arm, anasarca noted, no pressure injuries    Estimated Needs:   [ x] no change since previous assessment  [ ] recalculated:       Previous Nutrition Diagnosis:     [ x] Inadequate Energy Intake [ ]Inadequate Oral Intake [ ] Excessive Energy Intake     [ ] Underweight [ ] Increased Nutrient Needs [ ] Overweight/Obesity     [ ] Altered GI Function [ ] Unintended Weight Loss [ ] Food & Nutrition Related Knowledge Deficit [ ] Malnutrition          Nutrition Diagnosis is [ ] ongoing  [x ] resolved [ ] not applicable          New Nutrition Diagnosis: Swallow difficulty    [ ] Inadequate Protein Energy Intake [ ]Inadequate Oral Intake [ ] Excessive Energy Intake     [ ] Underweight [ ] Increased Nutrient Needs [ ] Overweight/Obesity     [ ] Altered GI Function [ ] Unintended Weight Loss [ ] Food & Nutrition Related Knowledge Deficit[ ] Limited Adherence to nutrition related recommendations [ ] Malnutrition  [ ] other: Free text       Related to: dysphagia     As evidenced by: s/p SLP swallow evaluation recommends NPO, pending SLP swallow re-evaluation, on enteral feed     Interventions:     Recommend    [ ] Change Diet To:    [ ] Nutrition Supplement    [x ] Nutrition Support: Recommend enteral feed change to Pivot 1.5 at starting rate of 30cc/hr, increase 10cc every 4 hours or as tolerated to goal rate of 55cc/hr x 24 hrs while pt remains on CVVHD. Enteral feed to provide 1954kcal and 123g protein (25kcal/kg and 1.5g/kg based on IBW 78.18kg)    [x ] Other: Consider adding Probiotics       Monitoring and Evaluation:     [ ] PO intake [ ] Tolerance to diet prescription [ ] weights [ ] follow up per protocol    [x ] other: enteral feed tolerance

## 2017-10-24 NOTE — PROGRESS NOTE ADULT - ATTENDING COMMENTS
Arousable, extubated, remains on CVVHDF  1.  ATN--multifactorial including pigment nephropathy.  Likely to have prolonged renal replacement requirements needing permcath.  Excellent clearances  2.  CHF--improved.  On dobutamine.  Volume optimization with CVVHDF

## 2017-10-24 NOTE — SWALLOW BEDSIDE ASSESSMENT ADULT - PHARYNGEAL PHASE
slight intermittent wet vocal quality/Multiple swallows Wet vocal quality post oral intake/cough post swallow following cup drinking/Multiple swallows cough post swallow following cup drinking./Multiple swallows/Wet vocal quality post oral intake

## 2017-10-24 NOTE — SWALLOW BEDSIDE ASSESSMENT ADULT - COMMENTS
10/17 Nsg 6500cc brown gastric content from NGT into canister.  10/18/18 Neuro: Patient  s/p cardiac cath and LV assist device on systemic heparin gtt, code stroke 10/16 called for asymmetric pupils R>L in setting of PTT >200,  third nerve palsy. In the setting of elevated PTT, concern for brainstem ICH. Imaging shows: No evidence for acute territorial infarct or hemorrhage.  Patient noted to have recurrent  b/l upper extremity myoclonic movement, with concomitant twitching of B/L LE. EEG completed, findings indicate non-specific moderate to severe diffuse or multifocal cerebral dysfunction. There were no epileptiform abnormalities recorded. Despite negative capture of epileptiform activity, patient continues to demonstrate myoclonic activity.  10/21/17 Pt switched from Bipap to 4L Nasal canula. 10/22 Per MD, Pt remains extubated w/o supp oxygen requirements, however, unintelligible speech and weak swallow.  CXR: IMPRESSION: Small left pleural effusion with adjacent atelectasis. 10/17 Nsg 6500cc brown gastric content from NGT into canister.  10/18/18 Neuro: Patient  s/p cardiac cath and LV assist device on systemic heparin gtt, code stroke 10/16 called for asymmetric pupils R>L in setting of PTT >200,  third nerve palsy. In the setting of elevated PTT, concern for brainstem ICH. Imaging shows: No evidence for acute territorial infarct or hemorrhage.  Patient noted to have recurrent  b/l upper extremity myoclonic movement, with concomitant twitching of B/L LE. EEG completed, findings indicate non-specific moderate to severe diffuse or multifocal cerebral dysfunction. There were no epileptiform abnormalities recorded. Despite negative capture of epileptiform activity, patient continues to demonstrate myoclonic activity.  10/21/17 Pt switched from Bipap to 4L Nasal canula. 10/22 Per MD, Pt remains extubated w/o supp oxygen requirements, however, unintelligible speech and weak swallow.  CXR: IMPRESSION: Small left pleural effusion with adjacent atelectasis.  10/24/17 CVVHDF placed on hold due to high access pressures; Per Nsg: Pt in no acute distress Neuro check completed as ordered on initial assessment. Rt eye size 3mm slower reaction to light than left eye 3 mm; MD to f/u.  Blood cultures collected 10/19, aerobic bottle with gram variable beaded rods; PTT > 200.   Prior to evaluation, SLP spoke with [place] who reported that the Pt is able to sit upright and findings of code stroke was unremarkable.    10/24/17 CXR: IMPRESSION:  Unchanged left pleural effusion with left basilar atelectasis and/or infection. 10/17 Nsg 6500cc brown gastric content from NGT into canister.  10/18/18 Neuro: Patient  s/p cardiac cath and LV assist device on systemic heparin gtt, code stroke 10/16 called for asymmetric pupils R>L in setting of PTT >200,  third nerve palsy. In the setting of elevated PTT, concern for brainstem ICH. Imaging shows: No evidence for acute territorial infarct or hemorrhage.  Patient noted to have recurrent  b/l upper extremity myoclonic movement, with concomitant twitching of B/L LE. EEG completed, findings indicate non-specific moderate to severe diffuse or multifocal cerebral dysfunction. There were no epileptiform abnormalities recorded. Despite negative capture of epileptiform activity, patient continues to demonstrate myoclonic activity.  10/21/17 Pt switched from Bipap to 4L Nasal canula. 10/22 Per MD, Pt remains extubated w/o supp oxygen requirements, however, unintelligible speech and weak swallow.  CXR: IMPRESSION: Small left pleural effusion with adjacent atelectasis.  10/24/17 Per NP: had [place] removed after his mixed venous sats from the [facility] correlated with the [place].  Per Nsg note: CVVHDF placed on hold due to high access pressures; Per Nsg: Pt in no acute distress Neuro check completed as ordered on initial assessment. Rt eye size 3mm slower reaction to light than left eye 3 mm; MD to f/u.  Blood cultures collected 10/19, aerobic bottle with gram variable beaded rods; PTT > 200.   Prior to evaluation, SLP spoke with Nsg who reported that the Pt is able to sit upright and findings of code stroke was unremarkable.    10/24/17 CXR: IMPRESSION:  Unchanged left pleural effusion with left basilar atelectasis and/or infection.

## 2017-10-24 NOTE — PROGRESS NOTE ADULT - SUBJECTIVE AND OBJECTIVE BOX
24H hour events:  no acute events overnight  -remains on  2.5  -BP (a line) 106/42   -CVVH clotted, shiley interrogation by vascualr surgery with plans to restart CVVH as soon as possible.  -Cr 1.96 (prev 1.85)  -I/O -20 x24hrs  -lactate 1.7    MEDICATIONS:  amiodarone    Tablet 400 milliGRAM(s) Oral every 8 hours  aspirin  chewable 81 milliGRAM(s) Oral daily  DOBUTamine Infusion 5 MICROgram(s)/kG/Min IV Continuous <Continuous>  ticagrelor 90 milliGRAM(s) Oral two times a day    piperacillin/tazobactam IVPB. 3.375 Gram(s) IV Intermittent every 12 hours  vancomycin  IVPB 1000 milliGRAM(s) IV Intermittent every 24 hours    ALBUTerol/ipratropium for Nebulization 3 milliLiter(s) Nebulizer every 6 hours PRN      pantoprazole  Injectable 40 milliGRAM(s) IV Push daily    atorvastatin 40 milliGRAM(s) Oral at bedtime    chlorhexidine 0.12% Liquid 15 milliLiter(s) Swish and Spit two times a day  influenza   Vaccine 0.5 milliLiter(s) IntraMuscular once      REVIEW OF SYSTEMS:  Complete 10point ROS negative.    PHYSICAL EXAM:  T(C): 36.9 (10-24-17 @ 07:00), Max: 37.7 (10-23-17 @ 23:00)  HR: 82 (10-24-17 @ 16:26) (82 - 112)  BP: 109/59 (10-24-17 @ 15:00) (93/58 - 110/62)  RR: 18 (10-24-17 @ 16:00) (14 - 36)  SpO2: 96% (10-24-17 @ 16:26) (94% - 100%)  Wt(kg): --  I&O's Summary    23 Oct 2017 07:  -  24 Oct 2017 07:00  --------------------------------------------------------  IN: 2994 mL / OUT: 3110 mL / NET: -116 mL    24 Oct 2017 07:01  -  24 Oct 2017 17:25  --------------------------------------------------------  IN: 697 mL / OUT: 8 mL / NET: 689 mL        Appearance: Normal	  HEENT:   Normal oral mucosa, PERRL, EOMI	  Lymphatic: No lymphadenopathy  Cardiovascular: Normal S1 S2, No JVD, No murmurs, diffuse anasarca  Respiratory: b/l rhonchi	  Psychiatry: A & O x 3, Mood & affect appropriate  Gastrointestinal:  Soft, Non-tender, + BS	  Skin: No rashes, No ecchymoses, No cyanosis	  Neurologic: Non-focal  Extremities: Normal range of motion, No clubbing, cyanosis or edema  Vascular: Peripheral pulses palpable 2+ bilaterally        LABS:	 	    CBC Full  -  ( 24 Oct 2017 12:58 )  WBC Count : 12.8 K/uL  Hemoglobin : 8.6 g/dL  Hematocrit : 25.4 %  Platelet Count - Automated : 195 K/uL  Mean Cell Volume : 102.0 fl  Mean Cell Hemoglobin : 34.3 pg  Mean Cell Hemoglobin Concentration : 33.7 gm/dL  Auto Neutrophil # : 11.2 K/uL  Auto Lymphocyte # : 1.1 K/uL  Auto Monocyte # : 0.5 K/uL  Auto Eosinophil # : 0.0 K/uL  Auto Basophil # : 0.0 K/uL  Auto Neutrophil % : 72.0 %  Auto Lymphocyte % : 6.0 %  Auto Monocyte % : 3.0 %  Auto Eosinophil % : 1.0 %  Auto Basophil % : x    10-24    138  |  101  |  44<H>  ----------------------------<  103<H>  5.0   |  25  |  2.56<H>  10-24    137  |  102  |  34<H>  ----------------------------<  118<H>  5.1   |  22  |  1.96<H>    Ca    8.0<L>      24 Oct 2017 12:58  Ca    8.1<L>      24 Oct 2017 03:50  Phos  1.6     10-24  Phos  2.3     10-23  Mg     2.6     10-24  Mg     2.5     10-24    TPro  5.4<L>  /  Alb  2.8<L>  /  TBili  1.6<H>  /  DBili  x   /  AST  61<H>  /  ALT  83<H>  /  AlkPhos  94  10-24  TPro  5.8<L>  /  Alb  2.6<L>  /  TBili  1.7<H>  /  DBili  x   /  AST  69<H>  /  ALT  87<H>  /  AlkPhos  98  10-24      proBNP:   Lipid Profile:   HgA1c:   TSH:       CARDIAC MARKERS:            TELEMETRY: 	    ECG:  	  RADIOLOGY:  OTHER: 	    PREVIOUS DIAGNOSTIC TESTING:    [ ] Echocardiogram:  [ ]  Catheterization:  [ ] Stress Test:  	  	  ASSESSMENT/PLAN: 	    83M pmhx of AF on pradaxa initially presented on 10/15/17 with chest pain/diaphoresis/ dyspnea to OSH, IW-STEMI emergently transferred here for LHC c/b VF arrest requiring IABP followed by pRCA stent c/b now acute right heart failure and cardiogenic shock s/p IABP (explanted 10/19), RP impella for RV support (explanted 10/20), remains on  5 with borderline low CI.      1. cardiogenic shock  - continue  5, would start captopril 6.25 tid in efforts to wean .  - cont cvvh, goal is net negative 500 cc as has extravascular fluid and needs to be mobilized    2.CAD  -cont dapt, statin    3. afib  cont heparin drip          Lukasz Barajas MD  p [phone]   evening [id number] 24H hour events:  no acute events overnight  -remains on  5  -BP (a line) 106/42   -CVVH clotted, shiley interrogation by vascualr surgery with plans to restart CVVH as soon as possible.  -Cr 1.96 (prev 1.85)  -I/O -20 x24hrs  -lactate 1.7    MEDICATIONS:  amiodarone    Tablet 400 milliGRAM(s) Oral every 8 hours  aspirin  chewable 81 milliGRAM(s) Oral daily  DOBUTamine Infusion 5 MICROgram(s)/kG/Min IV Continuous <Continuous>  ticagrelor 90 milliGRAM(s) Oral two times a day    piperacillin/tazobactam IVPB. 3.375 Gram(s) IV Intermittent every 12 hours  vancomycin  IVPB 1000 milliGRAM(s) IV Intermittent every 24 hours    ALBUTerol/ipratropium for Nebulization 3 milliLiter(s) Nebulizer every 6 hours PRN      pantoprazole  Injectable 40 milliGRAM(s) IV Push daily    atorvastatin 40 milliGRAM(s) Oral at bedtime    chlorhexidine 0.12% Liquid 15 milliLiter(s) Swish and Spit two times a day  influenza   Vaccine 0.5 milliLiter(s) IntraMuscular once      REVIEW OF SYSTEMS:  Complete 10point ROS negative.    PHYSICAL EXAM:  T(C): 36.9 (10-24-17 @ 07:00), Max: 37.7 (10-23-17 @ 23:00)  HR: 82 (10-24-17 @ 16:26) (82 - 112)  BP: 109/59 (10-24-17 @ 15:00) (93/58 - 110/62)  RR: 18 (10-24-17 @ 16:00) (14 - 36)  SpO2: 96% (10-24-17 @ 16:26) (94% - 100%)  Wt(kg): --  I&O's Summary    23 Oct 2017 07:  -  24 Oct 2017 07:00  --------------------------------------------------------  IN: 2994 mL / OUT: 3110 mL / NET: -116 mL    24 Oct 2017 07:01  -  24 Oct 2017 17:25  --------------------------------------------------------  IN: 697 mL / OUT: 8 mL / NET: 689 mL        Appearance: Normal	  HEENT:   Normal oral mucosa, PERRL, EOMI	  Lymphatic: No lymphadenopathy  Cardiovascular: Normal S1 S2, No JVD, No murmurs, diffuse anasarca  Respiratory: b/l rhonchi	  Psychiatry: A & O x 3, Mood & affect appropriate  Gastrointestinal:  Soft, Non-tender, + BS	  Skin: No rashes, No ecchymoses, No cyanosis	  Neurologic: Non-focal  Extremities: Normal range of motion, No clubbing, cyanosis or edema  Vascular: Peripheral pulses palpable 2+ bilaterally        LABS:	 	    CBC Full  -  ( 24 Oct 2017 12:58 )  WBC Count : 12.8 K/uL  Hemoglobin : 8.6 g/dL  Hematocrit : 25.4 %  Platelet Count - Automated : 195 K/uL  Mean Cell Volume : 102.0 fl  Mean Cell Hemoglobin : 34.3 pg  Mean Cell Hemoglobin Concentration : 33.7 gm/dL  Auto Neutrophil # : 11.2 K/uL  Auto Lymphocyte # : 1.1 K/uL  Auto Monocyte # : 0.5 K/uL  Auto Eosinophil # : 0.0 K/uL  Auto Basophil # : 0.0 K/uL  Auto Neutrophil % : 72.0 %  Auto Lymphocyte % : 6.0 %  Auto Monocyte % : 3.0 %  Auto Eosinophil % : 1.0 %  Auto Basophil % : x    10-24    138  |  101  |  44<H>  ----------------------------<  103<H>  5.0   |  25  |  2.56<H>  10-24    137  |  102  |  34<H>  ----------------------------<  118<H>  5.1   |  22  |  1.96<H>    Ca    8.0<L>      24 Oct 2017 12:58  Ca    8.1<L>      24 Oct 2017 03:50  Phos  1.6     10-24  Phos  2.3     10-23  Mg     2.6     10-24  Mg     2.5     10-24    TPro  5.4<L>  /  Alb  2.8<L>  /  TBili  1.6<H>  /  DBili  x   /  AST  61<H>  /  ALT  83<H>  /  AlkPhos  94  10-24  TPro  5.8<L>  /  Alb  2.6<L>  /  TBili  1.7<H>  /  DBili  x   /  AST  69<H>  /  ALT  87<H>  /  AlkPhos  98  10-24      proBNP:   Lipid Profile:   HgA1c:   TSH:       CARDIAC MARKERS:            TELEMETRY: 	    ECG:  	  RADIOLOGY:  OTHER: 	    PREVIOUS DIAGNOSTIC TESTING:    [ ] Echocardiogram:  [ ]  Catheterization:  [ ] Stress Test:  	  	  ASSESSMENT/PLAN: 	    83M pmhx of AF on pradaxa initially presented on 10/15/17 with chest pain/diaphoresis/ dyspnea to OSH, IW-STEMI emergently transferred here for LHC c/b VF arrest requiring IABP followed by pRCA stent c/b now acute right heart failure and cardiogenic shock s/p IABP (explanted 10/19), RP impella for RV support (explanted 10/20), remains on  5 with borderline low CI.      1. cardiogenic shock  - continue  5, would start captopril 6.25 tid in efforts to wean .  - cont cvvh, goal is net negative 500 cc as has extravascular fluid and needs to be mobilized    2.CAD  -cont dapt, statin    3. afib  cont heparin drip          Lukasz Barajas MD  p [phone]   evening [phone]

## 2017-10-24 NOTE — PROGRESS NOTE ADULT - ASSESSMENT
83 year old male with a PMH of atrial fibrillation (on Pradaxa), HTN, Thyroid Goiter, CKD stage III (dx 2016) was seen at [facility] with inferior wall STEMI and was brought to [facility] for cardiac catherization s/p PCI x 1 now with DANITA and Anuria.

## 2017-10-24 NOTE — SWALLOW BEDSIDE ASSESSMENT ADULT - SWALLOW EVAL: DIAGNOSIS
Pt presents with oropharyngeal dysphagia characterized by s/s of aspiration on the most conservative textures.  Given presence of goiter, laryngeal elevation could not be palpated.  Multiple swallows may be indicative of pharyngeal residue.  Vocal quality perceived to be hoarse/harsh.

## 2017-10-24 NOTE — PROCEDURE NOTE - PROCEDURE
<<-----Click on this checkbox to enter Procedure Central line placement  10/17/2017    Active  Brandon Foreman

## 2017-10-24 NOTE — PROCEDURE NOTE - NSPROCDETAILS_GEN_ALL_CORE
guidewire recovered
sterile dressing applied/Exchange of existing IJ shiley over a wire/guidewire recovered/lumen(s) aspirated and flushed/sterile technique, catheter placed
ultrasound guidance

## 2017-10-24 NOTE — PROGRESS NOTE ADULT - SUBJECTIVE AND OBJECTIVE BOX
[facility] DIVISION OF KIDNEY DISEASES AND HYPERTENSION -- FOLLOW UP NOTE  --------------------------------------------------------------------------------  Chief Complaint: DANITA with Anuria requiring renal replacement therapy     24 hour events/subjective:  Patient remains on CVVHDF overnight without complications       PAST HISTORY  --------------------------------------------------------------------------------  No significant changes to PMH, PSH, FHx, SHx, unless otherwise noted    ALLERGIES & MEDICATIONS  --------------------------------------------------------------------------------  Allergies    No Known Allergies    Intolerances      Standing Inpatient Medications  amiodarone    Tablet 400 milliGRAM(s) Oral every 8 hours  aspirin  chewable 81 milliGRAM(s) Oral daily  atorvastatin 40 milliGRAM(s) Oral at bedtime  chlorhexidine 0.12% Liquid 15 milliLiter(s) Swish and Spit two times a day  DOBUTamine Infusion 5 MICROgram(s)/kG/Min IV Continuous <Continuous>  heparin  Infusion. 1000 Unit(s)/Hr IV Continuous <Continuous>  influenza   Vaccine 0.5 milliLiter(s) IntraMuscular once  pantoprazole  Injectable 40 milliGRAM(s) IV Push daily  potassium phosphate IVPB 15 milliMole(s) IV Intermittent once  ticagrelor 90 milliGRAM(s) Oral two times a day    PRN Inpatient Medications  ALBUTerol/ipratropium for Nebulization 3 milliLiter(s) Nebulizer every 6 hours PRN  heparin  Injectable 7500 Unit(s) IV Push every 6 hours PRN  heparin  Injectable 3500 Unit(s) IV Push every 6 hours PRN      REVIEW OF SYSTEMS  --------------------------------------------------------------------------------  Unable to obtain     VITALS/PHYSICAL EXAM  --------------------------------------------------------------------------------  T(C): 36.9 (10-24-17 @ 07:00), Max: 37.7 (10-23-17 @ 23:00)  HR: 92 (10-24-17 @ 08:00) (84 - 112)  BP: 98/60 (10-24-17 @ 06:00) (93/58 - 102/52)  RR: 21 (10-24-17 @ 08:00) (14 - 36)  SpO2: 94% (10-24-17 @ 08:00) (94% - 100%)  Wt(kg): --        10-23-17 @ 07:01  -  10-24-17 @ 07:00  --------------------------------------------------------  IN: 2994 mL / OUT: 3110 mL / NET: -116 mL      Physical Exam:  	Gen: Awake  	Pulm: Course BS B/L  	CV: RRR, S1S2  	Abd: +BS, soft, nontender/nondistended   	LE: Warm, + edema  	Skin: Warm, without rashes              Vascular Access: + RIJ non-tunnelled HD catheter     LABS/STUDIES  --------------------------------------------------------------------------------              9.2    14.2  >-----------<  200      [10-24-17 @ 03:50]              27.3     137  |  102  |  34  ----------------------------<  118      [10-24-17 @ 03:50]  5.1   |  22  |  1.96        Ca     8.1     [10-24-17 @ 03:50]      Mg     2.5     [10-24-17 @ 03:50]      Phos  1.6     [10-24-17 @ 03:50]    TPro  5.8  /  Alb  2.6  /  TBili  1.7  /  DBili  x   /  AST  69  /  ALT  87  /  AlkPhos  98  [10-24-17 @ 03:50]    PT/INR: PT 13.3 , INR 1.23       [10-24-17 @ 03:50]  PTT: 53.6       [10-24-17 @ 03:50]      Creatinine Trend:  SCr 1.96 [10-24 @ 03:50]  SCr 1.85 [10-23 @ 13:46]  SCr 1.91 [10-23 @ 05:18]  SCr 1.83 [10-22 @ 18:34]  SCr 1.88 [10-22 @ 05:13]    Urinalysis - [10-15-17 @ 16:48]      Color Yellow / Appearance Turbid / SG >1.030 / pH 6.5      Gluc 50 / Ketone Negative  / Bili Negative / Urobili Negative       Blood Moderate / Protein >600 / Leuk Est Negative / Nitrite Negative      RBC >50 / WBC 3-5 / Hyaline  / Gran  / Sq Epi  / Non Sq Epi OCC / Bacteria Few      HbA1c 5.4      [10-15-17 @ 21:32]  TSH 2.40      [10-15-17 @ 21:34]  Lipid: chol 129, TG 45, HDL 41, LDL 79      [10-15-17 @ 21:40]    HBsAg Nonreact      [10-19-17 @ 07:25]  HCV 0.15, Nonreact      [10-19-17 @ 07:25]  HIV Nonreact      [10-19-17 @ 07:25]    EYAD: titer 1:640, pattern Homogeneous      [10-19-17 @ 07:25]  dsDNA <12      [10-19-17 @ 07:25]  C3 Complement 72      [10-19-17 @ 07:25]  C4 Complement 14      [10-19-17 @ 07:25]  Free Light Chains: kappa 15.20, lambda 10.50, ratio = 1.45      [10-19 @ 07:25]  SPEP Interpretation: Grossly Hemolyzed Sample - unsuitable for testing.      [10-19-17 @ 07:25]

## 2017-10-24 NOTE — CHART NOTE - NSCHARTNOTEFT_GEN_A_CORE
====================  CCU MIDNIGHT ROUNDS  ====================    KAMLESH PATEL  [id number]    ====================  SUMMARY: HPI:  83 male presents to ER by ambulance with report of shortness of breath. Wife at the bedside states patient has h/o Afib on Pradaxa, has "heart valve problems", has been having shortness of breath for the past few days, today was found sitting on stairs, c/o increased shortness of breath, chest pain and diaphoresis. At OSH, he was found to have an inferior wall STEMI and was brought to [facility] for cath. Upon arrival, pt appeared unwell and fixated on chest pain. Upon going to Cath lab, pt became non responsive and went into cardiogenic arrest. advanced life support was started, pt was intubated, and ROS was achieved. In cath lab, pt received a inta venus pacing wire, baloon pump, RCA stent, a swan RH cath, and an illiac balloon to stop R groin bleeding. Pt was admitted to the CCU for further management. (15 Oct 2017 17:59)    ====================        ====================  NEW EVENTS:  ====================        ====================  VITALS (Last 12 hrs):  ====================    T(C): 36.8 (10-24-17 @ 17:00), Max: 36.8 (10-24-17 @ 17:00)  HR: 82 (10-24-17 @ 20:30) (80 - 90)  BP: 104/52 (10-24-17 @ 20:30) (93/47 - 109/59)  BP(mean): 62 (10-24-17 @ 20:30) (59 - 70)  ABP: 108/48 (10-24-17 @ 18:00) (96/42 - 122/52)  ABP(mean): 64 (10-24-17 @ 18:00) (56 - 76)  RR: 27 (10-24-17 @ 20:30) (15 - 27)  SpO2: 96% (10-24-17 @ 20:30) (94% - 100%)  Wt(kg): --  CVP(mm Hg): --  CO: --  CI: --  PA: --  PA(mean): --  PA(direct): --  PCWP: --  SVR: --    TELEMETRY:        *BLOOD GAS/ARTERIAL/MIXED/VENOUS  *LACTATE    I&O's Summary    23 Oct 2017 07:01  -  24 Oct 2017 07:00  --------------------------------------------------------  IN: 2994 mL / OUT: 3110 mL / NET: -116 mL    24 Oct 2017 07:01  -  24 Oct 2017 22:58  --------------------------------------------------------  IN: 1301.9 mL / OUT: 8 mL / NET: 1293.9 mL        ====================  PLAN:  ====================    HEALTH ISSUES - PROBLEM Dx:  Atrial fibrillation with RVR: Atrial fibrillation with RVR  Cardiac arrest: Cardiac arrest  Proteinuria, unspecified type: Proteinuria, unspecified type  Acute kidney injury: Acute kidney injury  Proteinuria: Proteinuria  Hyperkalemia: Hyperkalemia  DANITA (acute kidney injury): DANITA (acute kidney injury)  Prophylactic measure: Prophylactic measure  Hypertension: Hypertension  Goiter diffuse, nontoxic: Goiter diffuse, nontoxic  Atrial fibrillation, chronic: Atrial fibrillation, chronic  STEMI involving right coronary artery: STEMI involving right coronary artery  Right heart failure: Right heart failure  Cardiogenic shock: Cardiogenic shock        HEALTH ISSUES - R/O PROBLEM Dx:      YNES Marcano NP   # ====================  CCU MIDNIGHT ROUNDS  ====================    KAMLESH PATEL  [id number]    ====================  SUMMARY:   83 male presents to ER by ambulance with report of shortness of breath. Wife at the bedside states patient has h/o Afib on Pradaxa, has "heart valve problems", has been having shortness of breath for the past few days, today was found sitting on stairs, c/o increased shortness of breath, chest pain and diaphoresis. At OSH, he was found to have an inferior wall STEMI and was brought to [facility] for cath. Upon arrival, pt appeared unwell and fixated on chest pain. Upon going to Cath lab, pt became non responsive and went into cardiogenic arrest. advanced life support was started, pt was intubated, and ROS was achieved. In cath lab, pt received a inta venus pacing wire, baloon pump, RCA stent, a swan RH cath, and an illiac balloon to stop R groin bleeding. Pt was admitted to the CCU for further management. (15 Oct 2017 17:59)    ====================  ====================  NEW EVENTS:   ====================        ====================  VITALS (Last 12 hrs):  ====================    T(C): 36.8 (10-24-17 @ 17:00), Max: 36.8 (10-24-17 @ 17:00)  HR: 82 (10-24-17 @ 20:30) (80 - 90)  BP: 104/52 (10-24-17 @ 20:30) (93/47 - 109/59)  BP(mean): 62 (10-24-17 @ 20:30) (59 - 70)  ABP: 108/48 (10-24-17 @ 18:00) (96/42 - 122/52)  ABP(mean): 64 (10-24-17 @ 18:00) (56 - 76)  RR: 27 (10-24-17 @ 20:30) (15 - 27)  SpO2: 96% (10-24-17 @ 20:30) (94% - 100%)  Wt(kg): --  CVP(mm Hg): --  CO: --  CI: --  PA: --  PA(mean): --  PA(direct): --  PCWP: --  SVR: --    TELEMETRY:        *BLOOD GAS/ARTERIAL/MIXED/VENOUS  *LACTATE    I&O's Summary    23 Oct 2017 07:01  -  24 Oct 2017 07:00  --------------------------------------------------------  IN: 2994 mL / OUT: 3110 mL / NET: -116 mL    24 Oct 2017 07:01  -  24 Oct 2017 22:58  --------------------------------------------------------  IN: 1301.9 mL / OUT: 8 mL / NET: 1293.9 mL        ====================  PLAN:  ====================    HEALTH ISSUES - PROBLEM Dx:  Atrial fibrillation with RVR: Atrial fibrillation with RVR  Cardiac arrest: Cardiac arrest  Proteinuria, unspecified type: Proteinuria, unspecified type  Acute kidney injury: Acute kidney injury  Proteinuria: Proteinuria  Hyperkalemia: Hyperkalemia  DANITA (acute kidney injury): DANITA (acute kidney injury)  Prophylactic measure: Prophylactic measure  Hypertension: Hypertension  Goiter diffuse, nontoxic: Goiter diffuse, nontoxic  Atrial fibrillation, chronic: Atrial fibrillation, chronic  STEMI involving right coronary artery: STEMI involving right coronary artery  Right heart failure: Right heart failure  Cardiogenic shock: Cardiogenic shock        HEALTH ISSUES - R/O PROBLEM Dx:      YNES Marcano NP   # ====================  CCU MIDNIGHT ROUNDS  ====================    KAMLESH PATEL  [id number]    ====================  SUMMARY:    M w/ hx of Afib (previously on Pradaxa) admitted from OSH for IWSTEMI/cath.  Upon going Cath lab, pt went into v-fib arrest requiring intubation.  RCA stent placed as well as IABP, swan and subsequent RP impella due to cardiogenic shock (IWSTEMI w/ RV infarct/RV shock).  Hosp course complicated by renal failure requiring dialysis/CRRT.  Pt extubated, off IABP/RP impella.  New swan numbers:  CI 2.07, CO4.4, PAD 15-17.    ====================  ====================  NEW EVENTS:  decreased to 2.5 due to improving venous sats. Pt taken for CTH due to UE tremors. Shiley exchanged by Vascular due to high pressure alarms on CRRT.    ====================    ====================  VITALS (Last 12 hrs):  ====================    T(C): 36.8 (10-24-17 @ 17:00), Max: 36.8 (10-24-17 @ 17:00)  HR: 82 (10-24-17 @ 20:30) (80 - 90)  BP: 104/52 (10-24-17 @ 20:30) (93/47 - 109/59)  BP(mean): 62 (10-24-17 @ 20:30) (59 - 70)  ABP: 108/48 (10-24-17 @ 18:00) (96/42 - 122/52)  ABP(mean): 64 (10-24-17 @ 18:00) (56 - 76)  RR: 27 (10-24-17 @ 20:30) (15 - 27)  SpO2: 96% (10-24-17 @ 20:30) (94% - 100%)    TELEMETRY:NSR 70      I&O's Summary    23 Oct 2017 07:  -  24 Oct 2017 07:00  --------------------------------------------------------  IN: 2994 mL / OUT: 3110 mL / NET: -116 mL    24 Oct 2017 07:  -  24 Oct 2017 22:58  --------------------------------------------------------  IN: 1301.9 mL / OUT: 8 mL / NET: 1293.9 mL        ====================  PLAN:  ====================    HEALTH ISSUES - PROBLEM Dx:  Atrial fibrillation with RVR: Atrial fibrillation with RVR  Cardiac arrest: Cardiac arrest  Proteinuria, unspecified type: Proteinuria, unspecified type  Acute kidney injury: Acute kidney injury  Proteinuria: Proteinuria  Hyperkalemia: Hyperkalemia  DANITA (acute kidney injury): DANITA (acute kidney injury)  Prophylactic measure: Prophylactic measure  Hypertension: Hypertension  Goiter diffuse, nontoxic: Goiter diffuse, nontoxic  Atrial fibrillation, chronic: Atrial fibrillation, chronic  STEMI involving right coronary artery: STEMI involving right coronary artery  Right heart failure: Right heart failure  Cardiogenic shock: Cardiogenic shock        HEALTH ISSUES - R/O PROBLEM Dx:      YNES Marcano NP   # ====================  CCU MIDNIGHT ROUNDS  ====================    KAMLESH PATEL  [id number]    ====================  SUMMARY:    M w/ hx of Afib (previously on Pradaxa) admitted from OSH for IWSTEMI/cath.  Upon going Cath lab, pt went into v-fib arrest requiring intubation.  RCA stent placed as well as IABP, swan and subsequent RP impella due to cardiogenic shock (IWSTEMI w/ RV infarct/RV shock).  Hosp course complicated by renal failure requiring dialysis/CRRT.  Pt extubated, off IABP/RP impella.  New swan numbers:  CI 2.07, CO4.4, PAD 15-17.    ====================  ====================  NEW EVENTS:  decreased to 2.5 due to improving venous sats. Pt taken for CTH due to UE tremors. Shiley exchanged by Vascular due to high pressure alarms on CRRT.    ====================    ====================  VITALS (Last 12 hrs):  ====================    T(C): 36.8 (10-24-17 @ 17:00), Max: 36.8 (10-24-17 @ 17:00)  HR: 82 (10-24-17 @ 20:30) (80 - 90)  BP: 104/52 (10-24-17 @ 20:30) (93/47 - 109/59)  BP(mean): 62 (10-24-17 @ 20:30) (59 - 70)  ABP: 108/48 (10-24-17 @ 18:00) (96/42 - 122/52)  ABP(mean): 64 (10-24-17 @ 18:00) (56 - 76)  RR: 27 (10-24-17 @ 20:30) (15 - )  SpO2: 96% (10-24-17 @ 20:30) (94% - 100%)    TELEMETRY:NSR 70      I&O's Summary    23 Oct 2017 07:  -  24 Oct 2017 07:00  --------------------------------------------------------  IN: 2994 mL / OUT: 3110 mL / NET: -116 mL    24 Oct 2017 07:  -  24 Oct 2017 22:58  --------------------------------------------------------  IN: 1301.9 mL / OUT: 8 mL / NET: 1293.9 mL    ====================  PLAN:  ====================  ADHF c/b DANITA requiring HD: Central venous 50 from 64; will repeat as no changes were made to Dobutamine gtt rate. Continue CRRT for goal net negative 100/hour. Last CVP 13. Active ROM in bed while on CRRT.  Tremors, resolving: CTH completed. F/U neuro recs  R/o bacteremia as e/b + BC x 1: Continue Vanco and Zosyn. Daily vanco levels. Repeat blood cultures from 10/24 NTD    MARY MarcanoU NP   #[id number]

## 2017-10-24 NOTE — SWALLOW BEDSIDE ASSESSMENT ADULT - SLP PERTINENT HISTORY OF CURRENT PROBLEM
83/M with Afib on Pradaxa who came to [facility] c/o SOB found to have IWSTEMI, transferred to [facility] for cath, h/c by VF arrest requiring IABP, RCA stent & SWAN, h/c c/b cardiogenic shock 2/2 right heart failure s/p impella (now removed), w/ h/c c/b renal failure requiring CVVH, Pt extubated (10/21/17).

## 2017-10-24 NOTE — PROVIDER CONTACT NOTE (OTHER) - ASSESSMENT
Pt currently in no acute distress. Afebrile 98.3F, 112/50 MAP 67 RR 18-20 Sp02 94 2L NC, HR 82 Afib no events on tele

## 2017-10-24 NOTE — PROVIDER CONTACT NOTE (OTHER) - ASSESSMENT
Pt has neuro checks ordered q4hrs. Noted unable to elevate Rt arm and Rt eye slow to react to light.

## 2017-10-25 NOTE — PROGRESS NOTE ADULT - ATTENDING COMMENTS
Seen and examined on rounds with housestaff.  New R ptosis, down and out gaze, dilated R pupil.  MRI to r/o a midbrain infarct.  MRA head and neck to r/o aneurysm.  EEG to evaluate for concern of myoclonus.

## 2017-10-25 NOTE — CONSULT NOTE ADULT - PROBLEM SELECTOR RECOMMENDATION 9
- plan for FOE - decadron 4mg BID x 3 days then taper  - PPI for acid reflux control  - care as per CCU

## 2017-10-25 NOTE — CONSULT NOTE ADULT - SUBJECTIVE AND OBJECTIVE BOX
CC: sensation of something stuck in throat, r/o airway obstruction    HPI: 84yo male with PMHx vertigo, goiter, afib, HTN, admitted for STEMI and cardiac arrest, extubated 10/21, no c/o sensation in throat since 5am. Pt states he has been coughing up phlegm all night, however this morning he felt a sensation in his throat like there is "something stuck." Pt pointed to left lateral submandibular area. Pt is NPO with tube feeds.  Pt denies fever, chills, dysphagia, odynophagia, hemoptysis, unintentional weight loss, hoarseness. While at bedside, pt had an episode of SOB and bradycardia. We were asked to come back later to finish assessment.         PAST MEDICAL & SURGICAL HISTORY:  Vertigo  Thyroid condition  Afib  High blood pressure  No significant past surgical history    Allergies    No Known Allergies    Intolerances      MEDICATIONS  (STANDING):  aspirin  chewable 81 milliGRAM(s) Oral daily  atorvastatin 40 milliGRAM(s) Oral at bedtime  chlorhexidine 0.12% Liquid 15 milliLiter(s) Swish and Spit two times a day  DOBUTamine Infusion 5 MICROgram(s)/kG/Min (13.965 mL/Hr) IV Continuous <Continuous>  heparin  Infusion. 1500 Unit(s)/Hr (15 mL/Hr) IV Continuous <Continuous>  influenza   Vaccine 0.5 milliLiter(s) IntraMuscular once  lactobacillus acidophilus and bulgaricus Chewable 1 Tablet(s) Chew daily  pantoprazole  Injectable 40 milliGRAM(s) IV Push daily  piperacillin/tazobactam IVPB. 3.375 Gram(s) IV Intermittent every 12 hours  ticagrelor 90 milliGRAM(s) Oral two times a day  vancomycin  IVPB 750 milliGRAM(s) IV Intermittent every 12 hours    MEDICATIONS  (PRN):  ALBUTerol/ipratropium for Nebulization 3 milliLiter(s) Nebulizer every 6 hours PRN Shortness of Breath and/or Wheezing  heparin  Injectable 7500 Unit(s) IV Push every 6 hours PRN For aPTT less than 40  heparin  Injectable 3500 Unit(s) IV Push every 6 hours PRN For aPTT between 40 - 57      Social History: denies tobacco use    ROS: ENT, GI, , CV, Pulm, Neuro, Psych, MS, Heme, Endo, Constitutional; all negative except as noted in HPI    Vital Signs Last 24 Hrs  T(C): 36.5 (25 Oct 2017 07:00), Max: 37.1 (25 Oct 2017 00:00)  T(F): 97.7 (25 Oct 2017 07:00), Max: 98.7 (25 Oct 2017 00:00)  HR: 82 (25 Oct 2017 11:00) (74 - 102)  BP: 95/64 (25 Oct 2017 11:00) (82/56 - 136/72)  BP(mean): 78 (25 Oct 2017 11:00) (58 - 101)  RR: 20 (25 Oct 2017 11:00) (13 - 27)  SpO2: 100% (25 Oct 2017 11:00) (93% - 100%)                          8.8    18.9  )-----------( 230      ( 25 Oct 2017 05:59 )             26.4    10-25    137  |  100  |  50<H>  ----------------------------<  104<H>  4.6   |  25  |  3.04<H>    Ca    8.2<L>      25 Oct 2017 05:59  Phos  3.5     10-25  Mg     2.4     10-25    TPro  5.9<L>  /  Alb  2.6<L>  /  TBili  1.4<H>  /  DBili  x   /  AST  50<H>  /  ALT  80<H>  /  AlkPhos  88  10-25   PT/INR - ( 25 Oct 2017 05:59 )   PT: 12.9 sec;   INR: 1.18 ratio         PTT - ( 25 Oct 2017 09:46 )  PTT:74.1 sec    PHYSICAL EXAM:  Gen: NAD, well-developed  Head: Normocephalic, Atraumatic  Face: no edema/erythema/fluctuance, parotid glands soft without mass  Eyes: PERRL, EOMI, no scleral injection  Nose: NG tube in left nare, Nares bilaterally patent, no discharge  Mouth: No Stridor / Drooling / Trismus.  Mucosa moist, tongue/uvula midline, oropharynx clear  Neck: Flat, supple, no lymphadenopathy, trachea midline, no masses  Resp: breathing easily, no stridor  CV: no peripheral edema/cyanosis CC: sensation of something stuck in throat, r/o airway obstruction    HPI: 84yo male with PMHx vertigo, goiter, afib, HTN, admitted for STEMI and cardiac arrest, extubated 10/21, no c/o sensation in throat since 5am. Pt states he has been coughing up phlegm all night, however this morning he felt a sensation in his throat like there is "something stuck." Pt pointed to left lateral submandibular area. Pt is NPO with tube feeds.  Pt denies fever, chills, dysphagia, odynophagia, hemoptysis, unintentional weight loss, hoarseness.        PAST MEDICAL & SURGICAL HISTORY:  Vertigo  Thyroid condition  Afib  High blood pressure  No significant past surgical history    Allergies    No Known Allergies    Intolerances      MEDICATIONS  (STANDING):  aspirin  chewable 81 milliGRAM(s) Oral daily  atorvastatin 40 milliGRAM(s) Oral at bedtime  chlorhexidine 0.12% Liquid 15 milliLiter(s) Swish and Spit two times a day  DOBUTamine Infusion 5 MICROgram(s)/kG/Min (13.965 mL/Hr) IV Continuous <Continuous>  heparin  Infusion. 1500 Unit(s)/Hr (15 mL/Hr) IV Continuous <Continuous>  influenza   Vaccine 0.5 milliLiter(s) IntraMuscular once  lactobacillus acidophilus and bulgaricus Chewable 1 Tablet(s) Chew daily  pantoprazole  Injectable 40 milliGRAM(s) IV Push daily  piperacillin/tazobactam IVPB. 3.375 Gram(s) IV Intermittent every 12 hours  ticagrelor 90 milliGRAM(s) Oral two times a day  vancomycin  IVPB 750 milliGRAM(s) IV Intermittent every 12 hours    MEDICATIONS  (PRN):  ALBUTerol/ipratropium for Nebulization 3 milliLiter(s) Nebulizer every 6 hours PRN Shortness of Breath and/or Wheezing  heparin  Injectable 7500 Unit(s) IV Push every 6 hours PRN For aPTT less than 40  heparin  Injectable 3500 Unit(s) IV Push every 6 hours PRN For aPTT between 40 - 57      Social History: denies tobacco use    ROS: ENT, GI, , CV, Pulm, Neuro, Psych, MS, Heme, Endo, Constitutional; all negative except as noted in HPI    Vital Signs Last 24 Hrs  T(C): 36.5 (25 Oct 2017 07:00), Max: 37.1 (25 Oct 2017 00:00)  T(F): 97.7 (25 Oct 2017 07:00), Max: 98.7 (25 Oct 2017 00:00)  HR: 82 (25 Oct 2017 11:00) (74 - 102)  BP: 95/64 (25 Oct 2017 11:00) (82/56 - 136/72)  BP(mean): 78 (25 Oct 2017 11:00) (58 - 101)  RR: 20 (25 Oct 2017 11:00) (13 - 27)  SpO2: 100% (25 Oct 2017 11:00) (93% - 100%)                          8.8    18.9  )-----------( 230      ( 25 Oct 2017 05:59 )             26.4    10-25    137  |  100  |  50<H>  ----------------------------<  104<H>  4.6   |  25  |  3.04<H>    Ca    8.2<L>      25 Oct 2017 05:59  Phos  3.5     10-25  Mg     2.4     10-25    TPro  5.9<L>  /  Alb  2.6<L>  /  TBili  1.4<H>  /  DBili  x   /  AST  50<H>  /  ALT  80<H>  /  AlkPhos  88  10-25   PT/INR - ( 25 Oct 2017 05:59 )   PT: 12.9 sec;   INR: 1.18 ratio         PTT - ( 25 Oct 2017 09:46 )  PTT:74.1 sec    PHYSICAL EXAM:  Gen: NAD, well-developed  Head: Normocephalic, Atraumatic  Face: no edema/erythema/fluctuance, parotid glands soft without mass  Eyes: PERRL, EOMI, no scleral injection  Nose: NG tube in left nare, Nares bilaterally patent, no discharge  Mouth: No Stridor / Drooling / Trismus.  Mucosa moist, tongue/uvula midline, oropharynx clear  Neck: Flat, supple, no lymphadenopathy, trachea midline, no masses  Resp: breathing easily, no stridor  CV: no peripheral edema/cyanosis      FOE   Nasopharynx, oropharynx, and hypopharynx clear, no bleeding. Airway patent, no foreign body visualized. No erythema, edema, pooling of thickened secretions, masses or lesions. No glottic/supraglottic edema. Vocal cords mobile with good contact b/l. + trachealmalacia CC: sensation of something stuck in throat, r/o airway obstruction    HPI: 82yo male with PMHx vertigo, goiter, afib, HTN, admitted for STEMI and cardiac arrest, extubated 10/21, no c/o sensation in throat since 5am. Pt states he has been coughing up phlegm all night, however this morning he felt a sensation in his throat like there is "something stuck." Pt pointed to left lateral submandibular area. Pt is NPO with tube feeds.  Pt denies fever, chills, dysphagia, odynophagia, hemoptysis, unintentional weight loss, hoarseness.        PAST MEDICAL & SURGICAL HISTORY:  Vertigo  Thyroid condition  Afib  High blood pressure  No significant past surgical history    Allergies    No Known Allergies    Intolerances      MEDICATIONS  (STANDING):  aspirin  chewable 81 milliGRAM(s) Oral daily  atorvastatin 40 milliGRAM(s) Oral at bedtime  chlorhexidine 0.12% Liquid 15 milliLiter(s) Swish and Spit two times a day  DOBUTamine Infusion 5 MICROgram(s)/kG/Min (13.965 mL/Hr) IV Continuous <Continuous>  heparin  Infusion. 1500 Unit(s)/Hr (15 mL/Hr) IV Continuous <Continuous>  influenza   Vaccine 0.5 milliLiter(s) IntraMuscular once  lactobacillus acidophilus and bulgaricus Chewable 1 Tablet(s) Chew daily  pantoprazole  Injectable 40 milliGRAM(s) IV Push daily  piperacillin/tazobactam IVPB. 3.375 Gram(s) IV Intermittent every 12 hours  ticagrelor 90 milliGRAM(s) Oral two times a day  vancomycin  IVPB 750 milliGRAM(s) IV Intermittent every 12 hours    MEDICATIONS  (PRN):  ALBUTerol/ipratropium for Nebulization 3 milliLiter(s) Nebulizer every 6 hours PRN Shortness of Breath and/or Wheezing  heparin  Injectable 7500 Unit(s) IV Push every 6 hours PRN For aPTT less than 40  heparin  Injectable 3500 Unit(s) IV Push every 6 hours PRN For aPTT between 40 - 57      Social History: denies tobacco use    ROS: ENT, GI, , CV, Pulm, Neuro, Psych, MS, Heme, Endo, Constitutional; all negative except as noted in HPI    Vital Signs Last 24 Hrs  T(C): 36.5 (25 Oct 2017 07:00), Max: 37.1 (25 Oct 2017 00:00)  T(F): 97.7 (25 Oct 2017 07:00), Max: 98.7 (25 Oct 2017 00:00)  HR: 82 (25 Oct 2017 11:00) (74 - 102)  BP: 95/64 (25 Oct 2017 11:00) (82/56 - 136/72)  BP(mean): 78 (25 Oct 2017 11:00) (58 - 101)  RR: 20 (25 Oct 2017 11:00) (13 - 27)  SpO2: 100% (25 Oct 2017 11:00) (93% - 100%)                          8.8    18.9  )-----------( 230      ( 25 Oct 2017 05:59 )             26.4    10-25    137  |  100  |  50<H>  ----------------------------<  104<H>  4.6   |  25  |  3.04<H>    Ca    8.2<L>      25 Oct 2017 05:59  Phos  3.5     10-25  Mg     2.4     10-25    TPro  5.9<L>  /  Alb  2.6<L>  /  TBili  1.4<H>  /  DBili  x   /  AST  50<H>  /  ALT  80<H>  /  AlkPhos  88  10-25   PT/INR - ( 25 Oct 2017 05:59 )   PT: 12.9 sec;   INR: 1.18 ratio         PTT - ( 25 Oct 2017 09:46 )  PTT:74.1 sec    PHYSICAL EXAM:  Gen: NAD, well-developed  Head: Normocephalic, Atraumatic  Face: no edema/erythema/fluctuance, parotid glands soft without mass  Eyes: PERRL, EOMI, no scleral injection  Nose: NG tube in left nare, Nares bilaterally patent, no discharge  Mouth: No Stridor / Drooling / Trismus.  Mucosa moist, tongue/uvula midline, oropharynx clear  Neck: Flat, supple, no lymphadenopathy, trachea midline, no masses  Resp: breathing easily, no stridor  CV: no peripheral edema/cyanosis      FOE   Nasopharynx, oropharynx, and hypopharynx clear, no bleeding. Airway patent, no foreign body visualized. No erythema, edema, pooling of thickened secretions, masses or lesions. Vocal cords mobile with good contact b/l.  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 also with reflux changes and intubation granulomas

## 2017-10-25 NOTE — EEG REPORT - NS EEG TEXT BOX
History:  p/w episodes of unresponsiveness    h/o afib, vertigo      Medication	  No Data.	    Study Interpretation:    FINDINGS:  The background was continuous, spontaneously variable and reactive.  During wakefulness, the posteriorly dominant rhythm consisted of 8-8.5 Hz activity, with an amplitude to 30 uV, that attenuated to eye opening.  Low amplitude central beta was noted in wakefulness.    Non –Epileptiform Abnormalities:  Intermittent generalized slowing     Sleep Background:  Drowsiness was characterized by fragmentation, attenuation, and slowing of the background activity.  .  Stage II sleep transients were not recorded.    Epileptiform Activity:   No epileptiform discharges were present.    Events:  Events of concern captured (head and full body twitches) . No electrographic abnormalities with these events and normal wakeful background was preserved. There was myogenic artifact present with the events.     Activation Procedures:   -Hyperventilation was not performed.    -Photic stimulation was not performed.    Artifacts:  Intermittent myogenic and movement artifacts were noted.    ECG:  The heart rate on single channel ECG was irregularly irregular at 80-90 BPM    EEG Classification / Summary:  Abnormal Routine EEG Study   -Intermittent generalized slowing     Clinical Impression:  The EEG provides evidence of a mild diffuse encephalopathy. Events of concern (body twitching) captured and had no electrographic correlate. There were no epileptiform abnormalities recorded.      Bettye Carr MD    ________________________________________  Fellow in Neurophysiology/Epilepsy, [facility]      Yifan Bhardwaj M.D.			    Attending Physician, [facility] [place]

## 2017-10-25 NOTE — PROGRESS NOTE ADULT - ASSESSMENT
83/M with Afib on Pradaxa who came to [facility] c/o SOB found to have IWSTEMI, transferred to [facility] for cath, h/c by VF arrest requiring IABP, RCA stent & SWAN, h/c c/b cardiogenic shock 2/2 right heart failure s/p impella (now removed), w/ h/c c/b renal failure requiring CVVH, extubated, now hospital course c/b tachy isabelle syndrome and ?seizures     # Neuro:   Possible seizure like activity when bradycardic. Previously a code stongozi was called and Head CT did not show new acute findings. Patient was recommended to start keppra but it is unclear why it was not continued. Repeat Head CT was done yesterday given the focal neurological deficits but it was unchanged. Neurology team has been consulted. Will start EEG to capture seizure like activity. Will discuss possibly restarting keppra    # CV:   - CAD s/p RCA stent: continue hep gtt, aspirin, brillinta, lipitor  - Atrial fibrillation: currently on amiodarone load, it was decreased to 200 TID due to tachy isabelle   - Cardiogenic shock s/p TVP and impella s/ removal: Continue dobutmine 5  - Tachy-isabelle of unknown etiology, will have atropine at bedside      #Respiratory: CXR Small left pleural effusion with adjacent atelectasis.  - Slightly tachypneic, but comfortable   - Continue suctioning due to increased secretions     #GI:   - failed dysphagia screen (yesterday), currently on NGT w/ tube feeds-vital  - dysphagia eval again tomorrow     #Metabolic/Renal  - Rhabdo: continue CVVH, net negative: 1Liter  - Patient is anuric at this time, will continue CCVH until HD stable as per Renal Team  - Replete Mg, Ph, K as needed     #Heme  - H&H stable    # Vascular  Patient currently has a R shilley and L central line in place     # Skin  Breakdown in R groin at the site of impella, wound care consulted     #DVT ppx: hep gtt  #Dispo - PT consulted

## 2017-10-25 NOTE — PROGRESS NOTE ADULT - ATTENDING COMMENTS
83M pmhx of AF on pradaxa initially presented on 10/15/17 with chest pain/diaphoresis/ dyspnea to OSH, IW-STEMI emergently transferred here for C c/b VF arrest requiring IABP followed by pRCA stent c/b now acute right heart failure and cardiogenic shock s/p IABP (explanted 10/19), RP impella for RV support (explanted 10/20), unable to be weaned from  as venous sat drops so remains on  5 with borderline low CI. RVSWI low at 4.4. TTE reviewed and appears to have hyperdynamic LV with moderate RV dysfunction. Had slow afib today to 30-40 with significant pauses with altered mental status d/t likely global hypoperfusion   - continue  for now  - would pull fluid to keep net negative 500 cc  - TVP placed today given significant hemodynamic effects from slow afib  - TSH low; given goiter and possible amio-induced inflammation, recommend endo c/s; no clinical signs of thyrotoxicosis  - overall prognosis guarded; d/w wife with Dr. Cooley

## 2017-10-25 NOTE — PROGRESS NOTE ADULT - ASSESSMENT
83 year old male with a PMH of atrial fibrillation (on Pradaxa), HTN, Thyroid Goiter, CKD stage III (dx 2016) was seen at [facility] with inferior wall STEMI and was brought to [facility] for cardiac catherization s/p PCI x 1 now with DANITA and Anuria.

## 2017-10-25 NOTE — CONSULT NOTE ADULT - ASSESSMENT
82yo male with sensation of something stuck in throat. Pt had episode of bradycardia during assessment. Will return to reassess pt with Dr. Macario. 82yo male with sensation of something stuck in throat. Pt had episode of bradycardia during assessment. 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 possible exacerbated by intubation and due to syncope on coughing will treat maximally to see if can resolve

## 2017-10-25 NOTE — CHART NOTE - NSCHARTNOTEFT_GEN_A_CORE
Pt undergoing TVP place under fluoroscopy in the cath lab.  Spoke w/ the wife at bedside who the POA/healthcare proxy, and has decided to rescind his code status from DNR/DNI to FULL code for this procedure.      Zelalem Marquez MD   CCU fellow   [phone] Pt undergoing TVP placement under fluoroscopy in the cath lab.  Spoke w/ the wife at bedside who the POA/healthcare proxy, and has decided to rescind his code status from DNR/DNI to FULL code for this procedure.      Zelalem Marquez MD   CCU fellow   [phone]

## 2017-10-25 NOTE — PROGRESS NOTE ADULT - SUBJECTIVE AND OBJECTIVE BOX
Vascular Surgery Progress Note    24hr Events:  -non-functioning R-IJ dual lumen temporary dialysis catheter exchange over a wire yesterday afternoon at bedside while heparin gtt held.  -CVVH running via new catheter o/n w/out issues, per RN at bedside this am.  -CXR confirms placement of tip in SVC  -L-IJ TLC placed late last night by ICU team.  -heparin gtt infusing this am.    HPI:  83M PMHx HTN, Afib (Pradaxa), Goiter. Pt presented to [facility] ED on 10/15/2017 w/ STEMI. Was taken to cath lab for RCA stent, s/p V fib arrest on table, underwent ACLS w/ ROSC, had IABP, R sided Impella, and [place]-Leeann catheter placement. Was transferred to CCU for ongoing care. Pt noted to have acute renal failure secondary to global ischemia. Vascular consulted and placed a Shiley on 10/17. Vascular contacted as catheter not functioning.     O:   Vital Signs Last 24 Hrs  T(C): 37.1 (25 Oct 2017 06:00), Max: 37.1 (25 Oct 2017 00:00)  T(F): 98.7 (25 Oct 2017 06:00), Max: 98.7 (25 Oct 2017 00:00)  HR: 90 (25 Oct 2017 06:30) (74 - 102)  BP: 136/72 (25 Oct 2017 06:30) (82/56 - 136/72)  BP(mean): 97 (25 Oct 2017 06:30) (59 - 101)  RR: 22 (25 Oct 2017 06:30) (13 - 27)  SpO2: 99% (25 Oct 2017 06:30) (93% - 100%)    PE:  Gen: elderly gentleman, fatigued & tremulous; wife at bedside  HEENT: R-IJ dual lumen catheter w/ CVVH in operation, dressing clear, no drainage, bleeding, erythema. L-IJ TLC w/ IV infusing. NGT w/out any TF running.       I&O's Summary    24 Oct 2017 07:01  -  25 Oct 2017 07:00  --------------------------------------------------------  IN: 1903.8 mL / OUT: 837 mL / NET: 1066.8 mL      Rx:  MEDICATIONS  (STANDING):  amiodarone    Tablet 400 milliGRAM(s) Oral every 8 hours  aspirin  chewable 81 milliGRAM(s) Oral daily  atorvastatin 40 milliGRAM(s) Oral at bedtime  chlorhexidine 0.12% Liquid 15 milliLiter(s) Swish and Spit two times a day  DOBUTamine Infusion 5 MICROgram(s)/kG/Min (13.965 mL/Hr) IV Continuous <Continuous>  heparin  Infusion. 1500 Unit(s)/Hr (15 mL/Hr) IV Continuous <Continuous>  influenza   Vaccine 0.5 milliLiter(s) IntraMuscular once  lactobacillus acidophilus and bulgaricus Chewable 1 Tablet(s) Chew daily  pantoprazole  Injectable 40 milliGRAM(s) IV Push daily  piperacillin/tazobactam IVPB. 3.375 Gram(s) IV Intermittent every 12 hours  ticagrelor 90 milliGRAM(s) Oral two times a day  vancomycin  IVPB 1000 milliGRAM(s) IV Intermittent every 24 hours    MEDICATIONS  (PRN):  ALBUTerol/ipratropium for Nebulization 3 milliLiter(s) Nebulizer every 6 hours PRN Shortness of Breath and/or Wheezing  heparin  Injectable 7500 Unit(s) IV Push every 6 hours PRN For aPTT less than 40  heparin  Injectable 3500 Unit(s) IV Push every 6 hours PRN For aPTT between 40 - 57      Labs:                        8.8    18.9  )-----------( 230      ( 25 Oct 2017 05:59 )             26.4     10-25    137  |  100  |  50<H>  ----------------------------<  104<H>  4.6   |  25  |  3.04<H>    Ca    8.2<L>      25 Oct 2017 05:59  Phos  3.5     10-25  Mg     2.4     10-25    TPro  5.9<L>  /  Alb  2.6<L>  /  TBili  1.4<H>  /  DBili  x   /  AST  50<H>  /  ALT  80<H>  /  AlkPhos  88  10-25    PT/INR - ( 25 Oct 2017 05:59 )   PT: 12.9 sec;   INR: 1.18 ratio         PTT - ( 25 Oct 2017 02:13 )  PTT:65.6 sec      Imaging:  < from: Xray Chest 1 View AP- PORTABLE-Urgent (10.24.17 @ 09:55) >  10/24/2017            INTERPRETATION:  CLINICAL INFORMATION: Status post myocardial infarction.    TECHNIQUE: AP chest radiograph.    COMPARISON: Chest radiograph performed 10/22/2017.    FINDINGS:     A right internal jugular approach central venous catheter tip terminates   over SVC. An enteric tube tip terminates over the stomach. There is an   unchanged left pleural effusion with an adjacent left basilar opacity.   Right lung is clear. No pneumothoraces are seen bilaterally.    IMPRESSION:     Unchanged left pleural effusion with left basilar atelectasis and/or   infection.    < end of copied text >

## 2017-10-25 NOTE — PROGRESS NOTE ADULT - SUBJECTIVE AND OBJECTIVE BOX
CONTACT INFO:  Walter Caldera PGY 2  Pager:  [phone]      HPI / INTERVAL HISTORY:  Patient seen and examined at bedside.  Overnight: patient's HR decreased to 30s, but immediately increased to 100s. During bradycardic episodes, patient would have diffuse shaking  Patient looks comfortable this morning. Reports he needs sleep. Denies chest pain, SOB, abdominal pain.       OBJECTIVE:  VITAL SIGNS:  ICU Vital Signs Last 24 Hrs  T(C): 36.5 (25 Oct 2017 07:00), Max: 37.1 (25 Oct 2017 00:00)  T(F): 97.7 (25 Oct 2017 07:00), Max: 98.7 (25 Oct 2017 00:00)  HR: 82 (25 Oct 2017 08:00) (74 - 102)  BP: 97/65 (25 Oct 2017 08:00) (82/56 - 136/72)  BP(mean): 83 (25 Oct 2017 08:00) (58 - 101)  ABP: 108/48 (24 Oct 2017 18:00) (96/42 - 126/46)  ABP(mean): 64 (24 Oct 2017 18:00) (56 - 76)  RR: 22 (25 Oct 2017 08:00) (13 - 27)  SpO2: 100% (25 Oct 2017 08:00) (93% - 100%)        10-24 @ 07:01  -  10-25 @ 07:00  --------------------------------------------------------  IN: 1903.8 mL / OUT: 837 mL / NET: 1066.8 mL    10-25 @ 07:01  -  10-25 @ 08:29  --------------------------------------------------------  IN: 39 mL / OUT: 0 mL / NET: 39 mL      CAPILLARY BLOOD GLUCOSE  108 (25 Oct 2017 08:17)      POCT Blood Glucose.: 108 mg/dL (25 Oct 2017 08:17)      PHYSICAL EXAM:  Gen:   HEENT: anicteric sclera  Neck: enlarged goiter  Resp: clear to ausculation B/L; no wheezes, rales or rhonchi  Cardiovasc: S1S2 normal; RRR; no murmurs, rubs or gallops  GI: soft, nondistended, nontender; +BS  Extremity: b/l LE edema, L central line in place, R shilley in place  Skin: normal color and turgor  Neuro: AAOx3, RUE weaker than LUE, R pupil sluggish but equal, LE equal     LABS:                        8.8    18.9  )-----------( 230      ( 25 Oct 2017 05:59 )             26.4     10-25    137  |  100  |  50<H>  ----------------------------<  104<H>  4.6   |  25  |  3.04<H>    Ca    8.2<L>      25 Oct 2017 05:59  Phos  3.5     10-25  Mg     2.4     10-25    TPro  5.9<L>  /  Alb  2.6<L>  /  TBili  1.4<H>  /  DBili  x   /  AST  50<H>  /  ALT  80<H>  /  AlkPhos  88  10-25    LIVER FUNCTIONS - ( 25 Oct 2017 05:59 )  Alb: 2.6 g/dL / Pro: 5.9 g/dL / ALK PHOS: 88 U/L / ALT: 80 U/L RC / AST: 50 U/L / GGT: x           PT/INR - ( 25 Oct 2017 05:59 )   PT: 12.9 sec;   INR: 1.18 ratio         PTT - ( 25 Oct 2017 02:13 )  PTT:65.6 sec            RADIOLOGY & ADDITIONAL TESTS:       MEDICATIONS:  ALBUTerol/ipratropium for Nebulization 3 milliLiter(s) Nebulizer every 6 hours PRN  amiodarone    Tablet 200 milliGRAM(s) Oral three times a day  aspirin  chewable 81 milliGRAM(s) Oral daily  atorvastatin 40 milliGRAM(s) Oral at bedtime  chlorhexidine 0.12% Liquid 15 milliLiter(s) Swish and Spit two times a day  DOBUTamine Infusion 5 MICROgram(s)/kG/Min IV Continuous <Continuous>  heparin  Infusion. 1500 Unit(s)/Hr IV Continuous <Continuous>  heparin  Injectable 7500 Unit(s) IV Push every 6 hours PRN  heparin  Injectable 3500 Unit(s) IV Push every 6 hours PRN  influenza   Vaccine 0.5 milliLiter(s) IntraMuscular once  lactobacillus acidophilus and bulgaricus Chewable 1 Tablet(s) Chew daily  pantoprazole  Injectable 40 milliGRAM(s) IV Push daily  piperacillin/tazobactam IVPB. 3.375 Gram(s) IV Intermittent every 12 hours  ticagrelor 90 milliGRAM(s) Oral two times a day  vancomycin  IVPB 1000 milliGRAM(s) IV Intermittent every 24 hours      ALLERGIES:  No Known Allergies

## 2017-10-25 NOTE — CONSULT NOTE ADULT - SUBJECTIVE AND OBJECTIVE BOX
Patient seen and evaluated @ 11 AM  Chief Complaint: AFib with slow VR    HPI:  83M with AFib on pradaxa, mod MR, mild AR, RV failure a/w IWSTEMI s/p RCA stent c/b VFib arrest requiring IABP, PAC with course complicated by cardiogenic shock 2/2 RV failure s/p RV impella now with renal failure on CVVH. Currently extubated with course c/b tachy/isabelle syndrome.    Consult for AFib with slow VR. Patient with shaking and twitching.    PMH:   Vertigo  Thyroid condition  Afib  High blood pressure    PSH:   No significant past surgical history    Medications:   ALBUTerol/ipratropium for Nebulization 3 milliLiter(s) Nebulizer every 6 hours PRN  aspirin  chewable 81 milliGRAM(s) Oral daily  atorvastatin 80 milliGRAM(s) Oral at bedtime  chlorhexidine 0.12% Liquid 15 milliLiter(s) Swish and Spit two times a day  DOBUTamine Infusion 5 MICROgram(s)/kG/Min IV Continuous <Continuous>  heparin  Infusion. 1500 Unit(s)/Hr IV Continuous <Continuous>  heparin  Injectable 7500 Unit(s) IV Push every 6 hours PRN  heparin  Injectable 3500 Unit(s) IV Push every 6 hours PRN  influenza   Vaccine 0.5 milliLiter(s) IntraMuscular once  lactobacillus acidophilus and bulgaricus Chewable 1 Tablet(s) Chew daily  pantoprazole  Injectable 40 milliGRAM(s) IV Push daily  piperacillin/tazobactam IVPB. 3.375 Gram(s) IV Intermittent every 12 hours  ticagrelor 90 milliGRAM(s) Oral two times a day  vancomycin  IVPB 750 milliGRAM(s) IV Intermittent every 12 hours    Allergies:  No Known Allergies    FAMILY HISTORY:  Family history of heart attack (Father, Sibling, Grandparent, Uncle): many family members have had heart problems at young ages    Social History:  NC    Review of Systems:  Constitutional: [ ] Fever [ ] Chills [ ] Fatigue [ ] Weight change   HEENT: [ ] Blurred vision [ ] Eye Pain [ ] Headache [ ] Runny nose [ ] Sore Throat   Respiratory: [ ] Cough [ ] Wheezing [ ] Shortness of breath  Cardiovascular: [ ] Chest Pain [ ] Palpitations [ ] JOE [ ] PND [ ] Orthopnea  Gastrointestinal: [ ] Abdominal Pain [ ] Diarrhea [ ] Constipation [ ] Hemorrhoids [ ] Nausea [ ] Vomiting  Genitourinary: [ ] Nocturia [ ] Dysuria [ ] Incontinence  Extremities: [ ] Swelling [ ] Joint Pain  Neurologic: [ ] Focal deficit [ ] Paresthesias [ ] Syncope  Lymphatic: [ ] Swelling [ ] Lymphadenopathy   Skin: [ ] Rash [ ] Ecchymoses [ ] Wounds [ ] Lesions  Psychiatry: [ ] Depression [ ] Suicidal/Homicidal Ideation [ ] Anxiety [ ] Sleep Disturbances  [ ] 10 point review of systems is otherwise negative except as mentioned above            [ ]Unable to obtain    Physical Exam:  T(C): 36.5 (10-25-17 @ 07:00), Max: 37.1 (10-25-17 @ 00:00)  HR: 82 (10-25-17 @ 11:00) (74 - 102)  BP: 95/64 (10-25-17 @ 11:00) (82/56 - 136/72)  RR: 20 (10-25-17 @ 11:00) (13 - 27)  SpO2: 100% (10-25-17 @ 11:00) (93% - 100%)  Wt(kg): --    10-24 @ 07:01  -  10-25 @ 07:00  --------------------------------------------------------  IN: 1903.8 mL / OUT: 837 mL / NET: 1066.8 mL    10-25 @ 07:01  -  10-25 @ 11:26  --------------------------------------------------------  IN: 216 mL / OUT: 355 mL / NET: -139 mL      Daily     Daily     Appearance: NAD  Eyes: PERRL, EOMI  HENT: Normal oral muscosa, NC/AT  Cardiovascular: normal S1 and S2, RRR, no m/r/g, no edema, normal JVP  Procedural Access Site:  No hematoma, non-tender to palpation, 2+ pulses distally, no bruit, no ecchymosis  Respiratory: Clear to auscultation bilaterally  Gastrointestinal: Soft, non-tender, non-distended, BS+  Musculoskeletal: No clubbing, no joint deformity   Neurologic: Non-focal  Lymphatic: No lymphadenopathy  Psychiatry: AAOx3, mood & affect appropriate  Skin: No rashes, no ecchymoses, no cyanosis    Cardiovascular Diagnostic Testing:  ECG:    Echo:    Stress Testing:    Cath:    Interpretation of Telemetry:    Imaging:    Labs:                        8.8    18.9  )-----------( 230      ( 25 Oct 2017 05:59 )             26.4     10-25    137  |  100  |  50<H>  ----------------------------<  104<H>  4.6   |  25  |  3.04<H>    Ca    8.2<L>      25 Oct 2017 05:59  Phos  3.5     10-25  Mg     2.4     10-25    TPro  5.9<L>  /  Alb  2.6<L>  /  TBili  1.4<H>  /  DBili  x   /  AST  50<H>  /  ALT  80<H>  /  AlkPhos  88  10-25    PT/INR - ( 25 Oct 2017 05:59 )   PT: 12.9 sec;   INR: 1.18 ratio         PTT - ( 25 Oct 2017 09:46 )  PTT:74.1 sec Patient seen and evaluated @ 11 AM  Chief Complaint: AFib with slow VR    HPI:  83M with AFib on pradaxa, mod MR, mild AR, RV failure a/w IWSTEMI s/p RCA stent c/b VFib arrest requiring IABP, PAC with course complicated by cardiogenic shock 2/2 RV failure s/p RV impella now with renal failure on CVVH. Currently extubated with course c/b tachy/isabelle syndrome.    Consult for AFib with slow VR. Patient with shaking and twitching.    PMH:   Vertigo  Thyroid condition  Afib  High blood pressure    PSH:   No significant past surgical history    Medications:   ALBUTerol/ipratropium for Nebulization 3 milliLiter(s) Nebulizer every 6 hours PRN  aspirin  chewable 81 milliGRAM(s) Oral daily  atorvastatin 80 milliGRAM(s) Oral at bedtime  chlorhexidine 0.12% Liquid 15 milliLiter(s) Swish and Spit two times a day  DOBUTamine Infusion 5 MICROgram(s)/kG/Min IV Continuous <Continuous>  heparin  Infusion. 1500 Unit(s)/Hr IV Continuous <Continuous>  heparin  Injectable 7500 Unit(s) IV Push every 6 hours PRN  heparin  Injectable 3500 Unit(s) IV Push every 6 hours PRN  influenza   Vaccine 0.5 milliLiter(s) IntraMuscular once  lactobacillus acidophilus and bulgaricus Chewable 1 Tablet(s) Chew daily  pantoprazole  Injectable 40 milliGRAM(s) IV Push daily  piperacillin/tazobactam IVPB. 3.375 Gram(s) IV Intermittent every 12 hours  ticagrelor 90 milliGRAM(s) Oral two times a day  vancomycin  IVPB 750 milliGRAM(s) IV Intermittent every 12 hours    Allergies:  No Known Allergies    FAMILY HISTORY:  Family history of heart attack (Father, Sibling, Grandparent, Uncle): many family members have had heart problems at young ages    Social History:  NC    Review of Systems:  Constitutional: [ ] Fever [ ] Chills [ ] Fatigue [ ] Weight change   HEENT: [ ] Blurred vision [ ] Eye Pain [ ] Headache [ ] Runny nose [ ] Sore Throat   Respiratory: [ ] Cough [ ] Wheezing [ ] Shortness of breath  Cardiovascular: [ ] Chest Pain [ ] Palpitations [ ] JOE [ ] PND [ ] Orthopnea  Gastrointestinal: [ ] Abdominal Pain [ ] Diarrhea [ ] Constipation [ ] Hemorrhoids [ ] Nausea [ ] Vomiting  Genitourinary: [ ] Nocturia [ ] Dysuria [ ] Incontinence  Extremities: [ ] Swelling [ ] Joint Pain  Neurologic: [ ] Focal deficit [ ] Paresthesias [ ] Syncope  Lymphatic: [ ] Swelling [ ] Lymphadenopathy   Skin: [ ] Rash [ ] Ecchymoses [ ] Wounds [ ] Lesions  Psychiatry: [ ] Depression [ ] Suicidal/Homicidal Ideation [ ] Anxiety [ ] Sleep Disturbances  [ ] 10 point review of systems is otherwise negative except as mentioned above            [ ]Unable to obtain    Physical Exam:  T(C): 36.5 (10-25-17 @ 07:00), Max: 37.1 (10-25-17 @ 00:00)  HR: 82 (10-25-17 @ 11:00) (74 - 102)  BP: 95/64 (10-25-17 @ 11:00) (82/56 - 136/72)  RR: 20 (10-25-17 @ 11:00) (13 - 27)  SpO2: 100% (10-25-17 @ 11:00) (93% - 100%)  Wt(kg): --    10-24 @ 07:01  -  10-25 @ 07:00  --------------------------------------------------------  IN: 1903.8 mL / OUT: 837 mL / NET: 1066.8 mL    10-25 @ 07:01  -  10-25 @ 11:26  --------------------------------------------------------  IN: 216 mL / OUT: 355 mL / NET: -139 mL      Daily     Daily     Appearance: NAD  Eyes: PERRL, EOMI  HENT: Normal oral muscosa, NC/AT  Cardiovascular: normal S1 and S2, RRR, no m/r/g, no edema, normal JVP  Procedural Access Site:  No hematoma, non-tender to palpation, 2+ pulses distally, no bruit, no ecchymosis  Respiratory: Clear to auscultation bilaterally  Gastrointestinal: Soft, non-tender, non-distended, BS+  Musculoskeletal: No clubbing, no joint deformity   Neurologic: Non-focal  Lymphatic: No lymphadenopathy  Psychiatry: AAOx3, mood & affect appropriate  Skin: No rashes, no ecchymoses, no cyanosis    Cardiovascular Diagnostic Testing:  ECG: AFib    Echo:  EF 60%  Conclusions:  1. Mitral annular calcification, otherwise normal mitral  valve. Mild-moderate mitral regurgitation.  2. Aortic valve leaflet morphology not well visualized.   Mild aortic regurgitation.  3. Normal left ventricular internal dimensions and wall  thicknesses.  4. Endocardium not well visualized; grossly normal left  ventricular systolic function.  5. Moderate right atrial enlargement.  6. Right ventricular enlargement with decreased right  ventricular systolic function.    Stress Testing:  none    Cath:  10/18/17 [facility]  HEMODYNAMIC TABLES  Pressures:  Baseline  Pressures:  - HR: 80  Pressures:  - Rhythm:  Pressures:  -- Pulmonary Artery (S/D/M): 46/28/35  Pressures:  -- Pulmonary Capillary Wedge: /17  Pressures:  -- Right Atrium (a/v/M): 16/17/16  Pressures:  -- Right Ventricle (s/edp): 46/16/--  O2 Sats:  Baseline  O2 Sats:  - HR: 80  O2 Sats:  - Rhythm:  O2 Sats:  -- AO: 8.5/100/11.56  O2 Sats:  -- Pa: 8.5/59.5/6.88  Outputs:  Baseline  Outputs:  -- CALCULATIONS[age]  Outputs:  -- CALCULATIONS: Body Surface Area: 2.10  Outputs:  -- CALCULATIONS: Height in cm: 180.00  Outputs:  -- CALCULATIONS: Sex: Male  Outputs:  -- CALCULATIONS: Weight in k.80  Outputs:  -- OUTPUTS: Blood Oxygen Difference: 4.68  Outputs:  -- OUTPUTS: CO by Ingrid: 6.00  Outputs:  -- OUTPUTS: Ingrid cardiac index: 2.86  Outputs:  -- OUTPUTS: O2 consumption: 281.00  Outputs:  -- OUTPUTS: Vo2 Indexed: 134.01  Outputs:  -- RESISTANCES: Pulmonary vascular index (dsc): 502.97  Outputs:  -- RESISTANCES: Pulmonary vascular index (Wood Units): 6.29  Outputs:  -- RESISTANCES: Pulmonary vascular resistance (dsc): 239.86  Outputs:  -- RESISTANCES: Pulmonary vascular resistance (Wood Units): 3.00  Outputs:  -- RESISTANCES: Right ventricular stroke work: 17.04  Outputs:  -- RESISTANCES: Right ventricular stroke work index: 8.13  Outputs:  -- RESISTANCES: Total pulmonary index (dsc): 978.00  Outputs:  -- RESISTANCES: Total pulmonary index (Wood Units): 12.23  Outputs:  -- RESISTANCES: Total pulmonary resistance (dsc): 466.40  Outputs:  --RESISTANCES: Total pulmonary resistance (Wood Units): 5.83  Outputs:  -- SHUNTS: Qs Indexed: 2.86  Outputs:  -- SHUNTS: Systemic flow: 6.00    Interpretation of Telemetry: afib with slow VR    Imaging:  CXR 10/25  Interval placement of a left internal jugular approach central venous   catheter, the tip of which terminates over the left brachycephalic/SVC   confluence.    Unchanged small left pleural effusion with adjacent left basilar passive   atelectasis.    Labs:                        8.8    18.9  )-----------( 230      ( 25 Oct 2017 05:59 )             26.4     10-25    137  |  100  |  50<H>  ----------------------------<  104<H>  4.6   |  25  |  3.04<H>    Ca    8.2<L>      25 Oct 2017 05:59  Phos  3.5     10-25  Mg     2.4     10-25    TPro  5.9<L>  /  Alb  2.6<L>  /  TBili  1.4<H>  /  DBili  x   /  AST  50<H>  /  ALT  80<H>  /  AlkPhos  88  10-25    PT/INR - ( 25 Oct 2017 05:59 )   PT: 12.9 sec;   INR: 1.18 ratio         PTT - ( 25 Oct 2017 09:46 )  PTT:74.1 sec

## 2017-10-25 NOTE — PROGRESS NOTE ADULT - ATTENDING COMMENTS
Anuric, extubated  1.  ATN--CVVHDF continues  2.  CHF--predominantly right sided.  Volume optimize, low dose inotrope  3.  Alkalosis, respiratory--volume overload vs evolving infection

## 2017-10-25 NOTE — PROGRESS NOTE ADULT - ASSESSMENT
83M PMHx HTN, Afib (Pradaxa), Goiter. presented to [facility] ED on 10/15/2017 w/ STEMI. s/p RCA stent in cath lab w/ V fib arrest on table, underwent ACLS w/ ROSC, s/p IABP, R sided Impella, and [place]-Leeann catheter placement. Transferred to CCU, now w/ DANITA in setting of global ischemia. Vascular consulted and placed Shiley on 10/17, which was replaced over a wire on 10/24 2/2 high pressures. Now CVVH functioning w/out issues.     -vascular will sign off  -please contact as necessary      ELENA Gomez MD  Vascular Surgery Resident      (Please page [phone] for questions/concerns.)

## 2017-10-25 NOTE — PROGRESS NOTE ADULT - ASSESSMENT
83M pmhx of AF on pradaxa initially presented on 10/15/17 with chest pain/diaphoresis/ dyspnea to OSH, IW-STEMI emergently transferred here for [facility] c/b VF arrest requiring IABP followed by pRCA stent c/b now acute right heart failure and cardiogenic shock s/p IABP (explanted 10/19), RP impella for RV support (explanted 10/20), remains on  5 with borderline low CI.      1. cardiogenic shock  - continue  5  - cont cvvh, goal is net negative 500 cc as has extravascular fluid and needs to be mobilized  -would decrease amio to 200 qd    2.CAD  -cont dapt, statin    3. afib  cont heparin drip          Lukasz Barajas MD  p [phone]   evening [phone]     24H hour events:  no acute events overnight  -remains on  2.5  -BP (a line) 106/42   -CVVH clotted, shiley interrogation by vascualr surgery with plans to restart CVVH as soon as possible.  -Cr 1.96 (prev 1.85)  -I/O -20 x24hrs  -lactate 1.7    MEDICATIONS:  amiodarone    Tablet 400 milliGRAM(s) Oral every 8 hours  aspirin  chewable 81 milliGRAM(s) Oral daily  DOBUTamine Infusion 5 MICROgram(s)/kG/Min IV Continuous <Continuous>  ticagrelor 90 milliGRAM(s) Oral two times a day    piperacillin/tazobactam IVPB. 3.375 Gram(s) IV Intermittent every 12 hours  vancomycin  IVPB 1000 milliGRAM(s) IV Intermittent every 24 hours    ALBUTerol/ipratropium for Nebulization 3 milliLiter(s) Nebulizer every 6 hours PRN      pantoprazole  Injectable 40 milliGRAM(s) IV Push daily    atorvastatin 40 milliGRAM(s) Oral at bedtime    chlorhexidine 0.12% Liquid 15 milliLiter(s) Swish and Spit two times a day  influenza   Vaccine 0.5 milliLiter(s) IntraMuscular once      REVIEW OF SYSTEMS:  Complete 10point ROS negative.    PHYSICAL EXAM:  T(C): 36.9 (10-24-17 @ 07:00), Max: 37.7 (10-23-17 @ 23:00)  HR: 82 (10-24-17 @ 16:26) (82 - 112)  BP: 109/59 (10-24-17 @ 15:00) (93/58 - 110/62)  RR: 18 (10-24-17 @ 16:00) (14 - 36)  SpO2: 96% (10-24-17 @ 16:26) (94% - 100%)  Wt(kg): --  I&O's Summary    23 Oct 2017 07:  -  24 Oct 2017 07:00  --------------------------------------------------------  IN: 2994 mL / OUT: 3110 mL / NET: -116 mL    24 Oct 2017 07:  -  24 Oct 2017 17:25  --------------------------------------------------------  IN: 697 mL / OUT: 8 mL / NET: 689 mL        Appearance: Normal	  HEENT:   Normal oral mucosa, PERRL, EOMI	  Lymphatic: No lymphadenopathy  Cardiovascular: Normal S1 S2, No JVD, No murmurs, diffuse anasarca  Respiratory: b/l rhonchi	  Psychiatry: A & O x 3, Mood & affect appropriate  Gastrointestinal:  Soft, Non-tender, + BS	  Skin: No rashes, No ecchymoses, No cyanosis	  Neurologic: Non-focal  Extremities: Normal range of motion, No clubbing, cyanosis or edema  Vascular: Peripheral pulses palpable 2+ bilaterally        LABS:	 	    CBC Full  -  ( 24 Oct 2017 12:58 )  WBC Count : 12.8 K/uL  Hemoglobin : 8.6 g/dL  Hematocrit : 25.4 %  Platelet Count - Automated : 195 K/uL  Mean Cell Volume : 102.0 fl  Mean Cell Hemoglobin : 34.3 pg  Mean Cell Hemoglobin Concentration : 33.7 gm/dL  Auto Neutrophil # : 11.2 K/uL  Auto Lymphocyte # : 1.1 K/uL  Auto Monocyte # : 0.5 K/uL  Auto Eosinophil # : 0.0 K/uL  Auto Basophil # : 0.0 K/uL  Auto Neutrophil % : 72.0 %  Auto Lymphocyte % : 6.0 %  Auto Monocyte % : 3.0 %  Auto Eosinophil % : 1.0 %  Auto Basophil % : x    10-24    138  |  101  |  44<H>  ----------------------------<  103<H>  5.0   |  25  |  2.56<H>  10-24    137  |  102  |  34<H>  ----------------------------<  118<H>  5.1   |  22  |  1.96<H>    Ca    8.0<L>      24 Oct 2017 12:58  Ca    8.1<L>      24 Oct 2017 03:50  Phos  1.6     10-  Phos  2.3     10-  Mg     2.6     10-24  Mg     2.5     10-24    TPro  5.4<L>  /  Alb  2.8<L>  /  TBili  1.6<H>  /  DBili  x   /  AST  61<H>  /  ALT  83<H>  /  AlkPhos  94  10-24  TPro  5.8<L>  /  Alb  2.6<L>  /  TBili  1.7<H>  /  DBili  x   /  AST  69<H>  /  ALT  87<H>  /  AlkPhos  98  10-24      proBNP:   Lipid Profile:   HgA1c:   TSH:       CARDIAC MARKERS:            TELEMETRY: 	    ECG:  	  RADIOLOGY:  OTHER: 	    PREVIOUS DIAGNOSTIC TESTING:    [ ] Echocardiogram:  [ ]  Catheterization:  [ ] Stress Test:  	  	  ASSESSMENT/PLAN: 	    83M pmhx of AF on pradaxa initially presented on 10/15/17 with chest pain/diaphoresis/ dyspnea to OSH, IW-STEMI emergently transferred here for [facility] c[facility] now acute right heart failure and cardiogenic shock s/p IABP (explanted 10/19), RP impella for RV support (explanted 10/20), remains on  5 with borderline low CI.      1. cardiogenic shock  - continue  5, would start captopril 6.25 tid in efforts to wean .  - cont cvvh, goal is net negative 500 cc as has extravascular fluid and needs to be mobilized    2.CAD  -cont dapt, statin    3. afib  cont heparin drip          Lukasz Faigen MD  p [phone]   evening [phone]

## 2017-10-25 NOTE — PROGRESS NOTE ADULT - SUBJECTIVE AND OBJECTIVE BOX
HPI: 83/M with Afib on Pradaxa who came to [facility] c/o SOB found to have IWSTEMI, transferred to [facility] for cath, h/c by VF arrest requiring IABP, RCA stent & SWAN, h/c c/b cardiogenic shock 2/2 right heart failure s/p impella (now removed), w/ h/c c/b renal failure requiring CVVH, extubated, now hospital course c/b tachy isabelle syndrome and ?seizures     S: Patient seen and examined at bedside.  Patient reporting new onset diplopia as well as being tired.  Wife at bedside reports that patient has had episodes of shaking in his extremities.    O:  ICU Vital Signs Last 24 Hrs  T(C): 36.5 (25 Oct 2017 07:00), Max: 37.1 (25 Oct 2017 00:00)  T(F): 97.7 (25 Oct 2017 07:00), Max: 98.7 (25 Oct 2017 00:00)  HR: 90 (25 Oct 2017 09:00) (74 - 102)  BP: 117/53 (25 Oct 2017 09:00) (82/56 - 136/72)  BP(mean): 67 (25 Oct 2017 09:00) (58 - 101)  ABP: 108/48 (24 Oct 2017 18:00) (96/42 - 122/52)  ABP(mean): 64 (24 Oct 2017 18:00) (56 - 76)  RR: 22 (25 Oct 2017 09:00) (13 - 27)  SpO2: 100% (25 Oct 2017 09:00) (93% - 100%)    MEDICATIONS  (STANDING):  amiodarone    Tablet 200 milliGRAM(s) Oral three times a day  aspirin  chewable 81 milliGRAM(s) Oral daily  atorvastatin 40 milliGRAM(s) Oral at bedtime  chlorhexidine 0.12% Liquid 15 milliLiter(s) Swish and Spit two times a day  DOBUTamine Infusion 5 MICROgram(s)/kG/Min (13.965 mL/Hr) IV Continuous <Continuous>  heparin  Infusion. 1500 Unit(s)/Hr (15 mL/Hr) IV Continuous <Continuous>  influenza   Vaccine 0.5 milliLiter(s) IntraMuscular once  lactobacillus acidophilus and bulgaricus Chewable 1 Tablet(s) Chew daily  pantoprazole  Injectable 40 milliGRAM(s) IV Push daily  piperacillin/tazobactam IVPB. 3.375 Gram(s) IV Intermittent every 12 hours  ticagrelor 90 milliGRAM(s) Oral two times a day  vancomycin  IVPB 1000 milliGRAM(s) IV Intermittent every 24 hours          Neurological Exam  Mental Status:  alert and oriented x3, fluent speech, following commands, repetition and naming intact    Cranial Nerves: anisocoria R>L, R pupil sluggish response, EOMI without nystagmus, binocular diplopia, visual fields intact no facial droop, no dysarthria    Motor:   Tone:   normal               Strength:  Upper extremity                          Delt       Bicep    Tricep                                                  R         3/5        3/5        3/5       3/5                                               L          4-/5        4-/5        4-/5      4-/5    Lower extremity                           HF          KE          KF        DF         PF                                               R        1/5 1/5 1/5 5/5 5/5                                               L         1/5 1/5 1/5 5/5 5/5              Dysmetria: none with finger-to-nose testing  Tremor:  occasional tremor on exertion    Sensation: intact grossly to light touch    Deep Tendon Reflexes:  2+ throughout  Toes flexor bilaterally         10-25    137  |  100  |  50<H>  ----------------------------<  104<H>  4.6   |  25  |  3.04<H>    Ca    8.2<L>      25 Oct 2017 05:59  Phos  3.5     10-25  Mg     2.4     10-25    TPro  5.9<L>  /  Alb  2.6<L>  /  TBili  1.4<H>  /  DBili  x   /  AST  50<H>  /  ALT  80<H>  /  AlkPhos  88  10-25    LIVER FUNCTIONS - ( 25 Oct 2017 05:59 )  Alb: 2.6 g/dL / Pro: 5.9 g/dL / ALK PHOS: 88 U/L / ALT: 80 U/L RC / AST: 50 U/L / GGT: x             Radiology    CTH: No acute intracranial hemorrhage, mass effect, or midline   shift. Unchanged microvascular disease.

## 2017-10-25 NOTE — PROGRESS NOTE ADULT - SUBJECTIVE AND OBJECTIVE BOX
[facility] DIVISION OF KIDNEY DISEASES AND HYPERTENSION -- FOLLOW UP NOTE  --------------------------------------------------------------------------------  Chief Complaint: DANITA on CKD requiring renal replacement therapy     24 hour events/subjective:  Patient remains on CVVHDF today. Patient remain anuric today       PAST HISTORY  --------------------------------------------------------------------------------  No significant changes to PMH, PSH, FHx, SHx, unless otherwise noted    ALLERGIES & MEDICATIONS  --------------------------------------------------------------------------------  Allergies    No Known Allergies    Intolerances      Standing Inpatient Medications  aspirin  chewable 81 milliGRAM(s) Oral daily  atorvastatin 80 milliGRAM(s) Oral at bedtime  chlorhexidine 0.12% Liquid 15 milliLiter(s) Swish and Spit two times a day  DOBUTamine Infusion 5 MICROgram(s)/kG/Min IV Continuous <Continuous>  heparin  Infusion. 1500 Unit(s)/Hr IV Continuous <Continuous>  influenza   Vaccine 0.5 milliLiter(s) IntraMuscular once  lactobacillus acidophilus and bulgaricus Chewable 1 Tablet(s) Chew daily  pantoprazole  Injectable 40 milliGRAM(s) IV Push daily  piperacillin/tazobactam IVPB. 3.375 Gram(s) IV Intermittent every 12 hours  ticagrelor 90 milliGRAM(s) Oral two times a day  vancomycin  IVPB 750 milliGRAM(s) IV Intermittent every 12 hours    PRN Inpatient Medications  ALBUTerol/ipratropium for Nebulization 3 milliLiter(s) Nebulizer every 6 hours PRN  heparin  Injectable 7500 Unit(s) IV Push every 6 hours PRN  heparin  Injectable 3500 Unit(s) IV Push every 6 hours PRN      REVIEW OF SYSTEMS  --------------------------------------------------------------------------------  Gen: No weakness  Skin: No rashes  Head/Eyes/Ears/Mouth: No headache  Respiratory: No dyspnea  CV: No chest pain  GI: No abdominal pain  : No increased frequency  MSK: No edema  Neuro: No dizziness/lightheadedness    All other systems were reviewed and are negative, except as noted.    VITALS/PHYSICAL EXAM  --------------------------------------------------------------------------------  T(C): 36.5 (10-25-17 @ 07:00), Max: 37.1 (10-25-17 @ 00:00)  HR: 80 (10-25-17 @ 12:00) (74 - 102)  BP: 121/83 (10-25-17 @ 12:00) (82/56 - 136/72)  RR: 21 (10-25-17 @ 12:00) (13 - 27)  SpO2: 100% (10-25-17 @ 12:00) (93% - 100%)  Wt(kg): --        10-24-17 @ 07:01  -  10-25-17 @ 07:00  --------------------------------------------------------  IN: 1903.8 mL / OUT: 837 mL / NET: 1066.8 mL    10-25-17 @ 07:01  -  10-25-17 @ 12:35  --------------------------------------------------------  IN: 375 mL / OUT: 455 mL / NET: -80 mL      Physical Exam:  	Gen: Awake  	Pulm: Course BS B/L  	CV: RRR, S1S2  	Abd: +BS, soft, nontender/nondistended   	LE: Warm, + edema  	Skin: Warm, without rashes              Vascular Access: + RIJ non-tunnelled HD catheter     LABS/STUDIES  --------------------------------------------------------------------------------              8.8    18.9  >-----------<  230      [10-25-17 @ 05:59]              26.4     137  |  100  |  50  ----------------------------<  104      [10-25-17 @ 05:59]  4.6   |  25  |  3.04        Ca     8.2     [10-25-17 @ 05:59]      Mg     2.4     [10-25-17 @ 05:59]      Phos  3.5     [10-25-17 @ 05:59]    TPro  5.9  /  Alb  2.6  /  TBili  1.4  /  DBili  x   /  AST  50  /  ALT  80  /  AlkPhos  88  [10-25-17 @ 05:59]    PT/INR: PT 12.9 , INR 1.18       [10-25-17 @ 05:59]  PTT: 74.1       [10-25-17 @ 09:46]      Creatinine Trend:  SCr 3.04 [10-25 @ 05:59]  SCr 3.50 [10-24 @ 22:39]  SCr 2.56 [10-24 @ 12:58]  SCr 1.96 [10-24 @ 03:50]  SCr 1.85 [10-23 @ 13:46]    Urinalysis - [10-15-17 @ 16:48]      Color Yellow / Appearance Turbid / SG >1.030 / pH 6.5      Gluc 50 / Ketone Negative  / Bili Negative / Urobili Negative       Blood Moderate / Protein >600 / Leuk Est Negative / Nitrite Negative      RBC >50 / WBC 3-5 / Hyaline  / Gran  / Sq Epi  / Non Sq Epi OCC / Bacteria Few      HbA1c 5.4      [10-15-17 @ 21:32]  TSH 2.40      [10-15-17 @ 21:34]  Lipid: chol 129, TG 45, HDL 41, LDL 79      [10-15-17 @ 21:40]    HBsAg Nonreact      [10-19-17 @ 07:25]  HCV 0.15, Nonreact      [10-19-17 @ 07:25]  HIV Nonreact      [10-19-17 @ 07:25]    EYAD: titer 1:640, pattern Homogeneous      [10-19-17 @ 07:25]  dsDNA <12      [10-19-17 @ 07:25]  C3 Complement 72      [10-19-17 @ 07:25]  C4 Complement 14      [10-19-17 @ 07:25]  Free Light Chains: kappa 15.20, lambda 10.50, ratio = 1.45      [10-19 @ 07:25]  SPEP Interpretation: Grossly Hemolyzed Sample - unsuitable for testing.      [10-19-17 @ 07:25]

## 2017-10-25 NOTE — PROGRESS NOTE ADULT - PROBLEM SELECTOR PLAN 1
DANITA on CKD stage III. DANITA may be multifactorial in the setting of IV contrast (cardiac cath), rhabdomyolysis and ATN from hypotension/ cardiac arrest. Patient has unclear etiology of his original underlying CKD.   Patient remains on CVVHDF. Patient with increased Scr to 3.04 and BUN 50. Patient remains anuric. Plan to continue CVVHDF with UF as per CCU. Once patient stabilizes off pressor support, may consider transition to IHD.    Continue to check daily serum phosphorus (may be low in the use of CVVHDF). Continue to renally dose all medications. Continue to monitor intake/output, BMP and urine output. Avoid NSAIDs, RCA and nephrotoxins.

## 2017-10-25 NOTE — PROGRESS NOTE ADULT - SUBJECTIVE AND OBJECTIVE BOX
24H hour events:  weaned, mixed venous sat worsening. CT head negative. overnight afib with HR to 40s at times.  -/78 cvp 13/15 mixed venous 46 (prev 38)   -cxr clear  -WBC 18.9 (prev 14.9) Cr 3.04 (prev 3.5)lactate 1.3  -I/O +1.1L  -CVVH: 100cc/hr removal    MEDICATIONS:  aspirin  chewable 81 milliGRAM(s) Oral daily  DOBUTamine Infusion 5 MICROgram(s)/kG/Min IV Continuous <Continuous>  heparin  Infusion. 1500 Unit(s)/Hr IV Continuous <Continuous>  heparin  Injectable 7500 Unit(s) IV Push every 6 hours PRN  heparin  Injectable 3500 Unit(s) IV Push every 6 hours PRN  ticagrelor 90 milliGRAM(s) Oral two times a day    piperacillin/tazobactam IVPB. 3.375 Gram(s) IV Intermittent every 12 hours  vancomycin  IVPB 750 milliGRAM(s) IV Intermittent every 12 hours    ALBUTerol/ipratropium for Nebulization 3 milliLiter(s) Nebulizer every 6 hours PRN      pantoprazole  Injectable 40 milliGRAM(s) IV Push daily    atorvastatin 80 milliGRAM(s) Oral at bedtime    chlorhexidine 0.12% Liquid 15 milliLiter(s) Swish and Spit two times a day  influenza   Vaccine 0.5 milliLiter(s) IntraMuscular once      REVIEW OF SYSTEMS:  Complete 10point ROS negative.    PHYSICAL EXAM:  T(C): 36.5 (10-25-17 @ 07:00), Max: 37.1 (10-25-17 @ 00:00)  HR: 80 (10-25-17 @ 12:00) (74 - 102)  BP: 121/83 (10-25-17 @ 12:00) (82/56 - 136/72)  RR: 21 (10-25-17 @ 12:00) (13 - 27)  SpO2: 100% (10-25-17 @ 12:00) (93% - 100%)  Wt(kg): --  I&O's Summary    24 Oct 2017 07:01  -  25 Oct 2017 07:00  --------------------------------------------------------  IN: 1903.8 mL / OUT: 837 mL / NET: 1066.8 mL    25 Oct 2017 07:01  -  25 Oct 2017 12:22  --------------------------------------------------------  IN: 375 mL / OUT: 355 mL / NET: 20 mL        Appearance: Normal	  HEENT:   Normal oral mucosa, PERRL, EOMI	  Lymphatic: No lymphadenopathy  Cardiovascular: Normal S1 S2, No JVD, No murmurs, diffuse anasarca  Respiratory: coarse bs b/l  Psychiatry: A & O x 3, Mood & affect appropriate  Gastrointestinal:  Soft, Non-tender, + BS	  Skin: No rashes, No ecchymoses, No cyanosis	  Neurologic: Non-focal  Extremities: Normal range of motion, No clubbing, cyanosis or edema  Vascular: Peripheral pulses palpable 2+ bilaterally        LABS:	 	    CBC Full  -  ( 25 Oct 2017 05:59 )  WBC Count : 18.9 K/uL  Hemoglobin : 8.8 g/dL  Hematocrit : 26.4 %  Platelet Count - Automated : 230 K/uL  Mean Cell Volume : 102.0 fl  Mean Cell Hemoglobin : 33.9 pg  Mean Cell Hemoglobin Concentration : 33.3 gm/dL  Auto Neutrophil # : 16.7 K/uL  Auto Lymphocyte # : 1.5 K/uL  Auto Monocyte # : 1.6 K/uL  Auto Eosinophil # : 0.1 K/uL  Auto Basophil # : 0.0 K/uL  Auto Neutrophil % : 78.0 %  Auto Lymphocyte % : 10.0 %  Auto Monocyte % : 8.0 %  Auto Eosinophil % : 1.0 %  Auto Basophil % : x    10-25    137  |  100  |  50<H>  ----------------------------<  104<H>  4.6   |  25  |  3.04<H>  10-24    136  |  99  |  55<H>  ----------------------------<  91  5.1   |  24  |  3.50<H>    Ca    8.2<L>      25 Oct 2017 05:59  Ca    7.5<L>      24 Oct 2017 22:39  Phos  3.5     10-25  Phos  3.8     10-24  Mg     2.4     10-25  Mg     2.5     10-24    TPro  5.9<L>  /  Alb  2.6<L>  /  TBili  1.4<H>  /  DBili  x   /  AST  50<H>  /  ALT  80<H>  /  AlkPhos  88  10-25  TPro  5.7<L>  /  Alb  2.7<L>  /  TBili  1.5<H>  /  DBili  x   /  AST  55<H>  /  ALT  83<H>  /  AlkPhos  91  10-24      proBNP:   Lipid Profile:   HgA1c:   TSH:       CARDIAC MARKERS:            TELEMETRY: 	    ECG:  	  RADIOLOGY:  OTHER: 	    PREVIOUS DIAGNOSTIC TESTING:    [ ] Echocardiogram:  [ ]  Catheterization:  [ ] Stress Test:  	  	  ASSESSMENT/PLAN:

## 2017-10-25 NOTE — PROGRESS NOTE ADULT - ASSESSMENT
83/M with Afib on Pradaxa who came to [facility] c/o SOB found to have IWSTEMI, transferred to [facility] for cath, h/c by VF arrest requiring IABP, RCA stent & SWAN, h/c c/b cardiogenic shock 2/2 right heart failure s/p impella (now removed), w/ h/c c/b renal failure requiring CVVH, extubated, now hospital course c/b tachy isabelle syndrome and ?seizures    Plan:  - MRI brain w/o contrast  - MRA head w/o contrast; neck w/ contrast  - routine EEG  - management as per CCU team

## 2017-10-25 NOTE — CONSULT NOTE ADULT - ASSESSMENT
83M with AFib on pradaxa, mod MR, mild AR, RV failure a/w IWSTEMI s/p RCA stent c/b VFib arrest requiring IABP, PAC with course complicated by cardiogenic shock 2/2 RV failure s/p RV impella now with renal failure on CVVH with new afib with slow VR.    -- plan for TVP via left IJ in cath lab  -- hold AVN blockers  -- will likely require PPM    Maximilian Emery MD

## 2017-10-25 NOTE — PROVIDER CONTACT NOTE (OTHER) - ASSESSMENT
Pt's HR in AF-70-80's, Pt becomes A&OX4, responds to commands and verbalizes. able to move all extremities. 02 sat remains 100% on RA.

## 2017-10-26 NOTE — BEHAVIORAL HEALTH ASSESSMENT NOTE - DIFFERENTIAL
multifactorial delirium.  Collateral needed for greater differential of psychopathology, most significantly for h/o dementia.

## 2017-10-26 NOTE — CONSULT NOTE ADULT - PROBLEM SELECTOR PROBLEM 1
Thyroid condition
Blood disorder
DANITA (acute kidney injury)
Sensation of foreign body in throat
Right heart failure
DANITA (acute kidney injury)

## 2017-10-26 NOTE — CONSULT NOTE ADULT - ASSESSMENT
A/P:  Rt Groin wound - Medihoney dressing  Lt wrist ruptured blister- ADAPTIC    con't Nutrition (as tolerated)  con't Offloading   con't Pericare  Care as per CCU will follow w/ you  Upon discharge f/u as outpatient at [facility] [address] [facility] [phone]  Seen w/ attng and D/w team  Thank you for this consult  Shawanda Kearns PA-C [id number]

## 2017-10-26 NOTE — BEHAVIORAL HEALTH ASSESSMENT NOTE - SUMMARY
82 y/o male with PMH HTN, Afib, diffuse nontoxic goiter, CKD stage III, admitted 10/15/17 for inferior wall STEMI complicated by cardiogenic shock, coded in cath lab, ROS achieved, received intravenous pacing wire, balloon pump, RCA stent, intra iliac balloon, and R heart impella, pt with right heart failure, DANITA, and delirium currently managed in CCU.  Pt with questionable seizure activity, plans for MRI and MRA once medically stable enough.  Psychiatry consulted to assist with medication management of delirium/agitation. Pt unable to participate in interview as he presented as actively delirious and collateral unable to be reached but no records of psychiatric history present in [facility].  Pt will likely benefit from standing antipsychotic such as risperidone 0.5mg PO BID to assist with delirium/agitation and improve sleep, however would obtain history including whether there is history of dementia and have conversation with family about risks/benefits of antipsychotics prior to standing medication being ordered.  In meantime would use haldol 0.5mg PO/IM Q 4 hours PRN agitation and 0.5mg IV Q 4 hours only for severe agitation and would avoid using benzodiazepines, or anticholinergic medications which can worsen confusion.  C/L psychiatry to follow to assist with management and adjustment of haldol.  Would hold antipsychotics for QTc>470.

## 2017-10-26 NOTE — PROVIDER CONTACT NOTE (OTHER) - ASSESSMENT
Pt alert, restless and confused. pt agitated with scattered thoughts, disorganized speech and unable to concentrate on tasks or follow commands.

## 2017-10-26 NOTE — PROVIDER CONTACT NOTE (OTHER) - ASSESSMENT
Pt agitated with fluctuating mental status; Pt forgetful to place. Pt restless and anxious at times.

## 2017-10-26 NOTE — EEG REPORT - NS EEG TEXT BOX
History:  p/w episodes of unresponsiveness    h/o afib, vertigo      Medication	  No Data.	    Study Interpretation: 10.25.2017 ([id number])    FINDINGS:  The background was continuous, spontaneously variable and reactive.  During wakefulness, the posteriorly dominant rhythm consisted of 8-8.5 Hz activity, with an amplitude to 30 uV, that attenuated to eye opening.  Low amplitude central beta was noted in wakefulness.    Non –Epileptiform Abnormalities:  Intermittent generalized slowing     Sleep Background:  Drowsiness was characterized by fragmentation, attenuation, and slowing of the background activity.  .  Stage II sleep transients were not recorded.    Epileptiform Activity:   No epileptiform discharges were present.    Events:  Events of concern captured (full body twitches) . No electrographic abnormalities with these events and normal wakeful background was preserved. There was myogenic artifact present with the events.     Activation Procedures:   -Hyperventilation was not performed.    -Photic stimulation was not performed.    Artifacts:  Intermittent myogenic and movement artifacts were noted.    ECG:  The heart rate on single channel ECG was irregularly irregular at 80-90 BPM    EEG Classification / Summary:  Abnormal Routine EEG Study   -Intermittent generalized slowing     Clinical Impression:  The EEG provides evidence of a mild diffuse encephalopathy. Events of concern (body twitching) captured and had no electrographic correlate. There were no epileptiform abnormalities recorded.      Bettye Carr MD    ________________________________________  Fellow in Neurophysiology/Epilepsy, [facility]      Yifan Bhardwaj M.D.			    Attending Physician, [facility] [place] History:  p/w episodes of unresponsiveness    h/o afib, vertigo      Medication	  No Data.	    Study Interpretation: 10.25.2017 ([id number])    FINDINGS:  The background was continuous, spontaneously variable and reactive.  During wakefulness, the posteriorly dominant rhythm consisted of 8-8.5 Hz activity, with an amplitude to 30 uV, that attenuated to eye opening.  Low amplitude central beta was noted in wakefulness.    Non –Epileptiform Abnormalities:  Intermittent generalized slowing     Sleep Background:  Drowsiness was characterized by fragmentation, attenuation, and slowing of the background activity.  .  Stage II sleep transients were not recorded.    Epileptiform Activity:   No epileptiform discharges were present.    Events:  Events of concern captured (full body twitches) . No electrographic abnormalities with these events and normal wakeful background was preserved. There was myogenic artifact present with the events.     Activation Procedures:   -Hyperventilation was not performed.    -Photic stimulation was not performed.    Artifacts:  Intermittent myogenic and movement artifacts were noted.    ECG:  The heart rate on single channel ECG was irregularly irregular at 80-90 BPM    EEG Classification / Summary:  Abnormal Routine EEG Study   -Intermittent generalized slowing     Clinical Impression:  The EEG provides evidence of a mild diffuse encephalopathy. Events of concern (body twitching) captured and had no electrographic correlate. There were no epileptiform abnormalities recorded.      Bettye Carr MD    ________________________________________  Fellow in Neurophysiology/Epilepsy, [facility]      Yifan Bhardwaj M.D.	 		    Attending Physician, [facility] [place]

## 2017-10-26 NOTE — PROGRESS NOTE ADULT - ATTENDING COMMENTS
83M pmhx of AF on pradaxa initially presented on 10/15/17 with chest pain/diaphoresis/ dyspnea to OSH, IW-STEMI emergently transferred here for C c/b VF arrest requiring IABP followed by pRCA stent c/b now acute right heart failure and cardiogenic shock s/p IABP (explanted 10/19), RP impella for RV support (explanted 10/20), unable to be weaned from  as venous sat drops so remains on  5 with borderline low CI. RVSWI low at 4.4. TTE reviewed and appears to have hyperdynamic LV with moderate RV dysfunction. Had slow afib 10/25 with global hypoperfusion so temporary wire placed. Also noted to have positive blood cx Nocardia   - continue  for now  - would pull fluid to keep net negative 500 cc  - TSH low; given goiter and possible amio-induced inflammation, recommend endo c/s; no clinical signs of thyrotoxicosis  - overall prognosis guarded; d/w wife with Dr. Cooley

## 2017-10-26 NOTE — CONSULT NOTE ADULT - ASSESSMENT
83 male w/ a.fib in [facility] who came to [facility] found to have NSTEMI, transferred to [facility] for cath, h/c c/b v.fib arrest requiring IABP, RCA stent, swan, h/c c/b cardiogenic shock 2/2 to right heart failure c/b tachy-isabelle syndrome s/p TVP, found to have BCx+. Hematology consulted for presence of metamyelocytes.

## 2017-10-26 NOTE — PROGRESS NOTE ADULT - ASSESSMENT
83/M with Afib on Pradaxa who came to [facility] c/o SOB found to have IWSTEMI, transferred to [facility] for cath, h/c by VF arrest requiring IABP, RCA stent & SWAN, h/c c/b cardiogenic shock 2/2 right heart failure s/p impella (now removed), w/ h/c c/b renal failure requiring CVVH, extubated, now hospital course c/b tachy isabelle syndrome; Neuro called to re-evaluate for episodes of shaking when HR decreases to 30s. No EEG correlation with shaking events. 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     - recommend MRI brain w/o, MRA Head/Neck w/o when stable  - fix metabolic derangements as per primary team  - continue supportive care as per primary team

## 2017-10-26 NOTE — CONSULT NOTE ADULT - SUBJECTIVE AND OBJECTIVE BOX
Hematology Consult Note    HPI:  83 male w/ a.fib in [facility] who came to [facility] found to have NSTEMI, transferred to [facility] for cath, h/c c/b v.fib arrest requiring IABP, RCA stent, swan, h/c c/b cardiogenic shock 2/2 to right heart failure c/b tachy-isabelle syndrome s/p TVP, found to have BCx+. Hematology consulted for presence of metamyelocytes.      Allergies    No Known Allergies    Intolerances        MEDICATIONS  (STANDING):  aspirin  chewable 81 milliGRAM(s) Oral daily  atorvastatin 80 milliGRAM(s) Oral at bedtime  chlorhexidine 0.12% Liquid 15 milliLiter(s) Swish and Spit two times a day  dexamethasone  Injectable 4 milliGRAM(s) IV Push two times a day  dextrose 5%. 1000 milliLiter(s) (50 mL/Hr) IV Continuous <Continuous>  dextrose 50% Injectable 12.5 Gram(s) IV Push once  dextrose 50% Injectable 25 Gram(s) IV Push once  dextrose 50% Injectable 25 Gram(s) IV Push once  DOBUTamine Infusion 5 MICROgram(s)/kG/Min (13.965 mL/Hr) IV Continuous <Continuous>  heparin  Infusion. 1500 Unit(s)/Hr (15 mL/Hr) IV Continuous <Continuous>  influenza   Vaccine 0.5 milliLiter(s) IntraMuscular once  insulin lispro (HumaLOG) corrective regimen sliding scale   SubCutaneous every 6 hours  lactobacillus acidophilus and bulgaricus Chewable 1 Tablet(s) Chew daily  pantoprazole  Injectable 40 milliGRAM(s) IV Push daily  piperacillin/tazobactam IVPB. 3.375 Gram(s) IV Intermittent every 8 hours  ticagrelor 90 milliGRAM(s) Oral two times a day  vancomycin  IVPB 750 milliGRAM(s) IV Intermittent every 12 hours    MEDICATIONS  (PRN):  ALBUTerol/ipratropium for Nebulization 3 milliLiter(s) Nebulizer every 6 hours PRN Shortness of Breath and/or Wheezing  dextrose Gel 1 Dose(s) Oral once PRN Blood Glucose LESS THAN 70 milliGRAM(s)/deciliter  glucagon  Injectable 1 milliGRAM(s) IntraMuscular once PRN Glucose LESS THAN 70 milligrams/deciliter  heparin  Injectable 7500 Unit(s) IV Push every 6 hours PRN For aPTT less than 40  heparin  Injectable 3500 Unit(s) IV Push every 6 hours PRN For aPTT between 40 - 57      PAST MEDICAL & SURGICAL HISTORY:  Vertigo  Thyroid condition  Afib  High blood pressure  No significant past surgical history      FAMILY HISTORY:  Family history of heart attack (Father, Sibling, Grandparent, Uncle): many family members have had heart problems at young ages      SOCIAL HISTORY: No EtOH, no tobacco    REVIEW OF SYSTEMS:    CONSTITUTIONAL: No weakness, fevers or chills  EYES/ENT: No visual changes;  No vertigo or throat pain   NECK: No pain or stiffness  RESPIRATORY: No cough, wheezing, hemoptysis; No shortness of breath  CARDIOVASCULAR: No chest pain or palpitations  GASTROINTESTINAL: No abdominal or epigastric pain. No nausea, vomiting, or hematemesis; No diarrhea or constipation. No melena or hematochezia.  GENITOURINARY: No dysuria, frequency or hematuria  NEUROLOGICAL: No numbness or weakness  SKIN: No itching, burning, rashes, or lesions   All other review of systems is negative unless indicated above.        T(F): 97.4 (10-26-17 @ 08:00), Max: 98.1 (10-26-17 @ 00:00)  HR: 114 (10-26-17 @ 12:00)  BP: 100/69 (10-26-17 @ 12:00)  RR: 22 (10-26-17 @ 12:00)  SpO2: 100% (10-26-17 @ 12:00)  Wt(kg): --    GENERAL: NAD, well-developed  HEAD:  Atraumatic, Normocephalic  EYES: EOMI, PERRLA, conjunctiva and sclera clear  NECK: Supple, No JVD  CHEST/LUNG: Clear to auscultation bilaterally; No wheeze  HEART: Regular rate and rhythm; No murmurs, rubs, or gallops  ABDOMEN: Soft, Nontender, Nondistended; Bowel sounds present  EXTREMITIES:  2+ Peripheral Pulses, No clubbing, cyanosis, or edema  NEUROLOGY: non-focal  SKIN: No rashes or lesions                          8.4    21.9  )-----------( 267      ( 26 Oct 2017 04:20 )             24.2       10-26    138  |  100  |  43<H>  ----------------------------<  124<H>  5.0   |  23  |  2.45<H>    Ca    8.6      26 Oct 2017 04:20  Phos  2.9     10-26  Mg     2.6     10-26    TPro  6.2  /  Alb  2.8<L>  /  TBili  1.4<H>  /  DBili  x   /  AST  44<H>  /  ALT  64<H>  /  AlkPhos  86  10-26      Lactate Dehydrogenase, Serum: 734 U/L (10-26 @ 04:59)  Uric Acid, Serum: 2.2 mg/dL (10-26 @ 04:59)  Magnesium, Serum: 2.6 mg/dL (10-26 @ 04:20)  Phosphorus Level, Serum: 2.9 mg/dL (10-26 @ 04:20)  Magnesium, Serum: 2.5 mg/dL (10-25 @ 15:11)  Phosphorus Level, Serum: 3.7 mg/dL (10-25 @ 15:11) Hematology Consult Note    HPI:  83 male w/ a.fib in [facility] who came to [facility] found to have NSTEMI, transferred to [facility] for cath, h/c c/b v.fib arrest requiring IABP, RCA stent, swan, h/c c/b cardiogenic shock 2/2 to right heart failure c/b tachy-isabelle syndrome s/p TVP, found to have BCx+. Hematology consulted for presence of metamyelocytes and blasts.      Allergies    No Known Allergies    Intolerances        MEDICATIONS  (STANDING):  aspirin  chewable 81 milliGRAM(s) Oral daily  atorvastatin 80 milliGRAM(s) Oral at bedtime  chlorhexidine 0.12% Liquid 15 milliLiter(s) Swish and Spit two times a day  dexamethasone  Injectable 4 milliGRAM(s) IV Push two times a day  dextrose 5%. 1000 milliLiter(s) (50 mL/Hr) IV Continuous <Continuous>  dextrose 50% Injectable 12.5 Gram(s) IV Push once  dextrose 50% Injectable 25 Gram(s) IV Push once  dextrose 50% Injectable 25 Gram(s) IV Push once  DOBUTamine Infusion 5 MICROgram(s)/kG/Min (13.965 mL/Hr) IV Continuous <Continuous>  heparin  Infusion. 1500 Unit(s)/Hr (15 mL/Hr) IV Continuous <Continuous>  influenza   Vaccine 0.5 milliLiter(s) IntraMuscular once  insulin lispro (HumaLOG) corrective regimen sliding scale   SubCutaneous every 6 hours  lactobacillus acidophilus and bulgaricus Chewable 1 Tablet(s) Chew daily  pantoprazole  Injectable 40 milliGRAM(s) IV Push daily  piperacillin/tazobactam IVPB. 3.375 Gram(s) IV Intermittent every 8 hours  ticagrelor 90 milliGRAM(s) Oral two times a day  vancomycin  IVPB 750 milliGRAM(s) IV Intermittent every 12 hours    MEDICATIONS  (PRN):  ALBUTerol/ipratropium for Nebulization 3 milliLiter(s) Nebulizer every 6 hours PRN Shortness of Breath and/or Wheezing  dextrose Gel 1 Dose(s) Oral once PRN Blood Glucose LESS THAN 70 milliGRAM(s)/deciliter  glucagon  Injectable 1 milliGRAM(s) IntraMuscular once PRN Glucose LESS THAN 70 milligrams/deciliter  heparin  Injectable 7500 Unit(s) IV Push every 6 hours PRN For aPTT less than 40  heparin  Injectable 3500 Unit(s) IV Push every 6 hours PRN For aPTT between 40 - 57      PAST MEDICAL & SURGICAL HISTORY:  Vertigo  Thyroid condition  Afib  High blood pressure  No significant past surgical history      FAMILY HISTORY:  Family history of heart attack (Father, Sibling, Grandparent, Uncle): many family members have had heart problems at young ages      SOCIAL HISTORY: No EtOH, no tobacco    REVIEW OF SYSTEMS:    unable to Answer questions due to agitation        T(F): 97.4 (10-26-17 @ 08:00), Max: 98.1 (10-26-17 @ 00:00)  HR: 114 (10-26-17 @ 12:00)  BP: 100/69 (10-26-17 @ 12:00)  RR: 22 (10-26-17 @ 12:00)  SpO2: 100% (10-26-17 @ 12:00)  Wt(kg): --    GENERAL: very agitated  HEENT: R pupil sluggish anicteric sclera  Neck: enlarged goiter   Resp: diffuse coarse breath sounds   Cardiovasc: S1S2 normal; RRR; no murmurs, rubs or gallops  GI: soft, nondistended, nontender; +BS  Extr: lower extremity edema   Skin: R shilley and L central line                               8.4    21.9  )-----------( 267      ( 26 Oct 2017 04:20 )             24.2       10-26    138  |  100  |  43<H>  ----------------------------<  124<H>  5.0   |  23  |  2.45<H>    Ca    8.6      26 Oct 2017 04:20  Phos  2.9     10-26  Mg     2.6     10-26    TPro  6.2  /  Alb  2.8<L>  /  TBili  1.4<H>  /  DBili  x   /  AST  44<H>  /  ALT  64<H>  /  AlkPhos  86  10-26      Lactate Dehydrogenase, Serum: 734 U/L (10-26 @ 04:59)  Uric Acid, Serum: 2.2 mg/dL (10-26 @ 04:59)  Magnesium, Serum: 2.6 mg/dL (10-26 @ 04:20)  Phosphorus Level, Serum: 2.9 mg/dL (10-26 @ 04:20)  Magnesium, Serum: 2.5 mg/dL (10-25 @ 15:11)  Phosphorus Level, Serum: 3.7 mg/dL (10-25 @ 15:11) Hematology Consult Note    HPI:  83 male w/ a.fib in [facility] who came to [facility] found to have NSTEMI, transferred to [facility] for cath, h/c c/b v.fib arrest requiring IABP, RCA stent, swan, h/c c/b cardiogenic shock 2/2 to right heart failure, charanjit requiring CVVH, c/b tachy-isabelle syndrome s/p TVP, found to have BCx+. Hematology consulted for presence of metamyelocytes and blasts.      Allergies    No Known Allergies    Intolerances        MEDICATIONS  (STANDING):  aspirin  chewable 81 milliGRAM(s) Oral daily  atorvastatin 80 milliGRAM(s) Oral at bedtime  chlorhexidine 0.12% Liquid 15 milliLiter(s) Swish and Spit two times a day  dexamethasone  Injectable 4 milliGRAM(s) IV Push two times a day  dextrose 5%. 1000 milliLiter(s) (50 mL/Hr) IV Continuous <Continuous>  dextrose 50% Injectable 12.5 Gram(s) IV Push once  dextrose 50% Injectable 25 Gram(s) IV Push once  dextrose 50% Injectable 25 Gram(s) IV Push once  DOBUTamine Infusion 5 MICROgram(s)/kG/Min (13.965 mL/Hr) IV Continuous <Continuous>  heparin  Infusion. 1500 Unit(s)/Hr (15 mL/Hr) IV Continuous <Continuous>  influenza   Vaccine 0.5 milliLiter(s) IntraMuscular once  insulin lispro (HumaLOG) corrective regimen sliding scale   SubCutaneous every 6 hours  lactobacillus acidophilus and bulgaricus Chewable 1 Tablet(s) Chew daily  pantoprazole  Injectable 40 milliGRAM(s) IV Push daily  piperacillin/tazobactam IVPB. 3.375 Gram(s) IV Intermittent every 8 hours  ticagrelor 90 milliGRAM(s) Oral two times a day  vancomycin  IVPB 750 milliGRAM(s) IV Intermittent every 12 hours    MEDICATIONS  (PRN):  ALBUTerol/ipratropium for Nebulization 3 milliLiter(s) Nebulizer every 6 hours PRN Shortness of Breath and/or Wheezing  dextrose Gel 1 Dose(s) Oral once PRN Blood Glucose LESS THAN 70 milliGRAM(s)/deciliter  glucagon  Injectable 1 milliGRAM(s) IntraMuscular once PRN Glucose LESS THAN 70 milligrams/deciliter  heparin  Injectable 7500 Unit(s) IV Push every 6 hours PRN For aPTT less than 40  heparin  Injectable 3500 Unit(s) IV Push every 6 hours PRN For aPTT between 40 - 57      PAST MEDICAL & SURGICAL HISTORY:  Vertigo  Thyroid condition  Afib  High blood pressure  No significant past surgical history      FAMILY HISTORY:  Family history of heart attack (Father, Sibling, Grandparent, Uncle): many family members have had heart problems at young ages      SOCIAL HISTORY: No EtOH, no tobacco    REVIEW OF SYSTEMS:    unable to Answer questions due to agitation        T(F): 97.4 (10-26-17 @ 08:00), Max: 98.1 (10-26-17 @ 00:00)  HR: 114 (10-26-17 @ 12:00)  BP: 100/69 (10-26-17 @ 12:00)  RR: 22 (10-26-17 @ 12:00)  SpO2: 100% (10-26-17 @ 12:00)  Wt(kg): --    GENERAL: very agitated  HEENT: R pupil sluggish anicteric sclera  Neck: enlarged goiter   Resp: diffuse coarse breath sounds   Cardiovasc: S1S2 normal; RRR; no murmurs, rubs or gallops  GI: soft, nondistended, nontender; +BS  Extr: lower extremity edema   Skin: R shilley and L central line                               8.4    21.9  )-----------( 267      ( 26 Oct 2017 04:20 )             24.2       10-26    138  |  100  |  43<H>  ----------------------------<  124<H>  5.0   |  23  |  2.45<H>    Ca    8.6      26 Oct 2017 04:20  Phos  2.9     10-26  Mg     2.6     10-26    TPro  6.2  /  Alb  2.8<L>  /  TBili  1.4<H>  /  DBili  x   /  AST  44<H>  /  ALT  64<H>  /  AlkPhos  86  10-26      Lactate Dehydrogenase, Serum: 734 U/L (10-26 @ 04:59)  Uric Acid, Serum: 2.2 mg/dL (10-26 @ 04:59)  Magnesium, Serum: 2.6 mg/dL (10-26 @ 04:20)  Phosphorus Level, Serum: 2.9 mg/dL (10-26 @ 04:20)  Magnesium, Serum: 2.5 mg/dL (10-25 @ 15:11)  Phosphorus Level, Serum: 3.7 mg/dL (10-25 @ 15:11)

## 2017-10-26 NOTE — PROGRESS NOTE ADULT - ASSESSMENT
83 year old male with a PMH of atrial fibrillation (on Pradaxa), HTN, Thyroid Goiter, CKD stage III (dx 2016) was seen at [facility] with inferior wall STEMI and was brought to [facility] for cardiac catherization s/p PCI x 1 now with DANITA and Anuria.

## 2017-10-26 NOTE — PROGRESS NOTE ADULT - SUBJECTIVE AND OBJECTIVE BOX
24H hour events: TVP placed under fluoro for afib with pauses causing hypotension., blood cx growing nocardia  -plan for swan placement today  -HR 90-100s BP /60s  -WBC 21.9 (prev 18.6) hgb 8.4 Cr 2.45  -CVVH 75cc/hr removal  -I/O -500  -now on  5    MEDICATIONS:  aspirin  chewable 81 milliGRAM(s) Oral daily  DOBUTamine Infusion 5 MICROgram(s)/kG/Min IV Continuous <Continuous>  heparin  Infusion. 1500 Unit(s)/Hr IV Continuous <Continuous>  heparin  Injectable 7500 Unit(s) IV Push every 6 hours PRN  heparin  Injectable 3500 Unit(s) IV Push every 6 hours PRN  ticagrelor 90 milliGRAM(s) Oral two times a day    imipenem/cilastatin  IVPB      vancomycin  IVPB 750 milliGRAM(s) IV Intermittent every 12 hours    ALBUTerol/ipratropium for Nebulization 3 milliLiter(s) Nebulizer every 6 hours PRN      pantoprazole  Injectable 40 milliGRAM(s) IV Push daily    atorvastatin 80 milliGRAM(s) Oral at bedtime  dexamethasone  Injectable 4 milliGRAM(s) IV Push two times a day  dextrose 50% Injectable 12.5 Gram(s) IV Push once  dextrose 50% Injectable 25 Gram(s) IV Push once  dextrose 50% Injectable 25 Gram(s) IV Push once  dextrose Gel 1 Dose(s) Oral once PRN  glucagon  Injectable 1 milliGRAM(s) IntraMuscular once PRN  insulin lispro (HumaLOG) corrective regimen sliding scale   SubCutaneous every 6 hours    chlorhexidine 0.12% Liquid 15 milliLiter(s) Swish and Spit two times a day  dextrose 5%. 1000 milliLiter(s) IV Continuous <Continuous>  influenza   Vaccine 0.5 milliLiter(s) IntraMuscular once      REVIEW OF SYSTEMS:  Complete 10point ROS negative.    PHYSICAL EXAM:  T(C): 36.9 (10-26-17 @ 18:00), Max: 36.9 (10-26-17 @ 18:00)  HR: 108 (10-26-17 @ 18:00) (94 - 118)  BP: 126/68 (10-26-17 @ 18:00) (93/72 - 126/68)  RR: 20 (10-26-17 @ 18:00) (20 - 35)  SpO2: 100% (10-26-17 @ 18:00) (85% - 100%)  Wt(kg): --  I&O's Summary    25 Oct 2017 07:  -  26 Oct 2017 07:00  --------------------------------------------------------  IN: 2160 mL / OUT: 2678 mL / NET: -518 mL    26 Oct 2017 07:  -  26 Oct 2017 18:22  --------------------------------------------------------  IN: 1329 mL / OUT: 1984 mL / NET: -655 mL        Appearance: Normal	  HEENT:   Normal oral mucosa, PERRL, EOMI	  Lymphatic: No lymphadenopathy  Cardiovascular: Normal S1 S2, No JVD, No murmurs, diffuse anasarca  Respiratory: b/l rhonchi  Psychiatry: A & O x 3, Mood & affect appropriate  Gastrointestinal:  Soft, Non-tender, + BS	  Skin: No rashes, No ecchymoses, No cyanosis	  Neurologic: Non-focal, awake, following commands  Extremities: Normal range of motion, No clubbing, cyanosis or edema  Vascular: Peripheral pulses palpable 2+ bilaterally        LABS:	 	    CBC Full  -  ( 26 Oct 2017 04:20 )  WBC Count : 21.9 K/uL  Hemoglobin : 8.4 g/dL  Hematocrit : 24.2 %  Platelet Count - Automated : 267 K/uL  Mean Cell Volume : 101.0 fl  Mean Cell Hemoglobin : 34.9 pg  Mean Cell Hemoglobin Concentration : 34.5 gm/dL  Auto Neutrophil # : 19.5 K/uL  Auto Lymphocyte # : 0.8 K/uL  Auto Monocyte # : 1.6 K/uL  Auto Eosinophil # : 0.0 K/uL  Auto Basophil # : 0.0 K/uL  Auto Neutrophil % : 79.0 %  Auto Lymphocyte % : 5.0 %  Auto Monocyte % : 11.0 %  Auto Eosinophil % : 1.0 %  Auto Basophil % : x    10-    138  |  101  |  41<H>  ----------------------------<  94  5.7<H>   |  24  |  2.33<H>  10    138  |  100  |  43<H>  ----------------------------<  124<H>  5.0   |  23  |  2.45<H>    Ca    8.5      26 Oct 2017 17:42  Ca    8.6      26 Oct 2017 04:20  Phos  2.9     10-  Phos  3.7     10-25  Mg     2.6     10-26  Mg     2.5     10-25    TPro  6.0  /  Alb  2.8<L>  /  TBili  1.2  /  DBili  x   /  AST  63<H>  /  ALT  61<H>  /  AlkPhos  79  10  TPro  6.2  /  Alb  2.8<L>  /  TBili  1.4<H>  /  DBili  x   /  AST  44<H>  /  ALT  64<H>  /  AlkPhos  86  10-26      proBNP:   Lipid Profile:   HgA1c:   TSH:       CARDIAC MARKERS:            TELEMETRY: 	    ECG:  	  RADIOLOGY:  OTHER: 	    PREVIOUS DIAGNOSTIC TESTING:    [ ] Echocardiogram:  [ ]  Catheterization:  [ ] Stress Test:  	  	  ASSESSMENT/PLAN: 24H hour events: TVP placed under fluoro for afib with pauses causing hypotension., blood cx growing nocardia  -HR 90-100s BP /60s  -WBC 21.9 (prev 18.6) hgb 8.4 Cr 2.45  -CVVH 75cc/hr removal  -I/O -500  -now on  5    MEDICATIONS:  aspirin  chewable 81 milliGRAM(s) Oral daily  DOBUTamine Infusion 5 MICROgram(s)/kG/Min IV Continuous <Continuous>  heparin  Infusion. 1500 Unit(s)/Hr IV Continuous <Continuous>  heparin  Injectable 7500 Unit(s) IV Push every 6 hours PRN  heparin  Injectable 3500 Unit(s) IV Push every 6 hours PRN  ticagrelor 90 milliGRAM(s) Oral two times a day    imipenem/cilastatin  IVPB      vancomycin  IVPB 750 milliGRAM(s) IV Intermittent every 12 hours    ALBUTerol/ipratropium for Nebulization 3 milliLiter(s) Nebulizer every 6 hours PRN      pantoprazole  Injectable 40 milliGRAM(s) IV Push daily    atorvastatin 80 milliGRAM(s) Oral at bedtime  dexamethasone  Injectable 4 milliGRAM(s) IV Push two times a day  dextrose 50% Injectable 12.5 Gram(s) IV Push once  dextrose 50% Injectable 25 Gram(s) IV Push once  dextrose 50% Injectable 25 Gram(s) IV Push once  dextrose Gel 1 Dose(s) Oral once PRN  glucagon  Injectable 1 milliGRAM(s) IntraMuscular once PRN  insulin lispro (HumaLOG) corrective regimen sliding scale   SubCutaneous every 6 hours    chlorhexidine 0.12% Liquid 15 milliLiter(s) Swish and Spit two times a day  dextrose 5%. 1000 milliLiter(s) IV Continuous <Continuous>  influenza   Vaccine 0.5 milliLiter(s) IntraMuscular once      REVIEW OF SYSTEMS:  Complete 10point ROS negative.    PHYSICAL EXAM:  T(C): 36.9 (10-26-17 @ 18:00), Max: 36.9 (10-26-17 @ 18:00)  HR: 108 (10-26-17 @ 18:00) (94 - 118)  BP: 126/68 (10-26-17 @ 18:00) (93/72 - 126/68)  RR: 20 (10-26-17 @ 18:00) (20 - 35)  SpO2: 100% (10-26-17 @ 18:00) (85% - 100%)  Wt(kg): --  I&O's Summary    25 Oct 2017 07:  -  26 Oct 2017 07:00  --------------------------------------------------------  IN: 2160 mL / OUT: 2678 mL / NET: -518 mL    26 Oct 2017 07:  -  26 Oct 2017 18:22  --------------------------------------------------------  IN: 1329 mL / OUT: 1984 mL / NET: -655 mL        Appearance: Normal	  HEENT:   Normal oral mucosa, PERRL, EOMI	  Lymphatic: No lymphadenopathy  Cardiovascular: Normal S1 S2, No JVD, No murmurs, diffuse anasarca  Respiratory: b/l rhonchi  Psychiatry: A & O x 3, Mood & affect appropriate  Gastrointestinal:  Soft, Non-tender, + BS	  Skin: No rashes, No ecchymoses, No cyanosis	  Neurologic: Non-focal, awake, following commands  Extremities: Normal range of motion, No clubbing, cyanosis or edema  Vascular: Peripheral pulses palpable 2+ bilaterally        LABS:	 	    CBC Full  -  ( 26 Oct 2017 04:20 )  WBC Count : 21.9 K/uL  Hemoglobin : 8.4 g/dL  Hematocrit : 24.2 %  Platelet Count - Automated : 267 K/uL  Mean Cell Volume : 101.0 fl  Mean Cell Hemoglobin : 34.9 pg  Mean Cell Hemoglobin Concentration : 34.5 gm/dL  Auto Neutrophil # : 19.5 K/uL  Auto Lymphocyte # : 0.8 K/uL  Auto Monocyte # : 1.6 K/uL  Auto Eosinophil # : 0.0 K/uL  Auto Basophil # : 0.0 K/uL  Auto Neutrophil % : 79.0 %  Auto Lymphocyte % : 5.0 %  Auto Monocyte % : 11.0 %  Auto Eosinophil % : 1.0 %  Auto Basophil % : x    10-    138  |  101  |  41<H>  ----------------------------<  94  5.7<H>   |  24  |  2.33<H>  10-26    138  |  100  |  43<H>  ----------------------------<  124<H>  5.0   |  23  |  2.45<H>    Ca    8.5      26 Oct 2017 17:42  Ca    8.6      26 Oct 2017 04:20  Phos  2.9     10-26  Phos  3.7     10-  Mg     2.6     10-26  Mg     2.5     10-25    TPro  6.0  /  Alb  2.8<L>  /  TBili  1.2  /  DBili  x   /  AST  63<H>  /  ALT  61<H>  /  AlkPhos  79  10-26  TPro  6.2  /  Alb  2.8<L>  /  TBili  1.4<H>  /  DBili  x   /  AST  44<H>  /  ALT  64<H>  /  AlkPhos  86  10-26      proBNP:   Lipid Profile:   HgA1c:   TSH:       CARDIAC MARKERS:            TELEMETRY: 	    ECG:  	  RADIOLOGY:  OTHER: 	    PREVIOUS DIAGNOSTIC TESTING:    [ ] Echocardiogram:  [ ]  Catheterization:  [ ] Stress Test:  	  	  ASSESSMENT/PLAN:

## 2017-10-26 NOTE — BEHAVIORAL HEALTH ASSESSMENT NOTE - NSBHREFERDETAILS_PSY_A_CORE_FT
82 y/o male with PMH HTN, Afib, diffuse nontoxic goiter, CKD stage III, admitted 10/15/17 for inferior wall STEMI complicated by cardiogenic shock, coded in cath lab, ROS achieved, received intravenous pacing wire, balloon pump, RCA stent, intra iliac balloon, and R heart impella, pt with right heart failure, DANITA, and delirium currently managed in CCU.  Pt with questionable seizure activity, plans for MRI and MRA once medically stable enough.  Psychiatry consulted to assist with medication management of delirium/agitation.

## 2017-10-26 NOTE — PROGRESS NOTE ADULT - ASSESSMENT
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 Previously a code stoke was called and Head CT did not show new acute findings. Repeat Head CT was done given the focal neurological deficits but it was unchanged. EEG did not show signs of seizure. Neurology team following     # CV:   - CAD s/p RCA stent: continue hep gtt, aspirin, brillinta, lipitor  - Atrial fibrillation: Was on amiodarone load, however, due to tachy isabelle, amiodarone was held   - Cardiogenic shock s/p TVP and impella s/ removal: Continue dobutmine 5, will get [place] today   - Tachy-isabelle s/p TVP      #Respiratory: CXR Small left pleural effusion with adjacent atelectasis.  - Slightly tachypneic, but comfortable   - Continue suctioning due to increased secretions     #GI:   - Currently on NGT w/ tube feeds with pivot because patient was having loose bm with vital  - dysphagia eval will be repeated     #Metabolic/Renal  - Rhabdo: continue CVVH, net negative: 1Liter, now removing 75cc/ hr   - Patient is anuric at this time, will continue CCVH until HD stable as per Renal Team  - Replete Mg, Ph, K as needed     #Heme  - H&H stable  - Metamyelocytes and blasts on CBC, will consult heme for peripheral smear     # Vascular  - Patient currently has a R shilley and L central line in place     # Skin  - Breakdown in R groin at the site of impella, wound care consulted     # Infectious  - Blood culture gram variable beaded rods (10/19), gram positive cocci in clusters (10/24)  - Will repeat blood cultures today   - Continue vanc and zosyn D3    # Endocrine   - Goiter TSH 0.05 T4 10.4 T3 85, will consult endocrinology (patient's endocrinologist is Dr. Baldomero Roberson in [place])    #DVT ppx: hep gtt  #Dispo - PT consulted 83M with Afib on Pradaxa who came to 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 Previously a code stoke was called and Head CT did not show new acute findings. Repeat Head CT was done given the focal neurological deficits but it was unchanged. EEG did not show signs of seizure. Neurology team following     # CV:   - CAD s/p RCA stent: continue hep gtt, aspirin, brillinta, lipitor  - Atrial fibrillation: Was on amiodarone load, however, due to tachy isbaelle, amiodarone was held   - Cardiogenic shock s/p TVP and impella s/ removal: Continue dobutmine 5, will get [place] today   - Tachy-isabelle s/p TVP    #Respiratory: CXR Small left pleural effusion with adjacent atelectasis. ENT scope showed tracheomalacia   - decadron 4mg for three days with protonix and ISS  - Slightly tachypneic, but comfortable   - Continue suctioning due to increased secretions     #GI:   - Currently on NGT w/ tube feeds with pivot because patient was having loose bm with vital  - dysphagia eval will be repeated     #Metabolic/Renal  - Rhabdo: continue CVVH, net negative: 1Liter, now removing 75cc/ hr   - Patient is anuric at this time, will continue CCVH until HD stable as per Renal Team  - Replete Mg, Ph, K as needed     #Heme  - H&H stable  - Metamyelocytes and blasts on CBC, will consult heme for peripheral smear     # Vascular  - Patient currently has a R shilley and L central line in place     # Skin  - Breakdown in R groin at the site of impella, wound care consulted     # Infectious  - Blood culture gram variable beaded rods (10/19), gram positive cocci in clusters (10/24)  - Will repeat blood cultures today   - Continue vanc and zosyn D3    # Endocrine   - Goiter TSH 0.05 T4 10.4 T3 85, will consult endocrinology (patient's endocrinologist is Dr. Baldomero Roberson in [place])    #DVT ppx: hep gtt  #Dispo - PT consulted

## 2017-10-26 NOTE — PROGRESS NOTE ADULT - PROBLEM SELECTOR PLAN 2
Patient with noted increasing proteinuria since UA on 6/2016 without an underlying etiology. Order urine spot protein/Cr ratio to quantify protein (once patient makes urine). Please repeat complements once patient stabilizes or in one week. Please order repeat SPEP/UPEP.  Renal sonogram once stabilized.     HBsAg Nonreact; HCV 0.15, Nonreact; HIV Nonreact; EYAD: titer 1:640 (elevated), pattern Homogeneous; dsDNA <12 ;C3 Complement 72; C4 Complement 14; Free Light Chains: kappa 15.20, lambda 10.50, ratio = 1.45

## 2017-10-26 NOTE — CHART NOTE - NSCHARTNOTEFT_GEN_A_CORE
====================  CCU MIDNIGHT ROUNDS  ====================    KAMLESH PATEL  [id number]    ====================  SUMMARY:  ====================    83M pmhx of AF on pradaxa initially presented on 10/15/17 with chest pain/diaphoresis/ dyspnea to OSH, IW-STEMI emergently transferred here for [facility] c/b VF arrest requiring IABP followed by pRCA stent c/b now acute right heart failure and cardiogenic shock s/p IABP (explanted 10/19), RP impella for RV support (explanted 10/20), unable to be weaned from  as venous sat drops so remains on  5 with borderline low CI. RVSWI low at 4.4. TTE reviewed and appears to have hyperdynamic LV with moderate RV dysfunction. Had slow afib today to 30-40 with significant pauses with altered mental status d/t likely global hypoperfusion     ====================  NEW EVENTS:  ====================    s/p LIJ TVP in cath lab. 50 cc/hr being removed w/ CVV. Pt delirious. Denies CP/palpitations/SOB    ====================  VITALS (Last 12 hrs):  ====================    T(C): 36.6 (10-25-17 @ 20:06), Max: 36.6 (10-25-17 @ 20:06)  HR: 98 (10-26-17 @ 00:00) (88 - 110)  BP: 103/60 (10-26-17 @ 00:00) (86/53 - 119/95)  BP(mean): 72 (10-26-17 @ 00:00) (58 - 112)  RR: 24 (10-26-17 @ 00:00) (20 - 35)  SpO2: 98% (10-26-17 @ 00:00) (85% - 100%)  CVP(mm Hg): 19 (10-26-17 @ 00:00) (8 - 22)    TELEMETRY: a fib     I&O's Summary    24 Oct 2017 07:  -  25 Oct 2017 07:00  --------------------------------------------------------  IN: 1903.8 mL / OUT: 837 mL / NET: 1066.8 mL    25 Oct 2017 07:  -  26 Oct 2017 00:06  --------------------------------------------------------  IN: 1481 mL / OUT: 1777 mL / NET: -296 mL    ====================  PLAN:  ====================  c/w w/ fluid removal as tolerated (goal -500),  c/w   s/p TVP for pauses, currently w/ out pauses this shift   for tracheamalecia - per ENT rec - decadron 4 mg BID x 3d then taper   MRA/MRI when stable     Fay Conley [facility]/CCU  #[id number]

## 2017-10-26 NOTE — CONSULT NOTE ADULT - SUBJECTIVE AND OBJECTIVE BOX
ID CONSULTATION--Marcus Cesar MD  Pager [phone]    Patient is a 83y old  Male who presents with a chief complaint of SOB, chest pain, diaphoresis (15 Oct 2017 17:59)    HPI:  83 male presents to ER by ambulance with report of shortness of breath. Wife at the bedside states patient has h/o Afib on Pradaxa, has "heart valve problems", has been having shortness of breath--the patient was found with RHF, ICS stent placed.  Intubated briefly.    ID called b/c BC from 10/19 with AFB (Gram variable rods) and more recently CNS.    PAST MEDICAL & SURGICAL HISTORY:  Vertigo  Thyroid condition  Afib  High blood pressure  No significant past surgical history    SOCIAL: no tobacco    FAMILY HISTORY:  Family history of heart attack (Father, Sibling, Grandparent, Uncle): many family members have had heart problems at young ages    REVIEW OF SYSTEMS  limited from current confused mental state    Allergies  No Known Allergies    ANTIMICROBIALS:  imipenem/cilastatin  IVPB      vancomycin  IVPB 750 milliGRAM(s) IV Intermittent every 12 hours    Vital Signs Last 24 Hrs  T(C): 36.3 (26 Oct 2017 08:00), Max: 36.7 (26 Oct 2017 00:00)  T(F): 97.4 (26 Oct 2017 08:00), Max: 98.1 (26 Oct 2017 00:00)  HR: 104 (26 Oct 2017 15:00) (94 - 118)  BP: 93/72 (26 Oct 2017 15:00) (86/53 - 123/53)  BP(mean): 68 (26 Oct 2017 15:00) (59 - 112)  RR: 22 (26 Oct 2017 15:00) (20 - 35)  SpO2: 100% (26 Oct 2017 15:00) (85% - 100%)    PHYSICAL EXAM: confused, tremulous  Constitutional:Comfortable.Awake and alert  ENMT:No sinus tenderness.No thrush.No pharyngeal exudate or erythema.Fair dental hygiene  Neck:Supple,No LN,no JVD  Respiratory:Good air entry bilaterally,CTA  Cardiovascular:S1 S2 wnl, No murmurs,rub or gallops  Gastrointestinal:Soft BS(+) no tenderness no masses ,No rebound or guarding  Genitourinary:No CVA tendereness   Skin:No R groin a bit macerated at site of Impella placement  Lymph Nodes:No palpable LNs                      8.4    21.9  )-----------( 267      ( 26 Oct 2017 04:20 )             24.2     10-26    138  |  100  |  43<H>  ----------------------------<  124<H>  5.0   |  23  |  2.45<H>    Ca    8.6      26 Oct 2017 04:20  Phos  2.9     10-26  Mg     2.6     10-26    TPro  6.2  /  Alb  2.8<L>  /  TBili  1.4<H>  /  DBili  x   /  AST  44<H>  /  ALT  64<H>  /  AlkPhos  86  10-26      RECENT CULTURES:  10-24 @ 23:58  .Coag neg Staph  10-20 @ 12:55  .Broncial Bronchoalveolar Lavagecup rec'd  --Normal Respiratory Mikaela present  ----  10-19 @ 16:48  .Blood Blood-Peripheral  Growth in aerobic bottle: Acid fast bacilli isolated: identification to  follow  Refered to Mycobacteriology  --  RADIOLOGY:< from: Xray Chest 1 View AP -PORTABLE-Routine (10.26.17 @ 09:06) >    Impression:    The heart is normal in size. The lungs are clear. A transvenous pacer was   placed in the left and the tip is in the right ventricular. NG tube is   coiled in the stomach. A central line seen on the right and tip is   superior vena cava. No pneumothorax.      < end of copied text >    IMPRESSION:  The coag neg staph may be real in the setting of recent instrumentation and lines.  But also could be a procurement contaminant.  The gram variable rods---staining acid fast is intriguing.  The ddx includes--among other things----Nocardia, Actinomyces, rapid growing Mycobacterial pathogens....We'll have to await the ID/sens.  I have asked lab to look into it.    Rx:  for now vanco and imipenem dosed for CVVH  i d/w CCU team

## 2017-10-26 NOTE — PROGRESS NOTE ADULT - SUBJECTIVE AND OBJECTIVE BOX
CONTACT INFO:  Walter Caldera PGY 2  Pager:  [phone]      HPI / INTERVAL HISTORY:    Overnight: no events  Patient reports feeling 100% today. No chest pain, SOB, abdominal pain. As per wife at bedside, patient has not slept all night; he has been making odd comments about his son's neurosurgery, etc.      OBJECTIVE:  VITAL SIGNS:  ICU Vital Signs Last 24 Hrs  T(C): 36.7 (26 Oct 2017 06:15), Max: 36.7 (26 Oct 2017 00:00)  T(F): 98 (26 Oct 2017 06:15), Max: 98.1 (26 Oct 2017 00:00)  HR: 104 (26 Oct 2017 07:00) (76 - 110)  BP: 98/55 (26 Oct 2017 07:00) (86/53 - 123/53)  BP(mean): 69 (26 Oct 2017 07:00) (58 - 112)  ABP: --  ABP(mean): --  RR: 22 (26 Oct 2017 07:00) (20 - 35)  SpO2: 97% (26 Oct 2017 07:00) (85% - 100%)        10-25 @ 07:01  -  10-26 @ 07:00  --------------------------------------------------------  IN: 2160 mL / OUT: 2678 mL / NET: -518 mL    10-26 @ 07:01  -  10-26 @ 08:09  --------------------------------------------------------  IN: 109 mL / OUT: 0 mL / NET: 109 mL      CAPILLARY BLOOD GLUCOSE  115 (25 Oct 2017 23:00)      POCT Blood Glucose.: 127 mg/dL (25 Oct 2017 17:35)      PHYSICAL EXAM:  Gen:   HEENT: R pupil sluggish anicteric sclera  Neck: enlarged goiter   Resp: diffuse coarse breath sounds   Cardiovasc: S1S2 normal; RRR; no murmurs, rubs or gallops  GI: soft, nondistended, nontender; +BS  Extr: lower extremity edema   Skin: R shilley and L central line   Neuro: RUE weaker than L    LABS:                        8.4    21.9  )-----------( 267      ( 26 Oct 2017 04:20 )             24.2     10-26    138  |  100  |  43<H>  ----------------------------<  124<H>  5.0   |  23  |  2.45<H>    Ca    8.6      26 Oct 2017 04:20  Phos  2.9     10-26  Mg     2.6     10-26    TPro  6.2  /  Alb  2.8<L>  /  TBili  1.4<H>  /  DBili  x   /  AST  44<H>  /  ALT  64<H>  /  AlkPhos  86  10-26    LIVER FUNCTIONS - ( 26 Oct 2017 04:20 )  Alb: 2.8 g/dL / Pro: 6.2 g/dL / ALK PHOS: 86 U/L / ALT: 64 U/L RC / AST: 44 U/L / GGT: x           PT/INR - ( 26 Oct 2017 04:20 )   PT: 13.4 sec;   INR: 1.24 ratio         PTT - ( 26 Oct 2017 04:20 )  PTT:85.5 sec            RADIOLOGY & ADDITIONAL TESTS:       MEDICATIONS:  ALBUTerol/ipratropium for Nebulization 3 milliLiter(s) Nebulizer every 6 hours PRN  aspirin  chewable 81 milliGRAM(s) Oral daily  atorvastatin 80 milliGRAM(s) Oral at bedtime  chlorhexidine 0.12% Liquid 15 milliLiter(s) Swish and Spit two times a day  dextrose 5%. 1000 milliLiter(s) IV Continuous <Continuous>  dextrose 50% Injectable 12.5 Gram(s) IV Push once  dextrose 50% Injectable 25 Gram(s) IV Push once  dextrose 50% Injectable 25 Gram(s) IV Push once  dextrose Gel 1 Dose(s) Oral once PRN  DOBUTamine Infusion 5 MICROgram(s)/kG/Min IV Continuous <Continuous>  glucagon  Injectable 1 milliGRAM(s) IntraMuscular once PRN  heparin  Infusion. 1500 Unit(s)/Hr IV Continuous <Continuous>  heparin  Injectable 7500 Unit(s) IV Push every 6 hours PRN  heparin  Injectable 3500 Unit(s) IV Push every 6 hours PRN  influenza   Vaccine 0.5 milliLiter(s) IntraMuscular once  insulin lispro (HumaLOG) corrective regimen sliding scale   SubCutaneous every 6 hours  lactobacillus acidophilus and bulgaricus Chewable 1 Tablet(s) Chew daily  pantoprazole  Injectable 40 milliGRAM(s) IV Push daily  piperacillin/tazobactam IVPB. 3.375 Gram(s) IV Intermittent every 8 hours  ticagrelor 90 milliGRAM(s) Oral two times a day  vancomycin  IVPB 750 milliGRAM(s) IV Intermittent every 12 hours      ALLERGIES:  No Known Allergies

## 2017-10-26 NOTE — PROGRESS NOTE ADULT - ATTENDING COMMENTS
ARF,CHF,AMI, coag- cocci bacteremia  1.  ATN--multifactorial including pigment nephropathy.  Oligoanuric and dialysis dependent.  2.  CHF--greatly improved with optimization of RV infarct.  UF rate calibrated appropriately  3.  Bacteremia--CVVHDF in contrast to standard HD removes vancomycin.  Redose according to levels.

## 2017-10-26 NOTE — CONSULT NOTE ADULT - PROBLEM SELECTOR RECOMMENDATION 9
Patient found to have 1% metamyelocytes, 1% myelocytes on CBC  -most likely secondary to sepsis as patient had +Bcx, leukocytosis and potential sources of infection, such as SWAN, repeat BCx, continue abx as per primary team, other diff. includes myeloproliferative disease  -will f/u peripheral smear  -unlikely TLS as uric acid not elevated, and , most likely secondary to renal failure  -will order flow cytometry, BCR-ABL  -cbc w/diff daily Patient found to have 1% metamyelocytes, 1% myelocytes on CBC  -most likely secondary to sepsis as patient had +Bcx, leukocytosis and potential sources of infection, such as shiley in since 10/19, repeat BCx, continue abx as per primary team, other diff. includes myeloproliferative disease  -will f/u peripheral smear  -unlikely TLS as uric acid not elevated, and , most likely secondary to renal failure  -will order flow cytometry, BCR-ABL  -cbc w/diff daily Patient found to have 1% metamyelocytes, 1% myelocytes on CBC  -most likely secondary to sepsis as patient had +Bcx, leukocytosis and potential sources of infection, such as shiley in since 10/19, repeat BCx, continue abx as per primary team, other diff. includes myeloproliferative disease  -will f/u peripheral smear  -unlikely TLS as uric acid not elevated, and , most likely secondary to renal failure  -cbc w/diff daily

## 2017-10-26 NOTE — PROVIDER CONTACT NOTE (OTHER) - ACTION/TREATMENT ORDERED:
Dr. Zelalem Marquez present at bedside. Haldol 2.5mg IVP given X1 dose STAT. See MAR. Will continue to monitor pt.

## 2017-10-26 NOTE — CONSULT NOTE ADULT - SUBJECTIVE AND OBJECTIVE BOX
HPI:  83 year old male presented for shortness of breath and was subsequently found to have an IWMI s/p RCA stent placement c/b Vfib arrest requiring IABP, course was further complicated by cardiogenic shock 2/2 RV failure s/p impella placement now with renal failure on CVVH with new A.fib with slow VR and now has a TVP placement. Endocrinology is consulted for evaluation of a thyroid goiter along with abnormal thyroid function test. His wife reported that he has had a long term goiter and he has been following with Dr. Roberson ( [place]) for 10 + years and he has been monitoring. He has had a biopsy of his goiter in the past too and that showed a benign goiter, he has also been previously been evaluated by ENT in [place] and was not deemed a surgical candidate. Inpatient, his TFTS have been TSH of 0.05, T4: 10.4, T3: 85. As per the wife, he has never been on any thyroid medication.       PAST MEDICAL & SURGICAL HISTORY:  Vertigo  Thyroid condition  Afib  High blood pressure  No significant past surgical history      FAMILY HISTORY:  Family history of heart attack (Father, Sibling, Grandparent, Uncle): many family members have had heart problems at young ages      Social History : Occasional alcohol use, smoked 40 years ago     Outpatient Medications:    MEDICATIONS  (STANDING):  aspirin  chewable 81 milliGRAM(s) Oral daily  atorvastatin 80 milliGRAM(s) Oral at bedtime  chlorhexidine 0.12% Liquid 15 milliLiter(s) Swish and Spit two times a day  dexamethasone  Injectable 4 milliGRAM(s) IV Push two times a day  dextrose 5%. 1000 milliLiter(s) (50 mL/Hr) IV Continuous <Continuous>  dextrose 50% Injectable 12.5 Gram(s) IV Push once  dextrose 50% Injectable 25 Gram(s) IV Push once  dextrose 50% Injectable 25 Gram(s) IV Push once  DOBUTamine Infusion 5 MICROgram(s)/kG/Min (13.965 mL/Hr) IV Continuous <Continuous>  heparin  Infusion. 1500 Unit(s)/Hr (15 mL/Hr) IV Continuous <Continuous>  imipenem/cilastatin  IVPB      influenza   Vaccine 0.5 milliLiter(s) IntraMuscular once  insulin lispro (HumaLOG) corrective regimen sliding scale   SubCutaneous every 6 hours  lactobacillus acidophilus and bulgaricus Chewable 1 Tablet(s) Chew daily  pantoprazole  Injectable 40 milliGRAM(s) IV Push daily  ticagrelor 90 milliGRAM(s) Oral two times a day  vancomycin  IVPB 750 milliGRAM(s) IV Intermittent every 12 hours    MEDICATIONS  (PRN):  ALBUTerol/ipratropium for Nebulization 3 milliLiter(s) Nebulizer every 6 hours PRN Shortness of Breath and/or Wheezing  dextrose Gel 1 Dose(s) Oral once PRN Blood Glucose LESS THAN 70 milliGRAM(s)/deciliter  glucagon  Injectable 1 milliGRAM(s) IntraMuscular once PRN Glucose LESS THAN 70 milligrams/deciliter  heparin  Injectable 7500 Unit(s) IV Push every 6 hours PRN For aPTT less than 40  heparin  Injectable 3500 Unit(s) IV Push every 6 hours PRN For aPTT between 40 - 57      Allergies    No Known Allergies        Review of Systems:  UNABLE TO OBTAIN 2/2 Altered mental status     PHYSICAL EXAM:  VITALS: T(C): 36.3 (10-26-17 @ 08:00)  T(F): 97.4 (10-26-17 @ 08:00), Max: 98.1 (10-26-17 @ 00:00)  HR: 104 (10-26-17 @ 15:00) (94 - 118)  BP: 93/72 (10-26-17 @ 15:00) (93/72 - 123/53)  RR:  (20 - 35)  SpO2:  (85% - 100%)      GENERAL: NAD, well-groomed, well-developed  EYES: No proptosis, no lid lag, anicteric  HEENT:  Atraumatic, Normocephalic, moist mucous membranes  THYROID: Normal size, no palpable nodules  RESPIRATORY: Clear to auscultation bilaterally; No rales, rhonchi, wheezing, or rubs  CARDIOVASCULAR: Regular rate and rhythm; No murmurs; no peripheral edema  GI: Soft, nontender, non distended, normal bowel sounds  SKIN: Dry, intact, No rashes or lesions  MUSCULOSKELETAL: Full range of motion, normal strength  NEURO: sensation intact, extraocular movements intact, no tremor, normal reflexes  PSYCH: Alert and oriented x 3, normal affect, normal mood      POCT Blood Glucose.: 104 mg/dL (10-26-17 @ 17:03)  POCT Blood Glucose.: 421 mg/dL (10-26-17 @ 17:01)  POCT Blood Glucose.: 114 mg/dL (10-26-17 @ 11:53)  POCT Blood Glucose.: 127 mg/dL (10-25-17 @ 17:35)  POCT Blood Glucose.: 108 mg/dL (10-25-17 @ 08:17)                            8.4    21.9  )-----------( 267      ( 26 Oct 2017 04:20 )             24.2       10-26    138  |  100  |  43<H>  ----------------------------<  124<H>  5.0   |  23  |  2.45<H>    EGFR if : 27<L>  EGFR if non : 23<L>    Ca    8.6      10-26  Mg     2.6     10-26  Phos  2.9     10-26    TPro  6.2  /  Alb  2.8<L>  /  TBili  1.4<H>  /  DBili  x   /  AST  44<H>  /  ALT  64<H>  /  AlkPhos  86  10-26      Thyroid Function Tests:  10-25 @ 13:21 TSH 0.05 FreeT4 -- T3 85 Anti TPO -- Anti Thyroglobulin Ab -- TSI --  10-15 @ 21:34 TSH 2.40 FreeT4 -- T3 94 Anti TPO -- Anti Thyroglobulin Ab -- TSI --      Hemoglobin A1C, Whole Blood: 5.4 % [4.0 - 5.6] (10-15-17 @ 21:32)    10-15 Chol 129 LDL 79 HDL 41 Trig 45

## 2017-10-26 NOTE — BEHAVIORAL HEALTH ASSESSMENT NOTE - PROBLEM SELECTOR PLAN 1
Pt will likely benefit from standing antipsychotic such as risperidone 0.5mg PO BID to assist with delirium/agitation and improve sleep, however would obtain history including whether there is history of dementia and have conversation with family about risks/benefits of antipsychotics prior to standing medication being ordered.  In meantime would use haldol 0.5mg PO/IM Q 4 hours PRN agitation and 0.5mg IV Q 4 hours only for severe agitation and would avoid using benzodiazepines, or anticholinergic medications which can worsen confusion.  C/L psychiatry to follow to assist with management and adjustment of haldol.  Would hold antipsychotics for QTc>470.

## 2017-10-26 NOTE — BEHAVIORAL HEALTH ASSESSMENT NOTE - AXIS III
HTN, Afib, diffuse nontoxic goiter, CKD stage III, admitted 10/15/17 for inferior wall STEMI complicated by cardiogenic shock, coded in cath lab, ROS achieved, received intravenous pacing wire, balloon pump, RCA stent, intra iliac balloon, and R heart impella, pt with right heart failure, DANITA, and delirium

## 2017-10-26 NOTE — BEHAVIORAL HEALTH ASSESSMENT NOTE - DESCRIPTION
TN, Afib, diffuse nontoxic goiter, CKD stage III, admitted 10/15/17 for inferior wall STEMI complicated by cardiogenic shock, coded in cath lab, ROS achieved, received intravenous pacing wire, balloon pump, RCA stent, intra iliac balloon, and R heart impella, pt with right heart failure, DANITA, and delirium

## 2017-10-26 NOTE — PROGRESS NOTE ADULT - SUBJECTIVE AND OBJECTIVE BOX
24H hour events:   Overnight, pt remained in atrial fibrillation with rates of 110-120 with frequent episodes of bradycardia requiring pacing.   MEDICATIONS:  aspirin  chewable 81 milliGRAM(s) Oral daily  DOBUTamine Infusion 5 MICROgram(s)/kG/Min IV Continuous <Continuous>  heparin  Infusion. 1500 Unit(s)/Hr IV Continuous <Continuous>  heparin  Injectable 7500 Unit(s) IV Push every 6 hours PRN  heparin  Injectable 3500 Unit(s) IV Push every 6 hours PRN  ticagrelor 90 milliGRAM(s) Oral two times a day    piperacillin/tazobactam IVPB. 3.375 Gram(s) IV Intermittent every 8 hours  vancomycin  IVPB 750 milliGRAM(s) IV Intermittent every 12 hours    ALBUTerol/ipratropium for Nebulization 3 milliLiter(s) Nebulizer every 6 hours PRN      pantoprazole  Injectable 40 milliGRAM(s) IV Push daily    atorvastatin 80 milliGRAM(s) Oral at bedtime  dexamethasone  Injectable 4 milliGRAM(s) IV Push two times a day  dextrose 50% Injectable 12.5 Gram(s) IV Push once  dextrose 50% Injectable 25 Gram(s) IV Push once  dextrose 50% Injectable 25 Gram(s) IV Push once  dextrose Gel 1 Dose(s) Oral once PRN  glucagon  Injectable 1 milliGRAM(s) IntraMuscular once PRN  insulin lispro (HumaLOG) corrective regimen sliding scale   SubCutaneous every 6 hours    chlorhexidine 0.12% Liquid 15 milliLiter(s) Swish and Spit two times a day  dextrose 5%. 1000 milliLiter(s) IV Continuous <Continuous>  influenza   Vaccine 0.5 milliLiter(s) IntraMuscular once      REVIEW OF SYSTEMS:  Complete 10point ROS negative.    PHYSICAL EXAM:  T(C): 36.3 (10-26-17 @ 08:00), Max: 36.7 (10-26-17 @ 00:00)  HR: 108 (10-26-17 @ 10:00) (80 - 110)  BP: 118/62 (10-26-17 @ 10:00) (86/53 - 123/53)  RR: 22 (10-26-17 @ 10:00) (20 - 35)  SpO2: 100% (10-26-17 @ 10:00) (85% - 100%)  Wt(kg): --  I&O's Summary    25 Oct 2017 07:01  -  26 Oct 2017 07:00  --------------------------------------------------------  IN: 2160 mL / OUT: 2678 mL / NET: -518 mL    26 Oct 2017 07:01  -  26 Oct 2017 10:56  --------------------------------------------------------  IN: 386 mL / OUT: 549 mL / NET: -163 mL        Gen:   HEENT: R pupil sluggish anicteric sclera  Neck: enlarged goiter   Resp: diffuse coarse breath sounds   Cardiovasc: S1S2 normal; RRR; no murmurs, rubs or gallops  GI: soft, nondistended, nontender; +BS  Extr: lower extremity edema   Skin: R john and L central line   Neuro: RUE weaker than L        LABS:	 	    CBC Full  -  ( 26 Oct 2017 04:20 )  WBC Count : 21.9 K/uL  Hemoglobin : 8.4 g/dL  Hematocrit : 24.2 %  Platelet Count - Automated : 267 K/uL  Mean Cell Volume : 101.0 fl  Mean Cell Hemoglobin : 34.9 pg  Mean Cell Hemoglobin Concentration : 34.5 gm/dL  Auto Neutrophil # : 19.5 K/uL  Auto Lymphocyte # : 0.8 K/uL  Auto Monocyte # : 1.6 K/uL  Auto Eosinophil # : 0.0 K/uL  Auto Basophil # : 0.0 K/uL  Auto Neutrophil % : 79.0 %  Auto Lymphocyte % : 5.0 %  Auto Monocyte % : 11.0 %  Auto Eosinophil % : 1.0 %  Auto Basophil % : x    10-26    138  |  100  |  43<H>  ----------------------------<  124<H>  5.0   |  23  |  2.45<H>  10-25    135  |  99  |  45<H>  ----------------------------<  115<H>  5.3   |  23  |  2.69<H>    Ca    8.6      26 Oct 2017 04:20  Ca    8.1<L>      25 Oct 2017 21:52  Phos  2.9     10-26  Phos  3.7     10-25  Mg     2.6     10-26  Mg     2.5     10-25    TPro  6.2  /  Alb  2.8<L>  /  TBili  1.4<H>  /  DBili  x   /  AST  44<H>  /  ALT  64<H>  /  AlkPhos  86  10-26  TPro  5.9<L>  /  Alb  2.9<L>  /  TBili  1.3<H>  /  DBili  x   /  AST  49<H>  /  ALT  71<H>  /  AlkPhos  84  10-25      proBNP:   Lipid Profile:   HgA1c:   TSH:       CARDIAC MARKERS:          TELEMETRY: 	    ECG:  	  RADIOLOGY:  OTHER: 	    PREVIOUS DIAGNOSTIC TESTING:    [ ] Echocardiogram:  Conclusions:  1. Mitral annular calcification, otherwise normal mitral  valve. Mild-moderate mitral regurgitation.  2. Aortic valve leaflet morphology not well visualized.   Mild aortic regurgitation.  3. Normal left ventricular internal dimensions and wall  thicknesses.  4. Endocardium not well visualized; grossly normal left  ventricular systolic function.  5. Moderate right atrial enlargement.  6. Right ventricular enlargement with decreased right  ventricular systolic function.  [ ]  Catheterization:  [ ] Stress Test:  	  	  ASSESSMENT/PLAN: 	  83M with AFib on pradaxa, mod MR, mild AR, RV failure a/w IWSTEMI s/p RCA stent c/b VFib arrest requiring IABP, PAC with course complicated by cardiogenic shock 2/2 RV failure s/p RV impella now with renal failure on CVVH with new afib with slow VR, Now with TVP    1. afib with heart block  -TVP in place, and he is intermittently pacing. He will need permanent pacemaker. However, currently he has positive blood cultures and an elevated WBC count. Will re-evaluate when stable.    Abhi López [id number]

## 2017-10-26 NOTE — PROGRESS NOTE ADULT - PROBLEM SELECTOR PLAN 1
DANITA on CKD stage III. DANITA may be multifactorial in the setting of IV contrast (cardiac cath), rhabdomyolysis and ATN from hypotension/ cardiac arrest. Patient has unclear etiology of his original underlying CKD.   Patient remains on CVVHDF. Patient with improved to 2.45 and BUN 43. Patient remains anuric. Plan to continue CVVHDF with UF as per CCU. Once patient stabilizes off pressor support, may consider transition to IHD.    Continue to check daily serum phosphorus (may be low in the use of CVVHDF). Continue to renally dose all medications. Continue to monitor intake/output, BMP and urine output. Avoid NSAIDs, RCA and nephrotoxins.

## 2017-10-26 NOTE — CONSULT NOTE ADULT - PROBLEM SELECTOR RECOMMENDATION 2
-Has had monitoring with ultrasound long term and biopsied in the past which showed a benign goiter  -Given his current cardiovascular status, patient is not a surgical candidate for thyroid surgery    -Will sign off, can re-consult as necessary
Patient with elevated serum creatinine in the setting of DANITA/ Anuria. Recommend medical management until repeat potassium is resulted
- suspect ATN in s/o cardiac arrest, if CVVHD needed for metabolic derangements, would hold on fluid removal unless CVP >12.

## 2017-10-26 NOTE — BEHAVIORAL HEALTH ASSESSMENT NOTE - NSBHCONSULTFOLLOWDETAILS_PSY_A_CORE FT
Pt is delirious and unsafe to discharge in current state.  No need for inpatient psychiatry admission at this time.

## 2017-10-26 NOTE — CONSULT NOTE ADULT - PROBLEM SELECTOR RECOMMENDATION 9
-TFTS are consistent with euthyroid sick syndrome.   -No further intervention necessary at this time  -Can be evaluated 4-6 weeks after discharge for full normalization of thyroid function

## 2017-10-26 NOTE — CONSULT NOTE ADULT - ATTENDING COMMENTS
84 yo male with history as noted. Currently has a superficial wound of right groin , device related, and a superficial wound of left dorsal wrist.  Is tremulous. Wound care d/w staff, and wife provided with Op f/u information.

## 2017-10-26 NOTE — PROGRESS NOTE ADULT - SUBJECTIVE AND OBJECTIVE BOX
[facility] DIVISION OF KIDNEY DISEASES AND HYPERTENSION -- FOLLOW UP NOTE  --------------------------------------------------------------------------------  Chief Complaint: DANITA with Anuria requiring renal replacement therapy     24 hour events/subjective:  Patient remained on CVVHDF overnight. No complications.       PAST HISTORY  --------------------------------------------------------------------------------  No significant changes to PMH, PSH, FHx, SHx, unless otherwise noted    ALLERGIES & MEDICATIONS  --------------------------------------------------------------------------------  Allergies    No Known Allergies    Intolerances      Standing Inpatient Medications  aspirin  chewable 81 milliGRAM(s) Oral daily  atorvastatin 80 milliGRAM(s) Oral at bedtime  chlorhexidine 0.12% Liquid 15 milliLiter(s) Swish and Spit two times a day  dexamethasone  Injectable 4 milliGRAM(s) IV Push two times a day  dextrose 5%. 1000 milliLiter(s) IV Continuous <Continuous>  dextrose 50% Injectable 12.5 Gram(s) IV Push once  dextrose 50% Injectable 25 Gram(s) IV Push once  dextrose 50% Injectable 25 Gram(s) IV Push once  DOBUTamine Infusion 5 MICROgram(s)/kG/Min IV Continuous <Continuous>  heparin  Infusion. 1500 Unit(s)/Hr IV Continuous <Continuous>  influenza   Vaccine 0.5 milliLiter(s) IntraMuscular once  insulin lispro (HumaLOG) corrective regimen sliding scale   SubCutaneous every 6 hours  lactobacillus acidophilus and bulgaricus Chewable 1 Tablet(s) Chew daily  pantoprazole  Injectable 40 milliGRAM(s) IV Push daily  piperacillin/tazobactam IVPB. 3.375 Gram(s) IV Intermittent every 8 hours  ticagrelor 90 milliGRAM(s) Oral two times a day  vancomycin  IVPB 750 milliGRAM(s) IV Intermittent every 12 hours    PRN Inpatient Medications  ALBUTerol/ipratropium for Nebulization 3 milliLiter(s) Nebulizer every 6 hours PRN  dextrose Gel 1 Dose(s) Oral once PRN  glucagon  Injectable 1 milliGRAM(s) IntraMuscular once PRN  heparin  Injectable 7500 Unit(s) IV Push every 6 hours PRN  heparin  Injectable 3500 Unit(s) IV Push every 6 hours PRN      REVIEW OF SYSTEMS  --------------------------------------------------------------------------------  Gen: No weakness  Skin: No rashes  Head/Eyes/Ears/Mouth: No headache  Respiratory: No dyspnea  CV: No chest pain  GI: No abdominal pain  : No increased frequency  MSK: No edema  Neuro: No dizziness/lightheadedness    All other systems were reviewed and are negative, except as noted.    VITALS/PHYSICAL EXAM  --------------------------------------------------------------------------------  T(C): 36.3 (10-26-17 @ 08:00), Max: 36.7 (10-26-17 @ 00:00)  HR: 108 (10-26-17 @ 10:00) (80 - 110)  BP: 118/62 (10-26-17 @ 10:00) (86/53 - 123/53)  RR: 22 (10-26-17 @ 10:00) (20 - 35)  SpO2: 100% (10-26-17 @ 10:00) (85% - 100%)  Wt(kg): --        10-25-17 @ 07:01  -  10-26-17 @ 07:00  --------------------------------------------------------  IN: 2160 mL / OUT: 2678 mL / NET: -518 mL    10-26-17 @ 07:01  -  10-26-17 @ 10:17  --------------------------------------------------------  IN: 302 mL / OUT: 549 mL / NET: -247 mL      Physical Exam:  	Gen: Awake  	Pulm: Course BS B/L  	CV: RRR, S1S2  	Abd: +BS, soft, nontender/nondistended   	LE: Warm, + edema  	Skin: Warm, without rashes              Vascular Access: + RIJ non-tunnelled HD catheter     LABS/STUDIES  --------------------------------------------------------------------------------              8.4    21.9  >-----------<  267      [10-26-17 @ 04:20]              24.2     138  |  100  |  43  ----------------------------<  124      [10-26-17 @ 04:20]  5.0   |  23  |  2.45        Ca     8.6     [10-26-17 @ 04:20]      Mg     2.6     [10-26-17 @ 04:20]      Phos  2.9     [10-26-17 @ 04:20]    TPro  6.2  /  Alb  2.8  /  TBili  1.4  /  DBili  x   /  AST  44  /  ALT  64  /  AlkPhos  86  [10-26-17 @ 04:20]    PT/INR: PT 13.4 , INR 1.24       [10-26-17 @ 04:20]  PTT: 85.5       [10-26-17 @ 04:20]      Creatinine Trend:  SCr 2.45 [10-26 @ 04:20]  SCr 2.69 [10-25 @ 21:52]  SCr 2.94 [10-25 @ 15:11]  SCr 3.04 [10-25 @ 05:59]  SCr 3.50 [10-24 @ 22:39]    Urinalysis - [10-15-17 @ 16:48]      Color Yellow / Appearance Turbid / SG >1.030 / pH 6.5      Gluc 50 / Ketone Negative  / Bili Negative / Urobili Negative       Blood Moderate / Protein >600 / Leuk Est Negative / Nitrite Negative      RBC >50 / WBC 3-5 / Hyaline  / Gran  / Sq Epi  / Non Sq Epi OCC / Bacteria Few      HbA1c 5.4      [10-15-17 @ 21:32]  TSH 0.05      [10-25-17 @ 13:21]  Lipid: chol 129, TG 45, HDL 41, LDL 79      [10-15-17 @ 21:40]    HBsAg Nonreact      [10-19-17 @ 07:25]  HCV 0.15, Nonreact      [10-19-17 @ 07:25]  HIV Nonreact      [10-19-17 @ 07:25]    EYAD: titer 1:640, pattern Homogeneous      [10-19-17 @ 07:25]  dsDNA <12      [10-19-17 @ 07:25]  C3 Complement 72      [10-19-17 @ 07:25]  C4 Complement 14      [10-19-17 @ 07:25]  Free Light Chains: kappa 15.20, lambda 10.50, ratio = 1.45      [10-19 @ 07:25]  SPEP Interpretation: Grossly Hemolyzed Sample - unsuitable for testing.      [10-19-17 @ 07:25]

## 2017-10-26 NOTE — CONSULT NOTE ADULT - ASSESSMENT
83 year old male with history of HTN, A.fib who presented with IWMI complicated with cardiogenic shock. Now s.p IABP and RCA stent placement and R impella treatment consulted for evaluation of a goiter and abnormal TFTS. In comparison with his TFTs on presentation to now, likely his low TSH is secondary to acute illness and will need to evaluated further as an outpatient. He has a long term history of goiter and was evaluated before and was not a surgical candidate. He is now also not a surgical candidate.

## 2017-10-26 NOTE — BEHAVIORAL HEALTH ASSESSMENT NOTE - HPI (INCLUDE ILLNESS QUALITY, SEVERITY, DURATION, TIMING, CONTEXT, MODIFYING FACTORS, ASSOCIATED SIGNS AND SYMPTOMS)
82 y/o male with PMH HTN, Afib, diffuse nontoxic goiter, CKD stage III, admitted 10/15/17 for inferior wall STEMI complicated by cardiogenic shock, coded in cath lab, ROS achieved, received intravenous pacing wire, balloon pump, RCA stent, intra iliac balloon, and R heart impella, pt with right heart failure, DANITA, and delirium currently managed in CCU.  Pt with questionable seizure activity, plans for MRI and MRA once medically stable enough.  Psychiatry consulted to assist with medication management of delirium/agitation.    Pt unable to participate in interview as he presented as actively delirious with poor eye contact, eyes closed but not sleeping with notable restlessness, non-responsiveness to verbal cues. Attempted unsuccessfully to reach pt’s wife Roberta by phone for collateral on any notable psychiatric history, left number to [facility] office requesting callback.

## 2017-10-26 NOTE — CONSULT NOTE ADULT - ATTENDING COMMENTS
smear reviewed. very toxic vacuolated polys, many myelocytes seen as well as bands, this is suggestive of a L shifted peripheral blood because of inflammation/infection current ill state.   Some atypical   Continue to monitor counts.

## 2017-10-26 NOTE — PROVIDER CONTACT NOTE (OTHER) - SITUATION
pt with RV impella, IABP on 1:2, subtherapeutic aPTT
6500cc brown gastric content from NGT into canister  Bladder rescan- 180cc
CVVHDF placed on hold due to high access pressures
Neuro check difference from baseline
Patient with multiple groin lines in place, site oozing scant drainage.  patient leaking blood around pace catheter
Pt urine Output remains low at <10cc/ hour.
Pt with New development of tremors
Pt's CAM score is 1,2,3; increased from prior assessment.
Pt's CAM score-1
Pt's HR isabelle down to AF-30's on telemetry monitor, pt unresponsive to commands, not alert and having tremors. See strip in patient's chart.
Rt eye slower reaction to light the left eye

## 2017-10-26 NOTE — PROGRESS NOTE ADULT - ATTENDING COMMENTS
Seen and examined on rounds with housestaff.  Today, he more encephalopathic, inattentive.  Visual hallucinations appreciated by wife and staff throughout the night and this morning.  He is aware that they are not real.    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  Hallucinations may potentially be peduncular in nature as well.  MRI brain when stable.

## 2017-10-26 NOTE — PROGRESS NOTE ADULT - SUBJECTIVE AND OBJECTIVE BOX
Subjective:    Pt seen and examined at bedside. This morning, pt noted to have visual hallucinations and did not sleep at all last night. Nurses at bedside reported pt to be delirious today.     MEDICATIONS  (STANDING):  aspirin  chewable 81 milliGRAM(s) Oral daily  atorvastatin 80 milliGRAM(s) Oral at bedtime  chlorhexidine 0.12% Liquid 15 milliLiter(s) Swish and Spit two times a day  dexamethasone  Injectable 4 milliGRAM(s) IV Push two times a day  dextrose 5%. 1000 milliLiter(s) (50 mL/Hr) IV Continuous <Continuous>  dextrose 50% Injectable 12.5 Gram(s) IV Push once  dextrose 50% Injectable 25 Gram(s) IV Push once  dextrose 50% Injectable 25 Gram(s) IV Push once  DOBUTamine Infusion 5 MICROgram(s)/kG/Min (13.965 mL/Hr) IV Continuous <Continuous>  heparin  Infusion. 1500 Unit(s)/Hr (15 mL/Hr) IV Continuous <Continuous>  influenza   Vaccine 0.5 milliLiter(s) IntraMuscular once  insulin lispro (HumaLOG) corrective regimen sliding scale   SubCutaneous every 6 hours  lactobacillus acidophilus and bulgaricus Chewable 1 Tablet(s) Chew daily  pantoprazole  Injectable 40 milliGRAM(s) IV Push daily  piperacillin/tazobactam IVPB. 3.375 Gram(s) IV Intermittent every 8 hours  ticagrelor 90 milliGRAM(s) Oral two times a day  vancomycin  IVPB 750 milliGRAM(s) IV Intermittent every 12 hours    MEDICATIONS  (PRN):  ALBUTerol/ipratropium for Nebulization 3 milliLiter(s) Nebulizer every 6 hours PRN Shortness of Breath and/or Wheezing  dextrose Gel 1 Dose(s) Oral once PRN Blood Glucose LESS THAN 70 milliGRAM(s)/deciliter  glucagon  Injectable 1 milliGRAM(s) IntraMuscular once PRN Glucose LESS THAN 70 milligrams/deciliter  heparin  Injectable 7500 Unit(s) IV Push every 6 hours PRN For aPTT less than 40  heparin  Injectable 3500 Unit(s) IV Push every 6 hours PRN For aPTT between 40 - 57      Allergies  No Known Allergies      Objective:   Vital Signs Last 24 Hrs  T(C): 36.3 (26 Oct 2017 08:00), Max: 36.7 (26 Oct 2017 00:00)  T(F): 97.4 (26 Oct 2017 08:00), Max: 98.1 (26 Oct 2017 00:00)  HR: 108 (26 Oct 2017 10:00) (80 - 110)  BP: 118/62 (26 Oct 2017 10:00) (86/53 - 123/53)  BP(mean): 67 (26 Oct 2017 10:00) (58 - 112)  RR: 22 (26 Oct 2017 10:00) (20 - 35)  SpO2: 100% (26 Oct 2017 10:00) (85% - 100%)    Exam:   awake, alert, oriented to year, speech fluent, impaired attention, does not follow simple commands  R ptosis, EOMI, L pupil 2mm and R 3mm minimally reactive,  motor exam limited by pt cooperation, RUE at least 3/5, LUE no drift, at least 3/5 in b/l LE  +action myoclonus    Other:  CBC Full  -  ( 26 Oct 2017 04:20 )  WBC Count : 21.9 K/uL  Hemoglobin : 8.4 g/dL  Hematocrit : 24.2 %  Platelet Count - Automated : 267 K/uL  Mean Cell Volume : 101.0 fl  Mean Cell Hemoglobin : 34.9 pg  Mean Cell Hemoglobin Concentration : 34.5 gm/dL  Auto Neutrophil # : 19.5 K/uL  Auto Lymphocyte # : 0.8 K/uL  Auto Monocyte # : 1.6 K/uL  Auto Eosinophil # : 0.0 K/uL  Auto Basophil # : 0.0 K/uL  Auto Neutrophil % : 79.0 %  Auto Lymphocyte % : 5.0 %  Auto Monocyte % : 11.0 %  Auto Eosinophil % : 1.0 %  Auto Basophil % : x    10-26    138  |  100  |  43<H>  ----------------------------<  124<H>  5.0   |  23  |  2.45<H>    Ca    8.6      26 Oct 2017 04:20  Phos  2.9     10-26  Mg     2.6     10-26    TPro  6.2  /  Alb  2.8<L>  /  TBili  1.4<H>  /  DBili  x   /  AST  44<H>  /  ALT  64<H>  /  AlkPhos  86  10-26    10-26    138  |  100  |  43<H>  ----------------------------<  124<H>  5.0   |  23  |  2.45<H>    Ca    8.6      26 Oct 2017 04:20  Phos  2.9     10-26  Mg     2.6     10-26    TPro  6.2  /  Alb  2.8<L>  /  TBili  1.4<H>  /  DBili  x   /  AST  44<H>  /  ALT  64<H>  /  AlkPhos  86  10-26    LIVER FUNCTIONS - ( 26 Oct 2017 04:20 )  Alb: 2.8 g/dL / Pro: 6.2 g/dL / ALK PHOS: 86 U/L / ALT: 64 U/L RC / AST: 44 U/L / GGT: x             Imaging:     Routine EEG:   Intermittent generalized slowing

## 2017-10-26 NOTE — PROGRESS NOTE ADULT - ASSESSMENT
83M pmhx of AF on pradaxa initially presented on 10/15/17 with chest pain/diaphoresis/ dyspnea to OSH, IW-STEMI emergently transferred here for [facility] c/b VF arrest requiring IABP followed by pRCA stent c/b now acute right heart failure and cardiogenic shock s/p IABP (explanted 10/19), RP impella for RV support (explanted 10/20), remains on  5 with borderline low CI.      1. cardiogenic shock  - continue  5  - cont cvvh, goal is net negative 500 cc as has extravascular fluid and needs to be mobilized  -would decrease amio to 200 qd  -with swan in place, would check mixed venous sat q6  -will follow up on numbers, CO  -repeat echo, also now in setting of positive blood cx with GPC, repeat CX    2.CAD  -cont dapt, statin    3. afib  -cont heparin drip  -endocrine c/s for thyroid eval        Lukasz Barajas MD  p [phone]   evening [phone]     24H hour events:  no acute events overnight  -remains on  2.5  -BP (a line) 106/42   -CVVH clotted, shiley interrogation by vascualr surgery with plans to restart CVVH as soon as possible.  -Cr 1.96 (prev 1.85)  -I/O -20 x24hrs  -lactate 1.7    MEDICATIONS:  amiodarone    Tablet 400 milliGRAM(s) Oral every 8 hours  aspirin  chewable 81 milliGRAM(s) Oral daily  DOBUTamine Infusion 5 MICROgram(s)/kG/Min IV Continuous <Continuous>  ticagrelor 90 milliGRAM(s) Oral two times a day    piperacillin/tazobactam IVPB. 3.375 Gram(s) IV Intermittent every 12 hours  vancomycin  IVPB 1000 milliGRAM(s) IV Intermittent every 24 hours    ALBUTerol/ipratropium for Nebulization 3 milliLiter(s) Nebulizer every 6 hours PRN      pantoprazole  Injectable 40 milliGRAM(s) IV Push daily    atorvastatin 40 milliGRAM(s) Oral at bedtime    chlorhexidine 0.12% Liquid 15 milliLiter(s) Swish and Spit two times a day  influenza   Vaccine 0.5 milliLiter(s) IntraMuscular once      REVIEW OF SYSTEMS:  Complete 10point ROS negative.    PHYSICAL EXAM:  T(C): 36.9 (10-24-17 @ 07:00), Max: 37.7 (10-23-17 @ 23:00)  HR: 82 (10-24-17 @ 16:26) (82 - 112)  BP: 109/59 (10-24-17 @ 15:00) (93/58 - 110/62)  RR: 18 (10-24-17 @ 16:00) (14 - 36)  SpO2: 96% (10-24-17 @ 16:26) (94% - 100%)  Wt(kg): --  I&O's Summary    23 Oct 2017 07:  -  24 Oct 2017 07:00  --------------------------------------------------------  IN: 2994 mL / OUT: 3110 mL / NET: -116 mL    24 Oct 2017 07:  -  24 Oct 2017 17:25  --------------------------------------------------------  IN: 697 mL / OUT: 8 mL / NET: 689 mL        Appearance: Normal	  HEENT:   Normal oral mucosa, PERRL, EOMI	  Lymphatic: No lymphadenopathy  Cardiovascular: Normal S1 S2, No JVD, No murmurs, diffuse anasarca  Respiratory: b/l rhonchi	  Psychiatry: A & O x 3, Mood & affect appropriate  Gastrointestinal:  Soft, Non-tender, + BS	  Skin: No rashes, No ecchymoses, No cyanosis	  Neurologic: Non-focal  Extremities: Normal range of motion, No clubbing, cyanosis or edema  Vascular: Peripheral pulses palpable 2+ bilaterally        LABS:	 	    CBC Full  -  ( 24 Oct 2017 12:58 )  WBC Count : 12.8 K/uL  Hemoglobin : 8.6 g/dL  Hematocrit : 25.4 %  Platelet Count - Automated : 195 K/uL  Mean Cell Volume : 102.0 fl  Mean Cell Hemoglobin : 34.3 pg  Mean Cell Hemoglobin Concentration : 33.7 gm/dL  Auto Neutrophil # : 11.2 K/uL  Auto Lymphocyte # : 1.1 K/uL  Auto Monocyte # : 0.5 K/uL  Auto Eosinophil # : 0.0 K/uL  Auto Basophil # : 0.0 K/uL  Auto Neutrophil % : 72.0 %  Auto Lymphocyte % : 6.0 %  Auto Monocyte % : 3.0 %  Auto Eosinophil % : 1.0 %  Auto Basophil % : x    10-24    138  |  101  |  44<H>  ----------------------------<  103<H>  5.0   |  25  |  2.56<H>  10-24    137  |  102  |  34<H>  ----------------------------<  118<H>  5.1   |  22  |  1.96<H>    Ca    8.0<L>      24 Oct 2017 12:58  Ca    8.1<L>      24 Oct 2017 03:50  Phos  1.6     10-24  Phos  2.3     10-23  Mg     2.6     10-24  Mg     2.5     10-24    TPro  5.4<L>  /  Alb  2.8<L>  /  TBili  1.6<H>  /  DBili  x   /  AST  61<H>  /  ALT  83<H>  /  AlkPhos  94  10-24  TPro  5.8<L>  /  Alb  2.6<L>  /  TBili  1.7<H>  /  DBili  x   /  AST  69<H>  /  ALT  87<H>  /  AlkPhos  98  10-24      proBNP:   Lipid Profile:   HgA1c:   TSH:       CARDIAC MARKERS:            TELEMETRY: 	    ECG:  	  RADIOLOGY:  OTHER: 	    PREVIOUS DIAGNOSTIC TESTING:    [ ] Echocardiogram:  [ ]  Catheterization:  [ ] Stress Test:  	  	  ASSESSMENT/PLAN: 	    83M pmhx of AF on pradaxa initially presented on 10/15/17 with chest pain/diaphoresis/ dyspnea to OSH, IW-STEMI emergently transferred here for [facility] c/b VF arrest requiring IABP followed by pRCA stent c/b now acute right heart failure and cardiogenic shock s/p IABP (explanted 10/19), RP impella for RV support (explanted 10/20), remains on  5 with borderline low CI.      1. cardiogenic shock  - continue  5, would start captopril 6.25 tid in efforts to wean .  - cont cvvh, goal is net negative 500 cc as has extravascular fluid and needs to be mobilized    2.CAD  -cont dapt, statin    3. afib  cont heparin drip          Lukasz Barajas MD  p [phone]   evening [phone] 83M pmhx of AF on pradaxa initially presented on 10/15/17 with chest pain/diaphoresis/ dyspnea to OSH, IW-STEMI emergently transferred here for [facility] c/b VF arrest requiring IABP followed by pRCA stent c/b now acute right heart failure and cardiogenic shock s/p IABP (explanted 10/19), RP impella for RV support (explanted 10/20), remains on  5 with borderline low CI.      1. cardiogenic shock  - continue  5  - cont cvvh, goal is net negative 500 cc as has extravascular fluid and needs to be mobilized  -would decrease amio to 200 qd  -repeat echo, also now in setting of positive blood cx with GPC, repeat CX    2.CAD  -cont dapt, statin    3. afib  -cont heparin drip  -endocrine c/s for thyroid eval        Lukasz Barajas MD  p [phone]   evening [phone]     24H hour events:  no acute events overnight  -remains on  2.5  -BP (a line) 106/42   -CVVH clotted, shiley interrogation by vascualr surgery with plans to restart CVVH as soon as possible.  -Cr 1.96 (prev 1.85)  -I/O -20 x24hrs  -lactate 1.7    MEDICATIONS:  amiodarone    Tablet 400 milliGRAM(s) Oral every 8 hours  aspirin  chewable 81 milliGRAM(s) Oral daily  DOBUTamine Infusion 5 MICROgram(s)/kG/Min IV Continuous <Continuous>  ticagrelor 90 milliGRAM(s) Oral two times a day    piperacillin/tazobactam IVPB. 3.375 Gram(s) IV Intermittent every 12 hours  vancomycin  IVPB 1000 milliGRAM(s) IV Intermittent every 24 hours    ALBUTerol/ipratropium for Nebulization 3 milliLiter(s) Nebulizer every 6 hours PRN      pantoprazole  Injectable 40 milliGRAM(s) IV Push daily    atorvastatin 40 milliGRAM(s) Oral at bedtime    chlorhexidine 0.12% Liquid 15 milliLiter(s) Swish and Spit two times a day  influenza   Vaccine 0.5 milliLiter(s) IntraMuscular once      REVIEW OF SYSTEMS:  Complete 10point ROS negative.    PHYSICAL EXAM:  T(C): 36.9 (10-24-17 @ 07:00), Max: 37.7 (10-23-17 @ 23:00)  HR: 82 (10-24-17 @ 16:26) (82 - 112)  BP: 109/59 (10-24-17 @ 15:00) (93/58 - 110/62)  RR: 18 (10-24-17 @ 16:00) (14 - 36)  SpO2: 96% (10-24-17 @ 16:26) (94% - 100%)  Wt(kg): --  I&O's Summary    23 Oct 2017 07:  -  24 Oct 2017 07:00  --------------------------------------------------------  IN: 2994 mL / OUT: 3110 mL / NET: -116 mL    24 Oct 2017 07:01  -  24 Oct 2017 17:25  --------------------------------------------------------  IN: 697 mL / OUT: 8 mL / NET: 689 mL        Appearance: Normal	  HEENT:   Normal oral mucosa, PERRL, EOMI	  Lymphatic: No lymphadenopathy  Cardiovascular: Normal S1 S2, No JVD, No murmurs, diffuse anasarca  Respiratory: b/l rhonchi	  Psychiatry: A & O x 3, Mood & affect appropriate  Gastrointestinal:  Soft, Non-tender, + BS	  Skin: No rashes, No ecchymoses, No cyanosis	  Neurologic: Non-focal  Extremities: Normal range of motion, No clubbing, cyanosis or edema  Vascular: Peripheral pulses palpable 2+ bilaterally        LABS:	 	    CBC Full  -  ( 24 Oct 2017 12:58 )  WBC Count : 12.8 K/uL  Hemoglobin : 8.6 g/dL  Hematocrit : 25.4 %  Platelet Count - Automated : 195 K/uL  Mean Cell Volume : 102.0 fl  Mean Cell Hemoglobin : 34.3 pg  Mean Cell Hemoglobin Concentration : 33.7 gm/dL  Auto Neutrophil # : 11.2 K/uL  Auto Lymphocyte # : 1.1 K/uL  Auto Monocyte # : 0.5 K/uL  Auto Eosinophil # : 0.0 K/uL  Auto Basophil # : 0.0 K/uL  Auto Neutrophil % : 72.0 %  Auto Lymphocyte % : 6.0 %  Auto Monocyte % : 3.0 %  Auto Eosinophil % : 1.0 %  Auto Basophil % : x    10-24    138  |  101  |  44<H>  ----------------------------<  103<H>  5.0   |  25  |  2.56<H>  10-24    137  |  102  |  34<H>  ----------------------------<  118<H>  5.1   |  22  |  1.96<H>    Ca    8.0<L>      24 Oct 2017 12:58  Ca    8.1<L>      24 Oct 2017 03:50  Phos  1.6     10-24  Phos  2.3     10-23  Mg     2.6     10-24  Mg     2.5     10-24    TPro  5.4<L>  /  Alb  2.8<L>  /  TBili  1.6<H>  /  DBili  x   /  AST  61<H>  /  ALT  83<H>  /  AlkPhos  94  10-24  TPro  5.8<L>  /  Alb  2.6<L>  /  TBili  1.7<H>  /  DBili  x   /  AST  69<H>  /  ALT  87<H>  /  AlkPhos  98  10-24      proBNP:   Lipid Profile:   HgA1c:   TSH:       CARDIAC MARKERS:            TELEMETRY: 	    ECG:  	  RADIOLOGY:  OTHER: 	    PREVIOUS DIAGNOSTIC TESTING:    [ ] Echocardiogram:  [ ]  Catheterization:  [ ] Stress Test:  	  	  ASSESSMENT/PLAN: 	    83M pmhx of AF on pradaxa initially presented on 10/15/17 with chest pain/diaphoresis/ dyspnea to OSH, IW-STEMI emergently transferred here for [facility] c/b VF arrest requiring IABP followed by pRCA stent c/b now acute right heart failure and cardiogenic shock s/p IABP (explanted 10/19), RP impella for RV support (explanted 10/20), remains on  5 with borderline low CI.      1. cardiogenic shock  - continue  5, would start captopril 6.25 tid in efforts to wean .  - cont cvvh, goal is net negative 500 cc as has extravascular fluid and needs to be mobilized    2.CAD  -cont dapt, statin    3. afib  cont heparin drip          Lukasz Barajas MD  p [phone]   evening [phone]

## 2017-10-26 NOTE — BEHAVIORAL HEALTH ASSESSMENT NOTE - RISK ASSESSMENT
Risk factors include chronic medical illness, acute medical illness, and delirum which put pt at risk for unintentional harm of self or others in current state.  Risk if modified by close observation and use of antipsychotics for agitation as needed.

## 2017-10-27 NOTE — PROGRESS NOTE ADULT - ASSESSMENT
83M pmhx of AF on pradaxa initially presented on 10/15/17 with chest pain/diaphoresis/ dyspnea to OSH, IW-STEMI emergently transferred here for [facility] c/b VF arrest requiring IABP followed by pRCA stent c/b now acute right heart failure and cardiogenic shock s/p IABP (explanted 10/19), RP impella for RV support (explanted 10/20), remains on  5 with borderline low CI.      1. cardiogenic shock  - continue , decrease to 2.5, attempt to wean  - cont cvvh, goal is net even for today  -repeat echo, showing hyperdynamic LV    2.CAD  -cont dapt, statin    3. afib  -cont heparin drip  -endocrine c/s for thyroid eval        Lukasz Barajas MD  p [phone]   evening [phone]     24H hour events:  no acute events overnight  -remains on  2.5  -BP (a line) 106/42   -CVVH clotted, shiley interrogation by vascualr surgery with plans to restart CVVH as soon as possible.  -Cr 1.96 (prev 1.85)  -I/O -20 x24hrs  -lactate 1.7    MEDICATIONS:  amiodarone    Tablet 400 milliGRAM(s) Oral every 8 hours  aspirin  chewable 81 milliGRAM(s) Oral daily  DOBUTamine Infusion 5 MICROgram(s)/kG/Min IV Continuous <Continuous>  ticagrelor 90 milliGRAM(s) Oral two times a day    piperacillin/tazobactam IVPB. 3.375 Gram(s) IV Intermittent every 12 hours  vancomycin  IVPB 1000 milliGRAM(s) IV Intermittent every 24 hours    ALBUTerol/ipratropium for Nebulization 3 milliLiter(s) Nebulizer every 6 hours PRN      pantoprazole  Injectable 40 milliGRAM(s) IV Push daily    atorvastatin 40 milliGRAM(s) Oral at bedtime    chlorhexidine 0.12% Liquid 15 milliLiter(s) Swish and Spit two times a day  influenza   Vaccine 0.5 milliLiter(s) IntraMuscular once      REVIEW OF SYSTEMS:  Complete 10point ROS negative.    PHYSICAL EXAM:  T(C): 36.9 (10-24-17 @ 07:00), Max: 37.7 (10-23-17 @ 23:00)  HR: 82 (10-24-17 @ 16:26) (82 - 112)  BP: 109/59 (10-24-17 @ 15:00) (93/58 - 110/62)  RR: 18 (10-24-17 @ 16:00) (14 - 36)  SpO2: 96% (10-24-17 @ 16:26) (94% - 100%)  Wt(kg): --  I&O's Summary    23 Oct 2017 07:  -  24 Oct 2017 07:00  --------------------------------------------------------  IN: 2994 mL / OUT: 3110 mL / NET: -116 mL    24 Oct 2017 07:  -  24 Oct 2017 17:25  --------------------------------------------------------  IN: 697 mL / OUT: 8 mL / NET: 689 mL        Appearance: Normal	  HEENT:   Normal oral mucosa, PERRL, EOMI	  Lymphatic: No lymphadenopathy  Cardiovascular: Normal S1 S2, No JVD, No murmurs, diffuse anasarca  Respiratory: b/l rhonchi	  Psychiatry: A & O x 3, Mood & affect appropriate  Gastrointestinal:  Soft, Non-tender, + BS	  Skin: No rashes, No ecchymoses, No cyanosis	  Neurologic: Non-focal  Extremities: Normal range of motion, No clubbing, cyanosis or edema  Vascular: Peripheral pulses palpable 2+ bilaterally        LABS:	 	    CBC Full  -  ( 24 Oct 2017 12:58 )  WBC Count : 12.8 K/uL  Hemoglobin : 8.6 g/dL  Hematocrit : 25.4 %  Platelet Count - Automated : 195 K/uL  Mean Cell Volume : 102.0 fl  Mean Cell Hemoglobin : 34.3 pg  Mean Cell Hemoglobin Concentration : 33.7 gm/dL  Auto Neutrophil # : 11.2 K/uL  Auto Lymphocyte # : 1.1 K/uL  Auto Monocyte # : 0.5 K/uL  Auto Eosinophil # : 0.0 K/uL  Auto Basophil # : 0.0 K/uL  Auto Neutrophil % : 72.0 %  Auto Lymphocyte % : 6.0 %  Auto Monocyte % : 3.0 %  Auto Eosinophil % : 1.0 %  Auto Basophil % : x    10-24    138  |  101  |  44<H>  ----------------------------<  103<H>  5.0   |  25  |  2.56<H>  10    137  |  102  |  34<H>  ----------------------------<  118<H>  5.1   |  22  |  1.96<H>    Ca    8.0<L>      24 Oct 2017 12:58  Ca    8.1<L>      24 Oct 2017 03:50  Phos  1.6     10-24  Phos  2.3     10-  Mg     2.6     10-24  Mg     2.5     10-24    TPro  5.4<L>  /  Alb  2.8<L>  /  TBili  1.6<H>  /  DBili  x   /  AST  61<H>  /  ALT  83<H>  /  AlkPhos  94  10-24  TPro  5.8<L>  /  Alb  2.6<L>  /  TBili  1.7<H>  /  DBili  x   /  AST  69<H>  /  ALT  87<H>  /  AlkPhos  98  10-24      proBNP:   Lipid Profile:   HgA1c:   TSH:       CARDIAC MARKERS:            TELEMETRY: 	    ECG:  	  RADIOLOGY:  OTHER: 	    PREVIOUS DIAGNOSTIC TESTING:    [ ] Echocardiogram:  [ ]  Catheterization:  [ ] Stress Test:  	  	  ASSESSMENT/PLAN: 	    83M pmhx of AF on pradaxa initially presented on 10/15/17 with chest pain/diaphoresis/ dyspnea to OSH, IW-STEMI emergently transferred here for [facility] now acute right heart failure and cardiogenic shock s/p IABP (explanted 10/19), RP impella for RV support (explanted 10/20), remains on  5 with borderline low CI.      1. cardiogenic shock  - continue  5, would start captopril 6.25 tid in efforts to wean .  - cont cvvh, goal is net negative 500 cc as has extravascular fluid and needs to be mobilized    2.CAD  -cont dapt, statin    3. afib  cont heparin drbg Barajas MD  p [phone]   evening [phone]

## 2017-10-27 NOTE — PROGRESS NOTE ADULT - ASSESSMENT
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 Previously a code stoke was called and Head CT did not show new acute findings. Repeat Head CT was done given the focal neurological deficits but it was unchanged. EEG did not show signs of seizure. Neurology team following   Due to agitation, patient was started on precedex drip, does not respond to haldol     # CV:   - CAD s/p RCA stent: continue hep gtt, aspirin, brillinta, lipitor  - Atrial fibrillation: Was on amiodarone load, however, due to tachy isabelle, amiodarone was held   - Cardiogenic shock s/p TVP and impella s/ removal: Continue dobutmine 5  - Tachy-isabelle s/p TVP, did not pace overnight     #Respiratory: CXR Small left pleural effusion with adjacent atelectasis. ENT scope showed tracheomalacia   - decadron 4mg for three days with protonix and ISS  - Slightly tachypneic, but comfortable   - Continue suctioning due to increased secretions     #GI:   - Currently on NGT w/ tube feeds with pivot because patient was having loose bm with vital  - dysphagia eval will be repeated     #Metabolic/Renal  - Rhabdo: continue CVVH, net negative: 1Liter, now removing 100cc/ hr   - Patient is anuric at this time, will continue CCVH until HD stable as per Renal Team  - Replete Mg, Ph, K as needed     #Heme  - H&H stable  - Metamyelocytes and blasts on CBC, Heme consulted for peripheral smear     # Vascular  - Patient currently has a R shilley and L central line in place     # Skin  - Breakdown in R groin at the site of impella, wound care following    # Infectious  - Blood culture gram variable beaded rods acid fast (10/19), gram positive cocci in clusters (10/24)  - Repeat blood culturespending  - Continue vanc D4 and aztreonam D2  - ID team following  - Echocardiogram today to rule out endocarditis     # Endocrine   - Goiter TSH 0.05 T4 10.4 T3 85 2/2 sick euthyroid syndrome, no intervention at this time  - Endocrinology following (house) (patient's endocrinologist is Dr. Baldomero Roberson in [place])    #DVT ppx: hep gtt  #Dispo - PT consulted

## 2017-10-27 NOTE — PROGRESS NOTE ADULT - ATTENDING COMMENTS
ARF, multifactorial, AMI, CHF, bacteremia  1.  ATN--oligoanuric.  CVVHDF requiring but as hemodynamics improve (probably after infection rxed) may be eligible for standard HD  2.  CHF--myocardial dysfunction with increased metabolic demand from infection.  UF optimizing, trend lactate for overly aggressive volume removal.    3.  Bacteremia--rx nd w/u in progress.  Catheter infection a consideration.  Well dialyzed and does not require CVVHDF for metabolic optimization.

## 2017-10-27 NOTE — CHART NOTE - NSCHARTNOTEFT_GEN_A_CORE
====================  CCU MIDNIGHT ROUNDS  ====================  KAMLESH PATEL  [id number]  Patient is a 83y old  Male who presents with a chief complaint of SOB, chest pain, diaphoresis (15 Oct 2017 17:59)  ====================  SUMMARY:  ====================  83M pmhx of AF on pradaxa initially presented on 10/15/17 with chest pain/diaphoresis/ dyspnea to OSH, IW-STEMI emergently transferred here for C c/b VF arrest requiring IABP followed by pRCA stent c/b now acute right heart failure and cardiogenic shock s/p IABP (explanted 10/19), RP impella for RV support (explanted 10/20), unable to be weaned from  as venous sat drops so remains on  5 with borderline low CI. RVSWI low at 4.4. TTE reviewed and appears to have hyperdynamic LV with moderate RV dysfunction. Had slow afib today to 30-40 with significant pauses with altered mental status d/t likely global hypoperfusion     ====================  NEW EVENTS:  ====================  More awake, and alert.  Oriented and cooperative.  Following commands.  CVVHD in progress with no fluid removal.  Afebrile, leukocytosis improving.  H/H stable.    MEDICATIONS  (STANDING):  aspirin  chewable 81 milliGRAM(s) Oral daily  atorvastatin 80 milliGRAM(s) Oral at bedtime  chlorhexidine 0.12% Liquid 15 milliLiter(s) Swish and Spit two times a day  dextrose 5%. 1000 milliLiter(s) (50 mL/Hr) IV Continuous <Continuous>  dextrose 50% Injectable 12.5 Gram(s) IV Push once  dextrose 50% Injectable 25 Gram(s) IV Push once  dextrose 50% Injectable 25 Gram(s) IV Push once  DOBUTamine Infusion 2.5 MICROgram(s)/kG/Min (6.982 mL/Hr) IV Continuous <Continuous>  heparin  Infusion. 1500 Unit(s)/Hr (15 mL/Hr) IV Continuous <Continuous>  imipenem/cilastatin  IVPB      imipenem/cilastatin  IVPB 250 milliGRAM(s) IV Intermittent every 6 hours  influenza   Vaccine 0.5 milliLiter(s) IntraMuscular once  insulin lispro (HumaLOG) corrective regimen sliding scale   SubCutaneous every 6 hours  lactobacillus acidophilus and bulgaricus Chewable 1 Tablet(s) Chew daily  pantoprazole  Injectable 40 milliGRAM(s) IV Push daily  risperiDONE   Tablet 0.5 milliGRAM(s) Oral every 12 hours  ticagrelor 90 milliGRAM(s) Oral two times a day  vancomycin  IVPB 1000 milliGRAM(s) IV Intermittent every 12 hours    MEDICATIONS  (PRN):  acetaminophen    Suspension 930 milliGRAM(s) Oral every 6 hours PRN For Temp greater than 38 C (100.4 F)  acetaminophen    Suspension. 930 milliGRAM(s) Oral every 4 hours PRN Moderate Pain (4 - 6)  ALBUTerol/ipratropium for Nebulization 3 milliLiter(s) Nebulizer every 6 hours PRN Shortness of Breath and/or Wheezing  dextrose Gel 1 Dose(s) Oral once PRN Blood Glucose LESS THAN 70 milliGRAM(s)/deciliter  glucagon  Injectable 1 milliGRAM(s) IntraMuscular once PRN Glucose LESS THAN 70 milligrams/deciliter  haloperidol     Tablet 0.5 milliGRAM(s) Oral every 12 hours PRN agitaion  heparin  Injectable 7500 Unit(s) IV Push every 6 hours PRN For aPTT less than 40  heparin  Injectable 3500 Unit(s) IV Push every 6 hours PRN For aPTT between 40 - 57    ====================  VITALS (Last 12 hrs):  ====================    T(C): 36.4 (10-27-17 @ 20:00), Max: 36.4 (10-27-17 @ 15:00)  T(F): 97.6 (10-27-17 @ 20:00), Max: 97.6 (10-27-17 @ 20:00)  HR: 88 (10-27-17 @ 22:00) (70 - 96)  BP: 99/66 (10-27-17 @ 22:00) (86/63 - 141/86)  BP(mean): 80 (10-27-17 @ 22:00) (73 - 127)  ABP: --  ABP(mean): --  RR: 20 (10-27-17 @ 22:00) (16 - 29)  SpO2: 100% (10-27-17 @ 22:00) (99% - 100%)  Wt(kg): --  CVP(mm Hg): --  CVP(cm H2O): --  CO: --  CI: --  PA: --  PA(mean): --  PCWP: --  SVR: --  PVR: --    I&O's Summary    26 Oct 2017 07:  -  27 Oct 2017 07:00  --------------------------------------------------------  IN: 3097.8 mL / OUT: 5094 mL / NET: -1996.2 mL    27 Oct 2017 07:  -  27 Oct 2017 23:22  --------------------------------------------------------  IN: 2665.6 mL / OUT: 2404 mL / NET: 261.6 mL    ====================  NEW LABS:  ====================    10-27    137  |  98  |  39<H>  ----------------------------<  111<H>  4.6   |  28  |  1.72<H>    Ca    9.0      27 Oct 2017 21:13  Phos  2.6     10-27  Mg     2.5     10-27    TPro  6.6  /  Alb  3.2<L>  /  TBili  1.2  /  DBili  x   /  AST  43<H>  /  ALT  64<H>  /  AlkPhos  86  10-27    PT/INR - ( 27 Oct 2017 04:23 )   PT: 14.4 sec;   INR: 1.31 ratio      PTT - ( 27 Oct 2017 04:23 )  PTT:74.1 sec    ====================  PLAN:  ====================    - Continue DAPT and statin  - Continue CVVH per renal  - Monitor serum lactate  - Monitor WBC, H/H  - Vascular consult called early during the shift, still awaiting for evaluation for right groin PSA.  Paged beeper [phone], awaiting callback  - H/H stable, leukocytosis improving  - Continue Dobutamine @ 2.5 mcg/kg/min for now; goal to wean in am per HF recs  - Give fluid as needed  - Monitor culture reports  - continue Abx; follow ID recs  - Reconsult for speech and swallow in am    Terra Enriquez CCU NP  Beeper #[id number]  Spectra # [phone] ====================  CCU MIDNIGHT ROUNDS  ====================  KAMLESH PATEL  [id number]  Patient is a 83y old  Male who presents with a chief complaint of SOB, chest pain, diaphoresis (15 Oct 2017 17:59)  ====================  SUMMARY:  ====================  83M pmhx of AF on pradaxa initially presented on 10/15/17 with chest pain/diaphoresis/ dyspnea to OSH, IW-STEMI emergently transferred here for C c/b VF arrest requiring IABP followed by pRCA stent c/b now acute right heart failure and cardiogenic shock s/p IABP (explanted 10/19), RP impella for RV support (explanted 10/20), unable to be weaned from  as venous sat drops so remains on  5 with borderline low CI. RVSWI low at 4.4. TTE reviewed and appears to have hyperdynamic LV with moderate RV dysfunction. Had slow afib today to 30-40 with significant pauses with altered mental status d/t likely global hypoperfusion     ====================  NEW EVENTS:  ====================  More awake, and alert.  Oriented and cooperative.  Following commands.  CVVHD in progress with no fluid removal.  Afebrile, leukocytosis improving.  H/H stable.    MEDICATIONS  (STANDING):  aspirin  chewable 81 milliGRAM(s) Oral daily  atorvastatin 80 milliGRAM(s) Oral at bedtime  chlorhexidine 0.12% Liquid 15 milliLiter(s) Swish and Spit two times a day  dextrose 5%. 1000 milliLiter(s) (50 mL/Hr) IV Continuous <Continuous>  dextrose 50% Injectable 12.5 Gram(s) IV Push once  dextrose 50% Injectable 25 Gram(s) IV Push once  dextrose 50% Injectable 25 Gram(s) IV Push once  DOBUTamine Infusion 2.5 MICROgram(s)/kG/Min (6.982 mL/Hr) IV Continuous <Continuous>  heparin  Infusion. 1500 Unit(s)/Hr (15 mL/Hr) IV Continuous <Continuous>  imipenem/cilastatin  IVPB      imipenem/cilastatin  IVPB 250 milliGRAM(s) IV Intermittent every 6 hours  influenza   Vaccine 0.5 milliLiter(s) IntraMuscular once  insulin lispro (HumaLOG) corrective regimen sliding scale   SubCutaneous every 6 hours  lactobacillus acidophilus and bulgaricus Chewable 1 Tablet(s) Chew daily  pantoprazole  Injectable 40 milliGRAM(s) IV Push daily  risperiDONE   Tablet 0.5 milliGRAM(s) Oral every 12 hours  ticagrelor 90 milliGRAM(s) Oral two times a day  vancomycin  IVPB 1000 milliGRAM(s) IV Intermittent every 12 hours    MEDICATIONS  (PRN):  acetaminophen    Suspension 930 milliGRAM(s) Oral every 6 hours PRN For Temp greater than 38 C (100.4 F)  acetaminophen    Suspension. 930 milliGRAM(s) Oral every 4 hours PRN Moderate Pain (4 - 6)  ALBUTerol/ipratropium for Nebulization 3 milliLiter(s) Nebulizer every 6 hours PRN Shortness of Breath and/or Wheezing  dextrose Gel 1 Dose(s) Oral once PRN Blood Glucose LESS THAN 70 milliGRAM(s)/deciliter  glucagon  Injectable 1 milliGRAM(s) IntraMuscular once PRN Glucose LESS THAN 70 milligrams/deciliter  haloperidol     Tablet 0.5 milliGRAM(s) Oral every 12 hours PRN agitaion  heparin  Injectable 7500 Unit(s) IV Push every 6 hours PRN For aPTT less than 40  heparin  Injectable 3500 Unit(s) IV Push every 6 hours PRN For aPTT between 40 - 57    ====================  VITALS (Last 12 hrs):  ====================    T(C): 36.4 (10-27-17 @ 20:00), Max: 36.4 (10-27-17 @ 15:00)  T(F): 97.6 (10-27-17 @ 20:00), Max: 97.6 (10-27-17 @ 20:00)  HR: 88 (10-27-17 @ 22:00) (70 - 96)  BP: 99/66 (10-27-17 @ 22:00) (86/63 - 141/86)  BP(mean): 80 (10-27-17 @ 22:00) (73 - 127)  ABP: --  ABP(mean): --  RR: 20 (10-27-17 @ 22:00) (16 - 29)  SpO2: 100% (10-27-17 @ 22:00) (99% - 100%)  Wt(kg): --  CVP(mm Hg): --  CVP(cm H2O): --  CO: --  CI: --  PA: --  PA(mean): --  PCWP: --  SVR: --  PVR: --    I&O's Summary    26 Oct 2017 07:  -  27 Oct 2017 07:00  --------------------------------------------------------  IN: 3097.8 mL / OUT: 5094 mL / NET: -1996.2 mL    27 Oct 2017 07:  -  27 Oct 2017 23:22  --------------------------------------------------------  IN: 2665.6 mL / OUT: 2404 mL / NET: 261.6 mL    ====================  NEW LABS:  ====================    10-27    137  |  98  |  39<H>  ----------------------------<  111<H>  4.6   |  28  |  1.72<H>    Ca    9.0      27 Oct 2017 21:13  Phos  2.6     10-27  Mg     2.5     10-27    TPro  6.6  /  Alb  3.2<L>  /  TBili  1.2  /  DBili  x   /  AST  43<H>  /  ALT  64<H>  /  AlkPhos  86  10-27    PT/INR - ( 27 Oct 2017 04:23 )   PT: 14.4 sec;   INR: 1.31 ratio      PTT - ( 27 Oct 2017 04:23 )  PTT:74.1 sec    ====================  PLAN:  ====================    - Continue DAPT and statin  - Continue CVVH per renal  - Monitor serum lactate  - Monitor WBC, H/H  - Vascular consult called early during the shift, still awaiting for evaluation for right groin PSA.  Paged beeper [phone], aware of consult.  Paged [phone], awaiting callback  - H/H stable, leukocytosis improving  - Continue Dobutamine @ 2.5 mcg/kg/min for now; goal to wean in am per HF recs  - Give fluid as needed  - Monitor culture reports  - continue Abx; follow ID recs  - Reconsult for speech and swallow in am    Terra Enriquez CCU NP  Beeper #[id number]  Spectra # [phone] ====================  CCU MIDNIGHT ROUNDS  ====================  KAMLESH PATEL  [id number]  Patient is a 83y old  Male who presents with a chief complaint of SOB, chest pain, diaphoresis (15 Oct 2017 17:59)  ====================  SUMMARY:  ====================  83M pmhx of AF on pradaxa initially presented on 10/15/17 with chest pain/diaphoresis/ dyspnea to OSH, IW-STEMI emergently transferred here for C c/b VF arrest requiring IABP followed by pRCA stent c/b now acute right heart failure and cardiogenic shock s/p IABP (explanted 10/19), RP impella for RV support (explanted 10/20), unable to be weaned from  as venous sat drops so remains on  5 with borderline low CI. RVSWI low at 4.4. TTE reviewed and appears to have hyperdynamic LV with moderate RV dysfunction. Had slow afib today to 30-40 with significant pauses with altered mental status d/t likely global hypoperfusion     ====================  NEW EVENTS:  ====================  More awake, and alert.  Oriented and cooperative.  Following commands.  CVVHD in progress with no fluid removal.  Afebrile, leukocytosis improving.  H/H stable.    MEDICATIONS  (STANDING):  aspirin  chewable 81 milliGRAM(s) Oral daily  atorvastatin 80 milliGRAM(s) Oral at bedtime  chlorhexidine 0.12% Liquid 15 milliLiter(s) Swish and Spit two times a day  dextrose 5%. 1000 milliLiter(s) (50 mL/Hr) IV Continuous <Continuous>  dextrose 50% Injectable 12.5 Gram(s) IV Push once  dextrose 50% Injectable 25 Gram(s) IV Push once  dextrose 50% Injectable 25 Gram(s) IV Push once  DOBUTamine Infusion 2.5 MICROgram(s)/kG/Min (6.982 mL/Hr) IV Continuous <Continuous>  heparin  Infusion. 1500 Unit(s)/Hr (15 mL/Hr) IV Continuous <Continuous>  imipenem/cilastatin  IVPB      imipenem/cilastatin  IVPB 250 milliGRAM(s) IV Intermittent every 6 hours  influenza   Vaccine 0.5 milliLiter(s) IntraMuscular once  insulin lispro (HumaLOG) corrective regimen sliding scale   SubCutaneous every 6 hours  lactobacillus acidophilus and bulgaricus Chewable 1 Tablet(s) Chew daily  pantoprazole  Injectable 40 milliGRAM(s) IV Push daily  risperiDONE   Tablet 0.5 milliGRAM(s) Oral every 12 hours  ticagrelor 90 milliGRAM(s) Oral two times a day  vancomycin  IVPB 1000 milliGRAM(s) IV Intermittent every 12 hours    MEDICATIONS  (PRN):  acetaminophen    Suspension 930 milliGRAM(s) Oral every 6 hours PRN For Temp greater than 38 C (100.4 F)  acetaminophen    Suspension. 930 milliGRAM(s) Oral every 4 hours PRN Moderate Pain (4 - 6)  ALBUTerol/ipratropium for Nebulization 3 milliLiter(s) Nebulizer every 6 hours PRN Shortness of Breath and/or Wheezing  dextrose Gel 1 Dose(s) Oral once PRN Blood Glucose LESS THAN 70 milliGRAM(s)/deciliter  glucagon  Injectable 1 milliGRAM(s) IntraMuscular once PRN Glucose LESS THAN 70 milligrams/deciliter  haloperidol     Tablet 0.5 milliGRAM(s) Oral every 12 hours PRN agitaion  heparin  Injectable 7500 Unit(s) IV Push every 6 hours PRN For aPTT less than 40  heparin  Injectable 3500 Unit(s) IV Push every 6 hours PRN For aPTT between 40 - 57    ====================  VITALS (Last 12 hrs):  ====================    T(C): 36.4 (10-27-17 @ 20:00), Max: 36.4 (10-27-17 @ 15:00)  T(F): 97.6 (10-27-17 @ 20:00), Max: 97.6 (10-27-17 @ 20:00)  HR: 88 (10-27-17 @ 22:00) (70 - 96)  BP: 99/66 (10-27-17 @ 22:00) (86/63 - 141/86)  BP(mean): 80 (10-27-17 @ 22:00) (73 - 127)  ABP: --  ABP(mean): --  RR: 20 (10-27-17 @ 22:00) (16 - 29)  SpO2: 100% (10-27-17 @ 22:00) (99% - 100%)  Wt(kg): --  CVP(mm Hg): --  CVP(cm H2O): --  CO: --  CI: --  PA: --  PA(mean): --  PCWP: --  SVR: --  PVR: --    I&O's Summary    26 Oct 2017 07:  -  27 Oct 2017 07:00  --------------------------------------------------------  IN: 3097.8 mL / OUT: 5094 mL / NET: -1996.2 mL    27 Oct 2017 07:  -  27 Oct 2017 23:22  --------------------------------------------------------  IN: 2665.6 mL / OUT: 2404 mL / NET: 261.6 mL    ====================  NEW LABS:  ====================    10-27    137  |  98  |  39<H>  ----------------------------<  111<H>  4.6   |  28  |  1.72<H>    Ca    9.0      27 Oct 2017 21:13  Phos  2.6     10-27  Mg     2.5     10-27    TPro  6.6  /  Alb  3.2<L>  /  TBili  1.2  /  DBili  x   /  AST  43<H>  /  ALT  64<H>  /  AlkPhos  86  10-27    PT/INR - ( 27 Oct 2017 04:23 )   PT: 14.4 sec;   INR: 1.31 ratio      PTT - ( 27 Oct 2017 04:23 )  PTT:74.1 sec    ====================  PLAN:  ====================    - Continue DAPT and statin  - Continue CVVH per renal  - Monitor serum lactate  - Monitor WBC, H/H  - Vascular consult called early during the shift, still awaiting for evaluation for right groin PSA.  Paged beeper [phone], aware of consult.  Paged [phone], awaiting callback  - H/H stable, leukocytosis improving  - Continue Dobutamine @ 2.5 mcg/kg/min for now; goal to wean in am per HF recs  - Give fluid as needed  - Monitor culture reports  - continue Abx; follow ID recs  - Reconsult for speech and swallow in am    Terra Enriquez CCU NP  Beeper #[id number]  Spectra # [phone]    Fellow Addendum:     83M w/AF on pradaxa initially presented on 10/15/17 with chest pain/diaphoresis/ dyspnea to OSH, IW-STEMI emergently transferred here for C c/b VF arrest requiring IABP followed by pRCA stent c/b now acute right heart failure and cardiogenic shock s/p IABP (explanted 10/19), RP impella for RV support (explanted 10/20), remains on  5 with borderline low CI now w/pseudoaneuysm.     - Speech and swallow in am; NG tube in place  - Lactate notably elevated 4.2 this afternoon with leukocytosis and newly found pseudoaneurysm with concern for infection-- Discussed with HF Dr. Rosen   - CVV--net even   - Cx pending, on ID   - Vasc consulted for pseudoaneurysm  - Goal to wean dobutamine as tolerated. ====================  CCU MIDNIGHT ROUNDS  ====================  KAMLESH PATEL  [id number]  Patient is a 83y old  Male who presents with a chief complaint of SOB, chest pain, diaphoresis (15 Oct 2017 17:59)  ====================  SUMMARY:  ====================  83M pmhx of AF on pradaxa initially presented on 10/15/17 with chest pain/diaphoresis/ dyspnea to OSH, IW-STEMI emergently transferred here for C c/b VF arrest requiring IABP followed by pRCA stent c/b now acute right heart failure and cardiogenic shock s/p IABP (explanted 10/19), RP impella for RV support (explanted 10/20), unable to be weaned from  as venous sat drops so remains on  5 with borderline low CI. RVSWI low at 4.4. TTE reviewed and appears to have hyperdynamic LV with moderate RV dysfunction. Had slow afib today to 30-40 with significant pauses with altered mental status d/t likely global hypoperfusion     ====================  NEW EVENTS:  ====================  More awake, and alert.  Oriented and cooperative.  Following commands.  CVVHD in progress with no fluid removal.  Afebrile, leukocytosis improving.  H/H stable.    MEDICATIONS  (STANDING):  aspirin  chewable 81 milliGRAM(s) Oral daily  atorvastatin 80 milliGRAM(s) Oral at bedtime  chlorhexidine 0.12% Liquid 15 milliLiter(s) Swish and Spit two times a day  dextrose 5%. 1000 milliLiter(s) (50 mL/Hr) IV Continuous <Continuous>  dextrose 50% Injectable 12.5 Gram(s) IV Push once  dextrose 50% Injectable 25 Gram(s) IV Push once  dextrose 50% Injectable 25 Gram(s) IV Push once  DOBUTamine Infusion 2.5 MICROgram(s)/kG/Min (6.982 mL/Hr) IV Continuous <Continuous>  heparin  Infusion. 1500 Unit(s)/Hr (15 mL/Hr) IV Continuous <Continuous>  imipenem/cilastatin  IVPB      imipenem/cilastatin  IVPB 250 milliGRAM(s) IV Intermittent every 6 hours  influenza   Vaccine 0.5 milliLiter(s) IntraMuscular once  insulin lispro (HumaLOG) corrective regimen sliding scale   SubCutaneous every 6 hours  lactobacillus acidophilus and bulgaricus Chewable 1 Tablet(s) Chew daily  pantoprazole  Injectable 40 milliGRAM(s) IV Push daily  risperiDONE   Tablet 0.5 milliGRAM(s) Oral every 12 hours  ticagrelor 90 milliGRAM(s) Oral two times a day  vancomycin  IVPB 1000 milliGRAM(s) IV Intermittent every 12 hours    MEDICATIONS  (PRN):  acetaminophen    Suspension 930 milliGRAM(s) Oral every 6 hours PRN For Temp greater than 38 C (100.4 F)  acetaminophen    Suspension. 930 milliGRAM(s) Oral every 4 hours PRN Moderate Pain (4 - 6)  ALBUTerol/ipratropium for Nebulization 3 milliLiter(s) Nebulizer every 6 hours PRN Shortness of Breath and/or Wheezing  dextrose Gel 1 Dose(s) Oral once PRN Blood Glucose LESS THAN 70 milliGRAM(s)/deciliter  glucagon  Injectable 1 milliGRAM(s) IntraMuscular once PRN Glucose LESS THAN 70 milligrams/deciliter  haloperidol     Tablet 0.5 milliGRAM(s) Oral every 12 hours PRN agitaion  heparin  Injectable 7500 Unit(s) IV Push every 6 hours PRN For aPTT less than 40  heparin  Injectable 3500 Unit(s) IV Push every 6 hours PRN For aPTT between 40 - 57    ====================  VITALS (Last 12 hrs):  ====================    T(C): 36.4 (10-27-17 @ 20:00), Max: 36.4 (10-27-17 @ 15:00)  T(F): 97.6 (10-27-17 @ 20:00), Max: 97.6 (10-27-17 @ 20:00)  HR: 88 (10-27-17 @ 22:00) (70 - 96)  BP: 99/66 (10-27-17 @ 22:00) (86/63 - 141/86)  BP(mean): 80 (10-27-17 @ 22:00) (73 - 127)  ABP: --  ABP(mean): --  RR: 20 (10-27-17 @ 22:00) (16 - 29)  SpO2: 100% (10-27-17 @ 22:00) (99% - 100%)  Wt(kg): --  CVP(mm Hg): --  CVP(cm H2O): --  CO: --  CI: --  PA: --  PA(mean): --  PCWP: --  SVR: --  PVR: --    I&O's Summary    26 Oct 2017 07:  -  27 Oct 2017 07:00  --------------------------------------------------------  IN: 3097.8 mL / OUT: 5094 mL / NET: -1996.2 mL    27 Oct 2017 07:  -  27 Oct 2017 23:22  --------------------------------------------------------  IN: 2665.6 mL / OUT: 2404 mL / NET: 261.6 mL    ====================  NEW LABS:  ====================    10-27    137  |  98  |  39<H>  ----------------------------<  111<H>  4.6   |  28  |  1.72<H>    Ca    9.0      27 Oct 2017 21:13  Phos  2.6     10-27  Mg     2.5     10-27    TPro  6.6  /  Alb  3.2<L>  /  TBili  1.2  /  DBili  x   /  AST  43<H>  /  ALT  64<H>  /  AlkPhos  86  10-27    PT/INR - ( 27 Oct 2017 04:23 )   PT: 14.4 sec;   INR: 1.31 ratio      PTT - ( 27 Oct 2017 04:23 )  PTT:74.1 sec    ====================  PLAN:  ====================    - Continue DAPT and statin  - Continue CVVH per renal  - Monitor serum lactate  - Monitor WBC, H/H  - Vascular consult called early during the shift, still awaiting for evaluation for right groin PSA.  Paged beeper [phone], aware of consult.  Paged [phone], awaiting callback  - H/H stable, leukocytosis improving  - Continue Dobutamine @ 2.5 mcg/kg/min for now; goal to wean in am per HF recs  - Give fluid as needed  - Monitor culture reports  - continue Abx; follow ID recs  - Reconsult for speech and swallow in am    Terra Enriquez CCU NP  Beeper #[id number]  Spectra # [phone]    Fellow Addendum:     83M w/AF on pradaxa initially presented on 10/15/17 with chest pain/diaphoresis/ dyspnea to OSH, IW-STEMI emergently transferred here for C c/b VF arrest requiring IABP followed by pRCA stent c/b now acute right heart failure and cardiogenic shock s/p IABP (explanted 10/19), RP impella for RV support (explanted 10/20), remains on  5 with borderline low CI now w/pseudoaneuysm.     - Speech and swallow in am; NG tube in place  - Lactate notably elevated 4.2 this afternoon with leukocytosis and newly found pseudoaneurysm with concern for infection-- Discussed with HF Dr. Rosen   - CV--net even   - Cx pending, on ID   - Vasc consulted for pseudoaneurysm; Discussed ultrasound and recommending CT Abd/pelvis for better evaluation as limited US; Will f/u   - Goal to wean dobutamine as tolerated. ====================  CCU MIDNIGHT ROUNDS  ====================  KAMLESH PATEL  [id number]  Patient is a 83y old  Male who presents with a chief complaint of SOB, chest pain, diaphoresis (15 Oct 2017 17:59)  ====================  SUMMARY:  ====================  83M pmhx of AF on pradaxa initially presented on 10/15/17 with chest pain/diaphoresis/ dyspnea to OSH, IW-STEMI emergently transferred here for C c/b VF arrest requiring IABP followed by pRCA stent c/b now acute right heart failure and cardiogenic shock s/p IABP (explanted 10/19), RP impella for RV support (explanted 10/20), unable to be weaned from  as venous sat drops so remains on  5 with borderline low CI. RVSWI low at 4.4. TTE reviewed and appears to have hyperdynamic LV with moderate RV dysfunction. Had slow afib today to 30-40 with significant pauses with altered mental status d/t likely global hypoperfusion     ====================  NEW EVENTS:  ====================  More awake, and alert.  Oriented and cooperative.  Following commands.  CVVHD in progress with no fluid removal.  Afebrile, leukocytosis improving.  H/H stable.    MEDICATIONS  (STANDING):  aspirin  chewable 81 milliGRAM(s) Oral daily  atorvastatin 80 milliGRAM(s) Oral at bedtime  chlorhexidine 0.12% Liquid 15 milliLiter(s) Swish and Spit two times a day  dextrose 5%. 1000 milliLiter(s) (50 mL/Hr) IV Continuous <Continuous>  dextrose 50% Injectable 12.5 Gram(s) IV Push once  dextrose 50% Injectable 25 Gram(s) IV Push once  dextrose 50% Injectable 25 Gram(s) IV Push once  DOBUTamine Infusion 2.5 MICROgram(s)/kG/Min (6.982 mL/Hr) IV Continuous <Continuous>  heparin  Infusion. 1500 Unit(s)/Hr (15 mL/Hr) IV Continuous <Continuous>  imipenem/cilastatin  IVPB      imipenem/cilastatin  IVPB 250 milliGRAM(s) IV Intermittent every 6 hours  influenza   Vaccine 0.5 milliLiter(s) IntraMuscular once  insulin lispro (HumaLOG) corrective regimen sliding scale   SubCutaneous every 6 hours  lactobacillus acidophilus and bulgaricus Chewable 1 Tablet(s) Chew daily  pantoprazole  Injectable 40 milliGRAM(s) IV Push daily  risperiDONE   Tablet 0.5 milliGRAM(s) Oral every 12 hours  ticagrelor 90 milliGRAM(s) Oral two times a day  vancomycin  IVPB 1000 milliGRAM(s) IV Intermittent every 12 hours    MEDICATIONS  (PRN):  acetaminophen    Suspension 930 milliGRAM(s) Oral every 6 hours PRN For Temp greater than 38 C (100.4 F)  acetaminophen    Suspension. 930 milliGRAM(s) Oral every 4 hours PRN Moderate Pain (4 - 6)  ALBUTerol/ipratropium for Nebulization 3 milliLiter(s) Nebulizer every 6 hours PRN Shortness of Breath and/or Wheezing  dextrose Gel 1 Dose(s) Oral once PRN Blood Glucose LESS THAN 70 milliGRAM(s)/deciliter  glucagon  Injectable 1 milliGRAM(s) IntraMuscular once PRN Glucose LESS THAN 70 milligrams/deciliter  haloperidol     Tablet 0.5 milliGRAM(s) Oral every 12 hours PRN agitaion  heparin  Injectable 7500 Unit(s) IV Push every 6 hours PRN For aPTT less than 40  heparin  Injectable 3500 Unit(s) IV Push every 6 hours PRN For aPTT between 40 - 57    ====================  VITALS (Last 12 hrs):  ====================    T(C): 36.4 (10-27-17 @ 20:00), Max: 36.4 (10-27-17 @ 15:00)  T(F): 97.6 (10-27-17 @ 20:00), Max: 97.6 (10-27-17 @ 20:00)  HR: 88 (10-27-17 @ 22:00) (70 - 96)  BP: 99/66 (10-27-17 @ 22:00) (86/63 - 141/86)  BP(mean): 80 (10-27-17 @ 22:00) (73 - 127)  ABP: --  ABP(mean): --  RR: 20 (10-27-17 @ 22:00) (16 - 29)  SpO2: 100% (10-27-17 @ 22:00) (99% - 100%)  Wt(kg): --  CVP(mm Hg): --  CVP(cm H2O): --  CO: --  CI: --  PA: --  PA(mean): --  PCWP: --  SVR: --  PVR: --    I&O's Summary    26 Oct 2017 07:  -  27 Oct 2017 07:00  --------------------------------------------------------  IN: 3097.8 mL / OUT: 5094 mL / NET: -1996.2 mL    27 Oct 2017 07:  -  27 Oct 2017 23:22  --------------------------------------------------------  IN: 2665.6 mL / OUT: 2404 mL / NET: 261.6 mL    ====================  NEW LABS:  ====================    10-27    137  |  98  |  39<H>  ----------------------------<  111<H>  4.6   |  28  |  1.72<H>    Ca    9.0      27 Oct 2017 21:13  Phos  2.6     10-27  Mg     2.5     10-27    TPro  6.6  /  Alb  3.2<L>  /  TBili  1.2  /  DBili  x   /  AST  43<H>  /  ALT  64<H>  /  AlkPhos  86  10-27    PT/INR - ( 27 Oct 2017 04:23 )   PT: 14.4 sec;   INR: 1.31 ratio      PTT - ( 27 Oct 2017 04:23 )  PTT:74.1 sec    ====================  PLAN:  ====================    - Continue DAPT and statin  - Continue CVVH per renal  - Monitor serum lactate  - Monitor WBC, H/H  - Vascular consult called early during the shift, still awaiting for evaluation for right groin PSA.  Paged beeper [phone], aware of consult.  Paged [phone], awaiting callback  - H/H stable, leukocytosis improving  - Continue Dobutamine @ 2.5 mcg/kg/min for now; goal to wean in am per HF recs  - Give fluid as needed  - Monitor culture reports  - continue Abx; follow ID recs  - Reconsult for speech and swallow in am    Terra Enriquez CCU NP  Beeper #[id number]  Spectra # [phone]    Fellow Addendum:     83M w/AF on pradaxa initially presented on 10/15/17 with chest pain/diaphoresis/ dyspnea to OSH, IW-STEMI emergently transferred here for [facility] c/b VF arrest requiring IABP followed by pRCA stent c/b now acute right heart failure and cardiogenic shock s/p IABP (explanted 10/19), RP impella for RV support (explanted 10/20), remains on  5 with borderline low CI now w/pseudoaneuysm.     - Speech and swallow in am; NG tube in place  - Lactate notably elevated 4.2 this afternoon with leukocytosis and newly found pseudoaneurysm with concern for infection-- Discussed with HF Dr. Rosen   - [facility]--net even   - Cx pending, on ID   - Vasc consulted for pseudoaneurysm; Discussed ultrasound and recommending CT Abd/pelvis for better evaluation as limited US; Will f/u   - Goal to wean dobutamine as tolerated.    Addendum:  Discussed case with Vascular Resident, Dr. Dhillon who recommends CTA tonight to reevaluate right pseudoaneurysm.  Radiology Resident, Dr. Cortez concerned about patient's renal function, that it may not be able to recover after contrast exposure.  Discussed urgency of CTA with Vascular, agreed to have it done in am pending discussion with renal.    Terra Enriquez NP  Cardiology

## 2017-10-27 NOTE — CHART NOTE - NSCHARTNOTEFT_GEN_A_CORE
====================  CCU MIDNIGHT ROUNDS  ====================    KAMLESH PATEL  [id number]  Patient is a 83y old  Male who presents with a chief complaint of SOB, chest pain, diaphoresis (15 Oct 2017 17:59)    ====================  SUMMARY:  ====================      ====================  NEW EVENTS:  ====================    MEDICATIONS  (STANDING):  aspirin  chewable 81 milliGRAM(s) Oral daily  atorvastatin 80 milliGRAM(s) Oral at bedtime  chlorhexidine 0.12% Liquid 15 milliLiter(s) Swish and Spit two times a day  dexamethasone  Injectable 4 milliGRAM(s) IV Push two times a day  dextrose 5%. 1000 milliLiter(s) (50 mL/Hr) IV Continuous <Continuous>  dextrose 50% Injectable 12.5 Gram(s) IV Push once  dextrose 50% Injectable 25 Gram(s) IV Push once  dextrose 50% Injectable 25 Gram(s) IV Push once  DOBUTamine Infusion 5 MICROgram(s)/kG/Min (13.965 mL/Hr) IV Continuous <Continuous>  heparin  Infusion. 1500 Unit(s)/Hr (15 mL/Hr) IV Continuous <Continuous>  imipenem/cilastatin  IVPB      imipenem/cilastatin  IVPB 250 milliGRAM(s) IV Intermittent every 6 hours  influenza   Vaccine 0.5 milliLiter(s) IntraMuscular once  insulin lispro (HumaLOG) corrective regimen sliding scale   SubCutaneous every 6 hours  lactobacillus acidophilus and bulgaricus Chewable 1 Tablet(s) Chew daily  pantoprazole  Injectable 40 milliGRAM(s) IV Push daily  ticagrelor 90 milliGRAM(s) Oral two times a day  vancomycin  IVPB 750 milliGRAM(s) IV Intermittent every 12 hours    MEDICATIONS  (PRN):  ALBUTerol/ipratropium for Nebulization 3 milliLiter(s) Nebulizer every 6 hours PRN Shortness of Breath and/or Wheezing  dextrose Gel 1 Dose(s) Oral once PRN Blood Glucose LESS THAN 70 milliGRAM(s)/deciliter  glucagon  Injectable 1 milliGRAM(s) IntraMuscular once PRN Glucose LESS THAN 70 milligrams/deciliter  heparin  Injectable 7500 Unit(s) IV Push every 6 hours PRN For aPTT less than 40  heparin  Injectable 3500 Unit(s) IV Push every 6 hours PRN For aPTT between 40 - 57      ====================  VITALS (Last 12 hrs):  ====================    T(C): 37.1 (10-26-17 @ 19:30), Max: 37.1 (10-26-17 @ 19:30)  T(F): 98.8 (10-26-17 @ 19:30), Max: 98.8 (10-26-17 @ 19:30)  HR: 102 (10-26-17 @ 23:00) (102 - 120)  BP: 152/73 (10-26-17 @ 23:00) (93/72 - 152/73)  BP(mean): 94 (10-26-17 @ 23:00) (68 - 94)  ABP: --  ABP(mean): --  RR: 25 (10-26-17 @ 23:00) (20 - 25)  SpO2: 98% (10-26-17 @ 23:00) (91% - 100%)  Wt(kg): --  CVP(mm Hg): --  CVP(cm H2O): --  CO: --  CI: --  PA: --  PA(mean): --  PCWP: --  SVR: --  PVR: --    I&O's Summary    25 Oct 2017 07:01  -  26 Oct 2017 07:00  --------------------------------------------------------  IN: 2160 mL / OUT: 2678 mL / NET: -518 mL    26 Oct 2017 07:01  -  27 Oct 2017 00:15  --------------------------------------------------------  IN: 2049 mL / OUT: 3268 mL / NET: -1219 mL    ====================  NEW LABS:  ====================    10-26    138  |  101  |  41<H>  ----------------------------<  94  5.7<H>   |  24  |  2.33<H>    Ca    8.5      26 Oct 2017 17:42  Phos  2.9     10-26  Mg     2.6     10-26    TPro  6.0  /  Alb  2.8<L>  /  TBili  1.2  /  DBili  x   /  AST  63<H>  /  ALT  61<H>  /  AlkPhos  79  10-26    PT/INR - ( 26 Oct 2017 04:20 )   PT: 13.4 sec;   INR: 1.24 ratio         PTT - ( 26 Oct 2017 04:20 )  PTT:85.5 sec      ABG - ( 25 Oct 2017 12:01 )  pH: 7.49  /  pCO2: 34    /  pO2: 105   / HCO3: 26    / Base Excess: 2.9   /  SaO2: 99                  ====================  PLAN:  ====================  Chetan Enriquez [facility] NP  Beeper #[id number]  Spectra # [phone] ====================  CCU MIDNIGHT ROUNDS  ====================    KAMLESH PATEL  [id number]  Patient is a 83y old  Male who presents with a chief complaint of SOB, chest pain, diaphoresis (15 Oct 2017 17:59)    ====================  SUMMARY:  ====================  83M pmhx of AF on pradaxa initially presented on 10/15/17 with chest pain/diaphoresis/ dyspnea to OSH, IW-STEMI emergently transferred here for C c/b VF arrest requiring IABP followed by pRCA stent c/b now acute right heart failure and cardiogenic shock s/p IABP (explanted 10/19), RP impella for RV support (explanted 10/20), unable to be weaned from  as venous sat drops so remains on  5 with borderline low CI. RVSWI low at 4.4. TTE reviewed and appears to have hyperdynamic LV with moderate RV dysfunction. Had slow afib today to 30-40 with significant pauses with altered mental status d/t likely global hypoperfusion     ====================  NEW EVENTS:  ====================  Tolerating CVVH with fluid removal of 100 cc/hr.  Remains agitated but much improved, on prn Haldol.  Afebrile despite worsening leukocytosis.    MEDICATIONS  (STANDING):  aspirin  chewable 81 milliGRAM(s) Oral daily  atorvastatin 80 milliGRAM(s) Oral at bedtime  chlorhexidine 0.12% Liquid 15 milliLiter(s) Swish and Spit two times a day  dexamethasone  Injectable 4 milliGRAM(s) IV Push two times a day  dextrose 5%. 1000 milliLiter(s) (50 mL/Hr) IV Continuous <Continuous>  dextrose 50% Injectable 12.5 Gram(s) IV Push once  dextrose 50% Injectable 25 Gram(s) IV Push once  dextrose 50% Injectable 25 Gram(s) IV Push once  DOBUTamine Infusion 5 MICROgram(s)/kG/Min (13.965 mL/Hr) IV Continuous <Continuous>  heparin  Infusion. 1500 Unit(s)/Hr (15 mL/Hr) IV Continuous <Continuous>  imipenem/cilastatin  IVPB      imipenem/cilastatin  IVPB 250 milliGRAM(s) IV Intermittent every 6 hours  influenza   Vaccine 0.5 milliLiter(s) IntraMuscular once  insulin lispro (HumaLOG) corrective regimen sliding scale   SubCutaneous every 6 hours  lactobacillus acidophilus and bulgaricus Chewable 1 Tablet(s) Chew daily  pantoprazole  Injectable 40 milliGRAM(s) IV Push daily  ticagrelor 90 milliGRAM(s) Oral two times a day  vancomycin  IVPB 750 milliGRAM(s) IV Intermittent every 12 hours    MEDICATIONS  (PRN):  ALBUTerol/ipratropium for Nebulization 3 milliLiter(s) Nebulizer every 6 hours PRN Shortness of Breath and/or Wheezing  dextrose Gel 1 Dose(s) Oral once PRN Blood Glucose LESS THAN 70 milliGRAM(s)/deciliter  glucagon  Injectable 1 milliGRAM(s) IntraMuscular once PRN Glucose LESS THAN 70 milligrams/deciliter  heparin  Injectable 7500 Unit(s) IV Push every 6 hours PRN For aPTT less than 40  heparin  Injectable 3500 Unit(s) IV Push every 6 hours PRN For aPTT between 40 - 57    ====================  VITALS (Last 12 hrs):  ====================    T(C): 37.1 (10-26-17 @ 19:30), Max: 37.1 (10-26-17 @ 19:30)  T(F): 98.8 (10-26-17 @ 19:30), Max: 98.8 (10-26-17 @ 19:30)  HR: 102 (10-26-17 @ 23:00) (102 - 120)  BP: 152/73 (10-26-17 @ 23:00) (93/72 - 152/73)  BP(mean): 94 (10-26-17 @ 23:00) (68 - 94)  ABP: --  ABP(mean): --  RR: 25 (10-26-17 @ 23:00) (20 - 25)  SpO2: 98% (10-26-17 @ 23:00) (91% - 100%)  Wt(kg): --  CVP(mm Hg): --  CVP(cm H2O): --  CO: --  CI: --  PA: --  PA(mean): --  PCWP: --  SVR: --  PVR: --    I&O's Summary    25 Oct 2017 07:  -  26 Oct 2017 07:00  --------------------------------------------------------  IN: 2160 mL / OUT: 2678 mL / NET: -518 mL    26 Oct 2017 07:  -  27 Oct 2017 00:15  --------------------------------------------------------  IN: 2049 mL / OUT: 3268 mL / NET: -1219 mL    ====================  NEW LABS:  ====================    10-26    138  |  101  |  41<H>  ----------------------------<  94  5.7<H>   |  24  |  2.33<H>    Ca    8.5      26 Oct 2017 17:42  Phos  2.9     10-26  Mg     2.6     10-26    TPro  6.0  /  Alb  2.8<L>  /  TBili  1.2  /  DBili  x   /  AST  63<H>  /  ALT  61<H>  /  AlkPhos  79  10-26    PT/INR - ( 26 Oct 2017 04:20 )   PT: 13.4 sec;   INR: 1.24 ratio         PTT - ( 26 Oct 2017 04:20 )  PTT:85.5 sec      ABG - ( 25 Oct 2017 12:01 )  pH: 7.49  /  pCO2: 34    /  pO2: 105   / HCO3: 26    / Base Excess: 2.9   /  SaO2: 99                  ====================  PLAN:  ====================  -       Terra HERNANDEZU NP  Beeper #[id number]  Spectra # [phone] ====================  CCU MIDNIGHT ROUNDS  ====================    KAMLESH PATEL  [id number]  Patient is a 83y old  Male who presents with a chief complaint of SOB, chest pain, diaphoresis (15 Oct 2017 17:59)    ====================  SUMMARY:  ====================  83M pmhx of AF on pradaxa initially presented on 10/15/17 with chest pain/diaphoresis/ dyspnea to OSH, IW-STEMI emergently transferred here for [facility] c/b VF arrest requiring IABP followed by pRCA stent c/b now acute right heart failure and cardiogenic shock s/p IABP (explanted 10/19), RP impella for RV support (explanted 10/20), unable to be weaned from  as venous sat drops so remains on  5 with borderline low CI. RVSWI low at 4.4. TTE reviewed and appears to have hyperdynamic LV with moderate RV dysfunction. Had slow afib today to 30-40 with significant pauses with altered mental status d/t likely global hypoperfusion     ====================  NEW EVENTS:  ====================  Tolerating CVVH with fluid removal of 100 cc/hr.  Remains agitated but much improved, on prn Haldol.  Afebrile despite worsening leukocytosis.    MEDICATIONS  (STANDING):  aspirin  chewable 81 milliGRAM(s) Oral daily  atorvastatin 80 milliGRAM(s) Oral at bedtime  chlorhexidine 0.12% Liquid 15 milliLiter(s) Swish and Spit two times a day  dexamethasone  Injectable 4 milliGRAM(s) IV Push two times a day  dextrose 5%. 1000 milliLiter(s) (50 mL/Hr) IV Continuous <Continuous>  dextrose 50% Injectable 12.5 Gram(s) IV Push once  dextrose 50% Injectable 25 Gram(s) IV Push once  dextrose 50% Injectable 25 Gram(s) IV Push once  DOBUTamine Infusion 5 MICROgram(s)/kG/Min (13.965 mL/Hr) IV Continuous <Continuous>  heparin  Infusion. 1500 Unit(s)/Hr (15 mL/Hr) IV Continuous <Continuous>  imipenem/cilastatin  IVPB      imipenem/cilastatin  IVPB 250 milliGRAM(s) IV Intermittent every 6 hours  influenza   Vaccine 0.5 milliLiter(s) IntraMuscular once  insulin lispro (HumaLOG) corrective regimen sliding scale   SubCutaneous every 6 hours  lactobacillus acidophilus and bulgaricus Chewable 1 Tablet(s) Chew daily  pantoprazole  Injectable 40 milliGRAM(s) IV Push daily  ticagrelor 90 milliGRAM(s) Oral two times a day  vancomycin  IVPB 750 milliGRAM(s) IV Intermittent every 12 hours    MEDICATIONS  (PRN):  ALBUTerol/ipratropium for Nebulization 3 milliLiter(s) Nebulizer every 6 hours PRN Shortness of Breath and/or Wheezing  dextrose Gel 1 Dose(s) Oral once PRN Blood Glucose LESS THAN 70 milliGRAM(s)/deciliter  glucagon  Injectable 1 milliGRAM(s) IntraMuscular once PRN Glucose LESS THAN 70 milligrams/deciliter  heparin  Injectable 7500 Unit(s) IV Push every 6 hours PRN For aPTT less than 40  heparin  Injectable 3500 Unit(s) IV Push every 6 hours PRN For aPTT between 40 - 57    ====================  VITALS (Last 12 hrs):  ====================    T(C): 37.1 (10-26-17 @ 19:30), Max: 37.1 (10-26-17 @ 19:30)  T(F): 98.8 (10-26-17 @ 19:30), Max: 98.8 (10-26-17 @ 19:30)  HR: 102 (10-26-17 @ 23:00) (102 - 120)  BP: 152/73 (10-26-17 @ 23:00) (93/72 - 152/73)  BP(mean): 94 (10-26-17 @ 23:00) (68 - 94)  ABP: --  ABP(mean): --  RR: 25 (10-26-17 @ 23:00) (20 - 25)  SpO2: 98% (10-26-17 @ 23:00) (91% - 100%)  Wt(kg): --  CVP(mm Hg): --  CVP(cm H2O): --  CO: --  CI: --  PA: --  PA(mean): --  PCWP: --  SVR: --  PVR: --    I&O's Summary    25 Oct 2017 07:  -  26 Oct 2017 07:00  --------------------------------------------------------  IN: 2160 mL / OUT: 2678 mL / NET: -518 mL    26 Oct 2017 07:  -  27 Oct 2017 00:15  --------------------------------------------------------  IN: 2049 mL / OUT: 3268 mL / NET: -1219 mL    ====================  NEW LABS:  ====================    10-26    138  |  101  |  41<H>  ----------------------------<  94  5.7<H>   |  24  |  2.33<H>    Ca    8.5      26 Oct 2017 17:42  Phos  2.9     10-26  Mg     2.6     10-26    TPro  6.0  /  Alb  2.8<L>  /  TBili  1.2  /  DBili  x   /  AST  63<H>  /  ALT  61<H>  /  AlkPhos  79  10-26    PT/INR - ( 26 Oct 2017 04:20 )   PT: 13.4 sec;   INR: 1.24 ratio       PTT - ( 26 Oct 2017 04:20 )  PTT:85.5 sec    ABG - ( 25 Oct 2017 12:01 )  pH: 7.49  /  pCO2: 34    /  pO2: 105   / HCO3: 26    / Base Excess: 2.9   /  SaO2: 99        ====================  PLAN:  ====================  -   - Continue DAPT      Terra HERNANDEZU NP  Beeper #[id number]  Spectra # [phone] ====================  CCU MIDNIGHT ROUNDS  ====================    KAMLESH PATEL  [id number]  Patient is a 83y old  Male who presents with a chief complaint of SOB, chest pain, diaphoresis (15 Oct 2017 17:59)    ====================  SUMMARY:  ====================  83M pmhx of AF on pradaxa initially presented on 10/15/17 with chest pain/diaphoresis/ dyspnea to OSH, IW-STEMI emergently transferred here for C c/b VF arrest requiring IABP followed by pRCA stent c/b now acute right heart failure and cardiogenic shock s/p IABP (explanted 10/19), RP impella for RV support (explanted 10/20), unable to be weaned from  as venous sat drops so remains on  5 with borderline low CI. RVSWI low at 4.4. TTE reviewed and appears to have hyperdynamic LV with moderate RV dysfunction. Had slow afib today to 30-40 with significant pauses with altered mental status d/t likely global hypoperfusion     ====================  NEW EVENTS:  ====================  Tolerating CVVH with fluid removal of 100 cc/hr.  Remains agitated but much improved, on prn Haldol.  Afebrile despite worsening leukocytosis.    MEDICATIONS  (STANDING):  aspirin  chewable 81 milliGRAM(s) Oral daily  atorvastatin 80 milliGRAM(s) Oral at bedtime  chlorhexidine 0.12% Liquid 15 milliLiter(s) Swish and Spit two times a day  dexamethasone  Injectable 4 milliGRAM(s) IV Push two times a day  dextrose 5%. 1000 milliLiter(s) (50 mL/Hr) IV Continuous <Continuous>  dextrose 50% Injectable 12.5 Gram(s) IV Push once  dextrose 50% Injectable 25 Gram(s) IV Push once  dextrose 50% Injectable 25 Gram(s) IV Push once  DOBUTamine Infusion 5 MICROgram(s)/kG/Min (13.965 mL/Hr) IV Continuous <Continuous>  heparin  Infusion. 1500 Unit(s)/Hr (15 mL/Hr) IV Continuous <Continuous>  imipenem/cilastatin  IVPB      imipenem/cilastatin  IVPB 250 milliGRAM(s) IV Intermittent every 6 hours  influenza   Vaccine 0.5 milliLiter(s) IntraMuscular once  insulin lispro (HumaLOG) corrective regimen sliding scale   SubCutaneous every 6 hours  lactobacillus acidophilus and bulgaricus Chewable 1 Tablet(s) Chew daily  pantoprazole  Injectable 40 milliGRAM(s) IV Push daily  ticagrelor 90 milliGRAM(s) Oral two times a day  vancomycin  IVPB 750 milliGRAM(s) IV Intermittent every 12 hours    MEDICATIONS  (PRN):  ALBUTerol/ipratropium for Nebulization 3 milliLiter(s) Nebulizer every 6 hours PRN Shortness of Breath and/or Wheezing  dextrose Gel 1 Dose(s) Oral once PRN Blood Glucose LESS THAN 70 milliGRAM(s)/deciliter  glucagon  Injectable 1 milliGRAM(s) IntraMuscular once PRN Glucose LESS THAN 70 milligrams/deciliter  heparin  Injectable 7500 Unit(s) IV Push every 6 hours PRN For aPTT less than 40  heparin  Injectable 3500 Unit(s) IV Push every 6 hours PRN For aPTT between 40 - 57    ====================  VITALS (Last 12 hrs):  ====================    T(C): 37.1 (10-26-17 @ 19:30), Max: 37.1 (10-26-17 @ 19:30)  T(F): 98.8 (10-26-17 @ 19:30), Max: 98.8 (10-26-17 @ 19:30)  HR: 102 (10-26-17 @ 23:00) (102 - 120)  BP: 152/73 (10-26-17 @ 23:00) (93/72 - 152/73)  BP(mean): 94 (10-26-17 @ 23:00) (68 - 94)  ABP: --  ABP(mean): --  RR: 25 (10-26-17 @ 23:00) (20 - 25)  SpO2: 98% (10-26-17 @ 23:00) (91% - 100%)  Wt(kg): --  CVP(mm Hg): --  CVP(cm H2O): --  CO: --  CI: --  PA: --  PA(mean): --  PCWP: --  SVR: --  PVR: --    I&O's Summary    25 Oct 2017 07:  -  26 Oct 2017 07:00  --------------------------------------------------------  IN: 2160 mL / OUT: 2678 mL / NET: -518 mL    26 Oct 2017 07:  -  27 Oct 2017 00:15  --------------------------------------------------------  IN: 2049 mL / OUT: 3268 mL / NET: -1219 mL    ====================  NEW LABS:  ====================    10-26    138  |  101  |  41<H>  ----------------------------<  94  5.7<H>   |  24  |  2.33<H>    Ca    8.5      26 Oct 2017 17:42  Phos  2.9     10-26  Mg     2.6     10-26    TPro  6.0  /  Alb  2.8<L>  /  TBili  1.2  /  DBili  x   /  AST  63<H>  /  ALT  61<H>  /  AlkPhos  79  10-26    PT/INR - ( 26 Oct 2017 04:20 )   PT: 13.4 sec;   INR: 1.24 ratio       PTT - ( 26 Oct 2017 04:20 )  PTT:85.5 sec    ABG - ( 25 Oct 2017 12:01 )  pH: 7.49  /  pCO2: 34    /  pO2: 105   / HCO3: 26    / Base Excess: 2.9   /  SaO2: 99        ====================  PLAN:  ====================  - Continue DAPT and statin for CAD s/p RICHARDSON  - Hold BB for now  - Continue dobutamine at 5 mcg/kg/min  - S/P TVP insertion for very slow Afib; no evidence of bradycardia x 24 hrs  - Continue CVVHDF per renal with goal of net neg 500 cc  - Worsening leukocytosis, now 21,000; Afebrile.  continue Imipenem and Vanco for bacteremia  - For repeat TTE in am  - Agitation, on Haldol prn and risperdone ATC; Monitor QTC daily while on Haldol  - EEG negative  - Neuro recs: MRI/MRA brain when stable  - Appreciate endo consult  - Follow up Heme consult    Terra Enriquez CCU NP  Beeper #[id number]  Spectra # [phone]

## 2017-10-27 NOTE — PROGRESS NOTE ADULT - ATTENDING COMMENTS
Continue management of RV infarction status post Impella RP, now with sepsis from unclear organism.  Appreciate both Heart Failure and ID recs.  Appreciate renal recommendations. Continue CVVHD without volume removal.  Continue to wean inotrope assistance.  Trend lactate.  Abx per ID.

## 2017-10-27 NOTE — PROGRESS NOTE ADULT - ATTENDING COMMENTS
83M pmhx of AF on pradaxa initially presented on 10/15/17 with chest pain/diaphoresis/ dyspnea to OSH, IW-STEMI emergently transferred here for C c/b VF arrest requiring IABP followed by pRCA stent c/b now acute right heart failure and cardiogenic shock s/p IABP (explanted 10/19), RP impella for RV support (explanted 10/20), initially unable to be weaned from . TTE reviewed and appears to have hyperdynamic LV with moderate RV dysfunction. Had slow afib 10/25 with global hypoperfusion so temporary wire placed. Also noted to have positive blood cx Nocardia/coag neg staph  - would d/c dobutamine; follow BP and lactate w/o monitoring venous sat  - would keep net even to positive given hyperdynamic LV  - TSH low; given goiter and possible amio-induced inflammation, recommend endo c/s; no clinical signs of thyrotoxicosis  - infectious issues; on abx  - overall prognosis guarded; d/w wife with Dr. Cooley

## 2017-10-27 NOTE — PROGRESS NOTE ADULT - PROBLEM SELECTOR PLAN 2
Patient with noted increasing proteinuria since UA on 6/2016 without an underlying etiology. Order urine spot protein/Cr ratio to quantify protein (once patient makes urine). Please repeat complements once patient stabilizes or in one week.   Renal sonogram once patient stabilizes.     HBsAg Nonreact; HCV 0.15, Nonreact; HIV Nonreact; EYAD: titer 1:640 (elevated), pattern Homogeneous; dsDNA <12 ;C3 Complement 72; C4 Complement 14; Free Light Chains: kappa 15.20, lambda 10.50, ratio = 1.45. Immunofixation negative.

## 2017-10-27 NOTE — PROVIDER CONTACT NOTE (OTHER) - NAME OF MD/NP/PA/DO NOTIFIED:
Deepa RODRIGUEZ
Dr. Cooley, Dr. Marquez, CROW [place]
Dr. Marcus Cooley
Dr. Thakur
MD Caldera
MD Marquez
MD Thakur
Mo Em
Verenice RODRIGUEZ
Zelalem Marquez MD
Zelalem Marquez MD
Brianna caba NP
home

## 2017-10-27 NOTE — PROGRESS NOTE ADULT - SUBJECTIVE AND OBJECTIVE BOX
INFECTIOUS DISEASES FOLLOW UP--Marcus Cesar MD  Pager [phone]    This is a follow up note for this  83y Male with bacteremia with  1. CNS--may be real in the setting of lines.  2. 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    Further ROS:  CONSTITUTIONAL:  No fever,  CARDIOVASCULAR:  No chest pain or palpitations    Allergies  No Known Allergies    ANTIBIOTICS/RELEVANT:  antimicrobials  imipenem/cilastatin  IVPB      imipenem/cilastatin  IVPB 250 milliGRAM(s) IV Intermittent every 6 hours  vancomycin  IVPB 1000 milliGRAM(s) IV Intermittent every 12 hours    immunologic:  influenza   Vaccine 0.5 milliLiter(s) IntraMuscular once    OTHER:  acetaminophen    Suspension 930 milliGRAM(s) Oral every 6 hours PRN  acetaminophen    Suspension. 930 milliGRAM(s) Oral every 4 hours PRN  ALBUTerol/ipratropium for Nebulization 3 milliLiter(s) Nebulizer every 6 hours PRN  aspirin  chewable 81 milliGRAM(s) Oral daily  atorvastatin 80 milliGRAM(s) Oral at bedtime  chlorhexidine 0.12% Liquid 15 milliLiter(s) Swish and Spit two times a day  dexamethasone  Injectable 4 milliGRAM(s) IV Push two times a day  dexmedetomidine Infusion 0.3 MICROgram(s)/kG/Hr IV Continuous <Continuous>  dextrose 5%. 1000 milliLiter(s) IV Continuous <Continuous>  dextrose 50% Injectable 12.5 Gram(s) IV Push once  dextrose 50% Injectable 25 Gram(s) IV Push once  dextrose 50% Injectable 25 Gram(s) IV Push once  dextrose Gel 1 Dose(s) Oral once PRN  DOBUTamine Infusion 5 MICROgram(s)/kG/Min IV Continuous <Continuous>  glucagon  Injectable 1 milliGRAM(s) IntraMuscular once PRN  haloperidol     Tablet 0.5 milliGRAM(s) Oral every 12 hours PRN  heparin  Infusion. 1500 Unit(s)/Hr IV Continuous <Continuous>  heparin  Injectable 7500 Unit(s) IV Push every 6 hours PRN  heparin  Injectable 3500 Unit(s) IV Push every 6 hours PRN  insulin lispro (HumaLOG) corrective regimen sliding scale   SubCutaneous every 6 hours  lactobacillus acidophilus and bulgaricus Chewable 1 Tablet(s) Chew daily  pantoprazole  Injectable 40 milliGRAM(s) IV Push daily  risperiDONE   Tablet 0.5 milliGRAM(s) Oral every 12 hours  ticagrelor 90 milliGRAM(s) Oral two times a day      Objective:  Vital Signs Last 24 Hrs  T(C): 36.5 (27 Oct 2017 05:00), Max: 37.1 (26 Oct 2017 19:30)  T(F): 97.7 (27 Oct 2017 05:00), Max: 98.8 (26 Oct 2017 19:30)  HR: 76 (27 Oct 2017 10:00) (70 - 130)  BP: 96/62 (27 Oct 2017 10:00) (93/72 - 166/92)  BP(mean): 74 (27 Oct 2017 10:00) (68 - 118)  RR: 22 (27 Oct 2017 10:00) (12 - 33)  SpO2: 97% (27 Oct 2017 10:00) (87% - 100%)    PHYSICAL EXAM:  Constitutional:no acute distress  Eyes:REGINALD, EOMI  Ear/Nose/Throat: no oral lesions, 	  Respiratory: clear BL  Cardiovascular: S1S2  Gastrointestinal:soft, (+) BS, no tenderness  Extremities:no e/e/c  No Lymphadenopathy  IV sites not inflammed.    LABS:                        7.8    18.8  )-----------( 309      ( 27 Oct 2017 04:23 )             23.1     10-27    137  |  99  |  39<H>  ----------------------------<  112<H>  4.7   |  26  |  2.10<H>    Ca    8.6      27 Oct 2017 04:23  Phos  2.8     10-27  Mg     2.3     10-27    TPro  6.1  /  Alb  2.8<L>  /  TBili  1.3<H>  /  DBili  x   /  AST  41<H>  /  ALT  60<H>  /  AlkPhos  81  10-27    PT/INR - ( 27 Oct 2017 04:23 )   PT: 14.4 sec;   INR: 1.31 ratio         PTT - ( 27 Oct 2017 04:23 )  PTT:74.1 sec      MICROBIOLOGY: BC CNS and BC (10/19) AFB +

## 2017-10-27 NOTE — PROGRESS NOTE ADULT - ASSESSMENT
Imp/Rx:  1. continue total of 7 days of vanco IV from negative BC--we'll presume the CNS may be real.    2. continue imipenem---? nocardia---but relying upon the micro lab to provide further data on the isolate.    d/w wife and d/w team    id covers this weekend.  thanks

## 2017-10-27 NOTE — PROGRESS NOTE ADULT - ASSESSMENT
83/M with Afib on Pradaxa who came to [facility] c/o SOB found to have IWSTEMI, transferred to [facility] for cath, h/c by VF arrest requiring IABP, RCA stent & SWAN, h/c c/b cardiogenic shock 2/2 right heart failure s/p impella (now removed), w/ h/c c/b renal failure requiring CVVH, extubated, now hospital course c/b tachy isabelle syndrome; Neuro called to re-evaluate for episodes of shaking when HR decreases to 30s. No EEG correlation with shaking events. 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

## 2017-10-27 NOTE — PROVIDER CONTACT NOTE (OTHER) - DATE AND TIME:
16-Oct-2017 03:00
16-Oct-2017 14:00
17-Oct-2017 14:00
24-Oct-2017 07:25
24-Oct-2017 09:26
24-Oct-2017 10:13
24-Oct-2017 17:54
25-Oct-2017 11:07
26-Oct-2017 11:35
26-Oct-2017 13:00
27-Oct-2017 12:10
17-Oct-2017 13:20

## 2017-10-27 NOTE — PROVIDER CONTACT NOTE (OTHER) - BACKGROUND
Pt being treated with IV antibiotics for bacteremia, ID following.
Admitted with IWMI, s/p cardiogenic shock due to RV failure. pt currently on CVVHDF for CRF. See H&P.
Admitted with IWMI, s/p cardiogenic shock due to RV failure. pt currently on CVVHDF for CRF. See H&P.
Admitted with IWMI, s/p cath, s/p cardiogenic shock due to RV failure. pt currently on CVVHDF and s/p Amio load. EEG in progress at this time.
Pt has code stroke called  (10-16-17) due to rt eye dilation 4mm while sedated on precedex. Pt also noted to have tremors at that time, eeg was performed and was negative at this time.
Pt not to have code stroke 10-16-17 called for rt eye dilated 4mm. CT head at the time negative.
Pt post Inferior wall MI S/P Stent to the RCA, cardiogenic shock
Pt s/p Inferior wall MI, S/P Stent to RCA, Developed cardiogenic shock was intubated on 10/16 noted to have code stroke for rt side dilated pupil and on precedex at the time . Noted to resolve
Pt s/p cardiac arrest requiring RP impella and IABP for cardiac support. Pt  increased from 2.5 to 5 this past hour.
patient on heparin via RV impella, pt ptt has been greater then 200. heparin dose decreased.

## 2017-10-27 NOTE — PROGRESS NOTE ADULT - SUBJECTIVE AND OBJECTIVE BOX
[facility] DIVISION OF KIDNEY DISEASES AND HYPERTENSION -- FOLLOW UP NOTE  --------------------------------------------------------------------------------  Chief Complaint: DANITA on CKD with Anuria requiring renal replacement therapy     24 hour events/subjective:  Patient remains on CVVHDF overnight without complications        PAST HISTORY  --------------------------------------------------------------------------------  No significant changes to PMH, PSH, FHx, SHx, unless otherwise noted    ALLERGIES & MEDICATIONS  --------------------------------------------------------------------------------  Allergies    No Known Allergies    Intolerances      Standing Inpatient Medications  aspirin  chewable 81 milliGRAM(s) Oral daily  atorvastatin 80 milliGRAM(s) Oral at bedtime  chlorhexidine 0.12% Liquid 15 milliLiter(s) Swish and Spit two times a day  dexamethasone  Injectable 4 milliGRAM(s) IV Push two times a day  dexmedetomidine Infusion 0.3 MICROgram(s)/kG/Hr IV Continuous <Continuous>  dextrose 5%. 1000 milliLiter(s) IV Continuous <Continuous>  dextrose 50% Injectable 12.5 Gram(s) IV Push once  dextrose 50% Injectable 25 Gram(s) IV Push once  dextrose 50% Injectable 25 Gram(s) IV Push once  DOBUTamine Infusion 5 MICROgram(s)/kG/Min IV Continuous <Continuous>  heparin  Infusion. 1500 Unit(s)/Hr IV Continuous <Continuous>  imipenem/cilastatin  IVPB      imipenem/cilastatin  IVPB 250 milliGRAM(s) IV Intermittent every 6 hours  influenza   Vaccine 0.5 milliLiter(s) IntraMuscular once  insulin lispro (HumaLOG) corrective regimen sliding scale   SubCutaneous every 6 hours  lactobacillus acidophilus and bulgaricus Chewable 1 Tablet(s) Chew daily  pantoprazole  Injectable 40 milliGRAM(s) IV Push daily  risperiDONE   Tablet 0.5 milliGRAM(s) Oral every 12 hours  ticagrelor 90 milliGRAM(s) Oral two times a day  vancomycin  IVPB 750 milliGRAM(s) IV Intermittent every 12 hours    PRN Inpatient Medications  ALBUTerol/ipratropium for Nebulization 3 milliLiter(s) Nebulizer every 6 hours PRN  dextrose Gel 1 Dose(s) Oral once PRN  glucagon  Injectable 1 milliGRAM(s) IntraMuscular once PRN  haloperidol     Tablet 0.5 milliGRAM(s) Oral every 12 hours PRN  heparin  Injectable 7500 Unit(s) IV Push every 6 hours PRN  heparin  Injectable 3500 Unit(s) IV Push every 6 hours PRN      REVIEW OF SYSTEMS  --------------------------------------------------------------------------------  Gen: No weakness  Skin: No rashes  Head/Eyes/Ears/Mouth: No headache  Respiratory: No dyspnea  CV: No chest pain  GI: No abdominal pain  : No increased frequency  MSK: No no edema  Neuro: No dizziness/lightheadedness    All other systems were reviewed and are negative, except as noted.    VITALS/PHYSICAL EXAM  --------------------------------------------------------------------------------  T(C): 36.5 (10-27-17 @ 05:00), Max: 37.1 (10-26-17 @ 19:30)  HR: 70 (10-27-17 @ 08:00) (70 - 130)  BP: 127/62 (10-27-17 @ 08:00) (93/72 - 166/92)  RR: 12 (10-27-17 @ 08:00) (12 - 61)  SpO2: 100% (10-27-17 @ 08:00) (87% - 100%)  Wt(kg): --        10-26-17 @ 07:01  -  10-27-17 @ 07:00  --------------------------------------------------------  IN: 3097.8 mL / OUT: 5094 mL / NET: -1996.2 mL    10-27-17 @ 07:01  -  10-27-17 @ 09:06  --------------------------------------------------------  IN: 95.6 mL / OUT: 182 mL / NET: -86.4 mL      Physical Exam:  	Gen: Awake  	Pulm: Course BS B/L  	CV: RRR, S1S2  	Abd: +BS, soft, nontender/nondistended   	LE: Warm, + edema  	Skin: Warm, without rashes              Vascular Access: + RIJ non-tunnelled HD catheter     LABS/STUDIES  --------------------------------------------------------------------------------              7.8    18.8  >-----------<  309      [10-27-17 @ 04:23]              23.1     137  |  99  |  39  ----------------------------<  112      [10-27-17 @ 04:23]  4.7   |  26  |  2.10        Ca     8.6     [10-27-17 @ 04:23]      Mg     2.3     [10-27-17 @ 04:23]      Phos  2.8     [10-27-17 @ 04:23]    TPro  6.1  /  Alb  2.8  /  TBili  1.3  /  DBili  x   /  AST  41  /  ALT  60  /  AlkPhos  81  [10-27-17 @ 04:23]    PT/INR: PT 14.4 , INR 1.31       [10-27-17 @ 04:23]  PTT: 74.1       [10-27-17 @ 04:23]    Uric acid 2.2      [10-26-17 @ 04:59]        [10-26-17 @ 04:59]    Creatinine Trend:  SCr 2.10 [10-27 @ 04:23]  SCr 2.33 [10-26 @ 17:42]  SCr 2.45 [10-26 @ 04:20]  SCr 2.69 [10-25 @ 21:52]  SCr 2.94 [10-25 @ 15:11]    Urinalysis - [10-15-17 @ 16:48]      Color Yellow / Appearance Turbid / SG >1.030 / pH 6.5      Gluc 50 / Ketone Negative  / Bili Negative / Urobili Negative       Blood Moderate / Protein >600 / Leuk Est Negative / Nitrite Negative      RBC >50 / WBC 3-5 / Hyaline  / Gran  / Sq Epi  / Non Sq Epi OCC / Bacteria Few      HbA1c 5.4      [10-15-17 @ 21:32]  TSH 0.05      [10-25-17 @ 13:21]  Lipid: chol 129, TG 45, HDL 41, LDL 79      [10-15-17 @ 21:40]    HBsAg Nonreact      [10-19-17 @ 07:25]  HCV 0.15, Nonreact      [10-19-17 @ 07:25]  HIV Nonreact      [10-19-17 @ 07:25]    EYAD: titer 1:640, pattern Homogeneous      [10-19-17 @ 07:25]  dsDNA <12      [10-19-17 @ 07:25]  C3 Complement 72      [10-19-17 @ 07:25]  C4 Complement 14      [10-19-17 @ 07:25]  Free Light Chains: kappa 15.20, lambda 10.50, ratio = 1.45      [10-19 @ 07:25]  Immunofixation Serum:   No Monoclonal Band Identified      [10-24-17 @ 13:31]  SPEP Interpretation: Hypoalbuminemia Increased beta fraction due to probable fibrinogen presence.      [10-24-17 @ 12:31]

## 2017-10-27 NOTE — PROGRESS NOTE ADULT - SUBJECTIVE AND OBJECTIVE BOX
24H hour events:   -awake, following commands,  -blood cx from 10/24 with coag neg staph, repeat cx pending  -HR 87 /58  -now on  2.5  -CVVH: 150/hr removal  -I/O -  -WBC 18.8 hgb 7.8 plts 309 cr 2.1    MEDICATIONS:  aspirin  chewable 81 milliGRAM(s) Oral daily  DOBUTamine Infusion 2.5 MICROgram(s)/kG/Min IV Continuous <Continuous>  heparin  Infusion. 1500 Unit(s)/Hr IV Continuous <Continuous>  heparin  Injectable 7500 Unit(s) IV Push every 6 hours PRN  heparin  Injectable 3500 Unit(s) IV Push every 6 hours PRN  ticagrelor 90 milliGRAM(s) Oral two times a day    imipenem/cilastatin  IVPB      imipenem/cilastatin  IVPB 250 milliGRAM(s) IV Intermittent every 6 hours  vancomycin  IVPB 1000 milliGRAM(s) IV Intermittent every 12 hours    ALBUTerol/ipratropium for Nebulization 3 milliLiter(s) Nebulizer every 6 hours PRN    acetaminophen    Suspension 930 milliGRAM(s) Oral every 6 hours PRN  acetaminophen    Suspension. 930 milliGRAM(s) Oral every 4 hours PRN  dexmedetomidine Infusion 0.3 MICROgram(s)/kG/Hr IV Continuous <Continuous>  haloperidol     Tablet 0.5 milliGRAM(s) Oral every 12 hours PRN  risperiDONE   Tablet 0.5 milliGRAM(s) Oral every 12 hours    pantoprazole  Injectable 40 milliGRAM(s) IV Push daily    atorvastatin 80 milliGRAM(s) Oral at bedtime  dexamethasone  Injectable 4 milliGRAM(s) IV Push two times a day  dextrose 50% Injectable 12.5 Gram(s) IV Push once  dextrose 50% Injectable 25 Gram(s) IV Push once  dextrose 50% Injectable 25 Gram(s) IV Push once  dextrose Gel 1 Dose(s) Oral once PRN  glucagon  Injectable 1 milliGRAM(s) IntraMuscular once PRN  insulin lispro (HumaLOG) corrective regimen sliding scale   SubCutaneous every 6 hours    chlorhexidine 0.12% Liquid 15 milliLiter(s) Swish and Spit two times a day  dextrose 5%. 1000 milliLiter(s) IV Continuous <Continuous>  influenza   Vaccine 0.5 milliLiter(s) IntraMuscular once      REVIEW OF SYSTEMS:  Complete 10point ROS negative.    PHYSICAL EXAM:  T(C): 36.4 (10-27-17 @ 15:00), Max: 37.1 (10-26-17 @ 19:30)  HR: 82 (10-27-17 @ 16:00) (70 - 130)  BP: 115/58 (10-27-17 @ 16:00) (86/63 - 166/92)  RR: 24 (10-27-17 @ 16:00) (12 - 33)  SpO2: 100% (10-27-17 @ 16:00) (87% - 100%)  Wt(kg): --  I&O's Summary    26 Oct 2017 07:  -  27 Oct 2017 07:00  --------------------------------------------------------  IN: 3097.8 mL / OUT: 5094 mL / NET: -1996.2 mL    27 Oct 2017 07:  -  27 Oct 2017 16:57  --------------------------------------------------------  IN: 1133.6 mL / OUT: 1315 mL / NET: -181.4 mL        Appearance: Normal	  HEENT:   Normal oral mucosa, PERRL, EOMI	  Lymphatic: No lymphadenopathy  Cardiovascular: Normal S1 S2, No JVD, No murmurs, diffuse anasarca  Respiratory: b/l crackles	  Psychiatry: A & O x 3, Mood & affect appropriate  Gastrointestinal:  Soft, Non-tender, + BS	  Skin: No rashes, No ecchymoses, No cyanosis	  Neurologic: Non-focal  Extremities: Normal range of motion, No clubbing, cyanosis or edema  Vascular: Peripheral pulses palpable 2+ bilaterally        LABS:	 	    CBC Full  -  ( 27 Oct 2017 04:23 )  WBC Count : 18.8 K/uL  Hemoglobin : 7.8 g/dL  Hematocrit : 23.1 %  Platelet Count - Automated : 309 K/uL  Mean Cell Volume : 102.0 fl  Mean Cell Hemoglobin : 34.4 pg  Mean Cell Hemoglobin Concentration : 33.7 gm/dL  Auto Neutrophil # : 16.5 K/uL  Auto Lymphocyte # : 0.7 K/uL  Auto Monocyte # : 1.5 K/uL  Auto Eosinophil # : 0.0 K/uL  Auto Basophil # : 0.0 K/uL  Auto Neutrophil % : 85.0 %  Auto Lymphocyte % : 5.0 %  Auto Monocyte % : 7.0 %  Auto Eosinophil % : x  Auto Basophil % : x    10-27    137  |  99  |  39<H>  ----------------------------<  112<H>  4.7   |  26  |  2.10<H>  10-    138  |  101  |  41<H>  ----------------------------<  94  5.7<H>   |  24  |  2.33<H>    Ca    8.6      27 Oct 2017 04:23  Ca    8.5      26 Oct 2017 17:42  Phos  2.8     10-  Phos  2.9     10-26  Mg     2.3     10-27  Mg     2.6     10-26    TPro  6.1  /  Alb  2.8<L>  /  TBili  1.3<H>  /  DBili  x   /  AST  41<H>  /  ALT  60<H>  /  AlkPhos  81  10-27  TPro  6.0  /  Alb  2.8<L>  /  TBili  1.2  /  DBili  x   /  AST  63<H>  /  ALT  61<H>  /  AlkPhos  79  10-26      proBNP:   Lipid Profile:   HgA1c:   TSH:       CARDIAC MARKERS:            TELEMETRY: 	    ECG:  	  RADIOLOGY:  OTHER: 	    PREVIOUS DIAGNOSTIC TESTING:    [ ] Echocardiogram: < from: Transthoracic Echocardiogram (10.26.17 @ 09:02) >  Dimensions:    Normal Values:  LA:     4.1    2.0 - 4.0 cm  Ao:     3.7    2.0 - 3.8 cm  SEPTUM: 1.0    0.6 - 1.2 cm  PWT:    0.9    0.6 - 1.1 cm  LVIDd:  5.1    3.0 - 5.6 cm  LVIDs:  2.5    1.8 - 4.0 cm  Derived variables:  LVMI: 82 g/m2  RWT: 0.35  Fractional short: 51 %  EF (Visual Estimate): 75 %  EF (Teicholtz): 82 %  ------------------------------------------------------------------------  Observations:  Mitral Valve: Mitral annular calcification, otherwise  normal mitral valve. Mild-moderate mitral regurgitation.  Aortic Valve/Aorta: Calcified trileaflet aortic valve with  slightly reduced opening. Due to patient's tremor gradients  were unable to be measured through the aortic valve.  Mild  aortic regurgitation.  Aortic Root: 3.7 cm.  LVOT diameter: 2.3 cm.  Left Atrium: Moderately dilated left atrium.  LA volume  index = 45 cc/m2.  Left Ventricle: Hyperdynamic left ventricle.  Right Heart: Normal right atrium. The right ventricle is  not well visualized; grossly reduced right ventricular  systolic function. Normal tricuspid valve. Mild tricuspid  regurgitation. Normal pulmonic valve. No pulmonic  regurgitation.  Pericardium/Pleura: Normal pericardium with no pericardial  effusion.  Right pleural effusion.  Hemodynamic: Estimated right atrial pressure is 8 mm Hg.  Estimated right ventricular systolic pressure equals 31 mm  Hg, assuming right atrial pressure equals 8 mm Hg,  consistent with normal pulmonary pressures. Color Doppler  demonstrates no evidence of a patent foramen ovale.    < end of copied text >    [ ]  Catheterization:  [ ] Stress Test:  	  	  ASSESSMENT/PLAN:

## 2017-10-27 NOTE — PROGRESS NOTE ADULT - ATTENDING COMMENTS
Seen and examined on rounds with housestaff.  Today, he remains encephalopathic, though less agitated relative to yesterday.  Appreciate psych input.  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  Hallucinations may potentially be peduncular in nature as well.  MRI brain when stable.

## 2017-10-27 NOTE — PROGRESS NOTE ADULT - ASSESSMENT
83M with AFib on pradaxa, mod MR, mild AR, RV failure a/w IWSTEMI s/p RCA stent c/b VFib arrest requiring IABP, PAC with course complicated by cardiogenic shock 2/2 RV failure s/p RV impella now with renal failure on CVVH with new afib with slow VR, Now with TVP    1. afib with heart block  -- TVP in place, and he is intermittently pacing. He will need permanent pacemaker. However, currently he has positive blood cultures and an elevated WBC count. Will re-evaluate when stable.    Maximilian Emery MD

## 2017-10-27 NOTE — PROGRESS NOTE ADULT - PROBLEM SELECTOR PLAN 1
DANITA on CKD stage III. DANITA may be multifactorial in the setting of IV contrast (cardiac cath), rhabdomyolysis and ATN from hypotension/ cardiac arrest. Patient has unclear etiology of his original underlying CKD.   Patient remains on CVVHDF due to Anuria. Patient with improved Scr to 2.1 and BUN 39. Patient remains anuric. Plan to continue CVVHDF with UF as per CCU. Once patient stabilizes off pressor support, may consider transition to IHD.    Continue to check daily serum phosphorus (may be low in the use of CVVHDF). Continue to renally dose all medications. Vancomycin trough. Continue to monitor intake/output, BMP and urine output. Avoid NSAIDs, RCA and nephrotoxins.

## 2017-10-27 NOTE — PROGRESS NOTE ADULT - ASSESSMENT
83 year old male with a PMH of atrial fibrillation (on Pradaxa), HTN, Thyroid Goiter, CKD stage III (dx 2016) was seen at [facility] with inferior wall STEMI and was brought to [facility] for cardiac catherization s/p PCI x 1 now with DANITA and Anuria.

## 2017-10-27 NOTE — PROGRESS NOTE ADULT - SUBJECTIVE AND OBJECTIVE BOX
Interval events:  Intermittent pacing, settings at HR 40 and mV 10  Patient complains of hip pain  Positive blood cultures  Persistent leukocytosis    Review Of Systems:  Constitutional: [ ] Fever [ ] Chills [ ] Fatigue [ ] Weight change   HEENT: [ ] Blurred vision [ ] Eye Pain [ ] Headache [ ] Runny nose [ ] Sore Throat   Respiratory: [ ] Cough [ ] Wheezing [ ] Shortness of breath  Cardiovascular: [ ] Chest Pain [ ] Palpitations [ ] JOE [ ] PND [ ] Orthopnea  Gastrointestinal: [ ] Abdominal Pain [ ] Diarrhea [ ] Constipation [ ] Hemorrhoids [ ] Nausea [ ] Vomiting  Genitourinary: [ ] Nocturia [ ] Dysuria [ ] Incontinence  Extremities: [ ] Swelling [ ] Joint Pain  Neurologic: [ ] Focal deficit [ ] Paresthesias [ ] Syncope  Lymphatic: [ ] Swelling [ ] Lymphadenopathy   Skin: [ ] Rash [ ] Ecchymoses [ ] Wounds [ ] Lesions  Psychiatry: [ ] Depression [ ] Suicidal/Homicidal Ideation [ ] Anxiety [ ] Sleep Disturbances  [ ] 10 point review of systems is otherwise negative except as mentioned above            [ ]Unable to obtain    Medications:  acetaminophen    Suspension 930 milliGRAM(s) Oral every 6 hours PRN  acetaminophen    Suspension. 930 milliGRAM(s) Oral every 4 hours PRN  ALBUTerol/ipratropium for Nebulization 3 milliLiter(s) Nebulizer every 6 hours PRN  aspirin  chewable 81 milliGRAM(s) Oral daily  atorvastatin 80 milliGRAM(s) Oral at bedtime  chlorhexidine 0.12% Liquid 15 milliLiter(s) Swish and Spit two times a day  dexamethasone  Injectable 4 milliGRAM(s) IV Push two times a day  dexmedetomidine Infusion 0.3 MICROgram(s)/kG/Hr IV Continuous <Continuous>  dextrose 5%. 1000 milliLiter(s) IV Continuous <Continuous>  dextrose 50% Injectable 12.5 Gram(s) IV Push once  dextrose 50% Injectable 25 Gram(s) IV Push once  dextrose 50% Injectable 25 Gram(s) IV Push once  dextrose Gel 1 Dose(s) Oral once PRN  DOBUTamine Infusion 2.5 MICROgram(s)/kG/Min IV Continuous <Continuous>  DOBUTamine Infusion 2.5 MICROgram(s)/kG/Min IV Continuous <Continuous>  glucagon  Injectable 1 milliGRAM(s) IntraMuscular once PRN  haloperidol     Tablet 0.5 milliGRAM(s) Oral every 12 hours PRN  heparin  Infusion. 1500 Unit(s)/Hr IV Continuous <Continuous>  heparin  Injectable 7500 Unit(s) IV Push every 6 hours PRN  heparin  Injectable 3500 Unit(s) IV Push every 6 hours PRN  imipenem/cilastatin  IVPB      imipenem/cilastatin  IVPB 250 milliGRAM(s) IV Intermittent every 6 hours  influenza   Vaccine 0.5 milliLiter(s) IntraMuscular once  insulin lispro (HumaLOG) corrective regimen sliding scale   SubCutaneous every 6 hours  lactobacillus acidophilus and bulgaricus Chewable 1 Tablet(s) Chew daily  pantoprazole  Injectable 40 milliGRAM(s) IV Push daily  risperiDONE   Tablet 0.5 milliGRAM(s) Oral every 12 hours  ticagrelor 90 milliGRAM(s) Oral two times a day  vancomycin  IVPB 1000 milliGRAM(s) IV Intermittent every 12 hours    PMH/PSH/FH/SH: [ ] Unchanged  Vitals:  T(C): 36.5 (10-27-17 @ 05:00), Max: 37.1 (10-26-17 @ 19:30)  HR: 76 (10-27-17 @ 10:00) (70 - 130)  BP: 96/62 (10-27-17 @ 10:00) (93/72 - 166/92)  BP(mean): 74 (10-27-17 @ 10:00) (68 - 118)  RR: 22 (10-27-17 @ 10:00) (12 - 33)  SpO2: 97% (10-27-17 @ 10:00) (87% - 100%)  Wt(kg): --  Daily     Daily Weight in k.4 (27 Oct 2017 03:00)  I&O's Summary    26 Oct 2017 07:  -  27 Oct 2017 07:00  --------------------------------------------------------  IN: 3097.8 mL / OUT: 5094 mL / NET: -1996.2 mL    27 Oct 2017 07:  -  27 Oct 2017 11:57  --------------------------------------------------------  IN: 367.6 mL / OUT: 782 mL / NET: -414.4 mL        Physical Exam:  Appearance:  Normal, NAD  Eyes: PERRL, EOMI  HENT: Normal oral [place] NC/AT  Cardiovascular: S1, S2, RRR, No m/r/g appreciated, No edema, no elevation in JVP  Respiratory: Clear to auscultation bilaterally  Gastrointestinal: Soft, Non-tender, Non-distended, BS+  Musculoskeletal:  No clubbing, No joint deformity   Neurologic: Non-focal  Lymphatic: No lymphadenopathy  Psychiatry: AAOx3, Mood & affect appropriate  Skin: No rashes, No ecchymoses, No cyanosis    Labs:                        7.8    18.8  )-----------( 309      ( 27 Oct 2017 04:23 )             23.1     10    137  |  99  |  39<H>  ----------------------------<  112<H>  4.7   |  26  |  2.10<H>    Ca    8.6      27 Oct 2017 04:23  Phos  2.8     10-27  Mg     2.3     10-27    TPro  6.1  /  Alb  2.8<L>  /  TBili  1.3<H>  /  DBili  x   /  AST  41<H>  /  ALT  60<H>  /  AlkPhos  81  10-27    PT/INR - ( 27 Oct 2017 04:23 )   PT: 14.4 sec;   INR: 1.31 ratio       PTT - ( 27 Oct 2017 04:23 )  PTT:74.1 sec    Interpretation of Telemetry: afib 70-80s, intermittently pacing, NSVT and PVCs

## 2017-10-27 NOTE — PROGRESS NOTE ADULT - SUBJECTIVE AND OBJECTIVE BOX
Patient is a 83y old  Male who presents with a chief complaint of SOB, chest pain, diaphoresis (15 Oct 2017 17:59)    Event	  HPI:  83 male presents to ER by ambulance with report of shortness of breath. Wife at the bedside states patient has h/o Afib on Pradaxa, has "heart valve problems", has been having shortness of breath for the past few days, today was found sitting on stairs, c/o increased shortness of breath, chest pain and diaphoresis. At OSH, he was found to have an inferior wall STEMI and was brought to [facility] for cath. Upon arrival, pt appeared unwell and fixated on chest pain. Upon going to Cath lab, pt became non responsive and went into cardiogenic arrest. advanced life support was started, pt was intubated, and ROS was achieved. In cath lab, pt received a inta venus pacing wire, baloon pump, RCA stent, a swan RH cath, and an illiac balloon to stop R groin bleeding. Pt was admitted to the CCU for further management. (15 Oct 2017 17:59)    Overnight: no events   Patient is comfortable on precedex drip.     MEDICATIONS  (STANDING):  aspirin  chewable 81 milliGRAM(s) Oral daily  atorvastatin 80 milliGRAM(s) Oral at bedtime  chlorhexidine 0.12% Liquid 15 milliLiter(s) Swish and Spit two times a day  dexamethasone  Injectable 4 milliGRAM(s) IV Push two times a day  dexmedetomidine Infusion 0.3 MICROgram(s)/kG/Hr (6.982 mL/Hr) IV Continuous <Continuous>  dextrose 5%. 1000 milliLiter(s) (50 mL/Hr) IV Continuous <Continuous>  dextrose 50% Injectable 12.5 Gram(s) IV Push once  dextrose 50% Injectable 25 Gram(s) IV Push once  dextrose 50% Injectable 25 Gram(s) IV Push once  DOBUTamine Infusion 5 MICROgram(s)/kG/Min (13.965 mL/Hr) IV Continuous <Continuous>  heparin  Infusion. 1500 Unit(s)/Hr (15 mL/Hr) IV Continuous <Continuous>  imipenem/cilastatin  IVPB      imipenem/cilastatin  IVPB 250 milliGRAM(s) IV Intermittent every 6 hours  influenza   Vaccine 0.5 milliLiter(s) IntraMuscular once  insulin lispro (HumaLOG) corrective regimen sliding scale   SubCutaneous every 6 hours  lactobacillus acidophilus and bulgaricus Chewable 1 Tablet(s) Chew daily  pantoprazole  Injectable 40 milliGRAM(s) IV Push daily  risperiDONE   Tablet 0.5 milliGRAM(s) Oral every 12 hours  ticagrelor 90 milliGRAM(s) Oral two times a day  vancomycin  IVPB 750 milliGRAM(s) IV Intermittent every 12 hours    MEDICATIONS  (PRN):  ALBUTerol/ipratropium for Nebulization 3 milliLiter(s) Nebulizer every 6 hours PRN Shortness of Breath and/or Wheezing  dextrose Gel 1 Dose(s) Oral once PRN Blood Glucose LESS THAN 70 milliGRAM(s)/deciliter  glucagon  Injectable 1 milliGRAM(s) IntraMuscular once PRN Glucose LESS THAN 70 milligrams/deciliter  haloperidol     Tablet 0.5 milliGRAM(s) Oral every 12 hours PRN agitaion  heparin  Injectable 7500 Unit(s) IV Push every 6 hours PRN For aPTT less than 40  heparin  Injectable 3500 Unit(s) IV Push every 6 hours PRN For aPTT between 40 - 57      REVIEW OF SYSTEMS:  Otherwise, 10 point ROS done and otherwise negative.    PHYSICAL EXAM:  Vital Signs Last 24 Hrs  T(C): 36.5 (27 Oct 2017 05:00), Max: 37.1 (26 Oct 2017 19:30)  T(F): 97.7 (27 Oct 2017 05:00), Max: 98.8 (26 Oct 2017 19:30)  HR: 70 (27 Oct 2017 08:00) (70 - 130)  BP: 127/62 (27 Oct 2017 08:00) (93/72 - 166/92)  BP(mean): 76 (27 Oct 2017 08:00) (67 - 118)  RR: 12 (27 Oct 2017 08:00) (12 - 61)  SpO2: 100% (27 Oct 2017 08:00) (87% - 100%)  I&O's Summary    26 Oct 2017 07:01  -  27 Oct 2017 07:00  --------------------------------------------------------  IN: 3097.8 mL / OUT: 5094 mL / NET: -1996.2 mL    27 Oct 2017 07:01  -  27 Oct 2017 09:03  --------------------------------------------------------  IN: 95.6 mL / OUT: 182 mL / NET: -86.4 mL        Appearance: Normal	  HEENT:   Normal oral mucosa  Cardiovascular: Normal S1 S2, No JVD, No murmurs, ankle edema  Respiratory: coarse breath sounds b/l  Gastrointestinal:  Soft, Non-tender, + BS	  Skin: No rashes, No ecchymoses, No cyanosis	  Neurologic: R pupil sluggish, RUE weakness   Extremities: No clubbing, cyanosis or edema  Vascular: Peripheral pulses palpable 2+ bilaterally    LABS:	 	                        7.8    18.8  )-----------( 309      ( 27 Oct 2017 04:23 )             23.1     Auto Eosinophil # 0.0   / Auto Eosinophil % x     / Auto Neutrophil # 16.5  / Auto Neutrophil % 85.0  / BANDS % 2.0                          8.4    21.9  )-----------( 267      ( 26 Oct 2017 04:20 )             24.2     Auto Eosinophil # 0.0   / Auto Eosinophil % 1.0   / Auto Neutrophil # 19.5  / Auto Neutrophil % 79.0  / BANDS % 2.0                          8.0    18.6  )-----------( 212      ( 25 Oct 2017 16:28 )             23.3     Auto Eosinophil # 0.1   / Auto Eosinophil % 1.0   / Auto Neutrophil # 16.4  / Auto Neutrophil % 78.0  / BANDS % 2.0      INR: 1.31 ratio (10-27 @ 04:23)  INR: 1.24 ratio (10-26 @ 04:20)  INR: 1.18 ratio (10-25 @ 05:59)    10-27    137  |  99  |  39<H>  ----------------------------<  112<H>  4.7   |  26  |  2.10<H>  10-26    138  |  101  |  41<H>  ----------------------------<  94  5.7<H>   |  24  |  2.33<H>  10-26    138  |  100  |  43<H>  ----------------------------<  124<H>  5.0   |  23  |  2.45<H>    Ca    8.6      27 Oct 2017 04:23  Mg     2.3     10-27  Phos  2.8     10-27  TPro  6.1  /  Alb  2.8<L>  /  TBili  1.3<H>  /  DBili  x   /  AST  41<H>  /  ALT  60<H>  /  AlkPhos  81  10-27  TPro  6.0  /  Alb  2.8<L>  /  TBili  1.2  /  DBili  x   /  AST  63<H>  /  ALT  61<H>  /  AlkPhos  79  10-26  TPro  6.2  /  Alb  2.8<L>  /  TBili  1.4<H>  /  DBili  x   /  AST  44<H>  /  ALT  64<H>  /  AlkPhos  86  10-26    Lactate, Blood: 1.4 mmol/L (10-27 @ 04:23)      proBNP:   Lipid Profile: 10-15 Chol 129 LDL 79 HDL 41 Trig 45  HgA1c: 5.4 % (10-15 @ 21:32)    TSH: Thyroid Stimulating Hormone, Serum: 0.05 uIU/mL (10-25 @ 13:21)      CARDIAC MARKERS:   18 Oct 2017 04:37 Troponin 10.87 ng/mL / Creatine Kinase 4675 U/L /  CKMB 27.1 ng/mL / CPK Mass Assay % 0.6 %   17 Oct 2017 22:34 Troponin 10.99 ng/mL / Creatine Kinase 4968 U/L /  CKMB 30.9 ng/mL / CPK Mass Assay % 0.6 %   17 Oct 2017 16:26 Troponin 13.48 ng/mL / Creatine Kinase 5078 U/L /  CKMB 37.8 ng/mL / CPK Mass Assay % 0.7 %

## 2017-10-28 NOTE — PROGRESS NOTE ADULT - SUBJECTIVE AND OBJECTIVE BOX
Subjective:    Pt seen and examined at bedside. Patient's mental status much improved, no longer hallucinating, diplopia improved      Objective:   Vital Signs Last 24 Hrs  T(C): 36.3 (26 Oct 2017 08:00), Max: 36.7 (26 Oct 2017 00:00)  T(F): 97.4 (26 Oct 2017 08:00), Max: 98.1 (26 Oct 2017 00:00)  HR: 108 (26 Oct 2017 10:00) (80 - 110)  BP: 118/62 (26 Oct 2017 10:00) (86/53 - 123/53)  BP(mean): 67 (26 Oct 2017 10:00) (58 - 112)  RR: 22 (26 Oct 2017 10:00) (20 - 35)  SpO2: 100% (26 Oct 2017 10:00) (85% - 100%)    Exam:   AAOx3, speech fluent, following commands  R ptosis, EOMI, L pupil 2mm and R 3mm minimally reactive,  moves all 4 limbs symmetrically anti-gravity.  +action myoclonus      Other:  CBC Full  -  ( 26 Oct 2017 04:20 )  WBC Count : 21.9 K/uL  Hemoglobin : 8.4 g/dL  Hematocrit : 24.2 %  Platelet Count - Automated : 267 K/uL  Mean Cell Volume : 101.0 fl  Mean Cell Hemoglobin : 34.9 pg  Mean Cell Hemoglobin Concentration : 34.5 gm/dL  Auto Neutrophil # : 19.5 K/uL  Auto Lymphocyte # : 0.8 K/uL  Auto Monocyte # : 1.6 K/uL  Auto Eosinophil # : 0.0 K/uL  Auto Basophil # : 0.0 K/uL  Auto Neutrophil % : 79.0 %  Auto Lymphocyte % : 5.0 %  Auto Monocyte % : 11.0 %  Auto Eosinophil % : 1.0 %  Auto Basophil % : x    10-26    138  |  100  |  43<H>  ----------------------------<  124<H>  5.0   |  23  |  2.45<H>    Ca    8.6      26 Oct 2017 04:20  Phos  2.9     10-26  Mg     2.6     10-26    TPro  6.2  /  Alb  2.8<L>  /  TBili  1.4<H>  /  DBili  x   /  AST  44<H>  /  ALT  64<H>  /  AlkPhos  86  10-26    10-26    138  |  100  |  43<H>  ----------------------------<  124<H>  5.0   |  23  |  2.45<H>    Ca    8.6      26 Oct 2017 04:20  Phos  2.9     10-26  Mg     2.6     10-26    TPro  6.2  /  Alb  2.8<L>  /  TBili  1.4<H>  /  DBili  x   /  AST  44<H>  /  ALT  64<H>  /  AlkPhos  86  10-26    LIVER FUNCTIONS - ( 26 Oct 2017 04:20 )  Alb: 2.8 g/dL / Pro: 6.2 g/dL / ALK PHOS: 86 U/L / ALT: 64 U/L RC / AST: 44 U/L / GGT: x             Imaging:     Routine EEG:   Intermittent generalized slowing Subjective:    Pt seen and examined at bedside. Patient's mental status much improved, no longer hallucinating.      Objective:   Vital Signs Last 24 Hrs  T(C): 36.3 (26 Oct 2017 08:00), Max: 36.7 (26 Oct 2017 00:00)  T(F): 97.4 (26 Oct 2017 08:00), Max: 98.1 (26 Oct 2017 00:00)  HR: 108 (26 Oct 2017 10:00) (80 - 110)  BP: 118/62 (26 Oct 2017 10:00) (86/53 - 123/53)  BP(mean): 67 (26 Oct 2017 10:00) (58 - 112)  RR: 22 (26 Oct 2017 10:00) (20 - 35)  SpO2: 100% (26 Oct 2017 10:00) (85% - 100%)    Exam:   AAOx3, speech fluent, following commands  R ptosis, EOMI, L pupil 2mm and R 3mm minimally reactive,  moves all 4 limbs symmetrically anti-gravity.  +action myoclonus      Other:  CBC Full  -  ( 26 Oct 2017 04:20 )  WBC Count : 21.9 K/uL  Hemoglobin : 8.4 g/dL  Hematocrit : 24.2 %  Platelet Count - Automated : 267 K/uL  Mean Cell Volume : 101.0 fl  Mean Cell Hemoglobin : 34.9 pg  Mean Cell Hemoglobin Concentration : 34.5 gm/dL  Auto Neutrophil # : 19.5 K/uL  Auto Lymphocyte # : 0.8 K/uL  Auto Monocyte # : 1.6 K/uL  Auto Eosinophil # : 0.0 K/uL  Auto Basophil # : 0.0 K/uL  Auto Neutrophil % : 79.0 %  Auto Lymphocyte % : 5.0 %  Auto Monocyte % : 11.0 %  Auto Eosinophil % : 1.0 %  Auto Basophil % : x    10-26    138  |  100  |  43<H>  ----------------------------<  124<H>  5.0   |  23  |  2.45<H>    Ca    8.6      26 Oct 2017 04:20  Phos  2.9     10-26  Mg     2.6     10-26    TPro  6.2  /  Alb  2.8<L>  /  TBili  1.4<H>  /  DBili  x   /  AST  44<H>  /  ALT  64<H>  /  AlkPhos  86  10-26    10-26    138  |  100  |  43<H>  ----------------------------<  124<H>  5.0   |  23  |  2.45<H>    Ca    8.6      26 Oct 2017 04:20  Phos  2.9     10-26  Mg     2.6     10-26    TPro  6.2  /  Alb  2.8<L>  /  TBili  1.4<H>  /  DBili  x   /  AST  44<H>  /  ALT  64<H>  /  AlkPhos  86  10-26    LIVER FUNCTIONS - ( 26 Oct 2017 04:20 )  Alb: 2.8 g/dL / Pro: 6.2 g/dL / ALK PHOS: 86 U/L / ALT: 64 U/L RC / AST: 44 U/L / GGT: x             Imaging:     Routine EEG:   Intermittent generalized slowing

## 2017-10-28 NOTE — PROGRESS NOTE ADULT - SUBJECTIVE AND OBJECTIVE BOX
VASCULAR SURGERY    HPI: Reconsulted for ?R groin pseudoaneurysm. Patient c/o right groin pain that began yesterday, intermittent, nonradiating. Denies numbness/tingling.        Physical Exam  Vital Signs Last 24 Hrs  T(C): 36.4 (27 Oct 2017 20:00), Max: 36.5 (27 Oct 2017 05:00)  T(F): 97.6 (27 Oct 2017 20:00), Max: 97.7 (27 Oct 2017 05:00)  HR: 84 (28 Oct 2017 00:25) (70 - 130)  BP: 109/50 (28 Oct 2017 00:25) (86/63 - 141/86)  BP(mean): 73 (28 Oct 2017 00:25) (69 - 127)  RR: 30 (28 Oct 2017 00:25) (12 - 32)  SpO2: 100% (28 Oct 2017 00:25) (96% - 100%)  Gen: NAD  HEENT: normocephalic, atraumatic, no scleral icterus  CV: S1, S2, RRR  Pulm: CTA B/L  Abd: Soft, ND, NTP, no rebound, no guarding, no palpable organomegaly/masses  Ext: warm, no edema, palp dp/pt  I&O's Detail    26 Oct 2017 07:01  -  27 Oct 2017 07:00  --------------------------------------------------------  IN:    dexmedetomidine Infusion: 76.8 mL    DOBUTamine Infusion: 336 mL    Enteral Tube Flush: 280 mL    heparin  Infusion.: 360 mL    Pivot: 1320 mL    Solution: 300 mL    Solution: 125 mL    Solution: 300 mL  Total IN: 3097.8 mL    OUT:    Other: 5094 mL  Total OUT: 5094 mL    Total NET: -1996.2 mL      27 Oct 2017 07:01  -  28 Oct 2017 01:15  --------------------------------------------------------  IN:    dexmedetomidine Infusion: 32.6 mL    DOBUTamine Infusion: 56 mL    DOBUTamine Infusion: 84 mL    Enteral Tube Flush: 500 mL    heparin  Infusion.: 240 mL    Pivot: 880 mL    Sodium Chloride 0.9% IV Bolus: 500 mL    Solution: 250 mL    Solution: 300 mL  Total IN: 2842.6 mL    OUT:    Other: 2404 mL  Total OUT: 2404 mL    Total NET: 438.6 mL          Labs:                        8.7    16.7  )-----------( 338      ( 27 Oct 2017 16:56 )             27.0     10-27    137  |  98  |  39<H>  ----------------------------<  111<H>  4.6   |  28  |  1.72<H>    Ca    9.0      27 Oct 2017 21:13  Phos  2.6     10-27  Mg     2.5     10-27    TPro  6.6  /  Alb  3.2<L>  /  TBili  1.2  /  DBili  x   /  AST  43<H>  /  ALT  64<H>  /  AlkPhos  86  10-27    PT/INR - ( 27 Oct 2017 04:23 )   PT: 14.4 sec;   INR: 1.31 ratio         PTT - ( 27 Oct 2017 04:23 )  PTT:74.1 sec        IMAGING:  US Duplex Arterial Lower Ext Ltd, Right (10.27.17 @ 16:59)   Impression:    Limited exam. Probable small right groin pseudoaneurysm with no   discernible neck as described above. Consider repeat exam when patient   has less pain and in the ultrasound department with higher quality   ultrasound machines. VASCULAR SURGERY    HPI: Reconsulted for ?R groin pseudoaneurysm. Patient c/o right groin pain that began yesterday, intermittent, nonradiating. Denies numbness/tingling.        Physical Exam  Vital Signs Last 24 Hrs  T(C): 36.4 (27 Oct 2017 20:00), Max: 36.5 (27 Oct 2017 05:00)  T(F): 97.6 (27 Oct 2017 20:00), Max: 97.7 (27 Oct 2017 05:00)  HR: 84 (28 Oct 2017 00:25) (70 - 130)  BP: 109/50 (28 Oct 2017 00:25) (86/63 - 141/86)  BP(mean): 73 (28 Oct 2017 00:25) (69 - 127)  RR: 30 (28 Oct 2017 00:25) (12 - 32)  SpO2: 100% (28 Oct 2017 00:25) (96% - 100%)    Gen: NAD  HEENT: normocephalic, atraumatic, no scleral icterus  CV: S1, S2, RRR  Pulm: CTA B/L  Abd: Soft, ND, NTP, no rebound, no guarding, no palpable organomegaly/masses  Ext: warm, no edema, palp dp/pt rt groinf mild tenderness w palp fullness 2cm diam no active bleeding   I&O's Detail    26 Oct 2017 07:01  -  27 Oct 2017 07:00  --------------------------------------------------------  IN:    dexmedetomidine Infusion: 76.8 mL    DOBUTamine Infusion: 336 mL    Enteral Tube Flush: 280 mL    heparin  Infusion.: 360 mL    Pivot: 1320 mL    Solution: 300 mL    Solution: 125 mL    Solution: 300 mL  Total IN: 3097.8 mL    OUT:    Other: 5094 mL  Total OUT: 5094 mL    Total NET: -1996.2 mL      27 Oct 2017 07:01  -  28 Oct 2017 01:15  --------------------------------------------------------  IN:    dexmedetomidine Infusion: 32.6 mL    DOBUTamine Infusion: 56 mL    DOBUTamine Infusion: 84 mL    Enteral Tube Flush: 500 mL    heparin  Infusion.: 240 mL    Pivot: 880 mL    Sodium Chloride 0.9% IV Bolus: 500 mL    Solution: 250 mL    Solution: 300 mL  Total IN: 2842.6 mL    OUT:    Other: 2404 mL  Total OUT: 2404 mL    Total NET: 438.6 mL          Labs:                        8.7    16.7  )-----------( 338      ( 27 Oct 2017 16:56 )             27.0     10-27    137  |  98  |  39<H>  ----------------------------<  111<H>  4.6   |  28  |  1.72<H>    Ca    9.0      27 Oct 2017 21:13  Phos  2.6     10-27  Mg     2.5     10-27    TPro  6.6  /  Alb  3.2<L>  /  TBili  1.2  /  DBili  x   /  AST  43<H>  /  ALT  64<H>  /  AlkPhos  86  10-27    PT/INR - ( 27 Oct 2017 04:23 )   PT: 14.4 sec;   INR: 1.31 ratio         PTT - ( 27 Oct 2017 04:23 )  PTT:74.1 sec        IMAGING:  US Duplex Arterial Lower Ext Ltd, Right (10.27.17 @ 16:59)   Impression:    Limited exam. Probable small right groin pseudoaneurysm with no   discernible neck as described above. Consider repeat exam when patient   has less pain and in the ultrasound department with higher quality   ultrasound machines.

## 2017-10-28 NOTE — PROGRESS NOTE ADULT - ATTENDING COMMENTS
Seen and examined on rounds with housestaff.  Significantly less agitated today and able to follow commands.  Uremia improving, though multiple metabolic derangements remain.  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  Concern for underlying infarct.  MRI when stable.

## 2017-10-28 NOTE — SWALLOW BEDSIDE ASSESSMENT ADULT - PHARYNGEAL PHASE
Multiple swallows/slight intermittent wet vocal quality cough post swallow following cup drinking./Multiple swallows/Wet vocal quality post oral intake Multiple swallows

## 2017-10-28 NOTE — SWALLOW BEDSIDE ASSESSMENT ADULT - COMMENTS
10/17 Nsg 6500cc brown gastric content from NGT into canister. 10/18 Neuro: Patient s/p cardiac cath and LV assist device on systemic heparin gtt, code stroke 10/16 called for asymmetric pupils R>L in setting of PTT >200,  third nerve palsy. In the setting of elevated PTT, concern for brainstem ICH. Imaging shows: No evidence for acute territorial infarct or hemorrhage.  Patient noted to have recurrent  b/l upper extremity myoclonic movement, with concomitant twitching of B/L LE. EEG completed, findings indicate non-specific moderate to severe diffuse or multifocal cerebral dysfunction. There were no epileptiform abnormalities recorded. Despite negative capture of epileptiform activity, patient continues to demonstrate myoclonic activity.  10/21/17 Pt switched from Bipap to 4L Nasal canula. 10/22 Per MD, Pt remains extubated w/o supp oxygen requirements, however, unintelligible speech and weak swallow.  CXR: IMPRESSION: Small left pleural effusion with adjacent atelectasis. 10/24/17 Per NP: had [place] removed after his mixed venous sats from the [facility] correlated with the [place].  Per Nsg note: CVVHDF placed on hold due to high access pressures; Per Nsg: Pt in no acute distress Neuro check completed as ordered on initial assessment. Rt eye size 3mm slower reaction to light than left eye 3 mm; MD to f/u.  Blood cultures collected 10/19, aerobic bottle with gram variable beaded rods; PTT > 200. Prior to evaluation, SLP spoke with Nsg who reported that the Pt is able to sit upright and findings of code stroke was unremarkable. 10/24 CXR: Unchanged left pleural effusion with left basilar atelectasis and/or infection. 10/25 ENT:Nasopharynx, oropharynx, and hypopharynx clear, no bleeding. Airway patent, no foreign body visualized. No erythema, edema, pooling of thickened secretions, masses or lesions. Vocal cords mobile with good contact b/l. 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 Also with reflux changes and intubation granulomas.

## 2017-10-28 NOTE — SWALLOW BEDSIDE ASSESSMENT ADULT - MUCOSAL QUALITY
Patches of ecchymosis vs other on sublingual surface ecchymosis vs hematoma vs other on sublingual surface L>R

## 2017-10-28 NOTE — PROGRESS NOTE ADULT - ASSESSMENT
83M pmhx of AF on pradaxa initially presented on 10/15/17 with chest pain/diaphoresis/ dyspnea to OSH, IW-STEMI emergently transferred here for C c/b VF arrest requiring IABP followed by pRCA stent c/b now acute right heart failure and cardiogenic shock s/p IABP (explanted 10/19), RP impella for RV support (explanted 10/20).       1. cardiogenic shock - lactate wnl  - wean  as tolerated  - cont cvvh, goal is net even for today    2.CAD  -cont dapt, statin    3. afib  -cont heparin drip

## 2017-10-28 NOTE — PROGRESS NOTE ADULT - ATTENDING COMMENTS
DANITA/ATN requiring CRRT    seen and examined on CRRT.  No edema    - Cont. CRRT  - Remainder per fellow.

## 2017-10-28 NOTE — PROGRESS NOTE ADULT - SUBJECTIVE AND OBJECTIVE BOX
[facility] DIVISION OF KIDNEY DISEASES AND HYPERTENSION -- FOLLOW UP NOTE  --------------------------------------------------------------------------------  Chief Complaint: DANITA on CKD with Anuria requiring renal replacement therapy     24 hour events/subjective:  Patient remains on CVVHDF overnight. became hypotensive overnight requiring 250 cc fluids.             PAST HISTORY  --------------------------------------------------------------------------------  No significant changes to PMH, PSH, FHx, SHx, unless otherwise noted    ALLERGIES & MEDICATIONS  --------------------------------------------------------------------------------  Allergies    No Known Allergies    Intolerances      Standing Inpatient Medications  aspirin  chewable 81 milliGRAM(s) Oral daily  atorvastatin 80 milliGRAM(s) Oral at bedtime  chlorhexidine 0.12% Liquid 15 milliLiter(s) Swish and Spit two times a day  dextrose 5%. 1000 milliLiter(s) IV Continuous <Continuous>  dextrose 50% Injectable 12.5 Gram(s) IV Push once  dextrose 50% Injectable 25 Gram(s) IV Push once  dextrose 50% Injectable 25 Gram(s) IV Push once  DOBUTamine Infusion 2.5 MICROgram(s)/kG/Min IV Continuous <Continuous>  heparin  Infusion. 1500 Unit(s)/Hr IV Continuous <Continuous>  imipenem/cilastatin  IVPB      imipenem/cilastatin  IVPB 250 milliGRAM(s) IV Intermittent every 6 hours  influenza   Vaccine 0.5 milliLiter(s) IntraMuscular once  insulin lispro (HumaLOG) corrective regimen sliding scale   SubCutaneous every 6 hours  lactobacillus acidophilus and bulgaricus Chewable 1 Tablet(s) Chew daily  pantoprazole  Injectable 40 milliGRAM(s) IV Push daily  risperiDONE   Tablet 0.5 milliGRAM(s) Oral every 12 hours  ticagrelor 90 milliGRAM(s) Oral two times a day  vancomycin  IVPB 1000 milliGRAM(s) IV Intermittent every 12 hours    PRN Inpatient Medications  acetaminophen    Suspension 930 milliGRAM(s) Oral every 6 hours PRN  acetaminophen    Suspension. 930 milliGRAM(s) Oral every 4 hours PRN  ALBUTerol/ipratropium for Nebulization 3 milliLiter(s) Nebulizer every 6 hours PRN  dextrose Gel 1 Dose(s) Oral once PRN  glucagon  Injectable 1 milliGRAM(s) IntraMuscular once PRN  haloperidol     Tablet 0.5 milliGRAM(s) Oral every 12 hours PRN  heparin  Injectable 7500 Unit(s) IV Push every 6 hours PRN  heparin  Injectable 3500 Unit(s) IV Push every 6 hours PRN  oxyCODONE    5 mG/acetaminophen 325 mG 1 Tablet(s) Oral every 6 hours PRN      REVIEW OF SYSTEMS  --------------------------------------------------------------------------------  Gen: No weakness, fever, chills  Skin: No rashes  Head/Eyes/Ears/Mouth: No headache  Respiratory: No dyspnea  CV: No chest pain  GI: No abdominal pain  MSK: No no edema  Neuro: No dizziness/lightheadedness    All other systems were reviewed and are negative, except as noted.    VITALS/PHYSICAL EXAM  --------------------------------------------------------------------------------  T(C): 36.3 (10-28-17 @ 09:00), Max: 36.4 (10-27-17 @ 15:00)  HR: 80 (10-28-17 @ 09:00) (70 - 96)  BP: 121/61 (10-28-17 @ 09:00) (86/63 - 144/88)  RR: 27 (10-28-17 @ 09:00) (16 - 30)  SpO2: 100% (10-28-17 @ 09:00) (93% - 100%)  Wt(kg): --        10-27-17 @ 07:01  -  10-28-17 @ 07:00  --------------------------------------------------------  IN: 3904.6 mL / OUT: 3567 mL / NET: 337.6 mL    10-28-17 @ 07:01  -  10-28-17 @ 09:37  --------------------------------------------------------  IN: 270 mL / OUT: 328 mL / NET: -58 mL      Physical Exam:  	Gen: NAD  	Pulm: CTA B/L  	CV: RRR, S1S2; no rub  	Abd: +BS, soft, nontender/nondistended  	UE: Warm,no edema  	LE: Warm,  no edema  	Skin: Warm, without rashes  	Vascular access: RIJ non tunneled catheter +    LABS/STUDIES  --------------------------------------------------------------------------------              8.9    20.0  >-----------<  374      [10-28-17 @ 05:02]              26.7     137  |  99  |  38  ----------------------------<  131      [10-28-17 @ 05:02]  4.7   |  26  |  1.56        Ca     8.7     [10-28-17 @ 05:02]      Mg     2.5     [10-28-17 @ 05:02]      Phos  2.6     [10-28-17 @ 05:02]    TPro  6.5  /  Alb  3.0  /  TBili  1.2  /  DBili  x   /  AST  45  /  ALT  65  /  AlkPhos  85  [10-28-17 @ 05:02]    PT/INR: PT 13.6 , INR 1.25       [10-28-17 @ 05:07]  PTT: 124.1      [10-28-17 @ 05:07]      Creatinine Trend:  SCr 1.56 [10-28 @ 05:02]  SCr 1.72 [10-27 @ 21:13]  SCr 1.81 [10-27 @ 16:56]  SCr 2.10 [10-27 @ 04:23]  SCr 2.33 [10-26 @ 17:42]    Urinalysis - [10-15-17 @ 16:48]      Color Yellow / Appearance Turbid / SG >1.030 / pH 6.5      Gluc 50 / Ketone Negative  / Bili Negative / Urobili Negative       Blood Moderate / Protein >600 / Leuk Est Negative / Nitrite Negative      RBC >50 / WBC 3-5 / Hyaline  / Gran  / Sq Epi  / Non Sq Epi OCC / Bacteria Few      HbA1c 5.4      [10-15-17 @ 21:32]  TSH 0.05      [10-25-17 @ 13:21]  Lipid: chol 129, TG 45, HDL 41, LDL 79      [10-15-17 @ 21:40]    HBsAg Nonreact      [10-19-17 @ 07:25]  HCV 0.15, Nonreact      [10-19-17 @ 07:25]  HIV Nonreact      [10-19-17 @ 07:25]    EYAD: titer 1:640, pattern Homogeneous      [10-19-17 @ 07:25]  dsDNA <12      [10-19-17 @ 07:25]  C3 Complement 72      [10-19-17 @ 07:25]  C4 Complement 14      [10-19-17 @ 07:25]  Free Light Chains: kappa 15.20, lambda 10.50, ratio = 1.45      [10-19 @ 07:25]  Immunofixation Serum:   No Monoclonal Band Identified      [10-24-17 @ 13:31]  SPEP Interpretation: Hypoalbuminemia Increased beta fraction due to probable fibrinogen presence.      [10-24-17 @ 12:31]

## 2017-10-28 NOTE — SWALLOW BEDSIDE ASSESSMENT ADULT - ASPIRATION PRECAUTIONS
Maintain aspiration precaution for Pt' own secretions and during enteral feeds./yes Aspiration precautions for secretions and during enteral feeds.

## 2017-10-28 NOTE — PROGRESS NOTE ADULT - SUBJECTIVE AND OBJECTIVE BOX
Patient is a 83y old  Male who presents with a chief complaint of SOB, chest pain, diaphoresis (15 Oct 2017 17:59)    Event	  HPI:  83 male presents to ER by ambulance with report of shortness of breath. Wife at the bedside states patient has h/o Afib on Pradaxa, has "heart valve problems", has been having shortness of breath for the past few days, today was found sitting on stairs, c/o increased shortness of breath, chest pain and diaphoresis. At OSH, he was found to have an inferior wall STEMI and was brought to [facility] for cath. Upon arrival, pt appeared unwell and fixated on chest pain. Upon going to Cath lab, pt became non responsive and went into cardiogenic arrest. advanced life support was started, pt was intubated, and ROS was achieved. In cath lab, pt received a inta venus pacing wire, baloon pump, RCA stent, a swan RH cath, and an illiac balloon to stop R groin bleeding. Pt was admitted to the CCU for further management. (15 Oct 2017 17:59)    MEDICATIONS  (STANDING):  aspirin  chewable 81 milliGRAM(s) Oral daily  atorvastatin 80 milliGRAM(s) Oral at bedtime  chlorhexidine 0.12% Liquid 15 milliLiter(s) Swish and Spit two times a day  dextrose 5%. 1000 milliLiter(s) (50 mL/Hr) IV Continuous <Continuous>  dextrose 50% Injectable 12.5 Gram(s) IV Push once  dextrose 50% Injectable 25 Gram(s) IV Push once  dextrose 50% Injectable 25 Gram(s) IV Push once  DOBUTamine Infusion 2.5 MICROgram(s)/kG/Min (6.982 mL/Hr) IV Continuous <Continuous>  heparin  Infusion. 1500 Unit(s)/Hr (15 mL/Hr) IV Continuous <Continuous>  imipenem/cilastatin  IVPB      imipenem/cilastatin  IVPB 250 milliGRAM(s) IV Intermittent every 6 hours  influenza   Vaccine 0.5 milliLiter(s) IntraMuscular once  insulin lispro (HumaLOG) corrective regimen sliding scale   SubCutaneous every 6 hours  lactobacillus acidophilus and bulgaricus Chewable 1 Tablet(s) Chew daily  pantoprazole  Injectable 40 milliGRAM(s) IV Push daily  risperiDONE   Tablet 0.5 milliGRAM(s) Oral every 12 hours  ticagrelor 90 milliGRAM(s) Oral two times a day  vancomycin  IVPB 1000 milliGRAM(s) IV Intermittent every 12 hours    MEDICATIONS  (PRN):  acetaminophen    Suspension 930 milliGRAM(s) Oral every 6 hours PRN For Temp greater than 38 C (100.4 F)  acetaminophen    Suspension. 930 milliGRAM(s) Oral every 4 hours PRN Moderate Pain (4 - 6)  ALBUTerol/ipratropium for Nebulization 3 milliLiter(s) Nebulizer every 6 hours PRN Shortness of Breath and/or Wheezing  dextrose Gel 1 Dose(s) Oral once PRN Blood Glucose LESS THAN 70 milliGRAM(s)/deciliter  glucagon  Injectable 1 milliGRAM(s) IntraMuscular once PRN Glucose LESS THAN 70 milligrams/deciliter  haloperidol     Tablet 0.5 milliGRAM(s) Oral every 12 hours PRN agitaion  heparin  Injectable 7500 Unit(s) IV Push every 6 hours PRN For aPTT less than 40  heparin  Injectable 3500 Unit(s) IV Push every 6 hours PRN For aPTT between 40 - 57      REVIEW OF SYSTEMS:  Otherwise, 10 point ROS done and otherwise negative.    PHYSICAL EXAM:  Vital Signs Last 24 Hrs  T(C): 36.3 (28 Oct 2017 05:00), Max: 36.4 (27 Oct 2017 15:00)  T(F): 97.3 (28 Oct 2017 05:00), Max: 97.6 (27 Oct 2017 20:00)  HR: 82 (28 Oct 2017 06:05) (70 - 96)  BP: 122/83 (28 Oct 2017 06:05) (86/63 - 141/86)  BP(mean): 99 (28 Oct 2017 06:05) (63 - 127)  RR: 19 (28 Oct 2017 06:05) (12 - 30)  SpO2: 99% (28 Oct 2017 06:05) (93% - 100%)  I&O's Summary    27 Oct 2017 07:01  -  28 Oct 2017 07:00  --------------------------------------------------------  IN: 3904.6 mL / OUT: 3567 mL / NET: 337.6 mL        Appearance: Normal	  HEENT:   Normal oral mucosa, PERRL, EOMI	  Lymphatic: No lymphadenopathy  Cardiovascular: Normal S1 S2, No JVD, No murmurs, No edema  Respiratory: Lungs clear to auscultation	  Psychiatry: A & O x 3, Mood & affect appropriate  Gastrointestinal:  Soft, Non-tender, + BS	  Skin: No rashes, No ecchymoses, No cyanosis	  Neurologic: Non-focal  Extremities: Normal range of motion, No clubbing, cyanosis or edema  Vascular: Peripheral pulses palpable 2+ bilaterally    LABS:	 	                        8.9    20.0  )-----------( 374      ( 28 Oct 2017 05:02 )             26.7     Auto Eosinophil # 0.0   / Auto Eosinophil % 0.1   / Auto Neutrophil # 17.8  / Auto Neutrophil % 88.7  / BANDS % x                            8.7    16.7  )-----------( 338      ( 27 Oct 2017 16:56 )             27.0     Auto Eosinophil # x     / Auto Eosinophil % x     / Auto Neutrophil # x     / Auto Neutrophil % x     / BANDS % x                            7.8    18.8  )-----------( 309      ( 27 Oct 2017 04:23 )             23.1     Auto Eosinophil # 0.0   / Auto Eosinophil % x     / Auto Neutrophil # 16.5  / Auto Neutrophil % 85.0  / BANDS % 2.0      INR: 1.25 ratio (10-28 @ 05:07)  INR: 1.31 ratio (10-27 @ 04:23)  INR: 1.24 ratio (10-26 @ 04:20)    10-28    137  |  99  |  38<H>  ----------------------------<  131<H>  4.7   |  26  |  1.56<H>  10-27    137  |  98  |  39<H>  ----------------------------<  111<H>  4.6   |  28  |  1.72<H>  10-27    137  |  97  |  39<H>  ----------------------------<  102<H>  4.1   |  25  |  1.81<H>    Ca    8.7      28 Oct 2017 05:02  Mg     2.5     10-28  Phos  2.6     10-28  TPro  6.5  /  Alb  3.0<L>  /  TBili  1.2  /  DBili  x   /  AST  45<H>  /  ALT  65<H>  /  AlkPhos  85  10-28  TPro  6.6  /  Alb  3.2<L>  /  TBili  1.2  /  DBili  x   /  AST  43<H>  /  ALT  64<H>  /  AlkPhos  86  10-27  TPro  6.5  /  Alb  3.0<L>  /  TBili  1.2  /  DBili  x   /  AST  40  /  ALT  62<H>  /  AlkPhos  85  10-27    Lactate, Blood: 1.5 mmol/L (10-28 @ 05:02)  Lactate, Blood: 1.6 mmol/L (10-27 @ 19:58)  Lactate, Blood: 4.2 mmol/L (10-27 @ 16:56)  Lactate, Blood: 1.4 mmol/L (10-27 @ 04:23)      proBNP:   Lipid Profile: 10-15 Chol 129 LDL 79 HDL 41 Trig 45  HgA1c: 5.4 % (10-15 @ 21:32)    TSH: Thyroid Stimulating Hormone, Serum: 0.05 uIU/mL (10-25 @ 13:21)      CARDIAC MARKERS:   18 Oct 2017 04:37 Troponin 10.87 ng/mL / Creatine Kinase 4675 U/L /  CKMB 27.1 ng/mL / CPK Mass Assay % 0.6 %   17 Oct 2017 22:34 Troponin 10.99 ng/mL / Creatine Kinase 4968 U/L /  CKMB 30.9 ng/mL / CPK Mass Assay % 0.6 %   17 Oct 2017 16:26 Troponin 13.48 ng/mL / Creatine Kinase 5078 U/L /  CKMB 37.8 ng/mL / CPK Mass Assay % 0.7 %        TELEMETRY: 	    ECG:  	  RADIOLOGY:  OTHER: 	    PREVIOUS DIAGNOSTIC TESTING:    [ ] Echocardiogram:  [ ]  Catheterization:  [ ] Stress Test:

## 2017-10-28 NOTE — SWALLOW BEDSIDE ASSESSMENT ADULT - SLP GENERAL OBSERVATIONS
Pt awake, alert and oriented x 3 (reduced recall to day); Pt's family at bedside.  Pt seated upright in bed.  SP02 99% upon encounter with RR 16 prior to oral intake; SP02 decreased intermittently to 96% and respiratory increased to 33 on one trial with return to 16 within a few minutes.  Hoarse/harsh vocal quality perceived.  Pt/family report coughing pta which they attribute to the presence of the goiter and deny h/o dysphagia.  Pt requesting to drink water upon encounter.  Pt provided with ice chips prior to PO trials; although no s/s of aspiration evident, silent aspiration not ruled out.  +NGT; supplemental 02 via nasal canula. Pt awake, A&Ox3, Pt's family at bedside. Pt seated upright in bed. +NGT; supplemental 02 via nasal canula. + CVVHD

## 2017-10-28 NOTE — PROGRESS NOTE ADULT - ATTENDING COMMENTS
Patient seen and examined with DR. Thayer. I agree with her interval history, exam findings and plans as noted above. Patient with improving mental status.   No new findings on exam, left neck with transvenous pacer, right groin area with ulcer with clean base at this point.    On Vancomycin for coag. negative staph bacteremia,   dosing based upon CVVH    Beaded AFB+ organism in another blood culture- further ID pending  Imipenem added yesterday to cover nocardia  possibility of rapid grower mycobacteria as well- pending ID/sen.    Weekend ID coverage available by calling [phone]

## 2017-10-28 NOTE — SWALLOW BEDSIDE ASSESSMENT ADULT - SLP PERTINENT HISTORY OF CURRENT PROBLEM
83/M with Afib on Pradaxa who came to [facility] c/o SOB found to have IWSTEMI, transferred to [facility] for cath, h/c by VF arrest requiring IABP, RCA stent & SWAN, h/c c/b cardiogenic shock 2/2 right heart failure s/p impella (now removed), w/ h/c c/b renal failure requiring CVVH, Pt extubated (10/21/17).

## 2017-10-28 NOTE — PROGRESS NOTE ADULT - ASSESSMENT
Coag negative Staph bacteremia  1. continue total of 7 days of vanco IV from negative BC (10/26)--we'll presume the CNS may be real.  Recommend adjust doasge of vancomycin to 500 mg iv [id number] as patient is on CVVH      Gram variable beaded layton bacteremia  2. continue imipenem---? nocardia---but relying upon the micro lab to provide further data on the isolate.  Recommend adjust dosage to 500-1000mg bid as patient is on CVVH  Agree with ECHO

## 2017-10-28 NOTE — PROGRESS NOTE ADULT - ASSESSMENT
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 No EEG correlation with shaking events. 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   Patient showing improvement in mental status.    - recommend MRI brain w/o, MRA Head/Neck w/o when stable  - fix metabolic derangements as per primary team  - continue supportive care as per primary team

## 2017-10-28 NOTE — SWALLOW BEDSIDE ASSESSMENT ADULT - SWALLOW EVAL: FUNCTIONAL LEVEL AT TIME OF EVAL
HX continued: ENT rx decadron 4mg BID x 3 days then taper. Pt s/p TVP placement under fluoroscopy +tachy-isabelle syndrome. Hematology consulted for presence of metamyelocytes and blasts. most likely secondary to sepsis as patient had +Bcx, leukocytosis and potential sources of infection, such as shiley in since 10/19, repeat BCx, continue abx as per primary team, other diff. includes myeloproliferative disease. Unable to be weaned from  as venous sat drops->Tolerated reduction in  to 2.5. lactate cleared with fluids. Has R groin pain from pseudoaneurysm

## 2017-10-28 NOTE — PROGRESS NOTE ADULT - ATTENDING COMMENTS
Patient is seen and examined with fellow, NP and the CCU house-staff. I agree with the history, physical and the assessment and plan.  awaiting CT to evaluate the pseudoaneurysm  c/w CVVHD  maintain euvolemic  keep CVP 6-8 in setting of RV dysfunction

## 2017-10-28 NOTE — PROGRESS NOTE ADULT - ASSESSMENT
83 year old male with a PMH of atrial fibrillation (on Pradaxa), HTN, Thyroid Goiter, CKD stage III (dx 2016) was seen at [facility] with inferior wall STEMI and was brought to [facility] for cardiac catherization s/p PCI x 1 now with DANITA and Anuria.

## 2017-10-28 NOTE — PROGRESS NOTE ADULT - SUBJECTIVE AND OBJECTIVE BOX
Follow Up:  bacteremia    Interval History/ROS: remains on CVVH    Allergies  No Known Allergies        ANTIMICROBIALS:  imipenem/cilastatin  IVPB    imipenem/cilastatin  IVPB 250 every 6 hours  vancomycin  IVPB 1000 every 12 hours      OTHER MEDS:  MEDICATIONS  (STANDING):  acetaminophen    Suspension 930 every 6 hours PRN  acetaminophen    Suspension. 930 every 4 hours PRN  ALBUTerol/ipratropium for Nebulization 3 every 6 hours PRN  aspirin  chewable 81 daily  atorvastatin 80 at bedtime  dextrose 50% Injectable 12.5 once  dextrose 50% Injectable 25 once  dextrose 50% Injectable 25 once  dextrose Gel 1 once PRN  DOBUTamine Infusion 2.5 <Continuous>  glucagon  Injectable 1 once PRN  haloperidol     Tablet 0.5 every 12 hours PRN  heparin  Infusion. 1500 <Continuous>  heparin  Injectable 7500 every 6 hours PRN  heparin  Injectable 3500 every 6 hours PRN  influenza   Vaccine 0.5 once  insulin lispro (HumaLOG) corrective regimen sliding scale  every 6 hours  oxyCODONE    5 mG/acetaminophen 325 mG 1 every 6 hours PRN  pantoprazole  Injectable 40 daily  risperiDONE   Tablet 0.5 every 12 hours  ticagrelor 90 two times a day      Vital Signs Last 24 Hrs  T(C): 36.3 (28 Oct 2017 09:00), Max: 36.4 (27 Oct 2017 15:00)  T(F): 97.3 (28 Oct 2017 09:00), Max: 97.6 (27 Oct 2017 20:00)  HR: 80 (28 Oct 2017 11:00) (70 - 96)  BP: 124/68 (28 Oct 2017 11:00) (86/63 - 144/88)  BP(mean): 82 (28 Oct 2017 11:00) (63 - 127)  RR: 21 (28 Oct 2017 11:00) (16 - 30)  SpO2: 95% (28 Oct 2017 11:00) (93% - 100%)    PHYSICAL EXAM:  General: non-toxic, drowsy, awake [place]  HEAD/EYES: anicteric, PERRL  ENT:  supple left neck transvenous pacer, right side shiley  Cardiovascular:   S1, S2  Respiratory:  clear bilaterally  GI:  soft, non-tender, normal bowel sounds  :  no CVA tenderness rt groin hematoma  Musculoskeletal:  no synovitis  Neurologic:  grossly non-focal  Skin:  no rash  Lymph: no lymphadenopathy  Psychiatric:  appropriate affect  Vascular:  no phlebitis                                8.9    20.0  )-----------( 374      ( 28 Oct 2017 05:02 )             26.7       10-28    137  |  99  |  38<H>  ----------------------------<  131<H>  4.7   |  26  |  1.56<H>    Ca    8.7      28 Oct 2017 05:02  Phos  2.6     10-28  Mg     2.5     10-28    TPro  6.5  /  Alb  3.0<L>  /  TBili  1.2  /  DBili  x   /  AST  45<H>  /  ALT  65<H>  /  AlkPhos  85  10-28          MICROBIOLOGY:  Vancomycin Level, Random: 17.9 ug/mL (10-28-17 @ 05:02)  v  .Blood Blood-Peripheral  10-26-17   No growth to date.  --  --      .Blood Blood-Venous  10-24-17   Growth in aerobic and anaerobic bottles: Coag Negative Staphylococcus  Single set isolate, possible contaminant. Contact  Microbiology if susceptibility testing clinically  indicated.  ***Blood Panel PCR results on this specimen are available  approximately 3 hours after the Gram stain result.***  Gram stain, PCR, and/or culture results may not always  correspond due to difference in methodologies.  ************************************************************  This PCR assay was performed using[employer].  The following targets are tested for: Enterococcus,  vancomycin resistant enterococci, Listeria monocytogenes,  coagulase negative staphylococci, S. aureus,  methicillin resistant S. aureus, Streptococcus agalactiae  (Group B), S.pneumoniae, S. pyogenes (Group A),  Acinetobacter baumannii, Enterobacter cloacae, E. coli,  Klebsiella oxytoca, K. pneumoniae, Proteus sp.,  Serratia marcescens, Haemophilus influenzae,  Neisseria meningitidis, Pseudomonas aeruginosa, Candida  albicans, C. glabrata, C krusei, C parapsilosis,  C. tropicalis and the KPC resistance gene.  --  Blood Culture PCR      .Blood Blood-Peripheral  10-24-17   No growth to date.  --  --      .Broncial Bronchoalveolar Lavagecup rec'd  10-20-17   Normal Respiratory Mikaela present  --    No polymorphonuclear leukocytes per low power field  No organisms seen per oil power field      .Blood Blood-Venous  10-20-17   No growth at 5 days.  --  --      .Blood Blood-Peripheral  10-19-17   Growth in aerobic bottle: Acid fast bacilli isolated: identification to  follow  Refered to Mycobacteriology  --    Growth in aerobic bottle: Gram Variable Beaded Rods      .Blood Blood  10-16-17   No growth at 5 days.  --  --      .Blood Blood  10-15-17   No growth at 5 days.  --  --      .Urine Catheterized  10-15-17   No growth  --  --                RADIOLOGY:

## 2017-10-28 NOTE — PROGRESS NOTE ADULT - ASSESSMENT
83M with AFib on pradaxa, mod MR, mild AR, RV failure a/w IWSTEMI s/p RCA stent c/b VFib arrest requiring IABP, PAC with course complicated by cardiogenic shock 2/2 RV failure s/p RV impella now with renal failure on CVVH with new afib with slow VR. TVP in place with several short lived episodes of demand pacing.    1. afib with heart block - TVP in place  -pt with positive BCx (coag neg staph). plan for PPM when risk factors are optimized.

## 2017-10-28 NOTE — PROGRESS NOTE ADULT - ASSESSMENT
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 Previously a code stoke was called and Head CT did not show new acute findings. Repeat Head CT was done given the focal neurological deficits but it was unchanged. EEG did not show signs of seizure. Neurology team following   Due to agitation, patient was started on precedex drip, does not respond to haldol     # CV:   - CAD s/p RCA stent: continue hep gtt, aspirin, brillinta, lipitor  - Atrial fibrillation: Was on amiodarone load, however, due to tachy isabelle, amiodarone was held   - Cardiogenic shock s/p TVP and impella s/ removal: Continue dobutmine 5  - Tachy-isabelle s/p TVP, did not pace overnight     #Respiratory: CXR Small left pleural effusion with adjacent atelectasis. ENT scope showed tracheomalacia   - decadron 4mg for three days with protonix and ISS  - Slightly tachypneic, but comfortable   - Continue suctioning due to increased secretions     #GI:   - Currently on NGT w/ tube feeds with pivot because patient was having loose bm with vital  - dysphagia eval will be repeated     #Metabolic/Renal  - Rhabdo: continue CVVH, net negative: 1Liter, now removing 100cc/ hr   - Patient is anuric at this time, will continue CCVH until HD stable as per Renal Team  - Replete Mg, Ph, K as needed     #Heme  - H&H stable  - Metamyelocytes and blasts on CBC, Heme consulted for peripheral smear     # Vascular  - Patient currently has a R shilley and L central line in place     # Skin  - Breakdown in R groin at the site of impella, wound care following    # Infectious  - Blood culture gram variable beaded rods acid fast (10/19), gram positive cocci in clusters (10/24)  - Repeat blood culturespending  - Continue vanc D4 and aztreonam D2  - ID team following  - Echocardiogram today to rule out endocarditis     # Endocrine   - Goiter TSH 0.05 T4 10.4 T3 85 2/2 sick euthyroid syndrome, no intervention at this time  - Endocrinology following (house) (patient's endocrinologist is Dr. Baldomero Roberson in [place])    #DVT ppx: hep gtt  #Dispo - PT consulted

## 2017-10-28 NOTE — SWALLOW BEDSIDE ASSESSMENT ADULT - ADDITIONAL RECOMMENDATIONS
Consider reevaluation of swallowing at b/s as Pt's status improves with possible objective swallowing assessment pending findings. Maintain good oral hygiene.  POC pending FEES scheduled for 10/30 with ENT.

## 2017-10-28 NOTE — PROGRESS NOTE ADULT - ATTENDING COMMENTS
83M pmhx of AF on pradaxa initially presented on 10/15/17 with chest pain/diaphoresis/ dyspnea to OSH, IW-STEMI emergently transferred here for C c/b VF arrest requiring IABP followed by pRCA stent c/b now acute right heart failure and cardiogenic shock s/p IABP (explanted 10/19), RP impella for RV support (explanted 10/20), initially unable to be weaned from . TTE reviewed and appears to have hyperdynamic LV with moderate RV dysfunction. Had slow afib 10/25 with global hypoperfusion so temporary wire placed. Also noted to have positive blood cx Nocardia/coag neg staph. Mental status improving. Tolerated reduction in  to 2.5. lactate cleared with fluids. Has R groin pain from pseudoaneurysm  - would d/c dobutamine; follow BP and lactate w/o monitoring venous sat  - would keep net even to positive given hyperdynamic LV  - TSH low; given goiter and possible amio-induced inflammation, recommend endo c/s; no clinical signs of thyrotoxicosis  - infectious issues; on abx  - CTA for R groin pseudoaneurysm pending  - overall prognosis guarded

## 2017-10-28 NOTE — PROGRESS NOTE ADULT - SUBJECTIVE AND OBJECTIVE BOX
[facility] Progress Note    Interval Events:  Pt awake. Still requiring intermittent pacing.    Review of Systems:  [ ] 10 point review of systems is otherwise negative except as mentioned above            [x]Unable to obtain    MEDICATIONS:  aspirin  chewable 81 milliGRAM(s) Oral daily  DOBUTamine Infusion 2.5 MICROgram(s)/kG/Min IV Continuous <Continuous>  heparin  Infusion. 1500 Unit(s)/Hr IV Continuous <Continuous>  heparin  Injectable 7500 Unit(s) IV Push every 6 hours PRN  heparin  Injectable 3500 Unit(s) IV Push every 6 hours PRN  ticagrelor 90 milliGRAM(s) Oral two times a day  imipenem/cilastatin  IVPB      imipenem/cilastatin  IVPB 250 milliGRAM(s) IV Intermittent every 6 hours  vancomycin  IVPB 1000 milliGRAM(s) IV Intermittent every 12 hours  ALBUTerol/ipratropium for Nebulization 3 milliLiter(s) Nebulizer every 6 hours PRN  acetaminophen    Suspension 930 milliGRAM(s) Oral every 6 hours PRN  acetaminophen    Suspension. 930 milliGRAM(s) Oral every 4 hours PRN  haloperidol     Tablet 0.5 milliGRAM(s) Oral every 12 hours PRN  oxyCODONE    5 mG/acetaminophen 325 mG 1 Tablet(s) Oral every 6 hours PRN  risperiDONE   Tablet 0.5 milliGRAM(s) Oral every 12 hours  pantoprazole  Injectable 40 milliGRAM(s) IV Push daily  atorvastatin 80 milliGRAM(s) Oral at bedtime  insulin lispro (HumaLOG) corrective regimen sliding scale   SubCutaneous every 6 hours  chlorhexidine 0.12% Liquid 15 milliLiter(s) Swish and Spit two times a day  dextrose 5%. 1000 milliLiter(s) IV Continuous <Continuous>    PHYSICAL EXAM:  T(C): 36.3 (10-28-17 @ 09:00), Max: 36.4 (10-27-17 @ 15:00)  HR: 80 (10-28-17 @ 11:00) (70 - 96)  BP: 124/68 (10-28-17 @ 11:00) (99/66 - 144/88)  RR: 21 (10-28-17 @ 11:00) (16 - 30)  SpO2: 95% (10-28-17 @ 11:00) (93% - 100%)  Wt(kg): --  I&O's Summary    27 Oct 2017 07:01  -  28 Oct 2017 07:00  --------------------------------------------------------  IN: 3904.6 mL / OUT: 3567 mL / NET: 337.6 mL    28 Oct 2017 07:  -  28 Oct 2017 13:31  --------------------------------------------------------  IN: 790 mL / OUT: 840 mL / NET: -50 mL      Daily     Daily Weight in k.2 (28 Oct 2017 02:05)    Appearance: Normal	  HEENT:   Normal oral mucosa. TVP in place	  Cardiovascular: S1 S2  Respiratory: Lungs clear to auscultation anteriorly  Psychiatry: unable to assess  Gastrointestinal:  soft  Skin: hyperpigmentation	  Neurologic: +muscle tremor    LABS:	 	  CBC Full  -  ( 28 Oct 2017 05:02 )  WBC Count : 20.0 K/uL  Hemoglobin : 8.9 g/dL  Hematocrit : 26.7 %  Platelet Count - Automated : 374 K/uL      10-28    137  |  99  |  38<H>  ----------------------------<  131<H>  4.7   |  26  |  1.56<H>  10-27    137  |  98  |  39<H>  ----------------------------<  111<H>  4.6   |  28  |  1.72<H>    Ca    8.7      28 Oct 2017 05:02  Ca    9.0      27 Oct 2017 21:13  Phos  2.6     10-  Phos  2.6     10-  Mg     2.5     10-  Mg     2.5     10-27    TPro  6.5  /  Alb  3.0<L>  /  TBili  1.2  /  DBili  x   /  AST  45<H>  /  ALT  65<H>  /  AlkPhos  85  10-28  TPro  6.6  /  Alb  3.2<L>  /  TBili  1.2  /  DBili  x   /  AST  43<H>  /  ALT  64<H>  /  AlkPhos  86  10-    TELEMETRY: 	  afib with rvr, intermittent V-pacing  TVP checked. Set at backup rate of 40, output 10mA. threshold 2mA

## 2017-10-28 NOTE — PROGRESS NOTE ADULT - PROBLEM SELECTOR PLAN 1
DANITA on CKD stage III. DANITA may be multifactorial in the setting of IV contrast (cardiac cath), rhabdomyolysis and ATN from hypotension/ cardiac arrest. Patient has unclear etiology of his original underlying CKD.   Patient remains on CVVHDF due to Anuria.  Labs with improved Scr to 1.5 and BUN 38.  Plan to continue CVVHDF, keep net even. Once patient stabilizes off pressor support, may consider transition to IHD.    Continue to check daily serum phosphorus (may be low in the use of CVVHDF). Continue to renally dose all medications. Vancomycin trough. Continue to monitor intake/output, BMP and urine output. Avoid NSAIDs, RCA and nephrotoxins.

## 2017-10-28 NOTE — SWALLOW BEDSIDE ASSESSMENT ADULT - ASR SWALLOW RECOMMEND DIAG
Objective testing would not likely change Pt management at this time. Plan for FEES 10/30 with ENT. RN Shyanne, Pt, and spouse aware.

## 2017-10-28 NOTE — PROGRESS NOTE ADULT - SUBJECTIVE AND OBJECTIVE BOX
Heart Failure Progress Note    Interval History:  Near euvolemia. No acute events    Medications:  acetaminophen    Suspension 930 milliGRAM(s) Oral every 6 hours PRN  acetaminophen    Suspension. 930 milliGRAM(s) Oral every 4 hours PRN  ALBUTerol/ipratropium for Nebulization 3 milliLiter(s) Nebulizer every 6 hours PRN  aspirin  chewable 81 milliGRAM(s) Oral daily  atorvastatin 80 milliGRAM(s) Oral at bedtime  DOBUTamine Infusion 2.5 MICROgram(s)/kG/Min IV Continuous <Continuous>  glucagon  Injectable 1 milliGRAM(s) IntraMuscular once PRN  haloperidol     Tablet 0.5 milliGRAM(s) Oral every 12 hours PRN  heparin  Infusion. 1500 Unit(s)/Hr IV Continuous <Continuous>  imipenem/cilastatin  IVPB 250 milliGRAM(s) IV Intermittent every 6 hours  insulin lispro (HumaLOG) corrective regimen sliding scale   SubCutaneous every 6 hours  lactobacillus acidophilus and bulgaricus Chewable 1 Tablet(s) Chew daily  oxyCODONE    5 mG/acetaminophen 325 mG 1 Tablet(s) Oral every 6 hours PRN  pantoprazole  Injectable 40 milliGRAM(s) IV Push daily  risperiDONE   Tablet 0.5 milliGRAM(s) Oral every 12 hours  ticagrelor 90 milliGRAM(s) Oral two times a day  vancomycin  IVPB 1000 milliGRAM(s) IV Intermittent every 12 hours    Vitals:  T(C): 36.3 (10-28-17 @ 09:00), Max: 36.4 (10-27-17 @ 15:00)  HR: 90 (10-28-17 @ 10:00) (70 - 96)  BP: 103/69 (10-28-17 @ 10:00) (86/63 - 144/88)  BP(mean): 88 (10-28-17 @ 10:00) (63 - 127)  ABP: --  ABP(mean): --  RR: 29 (10-28-17 @ 10:00) (16 - 30)  SpO2: 97% (10-28-17 @ 10:00) (93% - 100%)    Daily     Daily Weight in k.2 (28 Oct 2017 02:05)    I&O's Summary    27 Oct 2017 07:01  -  28 Oct 2017 07:00  --------------------------------------------------------  IN: 3904.6 mL / OUT: 3567 mL / NET: 337.6 mL    28 Oct 2017 07:  -  28 Oct 2017 10:48  --------------------------------------------------------  IN: 345 mL / OUT: 657 mL / NET: -312 mL      Physical Exam:  Appearance: No Acute Distress  HEENT: No JVD  Cardiovascular:  S1 S2 irr irr  Respiratory: Clear to auscultation bilaterally  Gastrointestinal: Soft, Non-tender	  Skin: No cyanosis	  Extremities: No LE edema  Psychiatry:  Mood & affect appropriate    Labs:                        8.9    20.0  )-----------( 374      ( 28 Oct 2017 05:02 )             26.7     10-28    137  |  99  |  38<H>  ----------------------------<  131<H>  4.7   |  26  |  1.56<H>    Ca    8.7      28 Oct 2017 05:02  Phos  2.6     10-28  Mg     2.5     10-28    TPro  6.5  /  Alb  3.0<L>  /  TBili  1.2  /  DBili  x   /  AST  45<H>  /  ALT  65<H>  /  AlkPhos  85  10-28    PT/INR - ( 28 Oct 2017 05:07 )   PT: 13.6 sec;   INR: 1.25 ratio      PTT - ( 28 Oct 2017 05:07 )  PTT:124.1 sec    Lactate Dehydrogenase, Serum: 734 U/L (10-26 @ 04:59)    Lactate, Blood: 1.5 mmol/L (10-28 @ 05:02)  Lactate, Blood: 1.6 mmol/L (10-27 @ 19:58)  Lactate, Blood: 4.2 mmol/L (10-27 @ 16:56)  Lactate, Blood: 1.4 mmol/L (10-27 @ 04:23)    TELEMETRY: AF with intermittent ventricular pacing

## 2017-10-28 NOTE — SWALLOW BEDSIDE ASSESSMENT ADULT - ASR SWALLOW LINGUAL MOBILITY
impaired left lateral movement/impaired right lateral movement/mildly reduced lateralization and strength within functional limits/impaired left lateral movement

## 2017-10-28 NOTE — PROGRESS NOTE ADULT - ASSESSMENT
83M with ? right groin pseudoaneurysm vs hematoma   -recommend CTA to further evaluate for right groin pseudoaneurysm  -further recommendations pending CTA  -d/w vascular fellow on behalf of Dr. Goncalves 83M with ? right groin pseudoaneurysm vs hematoma   -recommend CTA to further evaluate for right groin pseudoaneurysm  d/w renal  will hold off on CTA due to renal function and will repear US  -d/w vascular fellow on behalf of Dr. Goncalves

## 2017-10-29 NOTE — PROGRESS NOTE ADULT - ATTENDING COMMENTS
Patient is seen and examined with fellow, NP and the CCU house-staff. I agree with the history, physical and the assessment and plan.  f/u with psychiatry input  patient is on room air with good O2 Sat  on DAPT  trend CVP - maintain 6-8  on hep gtt for afib  CT pending for the pseudoaneurysm and possible infection  c/w abx

## 2017-10-29 NOTE — CHART NOTE - NSCHARTNOTEFT_GEN_A_CORE
====================  CCU MIDNIGHT ROUNDS  ====================    KAMLESH PATEL  [id number]    ====================  SUMMARY:  ====================    83 M AF on pradaxa initially presented on 10/15/17 with chest pain/diaphoresis/ dyspnea to OSH, IW-STEMI emergently transferred here for [facility] c/b VF arrest requiring IABP (removed 10/19) followed by pRCA stent and R iliac balloon for groin bleed c/b acute right heart failure and cardiogenic shock req being taken back to lab for RP impella for RV support (explanted 10/20), initially unable to be weaned from .  Had slow afib 10/25 with global hypoperfusion so temporary wire placed, amio DCed. Also noted to have positive blood cx Nocardia/coag neg staph. Has R groin pain from pseudoaneurysm. W/ DANITA req CVV. W/ tracheomalacia s/p steroids. w/ delirium    ====================  NEW EVENTS:  ====================    reqs precedex gtt s/t delirium, agitated, pulling @ lines and monitor     ====================  VITALS (Last 12 hrs):  ====================    T(C): 36.3 (10-28-17 @ 21:00), Max: 36.4 (10-28-17 @ 17:00)  HR: 90 (10-28-17 @ 23:00) (80 - 90)  BP: 112/59 (10-28-17 @ 23:00) (97/47 - 132/82)  BP(mean): 75 (10-28-17 @ 23:00) (71 - 100)  RR: 10 (10-28-17 @ 23:00) (10 - 24)  SpO2: 100% (10-28-17 @ 23:00) (94% - 100%)    TELEMETRY: a fib    I&O's Summary    27 Oct 2017 07:01  -  28 Oct 2017 07:00  --------------------------------------------------------  IN: 3904.6 mL / OUT: 3567 mL / NET: 337.6 mL    28 Oct 2017 07:  -  29 Oct 2017 01:11  --------------------------------------------------------  IN: 1896 mL / OUT: 1821 mL / NET: 75 mL    ====================  PLAN:  ====================  per HF, DC , f/u repeat lactate  c/w CVV  c/w ABX, f/u cultures  c/w heparin gtt   f/u CTA R Leg for pseudoaneurysm    Fay Conley [facility]/CCU   #[id number]

## 2017-10-29 NOTE — PROGRESS NOTE ADULT - PROBLEM SELECTOR PLAN 1
DANITA on CKD stage III. DANITA may be multifactorial in the setting of IV contrast (cardiac cath), rhabdomyolysis and ATN from hypotension/ cardiac arrest. Patient has unclear etiology of his original underlying CKD.   Patient remains on CVVHDF due to Anuria.  Labs with improved Scr.  Plan to continue CVVHDF to optimize for Cardiac CT. Once patient stabilizes off pressor support, may consider transition to IHD.    Continue to check daily serum phosphorus (may be low in the use of CVVHDF). Continue to monitor intake/output, BMP and urine output. Avoid NSAIDs, RCA and nephrotoxins.

## 2017-10-29 NOTE — PROGRESS NOTE ADULT - ASSESSMENT
83M pmhx of AF on pradaxa initially presented on 10/15/17 with chest pain/diaphoresis/ dyspnea to OSH, IW-STEMI emergently transferred here for C c/b VF arrest requiring IABP followed by pRCA stent c/b now acute right heart failure and cardiogenic shock s/p IABP (explanted 10/19), RP impella for RV support (explanted 10/20).       1. cardiogenic shock - lactate wnl  -  currently off  -repeat cxr to eval TVP placement, s/p TVP replacement overnight for bradycardia, loss of capture  -sepsis, unclear etiology of infection ?bacteremia with coag neg staph, cont abx  -plan for ppm when infection clears    Lukasz Barajas MD  [id number]

## 2017-10-29 NOTE — PROGRESS NOTE ADULT - SUBJECTIVE AND OBJECTIVE BOX
[facility] DIVISION OF KIDNEY DISEASES AND HYPERTENSION -- FOLLOW UP NOTE  --------------------------------------------------------------------------------  Chief Complaint: DANITA on CKD with Anuria requiring renal replacement therapy     24 hour events/subjective:  Patient remains on CVVHDF overnight. Became hypotensive overnight requiring 500 cc fluids.         PAST HISTORY  --------------------------------------------------------------------------------  No significant changes to PMH, PSH, FHx, SHx, unless otherwise noted    ALLERGIES & MEDICATIONS  --------------------------------------------------------------------------------  Allergies    No Known Allergies    Intolerances      Standing Inpatient Medications  aspirin  chewable 81 milliGRAM(s) Oral daily  atorvastatin 80 milliGRAM(s) Oral at bedtime  chlorhexidine 0.12% Liquid 15 milliLiter(s) Swish and Spit two times a day  dexmedetomidine Infusion 0.2 MICROgram(s)/kG/Hr IV Continuous <Continuous>  heparin  Infusion. 1500 Unit(s)/Hr IV Continuous <Continuous>  imipenem/cilastatin  IVPB      imipenem/cilastatin  IVPB 250 milliGRAM(s) IV Intermittent every 6 hours  influenza   Vaccine 0.5 milliLiter(s) IntraMuscular once  lactobacillus acidophilus and bulgaricus Chewable 1 Tablet(s) Chew daily  pantoprazole  Injectable 40 milliGRAM(s) IV Push daily  risperiDONE   Tablet 0.5 milliGRAM(s) Oral every 12 hours  ticagrelor 90 milliGRAM(s) Oral two times a day  vancomycin  IVPB 1000 milliGRAM(s) IV Intermittent every 12 hours    PRN Inpatient Medications  acetaminophen    Suspension 930 milliGRAM(s) Oral every 6 hours PRN  acetaminophen    Suspension. 930 milliGRAM(s) Oral every 4 hours PRN  ALBUTerol/ipratropium for Nebulization 3 milliLiter(s) Nebulizer every 6 hours PRN  haloperidol     Tablet 0.5 milliGRAM(s) Oral every 12 hours PRN  heparin  Injectable 7500 Unit(s) IV Push every 6 hours PRN  heparin  Injectable 3500 Unit(s) IV Push every 6 hours PRN  oxyCODONE    5 mG/acetaminophen 325 mG 1 Tablet(s) Oral every 6 hours PRN      REVIEW OF SYSTEMS  --------------------------------------------------------------------------------  unable to obtain    VITALS/PHYSICAL EXAM  --------------------------------------------------------------------------------  T(C): 34.1 (10-29-17 @ 08:00), Max: 36.4 (10-28-17 @ 17:00)  HR: 70 (10-29-17 @ 10:30) (60 - 96)  BP: 98/56 (10-29-17 @ 10:30) (67/43 - 132/82)  RR: 21 (10-29-17 @ 10:30) (10 - 25)  SpO2: 92% (10-29-17 @ 10:30) (92% - 100%)  Wt(kg): --        10-28-17 @ 07:01  -  10-29-17 @ 07:00  --------------------------------------------------------  IN: 3184 mL / OUT: 2470 mL / NET: 714 mL    10-29-17 @ 07:01  -  10-29-17 @ 11:20  --------------------------------------------------------  IN: 332 mL / OUT: 674 mL / NET: -342 mL      Physical Exam:  	Gen: confused  	Pulm: decreased breath sounds b/l  	CV: RRR, S1S2; no rub  	Abd: soft  	: No suprapubic tenderness  	UE: Warm,  no edema; no asterixis  	LE: b/l LE edema      LABS/STUDIES  --------------------------------------------------------------------------------              8.5    19.0  >-----------<  365      [10-29-17 @ 05:38]              25.3     138  |  102  |  38  ----------------------------<  111      [10-29-17 @ 05:38]  5.1   |  27  |  1.37        Ca     8.3     [10-29-17 @ 05:38]      Mg     2.6     [10-29-17 @ 05:38]      Phos  2.1     [10-29-17 @ 05:38]    TPro  5.9  /  Alb  2.9  /  TBili  1.0  /  DBili  x   /  AST  45  /  ALT  59  /  AlkPhos  78  [10-29-17 @ 05:38]    PT/INR: PT 13.6 , INR 1.25       [10-28-17 @ 05:07]  PTT: 103.1      [10-29-17 @ 05:38]      Creatinine Trend:  SCr 1.37 [10-29 @ 05:38]  SCr 1.53 [10-28 @ 18:00]  SCr 1.56 [10-28 @ 05:02]  SCr 1.72 [10-27 @ 21:13]  SCr 1.81 [10-27 @ 16:56]    Urinalysis - [10-15-17 @ 16:48]      Color Yellow / Appearance Turbid / SG >1.030 / pH 6.5      Gluc 50 / Ketone Negative  / Bili Negative / Urobili Negative       Blood Moderate / Protein >600 / Leuk Est Negative / Nitrite Negative      RBC >50 / WBC 3-5 / Hyaline  / Gran  / Sq Epi  / Non Sq Epi OCC / Bacteria Few      HbA1c 5.4      [10-15-17 @ 21:32]  TSH 0.05      [10-25-17 @ 13:21]  Lipid: chol 129, TG 45, HDL 41, LDL 79      [10-15-17 @ 21:40]    HBsAg Nonreact      [10-19-17 @ 07:25]  HCV 0.15, Nonreact      [10-19-17 @ 07:25]  HIV Nonreact      [10-19-17 @ 07:25]    EYAD: titer 1:640, pattern Homogeneous      [10-19-17 @ 07:25]  dsDNA <12      [10-19-17 @ 07:25]  C3 Complement 72      [10-19-17 @ 07:25]  C4 Complement 14      [10-19-17 @ 07:25]  Free Light Chains: kappa 15.20, lambda 10.50, ratio = 1.45      [10-19 @ 07:25]  Immunofixation Serum:   No Monoclonal Band Identified      [10-24-17 @ 13:31]  SPEP Interpretation: Hypoalbuminemia Increased beta fraction due to probable fibrinogen presence.      [10-24-17 @ 12:31]

## 2017-10-29 NOTE — PROGRESS NOTE ADULT - SUBJECTIVE AND OBJECTIVE BOX
Patient is a 83y old  Male who presents with a chief complaint of SOB, chest pain, diaphoresis (15 Oct 2017 17:59)    Event	  HPI:  83 male presents to ER by ambulance with report of shortness of breath. Wife at the bedside states patient has h/o Afib on Pradaxa, has "heart valve problems", has been having shortness of breath for the past few days, today was found sitting on stairs, c/o increased shortness of breath, chest pain and diaphoresis. At OSH, he was found to have an inferior wall STEMI and was brought to [facility] for cath. Upon arrival, pt appeared unwell and fixated on chest pain. Upon going to Cath lab, pt became non responsive and went into cardiogenic arrest. advanced life support was started, pt was intubated, and ROS was achieved. In cath lab, pt received a inta venus pacing wire, baloon pump, RCA stent, a swan RH cath, and an illiac balloon to stop R groin bleeding. Pt was admitted to the CCU for further management. (15 Oct 2017 17:59)    MEDICATIONS  (STANDING):  aspirin  chewable 81 milliGRAM(s) Oral daily  atorvastatin 80 milliGRAM(s) Oral at bedtime  chlorhexidine 0.12% Liquid 15 milliLiter(s) Swish and Spit two times a day  dexmedetomidine Infusion 0.2 MICROgram(s)/kG/Hr (4.655 mL/Hr) IV Continuous <Continuous>  heparin  Infusion. 1500 Unit(s)/Hr (15 mL/Hr) IV Continuous <Continuous>  imipenem/cilastatin  IVPB      imipenem/cilastatin  IVPB 250 milliGRAM(s) IV Intermittent every 6 hours  influenza   Vaccine 0.5 milliLiter(s) IntraMuscular once  lactobacillus acidophilus and bulgaricus Chewable 1 Tablet(s) Chew daily  pantoprazole  Injectable 40 milliGRAM(s) IV Push daily  risperiDONE   Tablet 0.5 milliGRAM(s) Oral every 12 hours  ticagrelor 90 milliGRAM(s) Oral two times a day  vancomycin  IVPB 1000 milliGRAM(s) IV Intermittent every 12 hours    MEDICATIONS  (PRN):  acetaminophen    Suspension 930 milliGRAM(s) Oral every 6 hours PRN For Temp greater than 38 C (100.4 F)  acetaminophen    Suspension. 930 milliGRAM(s) Oral every 4 hours PRN Moderate Pain (4 - 6)  ALBUTerol/ipratropium for Nebulization 3 milliLiter(s) Nebulizer every 6 hours PRN Shortness of Breath and/or Wheezing  haloperidol     Tablet 0.5 milliGRAM(s) Oral every 12 hours PRN agitaion  heparin  Injectable 7500 Unit(s) IV Push every 6 hours PRN For aPTT less than 40  heparin  Injectable 3500 Unit(s) IV Push every 6 hours PRN For aPTT between 40 - 57  oxyCODONE    5 mG/acetaminophen 325 mG 1 Tablet(s) Oral every 6 hours PRN Severe Pain (7 - 10)      REVIEW OF SYSTEMS:  Otherwise, 10 point ROS done and otherwise negative.    PHYSICAL EXAM:  Vital Signs Last 24 Hrs  T(C): 36.2 (29 Oct 2017 04:00), Max: 36.4 (28 Oct 2017 17:00)  T(F): 97.2 (29 Oct 2017 04:00), Max: 97.5 (28 Oct 2017 17:00)  HR: 64 (29 Oct 2017 06:00) (60 - 96)  BP: 98/50 (29 Oct 2017 06:00) (67/43 - 144/88)  BP(mean): 71 (29 Oct 2017 06:00) (48 - 120)  RR: 21 (29 Oct 2017 06:00) (10 - 29)  SpO2: 98% (29 Oct 2017 06:00) (92% - 100%)  I&O's Summary    27 Oct 2017 07:01  -  28 Oct 2017 07:00  --------------------------------------------------------  IN: 3904.6 mL / OUT: 3567 mL / NET: 337.6 mL    28 Oct 2017 07:01  -  29 Oct 2017 06:53  --------------------------------------------------------  IN: 3184 mL / OUT: 2470 mL / NET: 714 mL        Appearance: Normal	  HEENT:   Normal oral mucosa, PERRL, EOMI	  Lymphatic: No lymphadenopathy  Cardiovascular: Normal S1 S2, No JVD, No murmurs, No edema  Respiratory: Lungs clear to auscultation	  Psychiatry: A & O x 3, Mood & affect appropriate  Gastrointestinal:  Soft, Non-tender, + BS	  Skin: No rashes, No ecchymoses, No cyanosis	  Neurologic: Non-focal  Extremities: Normal range of motion, No clubbing, cyanosis or edema  Vascular: Peripheral pulses palpable 2+ bilaterally    LABS:	 	                        8.5    19.0  )-----------( 365      ( 29 Oct 2017 05:38 )             25.3     Auto Eosinophil # 0.1   / Auto Eosinophil % 0.3   / Auto Neutrophil # 16.9  / Auto Neutrophil % 88.7  / BANDS % x                            8.9    20.0  )-----------( 374      ( 28 Oct 2017 05:02 )             26.7     Auto Eosinophil # 0.0   / Auto Eosinophil % 0.1   / Auto Neutrophil # 17.8  / Auto Neutrophil % 88.7  / BANDS % x                            8.7    16.7  )-----------( 338      ( 27 Oct 2017 16:56 )             27.0     Auto Eosinophil # x     / Auto Eosinophil % x     / Auto Neutrophil # x     / Auto Neutrophil % x     / BANDS % x        INR: 1.25 ratio (10-28 @ 05:07)  INR: 1.31 ratio (10-27 @ 04:23)  INR: 1.24 ratio (10-26 @ 04:20)    10-29    138  |  102  |  38<H>  ----------------------------<  111<H>  5.1   |  27  |  1.37<H>  10-28    138  |  102  |  40<H>  ----------------------------<  132<H>  4.9   |  27  |  1.53<H>  10-28    137  |  99  |  38<H>  ----------------------------<  131<H>  4.7   |  26  |  1.56<H>    Ca    8.3<L>      29 Oct 2017 05:38  Mg     2.6     10-29  Phos  2.1     10-29  TPro  5.9<L>  /  Alb  2.9<L>  /  TBili  1.0  /  DBili  x   /  AST  45<H>  /  ALT  59<H>  /  AlkPhos  78  10-29  TPro  6.1  /  Alb  3.0<L>  /  TBili  1.0  /  DBili  x   /  AST  50<H>  /  ALT  62<H>  /  AlkPhos  79  10-28  TPro  6.5  /  Alb  3.0<L>  /  TBili  1.2  /  DBili  x   /  AST  45<H>  /  ALT  65<H>  /  AlkPhos  85  10-28    Lactate, Blood: 1.8 mmol/L (10-29 @ 05:38)  Lactate, Blood: 2.4 mmol/L (10-28 @ 23:55)  Lactate, Blood: 1.5 mmol/L (10-28 @ 05:02)  Lactate, Blood: 1.6 mmol/L (10-27 @ 19:58)      proBNP:   Lipid Profile: 10-15 Chol 129 LDL 79 HDL 41 Trig 45  HgA1c: 5.4 % (10-15 @ 21:32)    TSH: Thyroid Stimulating Hormone, Serum: 0.05 uIU/mL (10-25 @ 13:21)      CARDIAC MARKERS:   18 Oct 2017 04:37 Troponin 10.87 ng/mL / Creatine Kinase 4675 U/L /  CKMB 27.1 ng/mL / CPK Mass Assay % 0.6 %   17 Oct 2017 22:34 Troponin 10.99 ng/mL / Creatine Kinase 4968 U/L /  CKMB 30.9 ng/mL / CPK Mass Assay % 0.6 %   17 Oct 2017 16:26 Troponin 13.48 ng/mL / Creatine Kinase 5078 U/L /  CKMB 37.8 ng/mL / CPK Mass Assay % 0.7 %        TELEMETRY: 	    ECG:  	  RADIOLOGY:  OTHER: 	    PREVIOUS DIAGNOSTIC TESTING:    [ ] Echocardiogram:  [ ]  Catheterization:  [ ] Stress Test:

## 2017-10-29 NOTE — PROGRESS NOTE ADULT - SUBJECTIVE AND OBJECTIVE BOX
Patient is a 83y old  Male who presents with a chief complaint of SOB, chest pain, diaphoresis (15 Oct 2017 17:59)      Vascular Surgery Attending Progress Note    Interval HPI: pt w/o new c/o     Medications:  acetaminophen    Suspension 930 milliGRAM(s) Oral every 6 hours PRN  acetaminophen    Suspension. 930 milliGRAM(s) Oral every 4 hours PRN  ALBUTerol/ipratropium for Nebulization 3 milliLiter(s) Nebulizer every 6 hours PRN  aspirin  chewable 81 milliGRAM(s) Oral daily  atorvastatin 80 milliGRAM(s) Oral at bedtime  chlorhexidine 0.12% Liquid 15 milliLiter(s) Swish and Spit two times a day  dexmedetomidine Infusion 0.2 MICROgram(s)/kG/Hr IV Continuous <Continuous>  haloperidol     Tablet 0.5 milliGRAM(s) Oral every 12 hours PRN  heparin  Infusion. 1500 Unit(s)/Hr IV Continuous <Continuous>  heparin  Injectable 7500 Unit(s) IV Push every 6 hours PRN  heparin  Injectable 3500 Unit(s) IV Push every 6 hours PRN  imipenem/cilastatin  IVPB      imipenem/cilastatin  IVPB 250 milliGRAM(s) IV Intermittent every 6 hours  influenza   Vaccine 0.5 milliLiter(s) IntraMuscular once  lactobacillus acidophilus and bulgaricus Chewable 1 Tablet(s) Chew daily  oxyCODONE    5 mG/acetaminophen 325 mG 1 Tablet(s) Oral every 6 hours PRN  pantoprazole  Injectable 40 milliGRAM(s) IV Push daily  risperiDONE   Tablet 0.5 milliGRAM(s) Oral every 12 hours  ticagrelor 90 milliGRAM(s) Oral two times a day  vancomycin  IVPB 1000 milliGRAM(s) IV Intermittent every 12 hours      Vital Signs Last 24 Hrs  T(C): 34.1 (29 Oct 2017 08:00), Max: 36.4 (28 Oct 2017 17:00)  T(F): 93.3 (29 Oct 2017 08:00), Max: 97.5 (28 Oct 2017 17:00)  HR: 94 (29 Oct 2017 08:50) (60 - 96)  BP: 113/87 (29 Oct 2017 08:50) (67/43 - 132/82)  BP(mean): 96 (29 Oct 2017 08:50) (48 - 106)  RR: 22 (29 Oct 2017 08:50) (10 - 29)  SpO2: 96% (29 Oct 2017 08:50) (92% - 100%)  I&O's Summary    28 Oct 2017 07:01  -  29 Oct 2017 07:00  --------------------------------------------------------  IN: 3184 mL / OUT: 2470 mL / NET: 714 mL    29 Oct 2017 07:01  -  29 Oct 2017 09:50  --------------------------------------------------------  IN: 276 mL / OUT: 226 mL / NET: 50 mL        Physical Exam:  Neuro  A&Ox2-3  abd soft   Vascular:  rt groin hematoma stable   Musculoskeletal: limited     LABS:                        8.5    19.0  )-----------( 365      ( 29 Oct 2017 05:38 )             25.3     10-29    138  |  102  |  38<H>  ----------------------------<  111<H>  5.1   |  27  |  1.37<H>    Ca    8.3<L>      29 Oct 2017 05:38  Phos  2.1     10-29  Mg     2.6     10-29    TPro  5.9<L>  /  Alb  2.9<L>  /  TBili  1.0  /  DBili  x   /  AST  45<H>  /  ALT  59<H>  /  AlkPhos  78  10-29    PT/INR - ( 28 Oct 2017 05:07 )   PT: 13.6 sec;   INR: 1.25 ratio         PTT - ( 29 Oct 2017 05:38 )  PTT:103.1 sec    CHARLY CAMERON MD  [phone]

## 2017-10-29 NOTE — PROGRESS NOTE ADULT - ASSESSMENT
83M with ? right groin pseudoaneurysm vs hematoma   would recommend  repeat rt cfa US to eval for pseudoann and to see if pt is a candidate for US guided thrombin injection   will follow  above d/w pt's wife

## 2017-10-29 NOTE — PROGRESS NOTE ADULT - ASSESSMENT
83 year old male with a PMH of atrial fibrillation (on Pradaxa), HTN, Thyroid Goiter, CKD stage III (dx 2016) was seen at [facility] with inferior wall STEMI and was brought to [facility] for cardiac catherization s/p PCI x 1 now with DANITA and Anuria.

## 2017-10-29 NOTE — PROGRESS NOTE ADULT - ASSESSMENT
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 Previously a code stoke was called and Head CT did not show new acute findings. Repeat Head CT was done given the focal neurological deficits but it was unchanged. EEG did not show signs of seizure. Neurology team following   Due to agitation, patient was started on precedex drip, does not respond to haldol     # CV:   - CAD s/p RCA stent: continue hep gtt, aspirin, brillinta, lipitor  - Atrial fibrillation: Was on amiodarone load, however, due to tachy isabelle, amiodarone was held   - Cardiogenic shock s/p TVP and impella s/ removal: Continue dobutmine 5  - Tachy-isabelle s/p TVP, did not pace overnight     #Respiratory: CXR Small left pleural effusion with adjacent atelectasis. ENT scope showed tracheomalacia   - decadron 4mg for three days with protonix and ISS  - Slightly tachypneic, but comfortable   - Continue suctioning due to increased secretions     #GI:   - Currently on NGT w/ tube feeds with pivot because patient was having loose bm with vital  - dysphagia eval will be repeated     #Metabolic/Renal  - Rhabdo: continue CVVH, net negative: 1Liter, now removing 100cc/ hr   - Patient is anuric at this time, will continue CCVH until HD stable as per Renal Team  - Replete Mg, Ph, K as needed     #Heme  - H&H stable  - Metamyelocytes and blasts on CBC, Heme consulted for peripheral smear     # Vascular  - Patient currently has a R shilley and L central line in place     # Skin  - Breakdown in R groin at the site of impella, wound care following    # Infectious  - Blood culture gram variable beaded rods acid fast (10/19), gram positive cocci in clusters (10/24)  - Repeat blood culturespending  - Continue vanc D4 and aztreonam D2  - ID team following  - Echocardiogram today to rule out endocarditis     # Endocrine   - Goiter TSH 0.05 T4 10.4 T3 85 2/2 sick euthyroid syndrome, no intervention at this time  - Endocrinology following (house) (patient's endocrinologist is Dr. 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 Previously a code stoke was called and Head CT did not show new acute findings. Repeat Head CT was done given the focal neurological deficits but it was unchanged. EEG did not show signs of seizure. Neurology team following   Due to agitation, patient was started on precedex drip, does not respond to haldol     # CV:   - CAD s/p RCA stent: continue hep gtt, aspirin, brillinta, lipitor  - Atrial fibrillation: Was on amiodarone load, however, due to tachy isabelle, amiodarone was held   - Cardiogenic shock s/p TVP and impella s/ removal: Continue dobutmine 5  - Tachy-isabelle s/p TVP, did not pace overnight     #Respiratory: CXR Small left pleural effusion with adjacent atelectasis. ENT scope showed tracheomalacia   - decadron 4mg for three days with protonix and ISS  - Slightly tachypneic, but comfortable   - Continue suctioning due to increased secretions     #GI:   - Currently on NGT w/ tube feeds with pivot because patient was having loose bm with vital  - dysphagia eval will be repeated     #Metabolic/Renal  - Rhabdo: continue CVVH, net negative: 1Liter, now removing 80cc/ hr   - Patient is anuric at this time, will continue CCVH until HD stable as per Renal Team  - Replete Mg, Ph, K as needed     #Heme  - H&H stable  - Metamyelocytes and blasts on CBC, Heme consulted for peripheral smear     # Vascular  - Patient currently has a R shilley and L central line in place     # Skin  - Breakdown in R groin at the site of impella, wound care following    # Infectious  - Blood culture gram variable beaded rods acid fast (10/19), gram positive cocci in clusters (10/24)  - Repeat blood culturespending  - Continue vanc D6 and aztreonam D4  - ID team following  - Echocardiogram ruled out endocarditis     # Endocrine   - Goiter TSH 0.05 T4 10.4 T3 85 2/2 sick euthyroid syndrome, no intervention at this time  - Endocrinology following (house) (patient's endocrinologist is Dr. Baldomero Roberson in [place])    #DVT ppx: hep gtt  #Dispo - PT consulted

## 2017-10-29 NOTE — PROGRESS NOTE ADULT - ATTENDING COMMENTS
83M pmhx of AF on pradaxa initially presented on 10/15/17 with chest pain/diaphoresis/ dyspnea to OSH, IW-STEMI emergently transferred here for C c/b VF arrest requiring IABP followed by pRCA stent c/b now acute right heart failure and cardiogenic shock s/p IABP (explanted 10/19), RP impella for RV support (explanted 10/20), initially unable to be weaned from . TTE reviewed and appears to have hyperdynamic LV with moderate RV dysfunction. Had slow afib 10/25 with global hypoperfusion so temporary wire placed. Also noted to have positive blood cx Nocardia/coag neg staph. Mental status fluctuating. Tolerated d/c  (10/28). Has R groin pain from pseudoaneurysm  - would keep net even to negative 500 cc given CVP 13; keep CVP 8-10   - TSH low; given goiter and possible amio-induced inflammation, recommend endo c/s; no clinical signs of thyrotoxicosis  - infectious issues; on abx  - CTA for R groin pseudoaneurysm pending  - overall prognosis guarded

## 2017-10-29 NOTE — PROGRESS NOTE ADULT - SUBJECTIVE AND OBJECTIVE BOX
24H hour events:   -hypotensive episode overnight in setting of bradycardia to 40s. TVP adjusted, s/p IV bolus, BP improved.   -HR 60-80 BP /50-80. now off .  -Bcx from 10/26 neg, from 10/24 positive for coag neg staph, cont on abx with vanco/imipenem, WBC remians elevated to 19  -hgb 8.5 (prev 8.9) lactate 1.8  -confused/agitated this am but following commands and able to answer questions  -CVVH, goal net even. I/O [phone]    MEDICATIONS:  aspirin  chewable 81 milliGRAM(s) Oral daily  heparin  Infusion. 1500 Unit(s)/Hr IV Continuous <Continuous>  heparin  Injectable 7500 Unit(s) IV Push every 6 hours PRN  heparin  Injectable 3500 Unit(s) IV Push every 6 hours PRN  ticagrelor 90 milliGRAM(s) Oral two times a day    imipenem/cilastatin  IVPB      imipenem/cilastatin  IVPB 250 milliGRAM(s) IV Intermittent every 6 hours  vancomycin  IVPB 1000 milliGRAM(s) IV Intermittent every 12 hours    ALBUTerol/ipratropium for Nebulization 3 milliLiter(s) Nebulizer every 6 hours PRN    acetaminophen    Suspension 930 milliGRAM(s) Oral every 6 hours PRN  acetaminophen    Suspension. 930 milliGRAM(s) Oral every 4 hours PRN  dexmedetomidine Infusion 0.2 MICROgram(s)/kG/Hr IV Continuous <Continuous>  haloperidol     Tablet 0.5 milliGRAM(s) Oral every 12 hours PRN  oxyCODONE    5 mG/acetaminophen 325 mG 1 Tablet(s) Oral every 6 hours PRN  risperiDONE   Tablet 0.5 milliGRAM(s) Oral every 12 hours    pantoprazole  Injectable 40 milliGRAM(s) IV Push daily    atorvastatin 80 milliGRAM(s) Oral at bedtime    chlorhexidine 0.12% Liquid 15 milliLiter(s) Swish and Spit two times a day  influenza   Vaccine 0.5 milliLiter(s) IntraMuscular once      REVIEW OF SYSTEMS:  Complete 10point ROS negative.    PHYSICAL EXAM:  T(C): 34.1 (10-29-17 @ 08:00), Max: 36.4 (10-28-17 @ 17:00)  HR: 94 (10-29-17 @ 08:50) (60 - 96)  BP: 113/87 (10-29-17 @ 08:50) (67/43 - 132/82)  RR: 22 (10-29-17 @ 08:50) (10 - 25)  SpO2: 96% (10-29-17 @ 08:50) (92% - 100%)  Wt(kg): --  I&O's Summary    28 Oct 2017 07:  -  29 Oct 2017 07:00  --------------------------------------------------------  IN: 3184 mL / OUT: 2470 mL / NET: 714 mL    29 Oct 2017 07:  -  29 Oct 2017 10:30  --------------------------------------------------------  IN: 276 mL / OUT: 226 mL / NET: 50 mL        Appearance: Normal	  HEENT:   Normal oral mucosa, PERRL, EOMI	  Lymphatic: No lymphadenopathy  Cardiovascular: Normal S1 S2, No JVD, No murmurs, diffuse anasarca  Respiratory: coarse bs b/l	  Psychiatry: A & O x 3, Mood & affect appropriate  Gastrointestinal:  Soft, Non-tender, + BS	  Skin: No rashes, No ecchymoses, No cyanosis	  Neurologic: Non-focal  Extremities: Normal range of motion, No clubbing, cyanosis or edema  Vascular: Peripheral pulses palpable 2+ bilaterally        LABS:	 	    CBC Full  -  ( 29 Oct 2017 05:38 )  WBC Count : 19.0 K/uL  Hemoglobin : 8.5 g/dL  Hematocrit : 25.3 %  Platelet Count - Automated : 365 K/uL  Mean Cell Volume : 104.0 fl  Mean Cell Hemoglobin : 35.0 pg  Mean Cell Hemoglobin Concentration : 33.8 gm/dL  Auto Neutrophil # : 16.9 K/uL  Auto Lymphocyte # : 0.8 K/uL  Auto Monocyte # : 1.3 K/uL  Auto Eosinophil # : 0.1 K/uL  Auto Basophil # : 0.0 K/uL  Auto Neutrophil % : 88.7 %  Auto Lymphocyte % : 4.0 %  Auto Monocyte % : 6.9 %  Auto Eosinophil % : 0.3 %  Auto Basophil % : 0.0 %    10-29    138  |  102  |  38<H>  ----------------------------<  111<H>  5.1   |  27  |  1.37<H>  10-28    138  |  102  |  40<H>  ----------------------------<  132<H>  4.9   |  27  |  1.53<H>    Ca    8.3<L>      29 Oct 2017 05:38  Ca    8.7      28 Oct 2017 18:00  Phos  2.1     10-29  Phos  2.0     10-28  Mg     2.6     10-29  Mg     2.7     10-28    TPro  5.9<L>  /  Alb  2.9<L>  /  TBili  1.0  /  DBili  x   /  AST  45<H>  /  ALT  59<H>  /  AlkPhos  78  10-29  TPro  6.1  /  Alb  3.0<L>  /  TBili  1.0  /  DBili  x   /  AST  50<H>  /  ALT  62<H>  /  AlkPhos  79  10-28      proBNP:   Lipid Profile:   HgA1c:   TSH:       CARDIAC MARKERS:            TELEMETRY: 	    ECG:  	  RADIOLOGY:  OTHER: 	    PREVIOUS DIAGNOSTIC TESTING:    [ ] Echocardiogram:  [ ]  Catheterization:  [ ] Stress Test:  	  	  ASSESSMENT/PLAN:

## 2017-10-29 NOTE — PROGRESS NOTE ADULT - ASSESSMENT
83M pmhx of AF on pradaxa initially presented on 10/15/17 with chest pain/diaphoresis/ dyspnea to OSH, IW-STEMI emergently transferred here for C c/b VF arrest requiring IABP followed by pRCA stent c/b now acute right heart failure and cardiogenic shock s/p IABP (explanted 10/19), RP impella for RV support (explanted 10/20).       1. cardiogenic shock - lactate wnl  -  currently off  - cont cvvh, goal is net even for today  -repeat cxr to eval TVP placement    2.CAD  -cont dapt, statin    3. afib  -cont heparin drip    4. sepsis, unclear etiology of infection ?bacteremia with coag neg staph, cont abx  -likely contributing to delirium with acute metabolic encephalopathy    Lukasz Barajas MD  [phone]

## 2017-10-30 NOTE — PROGRESS NOTE ADULT - PROBLEM SELECTOR PLAN 1
DANITA on CKD stage III. DANITA may be multifactorial in the setting of contrast (cardiac cath), rhabdomyolysis and ATN from hypotension/ cardiac arrest. Patient has unclear etiology of his original underlying CKD. (workup remained negatived)  Patient remains on CVVHDF due to Anuria.  Patient remains off pressor support, possible transition to HD tmrw.   Continue to check daily serum phosphorus (may be low in the use of CVVHDF). Continue to monitor intake/output, BMP and urine output. Avoid NSAIDs, RCA and nephrotoxins. Renally dose all medications DANITA on CKD stage III. DANITA may be multifactorial in the setting of contrast (cardiac cath), ATN from hypotension/ cardiac arrest. Patient has unclear etiology of his original underlying CKD.   Patient remains on CVVHDF due to Anuria.  Patient remains off pressor support, Stop CVVHDF at midnight and evaluate for transition to IHD tomorrow AM.  Continue to check daily serum phosphorus (may be low in the use of CVVHDF). Continue to monitor intake/output, BMP and urine output. Avoid NSAIDs, RCA and nephrotoxins. Renally dose all medications

## 2017-10-30 NOTE — CHART NOTE - NSCHARTNOTEFT_GEN_A_CORE
====================  CCU MIDNIGHT ROUNDS  ====================    KAMLESH PATEL  [id number]    ====================  SUMMARY:  ====================        ====================  NEW EVENTS:  ====================        ====================  VITALS (Last 12 hrs):  ====================    T(C): 36.4 (10-30-17 @ 19:00), Max: 36.4 (10-30-17 @ 19:00)  HR: 92 (10-30-17 @ 21:30) (60 - 130)  BP: 124/81 (10-30-17 @ 21:30) (73/48 - 159/73)  BP(mean): 102 (10-30-17 @ 21:30) (55 - 116)  ABP: --  ABP(mean): --  RR: 18 (10-30-17 @ 21:30) (13 - 22)  SpO2: 99% (10-30-17 @ 21:30) (89% - 100%)  Wt(kg): --  CVP(mm Hg): 16 (10-30-17 @ 13:05) (15 - 24)  CO: --  CI: --  PA: --  PA(mean): --  PA(direct): --  PCWP: --  SVR: --    TELEMETRY:        *BLOOD GAS/ARTERIAL/MIXED/VENOUS  *LACTATE    I&O's Summary    29 Oct 2017 07:01  -  30 Oct 2017 07:00  --------------------------------------------------------  IN: 1558 mL / OUT: 2444 mL / NET: -886 mL    30 Oct 2017 07:01  -  30 Oct 2017 22:02  --------------------------------------------------------  IN: 1132.9 mL / OUT: 1420 mL / NET: -287.1 mL        ====================  PLAN:  ====================        Fay JULIO/U  #[id number] ====================  CCU MIDNIGHT ROUNDS  ====================    KAMLESH PATEL  [id number]    ====================  SUMMARY:  ====================    83 M AF on pradaxa initially presented on 10/15/17 with chest pain/diaphoresis/ dyspnea to OSH, IW-STEMI emergently transferred here for [facility] c/b VF arrest requiring IABP (removed 10/19) followed by pRCA stent and R iliac balloon for groin bleed c/b acute right heart failure and cardiogenic shock req being taken back to lab for RP impella for RV support (explanted 10/20), initially unable to be weaned from .  Had slow afib 10/25 with global hypoperfusion so temporary wire placed, amio DCed. Also noted to have positive blood cx Nocardia/coag neg staph. Has R groin pain from pseudoaneurysm. W/ DANITA req CVV. W/ tracheomalacia s/p steroids. w/ delirium    ====================  NEW EVENTS:  ====================    Unable to inject thrombin s/t no neck. Heparin gtt re-started. BP dropped today, levo started. This PM alert, answering questions. No complaints @ this time.    ====================  VITALS (Last 12 hrs):  ====================    T(C): 36.4 (10-30-17 @ 19:00), Max: 36.4 (10-30-17 @ 19:00)  HR: 92 (10-30-17 @ 21:30) (60 - 130)  BP: 124/81 (10-30-17 @ 21:30) (73/48 - 159/73)  BP(mean): 102 (10-30-17 @ 21:30) (55 - 116)  RR: 18 (10-30-17 @ 21:30) (13 - 22)  SpO2: 99% (10-30-17 @ 21:30) (89% - 100%)    TELEMETRY: a fib    I&O's Summary    29 Oct 2017 07:  -  30 Oct 2017 07:00  --------------------------------------------------------  IN: 1558 mL / OUT: 2444 mL / NET: -886 mL    30 Oct 2017 07:01  -  30 Oct 2017 22:02  --------------------------------------------------------  IN: 1132.9 mL / OUT: 1420 mL / NET: -287.1 mL    ====================  PLAN:  ====================  c/w CVV, fluid removal @ 50/hr. rheum w/u per nephro recs   wean pressors as tolerated   c/w heparin gtt for a fib, TVP appropriately placed on CXR, transcutaneous pads on for demand pacing back up if TVP does not capture in time   repeat groin US in 1 week to eval pseudoaneurysm   c/w TF    Fay Conley [facility]/CCU  #[id number]

## 2017-10-30 NOTE — CHART NOTE - NSCHARTNOTEFT_GEN_A_CORE
====================  CCU MIDNIGHT ROUNDS  ====================    KAMLESH PATEL  [id number]    ====================  SUMMARY:  ====================        ====================  NEW EVENTS:  ====================        ====================  VITALS (Last 12 hrs):  ====================    T(C): 36.4 (10-29-17 @ 23:00), Max: 37 (10-29-17 @ 18:00)  HR: 88 (10-29-17 @ 23:00) (78 - 118)  BP: 104/58 (10-29-17 @ 23:00) (81/47 - 116/64)  BP(mean): 66 (10-29-17 @ 23:00) (58 - 726)  ABP: --  ABP(mean): --  RR: 20 (10-29-17 @ 23:00) (17 - 29)  SpO2: 99% (10-29-17 @ 23:00) (83% - 100%)  Wt(kg): --  CVP(mm Hg): 19 (10-29-17 @ 22:00) (13 - 22)  CO: --  CI: --  PA: --  PA(mean): --  PA(direct): --  PCWP: --  SVR: --    TELEMETRY:        *BLOOD GAS/ARTERIAL/MIXED/VENOUS  *LACTATE    I&O's Summary    28 Oct 2017 07:01  -  29 Oct 2017 07:00  --------------------------------------------------------  IN: 3184 mL / OUT: 2470 mL / NET: 714 mL    29 Oct 2017 07:01  -  30 Oct 2017 00:15  --------------------------------------------------------  IN: 1236 mL / OUT: 1460 mL / NET: -224 mL        ====================  PLAN:  ====================      Fay JULIO/CCU  #[id number] ====================  CCU MIDNIGHT ROUNDS  ====================    KAMLESH PATEL  [id number]    ====================  SUMMARY:  ====================        ====================  NEW EVENTS:  ====================    CVV now removing 50 cc/hr w/ goal even to -500. W/ episodes of bradycardia and delay in TVP capture, TVP appropriately placed on CXR, transcutaneous pacing pads placed @ backup rate of 30    ====================  VITALS (Last 12 hrs):  ====================    T(C): 36.4 (10-29-17 @ 23:00), Max: 37 (10-29-17 @ 18:00)  HR: 88 (10-29-17 @ 23:00) (78 - 118)  BP: 104/58 (10-29-17 @ 23:00) (81/47 - 116/64)  BP(mean): 66 (10-29-17 @ 23:00) (58 - 726)  RR: 20 (10-29-17 @ 23:00) (17 - 29)  SpO2: 99% (10-29-17 @ 23:00) (83% - 100%)  CVP(mm Hg): 19 (10-29-17 @ 22:00) (13 - 22)    TELEMETRY: a fib    I&O's Summary    28 Oct 2017 07:01  -  29 Oct 2017 07:00  --------------------------------------------------------  IN: 3184 mL / OUT: 2470 mL / NET: 714 mL    29 Oct 2017 07:01  -  30 Oct 2017 00:15  --------------------------------------------------------  IN: 1236 mL / OUT: 1460 mL / NET: -224 mL    ====================  PLAN:  ====================  fluid removal via CVV, monitor lactate and venous sat   c/w heparin gtt for a fib   c/w transcutaneous pacing @ backup rate of 30, TVP in place  c/w ABX   TF held and hep gtt dec to ACS dose, hep gtt held for US guided thrombin injection of L pseudoaneurysm in AM     Faymorgan CHACKOCNP/CCU  #[id number] ====================  CCU MIDNIGHT ROUNDS  ====================    KAMLESH PATEL  [id number]    ====================  SUMMARY:  ====================    83 M AF on pradaxa initially presented on 10/15/17 with chest pain/diaphoresis/ dyspnea to OSH, IW-STEMI emergently transferred here for [facility] c/b VF arrest requiring IABP (removed 10/19) followed by pRCA stent and R iliac balloon for groin bleed c/b acute right heart failure and cardiogenic shock req being taken back to lab for RP impella for RV support (explanted 10/20), initially unable to be weaned from .  Had slow afib 10/25 with global hypoperfusion so temporary wire placed, amio DCed. Also noted to have positive blood cx Nocardia/coag neg staph. Has R groin pain from pseudoaneurysm. W/ DANITA req CVV. W/ tracheomalacia s/p steroids. w/ delirium    ====================  NEW EVENTS:  ====================    CVV now removing 50 cc/hr w/ goal even to -500. W/ episodes of bradycardia and delay in TVP capture, TVP appropriately placed on CXR, transcutaneous pacing pads placed @ backup rate of 30    ====================  VITALS (Last 12 hrs):  ====================    T(C): 36.4 (10-29-17 @ 23:00), Max: 37 (10-29-17 @ 18:00)  HR: 88 (10-29-17 @ 23:00) (78 - 118)  BP: 104/58 (10-29-17 @ 23:00) (81/47 - 116/64)  BP(mean): 66 (10-29-17 @ 23:00) (58 - 726)  RR: 20 (10-29-17 @ 23:00) (17 - 29)  SpO2: 99% (10-29-17 @ 23:00) (83% - 100%)  CVP(mm Hg): 19 (10-29-17 @ 22:00) (13 - 22)    TELEMETRY: a fib    I&O's Summary    28 Oct 2017 07:  -  29 Oct 2017 07:00  --------------------------------------------------------  IN: 3184 mL / OUT: 2470 mL / NET: 714 mL    29 Oct 2017 07:01  -  30 Oct 2017 00:15  --------------------------------------------------------  IN: 1236 mL / OUT: 1460 mL / NET: -224 mL    ====================  PLAN:  ====================  fluid removal via CVV, monitor lactate and venous sat   c/w heparin gtt for a fib   c/w transcutaneous pacing @ backup rate of 30, TVP in place  c/w ABX   TF held and hep gtt dec to ACS dose, hep gtt held for US guided thrombin injection of L pseudoaneurysm in AM     Faybaljit Conley [facility]/CCU  #[id number]

## 2017-10-30 NOTE — SWALLOW FEES ASSESSMENT ADULT - DIAGNOSTIC IMPRESSIONS
Pt scheduled for FEES today with ENT. Pt with change of status now obtunded and nonverbal. Awaiting Head CT. FEES indefinitely postponed at this time. This service to f/u tomorrow, 10/31, prior to signing off.

## 2017-10-30 NOTE — PROGRESS NOTE ADULT - SUBJECTIVE AND OBJECTIVE BOX
Patient is a 83y old  Male who presents with a chief complaint of SOB, chest pain, diaphoresis (15 Oct 2017 17:59)    Event	  HPI:  83 male presents to ER by ambulance with report of shortness of breath. Wife at the bedside states patient has h/o Afib on Pradaxa, has "heart valve problems", has been having shortness of breath for the past few days, today was found sitting on stairs, c/o increased shortness of breath, chest pain and diaphoresis. At OSH, he was found to have an inferior wall STEMI and was brought to [facility] for cath. Upon arrival, pt appeared unwell and fixated on chest pain. Upon going to Cath lab, pt became non responsive and went into cardiogenic arrest. advanced life support was started, pt was intubated, and ROS was achieved. In cath lab, pt received a inta venus pacing wire, baloon pump, RCA stent, a swan RH cath, and an illiac balloon to stop R groin bleeding. Pt was admitted to the CCU for further management. (15 Oct 2017 17:59)    MEDICATIONS  (STANDING):  aspirin  chewable 81 milliGRAM(s) Oral daily  atorvastatin 80 milliGRAM(s) Oral at bedtime  azithromycin  IVPB 500 milliGRAM(s) IV Intermittent every 24 hours  heparin  Infusion. 700 Unit(s)/Hr (7 mL/Hr) IV Continuous <Continuous>  imipenem/cilastatin  IVPB      imipenem/cilastatin  IVPB 250 milliGRAM(s) IV Intermittent every 6 hours  influenza   Vaccine 0.5 milliLiter(s) IntraMuscular once  lactobacillus acidophilus and bulgaricus Chewable 1 Tablet(s) Chew daily  pantoprazole  Injectable 40 milliGRAM(s) IV Push daily  risperiDONE   Tablet 0.5 milliGRAM(s) Oral every 12 hours  ticagrelor 90 milliGRAM(s) Oral two times a day  vancomycin  IVPB 500 milliGRAM(s) IV Intermittent every 24 hours    MEDICATIONS  (PRN):  acetaminophen    Suspension 930 milliGRAM(s) Oral every 6 hours PRN For Temp greater than 38 C (100.4 F)  acetaminophen    Suspension. 930 milliGRAM(s) Oral every 4 hours PRN Moderate Pain (4 - 6)  ALBUTerol/ipratropium for Nebulization 3 milliLiter(s) Nebulizer every 6 hours PRN Shortness of Breath and/or Wheezing  haloperidol     Tablet 0.5 milliGRAM(s) Oral every 12 hours PRN agitaion  heparin  Injectable 4000 Unit(s) IV Push every 6 hours PRN For aPTT less than 40  oxyCODONE    5 mG/acetaminophen 325 mG 1 Tablet(s) Oral every 6 hours PRN Severe Pain (7 - 10)      REVIEW OF SYSTEMS:  Otherwise, 10 point ROS done and otherwise negative.    PHYSICAL EXAM:  Vital Signs Last 24 Hrs  T(C): 35.6 (30 Oct 2017 06:00), Max: 37 (29 Oct 2017 18:00)  T(F): 96.1 (30 Oct 2017 06:00), Max: 98.6 (29 Oct 2017 18:00)  HR: 81 (30 Oct 2017 06:00) (62 - 118)  BP: 96/76 (30 Oct 2017 06:00) (81/47 - 118/53)  BP(mean): 65 (30 Oct 2017 06:00) (58 - 726)  RR: 23 (30 Oct 2017 06:00) (17 - 29)  SpO2: 92% (30 Oct 2017 06:00) (83% - 100%)  I&O's Summary    29 Oct 2017 07:01  -  30 Oct 2017 07:00  --------------------------------------------------------  IN: 1558 mL / OUT: 2234 mL / NET: -676 mL        Appearance: Normal	  HEENT:   Normal oral mucosa, PERRL, EOMI	  Lymphatic: No lymphadenopathy  Cardiovascular: Normal S1 S2, No JVD, No murmurs, No edema  Respiratory: Lungs clear to auscultation	  Psychiatry: A & O x 3, Mood & affect appropriate  Gastrointestinal:  Soft, Non-tender, + BS	  Skin: No rashes, No ecchymoses, No cyanosis	  Neurologic: Non-focal  Extremities: Normal range of motion, No clubbing, cyanosis or edema  Vascular: Peripheral pulses palpable 2+ bilaterally    LABS:	 	                        8.3    20.2  )-----------( 393      ( 30 Oct 2017 05:17 )             25.2     Auto Eosinophil # x     / Auto Eosinophil % x     / Auto Neutrophil # x     / Auto Neutrophil % x     / BANDS % x                            8.4    19.5  )-----------( 368      ( 29 Oct 2017 13:35 )             26.3     Auto Eosinophil # x     / Auto Eosinophil % x     / Auto Neutrophil # x     / Auto Neutrophil % x     / BANDS % x                            8.5    19.0  )-----------( 365      ( 29 Oct 2017 05:38 )             25.3     Auto Eosinophil # 0.1   / Auto Eosinophil % 0.3   / Auto Neutrophil # 16.9  / Auto Neutrophil % 88.7  / BANDS % x        INR: 1.25 ratio (10-28 @ 05:07)  INR: 1.31 ratio (10-27 @ 04:23)  INR: 1.24 ratio (10-26 @ 04:20)    10-30    137  |  101  |  32<H>  ----------------------------<  90  4.7   |  25  |  1.53<H>  10-29    137  |  102  |  35<H>  ----------------------------<  96  4.8   |  26  |  1.50<H>  10-29    136  |  101  |  35<H>  ----------------------------<  92  4.9   |  23  |  1.33<H>    Ca    8.6      30 Oct 2017 05:17  Mg     2.7     10-30  Phos  2.1     10-30  TPro  6.1  /  Alb  3.0<L>  /  TBili  1.4<H>  /  DBili  x   /  AST  45<H>  /  ALT  55<H>  /  AlkPhos  82  10-30  TPro  5.9<L>  /  Alb  2.8<L>  /  TBili  1.2  /  DBili  x   /  AST  46<H>  /  ALT  55<H>  /  AlkPhos  79  10-29  TPro  6.0  /  Alb  2.9<L>  /  TBili  1.1  /  DBili  x   /  AST  41<H>  /  ALT  57<H>  /  AlkPhos  79  10-29    Lactate, Blood: 1.6 mmol/L (10-30 @ 05:17)  Lactate, Blood: 1.3 mmol/L (10-29 @ 22:36)  Lactate, Blood: 2.1 mmol/L (10-29 @ 13:35)  Lactate, Blood: 1.8 mmol/L (10-29 @ 05:38)      proBNP:   Lipid Profile: 10-15 Chol 129 LDL 79 HDL 41 Trig 45  HgA1c: 5.4 % (10-15 @ 21:32)    TSH: Thyroid Stimulating Hormone, Serum: 0.05 uIU/mL (10-25 @ 13:21)      CARDIAC MARKERS:   18 Oct 2017 04:37 Troponin 10.87 ng/mL / Creatine Kinase 4675 U/L /  CKMB 27.1 ng/mL / CPK Mass Assay % 0.6 %   17 Oct 2017 22:34 Troponin 10.99 ng/mL / Creatine Kinase 4968 U/L /  CKMB 30.9 ng/mL / CPK Mass Assay % 0.6 %   17 Oct 2017 16:26 Troponin 13.48 ng/mL / Creatine Kinase 5078 U/L /  CKMB 37.8 ng/mL / CPK Mass Assay % 0.7 %        TELEMETRY: 	    ECG:  	  RADIOLOGY:  OTHER: 	    PREVIOUS DIAGNOSTIC TESTING:    [ ] Echocardiogram:  [ ]  Catheterization:  [ ] Stress Test:

## 2017-10-30 NOTE — PROGRESS NOTE ADULT - ASSESSMENT
84yo M with R Circumflex pseudoaneurysm with wide neck. Per US attending, Dr. Juares, the PSA is not amenable to thrombin injection. The pt is high risk for operative repair.     - Recommend repeat US of PSA in 1 week  - Hold A/C if able  - Serial exams  - Will follow  - D/W Dr. Goncalves, CCU team, and pt's family.    Sylvia Guaman   [phone]

## 2017-10-30 NOTE — PROGRESS NOTE ADULT - PROBLEM SELECTOR PLAN 2
Patient with noted increasing proteinuria since UA on 6/2016 without an underlying etiology. Order urine spot protein/Cr ratio to quantify protein (once patient makes urine). Please repeat complements once patient stabilizes or in one week.   Renal sonogram once patient stabilizes. Please send an ANCA (C-ANCA and -ANCA)     HBsAg Nonreact; HCV 0.15, Nonreact; HIV Nonreact; EYAD: titer 1:640 (elevated), pattern Homogeneous; dsDNA <12 ;C3 Complement 72; C4 Complement 14; Free Light Chains: kappa 15.20, lambda 10.50, ratio = 1.45. Immunofixation negative. Patient with noted increasing proteinuria since UA on 6/2016 without an underlying etiology. Order urine spot protein/Cr ratio to quantify protein (once patient makes urine). Please repeat complements once patient stabilizes or in one week.   Renal sonogram once patient stabilizes. Please send an ANCA (C-ANCA and -ANCA).     HBsAg Nonreact; HCV 0.15, Nonreact; HIV Nonreact; EYAD: titer 1:640 (elevated), pattern Homogeneous; dsDNA <12 ;C3 Complement 72; C4 Complement 14; Free Light Chains: kappa 15.20, lambda 10.50, ratio = 1.45. Immunofixation negative.

## 2017-10-30 NOTE — PROGRESS NOTE ADULT - ASSESSMENT
83 year old male with a PMH of atrial fibrillation (on Pradaxa), HTN, Thyroid Goiter, CKD stage III (dx 2016) was seen at [facility] with inferior wall STEMI and was brought to [facility] for cardiac catherization s/p PCI x 1 now with DANITA and Anuria.

## 2017-10-30 NOTE — PROGRESS NOTE ADULT - ASSESSMENT
83M pmhx of AF on pradaxa initially presented on 10/15/17 with chest pain/diaphoresis/ dyspnea to OSH, IW-STEMI emergently transferred here for [facility] c/b VF arrest requiring IABP followed by pRCA stent c/b now acute right heart failure and cardiogenic shock s/p IABP (explanted 10/19), RP impella for RV support (explanted 10/20).       1. cardiogenic shock - lactate wnl  -  currently off  - cont cvvh, goal is net even for today  -repeat cxr to eval TVP placement    2.CAD  -cont dapt, statin    3. afib  -cont heparin drip    4. sepsis, unclear etiology of infection ?bacteremia with coag neg staph, cont abx  -likely contributing to delirium with acute metabolic encephalopathy  -cont abx    5.RLE pseudoaneurysm  -RLE doppler today for further eval     Lukasz Barajas MD  p [phone]  evening [phone]

## 2017-10-30 NOTE — CHART NOTE - NSCHARTNOTEFT_GEN_A_CORE
Source: Patient [x ]    Family [ ]     other [ x]Medical records, RN, family at bedside; Pt seen for LOS follow up. Per chart, pt with PMH of Afib, admitted with IWSTEMI and right heart failure with cardiogenic shock, s/p impella now removed, DANITA on CKD 3 requiring CVVHD, s/p FEES attempt but unable to 2/2 change in pt's mental status. Per RN, pt tolerating enteral feed with no GI distress, pt states he is tolerating enteral feed with no issues    Diet : NPO      Patient reports [ ] nausea  [ ] vomiting [ ] diarrhea [ ] constipation  [ ]chewing problems [ ] swallowing issues  [ ] other: No GI distress reported, soft semi formed BM today per RN     Enteral /Parenteral Nutrition: Pivot 1.5 at goal rate of 55cc/hr x 24 hrs while pt remains on CVVHD. Enteral feed to provide 1954kcal and 123g protein (25kcal/kg and 1.5g/kg based on IBW 78.18kg)      Current Weight: Dosing wt 205 pounds, 207 pounds (10/24), 190 pounds today - suspect fluid shift for wt discrepancy  % Weight Change    Pertinent Medications: MEDICATIONS  (STANDING):  aspirin  chewable 81 milliGRAM(s) Oral daily  atorvastatin 80 milliGRAM(s) Oral at bedtime  heparin  Infusion. 700 Unit(s)/Hr (7 mL/Hr) IV Continuous <Continuous>  imipenem/cilastatin  IVPB      imipenem/cilastatin  IVPB 250 milliGRAM(s) IV Intermittent every 6 hours  influenza   Vaccine 0.5 milliLiter(s) IntraMuscular once  lactobacillus acidophilus and bulgaricus Chewable 1 Tablet(s) Chew daily  norepinephrine Infusion 1 MICROgram(s)/kG/Min (174.563 mL/Hr) IV Continuous <Continuous>  pantoprazole  Injectable 40 milliGRAM(s) IV Push daily  risperiDONE   Tablet 0.5 milliGRAM(s) Oral every 12 hours  ticagrelor 90 milliGRAM(s) Oral two times a day  vancomycin  IVPB 500 milliGRAM(s) IV Intermittent every 24 hours    MEDICATIONS  (PRN):  acetaminophen    Suspension 930 milliGRAM(s) Oral every 6 hours PRN For Temp greater than 38 C (100.4 F)  acetaminophen    Suspension. 930 milliGRAM(s) Oral every 4 hours PRN Moderate Pain (4 - 6)  ALBUTerol/ipratropium for Nebulization 3 milliLiter(s) Nebulizer every 6 hours PRN Shortness of Breath and/or Wheezing  haloperidol     Tablet 0.5 milliGRAM(s) Oral every 12 hours PRN agitaion  heparin  Injectable 4000 Unit(s) IV Push every 6 hours PRN For aPTT less than 40  oxyCODONE    5 mG/acetaminophen 325 mG 1 Tablet(s) Oral every 6 hours PRN Severe Pain (7 - 10)    Pertinent Labs:  10-30 Na137 mmol/L Glu 90 mg/dL K+ 4.7 mmol/L Cr  1.53 mg/dL<H> BUN 32 mg/dL<H> Phos 2.1 mg/dL<L> Alb 3.0 g/dL<L> PAB n/a         Skin: +2 dependent edema, skin no pressure injuries    Estimated Needs:   [ x] no change since previous assessment  [ ] recalculated:       Previous Nutrition Diagnosis: Swallowing difficulty      Nutrition Diagnosis is [ x] ongoing  [ ] resolved [ ] not applicable          New Nutrition Diagnosis: [ x] not applicable         Monitoring and Evaluation:     [ ] PO intake [ ] Tolerance to diet prescription [ ] weights [ ] follow up per protocol    [x ] other: Continue to monitor enteral feed tolerance. Consider changing the formula once pt off CVVHD, pending SLP swallow re-evaluation.

## 2017-10-30 NOTE — PROGRESS NOTE ADULT - SUBJECTIVE AND OBJECTIVE BOX
SURGERY PROGRESS NOTE:     Overnight Events:        Physical Exam  Vital Signs Last 24 Hrs  T(C): 35.9 (30 Oct 2017 15:00), Max: 37 (29 Oct 2017 18:00)  T(F): 96.7 (30 Oct 2017 15:00), Max: 98.6 (29 Oct 2017 18:00)  HR: 100 (30 Oct 2017 16:30) (60 - 130)  BP: 126/78 (30 Oct 2017 16:30) (73/48 - 138/91)  BP(mean): 116 (30 Oct 2017 16:30) (55 - 726)  RR: 18 (30 Oct 2017 16:30) (13 - 29)  SpO2: 93% (30 Oct 2017 16:30) (83% - 100%)    I&O's Detail    29 Oct 2017 07:01  -  30 Oct 2017 07:00  --------------------------------------------------------  IN:    Enteral Tube Flush: 305 mL    heparin  Infusion.: 178 mL    heparin  Infusion.: 35 mL    Pivot: 300 mL    Solution: 100 mL    Solution: 250 mL    Solution: 390 mL  Total IN: 1558 mL    OUT:    Other: 2444 mL  Total OUT: 2444 mL    Total NET: -886 mL      30 Oct 2017 07:01  -  30 Oct 2017 16:43  --------------------------------------------------------  IN:    Enteral Tube Flush: 150 mL    heparin  Infusion.: 7 mL    norepinephrine Infusion: 41.2 mL    Pivot: 140 mL    Solution: 117.5 mL  Total IN: 455.7 mL    OUT:    Other: 670 mL  Total OUT: 670 mL    Total NET: -214.3 mL          Gen: NAD  HEENT: normocephalic, atraumatic, no scleral icterus  CV: S1, S2, RRR  Pulm: CTA B/L  Abd: Soft, ND, NTP, no rebound, no guarding, no palpable organomegaly/masses  Ext: warm, no edema, palp dp/pt    Labs:                        7.8    21.2  )-----------( 366      ( 30 Oct 2017 13:19 )             24.4     10-30    137  |  101  |  32<H>  ----------------------------<  90  4.7   |  25  |  1.53<H>    Ca    8.6      30 Oct 2017 05:17  Phos  2.1     10-30  Mg     2.7     10-30    TPro  6.1  /  Alb  3.0<L>  /  TBili  1.4<H>  /  DBili  x   /  AST  45<H>  /  ALT  55<H>  /  AlkPhos  82  10-30    PTT - ( 30 Oct 2017 05:17 )  PTT:62.2 sec SURGERY PROGRESS NOTE:     pt w/o new c/o       Physical Exam  Vital Signs Last 24 Hrs  T(C): 35.9 (30 Oct 2017 15:00), Max: 37 (29 Oct 2017 18:00)  T(F): 96.7 (30 Oct 2017 15:00), Max: 98.6 (29 Oct 2017 18:00)  HR: 100 (30 Oct 2017 16:30) (60 - 130)  BP: 126/78 (30 Oct 2017 16:30) (73/48 - 138/91)  BP(mean): 116 (30 Oct 2017 16:30) (55 - 726)  RR: 18 (30 Oct 2017 16:30) (13 - 29)  SpO2: 93% (30 Oct 2017 16:30) (83% - 100%)    I&O's Detail    29 Oct 2017 07:01  -  30 Oct 2017 07:00  --------------------------------------------------------  IN:    Enteral Tube Flush: 305 mL    heparin  Infusion.: 178 mL    heparin  Infusion.: 35 mL    Pivot: 300 mL    Solution: 100 mL    Solution: 250 mL    Solution: 390 mL  Total IN: 1558 mL    OUT:    Other: 2444 mL  Total OUT: 2444 mL    Total NET: -886 mL      30 Oct 2017 07:01  -  30 Oct 2017 16:43  --------------------------------------------------------  IN:    Enteral Tube Flush: 150 mL    heparin  Infusion.: 7 mL    norepinephrine Infusion: 41.2 mL    Pivot: 140 mL    Solution: 117.5 mL  Total IN: 455.7 mL    OUT:    Other: 670 mL  Total OUT: 670 mL    Total NET: -214.3 mL          Gen: NAD  HEENT: normocephalic, atraumatic, no scleral icterus  CV: S1, S2, RRR  Pulm: CTA B/L  Abd: Soft, ND, NTP, no rebound, no guarding, no palpable organomegaly/masses  Ext: warm, no edema, palp dp/pt rt groin hematoma remains w less tenderness and discomfort     Labs:                        7.8    21.2  )-----------( 366      ( 30 Oct 2017 13:19 )             24.4     10-30    137  |  101  |  32<H>  ----------------------------<  90  4.7   |  25  |  1.53<H>    Ca    8.6      30 Oct 2017 05:17  Phos  2.1     10-30  Mg     2.7     10-30    TPro  6.1  /  Alb  3.0<L>  /  TBili  1.4<H>  /  DBili  x   /  AST  45<H>  /  ALT  55<H>  /  AlkPhos  82  10-30    PTT - ( 30 Oct 2017 05:17 )  PTT:62.2 sec

## 2017-10-30 NOTE — PROGRESS NOTE ADULT - ASSESSMENT
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 Previously a code stoke was called and Head CT did not show new acute findings. Repeat Head CT was done given the focal neurological deficits but it was unchanged. EEG did not show signs of seizure. Neurology team following   Due to agitation, patient was started on precedex drip, does not respond to haldol     # CV:   - CAD s/p RCA stent: continue hep gtt, aspirin, brillinta, lipitor  - Atrial fibrillation: Was on amiodarone load, however, due to tachy isabelle, amiodarone was held   - Cardiogenic shock s/p TVP and impella s/ removal: Continue dobutmine 5  - Tachy-isabelle s/p TVP, did not pace overnight     #Respiratory: CXR Small left pleural effusion with adjacent atelectasis. ENT scope showed tracheomalacia   - decadron 4mg for three days with protonix and ISS  - Slightly tachypneic, but comfortable   - Continue suctioning due to increased secretions     #GI:   - Currently on NGT w/ tube feeds with pivot because patient was having loose bm with vital  - dysphagia eval will be repeated     #Metabolic/Renal  - Rhabdo: continue CVVH, net negative: 1Liter, now removing 80cc/ hr   - Patient is anuric at this time, will continue CCVH until HD stable as per Renal Team  - Replete Mg, Ph, K as needed     #Heme  - H&H stable  - Metamyelocytes and blasts on CBC, Heme consulted for peripheral smear     # Vascular  - Patient currently has a R shilley and L central line in place     # Skin  - Breakdown in R groin at the site of impella, wound care following    # Infectious  - Blood culture gram variable beaded rods acid fast (10/19), gram positive cocci in clusters (10/24)  - Repeat blood culturespending  - Continue vanc D6 and aztreonam D4  - ID team following  - Echocardiogram ruled out endocarditis     # Endocrine   - Goiter TSH 0.05 T4 10.4 T3 85 2/2 sick euthyroid syndrome, no intervention at this time  - Endocrinology following (house) (patient's endocrinologist is Dr. Baldomero Roberson in [place])    #DVT ppx: hep gtt  #Dispo - PT consulted

## 2017-10-30 NOTE — PROGRESS NOTE ADULT - SUBJECTIVE AND OBJECTIVE BOX
[facility] DIVISION OF KIDNEY DISEASES AND HYPERTENSION -- FOLLOW UP NOTE  --------------------------------------------------------------------------------  Chief Complaint: DANITA with Anuria requiring renal replacement therapy     24 hour events/subjective:  Patient had a change in mental status overnight. Remained on CVVHDF without complications.       PAST HISTORY  --------------------------------------------------------------------------------  No significant changes to PMH, PSH, FHx, SHx, unless otherwise noted    ALLERGIES & MEDICATIONS  --------------------------------------------------------------------------------  Allergies    No Known Allergies    Intolerances      Standing Inpatient Medications  aspirin  chewable 81 milliGRAM(s) Oral daily  atorvastatin 80 milliGRAM(s) Oral at bedtime  heparin  Infusion. 700 Unit(s)/Hr IV Continuous <Continuous>  imipenem/cilastatin  IVPB      imipenem/cilastatin  IVPB 250 milliGRAM(s) IV Intermittent every 6 hours  influenza   Vaccine 0.5 milliLiter(s) IntraMuscular once  lactobacillus acidophilus and bulgaricus Chewable 1 Tablet(s) Chew daily  pantoprazole  Injectable 40 milliGRAM(s) IV Push daily  risperiDONE   Tablet 0.5 milliGRAM(s) Oral every 12 hours  ticagrelor 90 milliGRAM(s) Oral two times a day  vancomycin  IVPB 500 milliGRAM(s) IV Intermittent every 24 hours    PRN Inpatient Medications  acetaminophen    Suspension 930 milliGRAM(s) Oral every 6 hours PRN  acetaminophen    Suspension. 930 milliGRAM(s) Oral every 4 hours PRN  ALBUTerol/ipratropium for Nebulization 3 milliLiter(s) Nebulizer every 6 hours PRN  haloperidol     Tablet 0.5 milliGRAM(s) Oral every 12 hours PRN  heparin  Injectable 4000 Unit(s) IV Push every 6 hours PRN  oxyCODONE    5 mG/acetaminophen 325 mG 1 Tablet(s) Oral every 6 hours PRN      REVIEW OF SYSTEMS  --------------------------------------------------------------------------------  Unable to obtain due to AMS.     VITALS/PHYSICAL EXAM  --------------------------------------------------------------------------------  T(C): 35.9 (10-30-17 @ 13:00), Max: 37 (10-29-17 @ 18:00)  HR: 60 (10-30-17 @ 13:20) (60 - 118)  BP: 90/59 (10-30-17 @ 13:20) (73/48 - 138/91)  RR: 18 (10-30-17 @ 13:20) (13 - 29)  SpO2: 100% (10-30-17 @ 13:20) (83% - 100%)  Wt(kg): --        10-29-17 @ 07:01  -  10-30-17 @ 07:00  --------------------------------------------------------  IN: 1558 mL / OUT: 2444 mL / NET: -886 mL    10-30-17 @ 07:01  -  10-30-17 @ 13:30  --------------------------------------------------------  IN: 250 mL / OUT: 288 mL / NET: -38 mL      Physical Exam:  	Gen: Awake  	Pulm: Course BS B/L  	CV: RRR, S1S2  	Abd: +BS, soft, nontender/nondistended   	LE: Warm, + edema  	Skin: Warm, without rashes              Vascular Access: + RIJ non-tunnelled HD catheter     LABS/STUDIES  --------------------------------------------------------------------------------              8.3    20.2  >-----------<  393      [10-30-17 @ 05:17]              25.2     137  |  101  |  32  ----------------------------<  90      [10-30-17 @ 05:17]  4.7   |  25  |  1.53        Ca     8.6     [10-30-17 @ 05:17]      Mg     2.7     [10-30-17 @ 05:17]      Phos  2.1     [10-30-17 @ 05:17]    TPro  6.1  /  Alb  3.0  /  TBili  1.4  /  DBili  x   /  AST  45  /  ALT  55  /  AlkPhos  82  [10-30-17 @ 05:17]      PTT: 62.2       [10-30-17 @ 05:17]      Creatinine Trend:  SCr 1.53 [10-30 @ 05:17]  SCr 1.50 [10-29 @ 22:36]  SCr 1.33 [10-29 @ 13:35]  SCr 1.37 [10-29 @ 05:38]  SCr 1.53 [10-28 @ 18:00]    Urinalysis - [10-15-17 @ 16:48]      Color Yellow / Appearance Turbid / SG >1.030 / pH 6.5      Gluc 50 / Ketone Negative  / Bili Negative / Urobili Negative       Blood Moderate / Protein >600 / Leuk Est Negative / Nitrite Negative      RBC >50 / WBC 3-5 / Hyaline  / Gran  / Sq Epi  / Non Sq Epi OCC / Bacteria Few      HbA1c 5.4      [10-15-17 @ 21:32]  TSH 0.05      [10-25-17 @ 13:21]  Lipid: chol 129, TG 45, HDL 41, LDL 79      [10-15-17 @ 21:40]    HBsAg Nonreact      [10-19-17 @ 07:25]  HCV 0.15, Nonreact      [10-19-17 @ 07:25]  HIV Nonreact      [10-19-17 @ 07:25]    EYAD: titer 1:640, pattern Homogeneous      [10-19-17 @ 07:25]  dsDNA <12      [10-19-17 @ 07:25]  C3 Complement 72      [10-19-17 @ 07:25]  C4 Complement 14      [10-19-17 @ 07:25]  Free Light Chains: kappa 15.20, lambda 10.50, ratio = 1.45      [10-19 @ 07:25]  Immunofixation Serum:   No Monoclonal Band Identified      [10-24-17 @ 13:31]  SPEP Interpretation: Hypoalbuminemia Increased beta fraction due to probable fibrinogen presence.      [10-24-17 @ 12:31] [facility] DIVISION OF KIDNEY DISEASES AND HYPERTENSION -- FOLLOW UP NOTE  --------------------------------------------------------------------------------  Chief Complaint: DANITA with Anuria requiring renal replacement therapy     24 hour events/subjective:  Patient had a change in mental status overnight. Remained on CVVHDF without complications.       PAST HISTORY  --------------------------------------------------------------------------------  No significant changes to PMH, PSH, FHx, SHx, unless otherwise noted    ALLERGIES & MEDICATIONS  --------------------------------------------------------------------------------  Allergies    No Known Allergies    Intolerances      Standing Inpatient Medications  aspirin  chewable 81 milliGRAM(s) Oral daily  atorvastatin 80 milliGRAM(s) Oral at bedtime  heparin  Infusion. 700 Unit(s)/Hr IV Continuous <Continuous>  imipenem/cilastatin  IVPB      imipenem/cilastatin  IVPB 250 milliGRAM(s) IV Intermittent every 6 hours  influenza   Vaccine 0.5 milliLiter(s) IntraMuscular once  lactobacillus acidophilus and bulgaricus Chewable 1 Tablet(s) Chew daily  pantoprazole  Injectable 40 milliGRAM(s) IV Push daily  risperiDONE   Tablet 0.5 milliGRAM(s) Oral every 12 hours  ticagrelor 90 milliGRAM(s) Oral two times a day  vancomycin  IVPB 500 milliGRAM(s) IV Intermittent every 24 hours    PRN Inpatient Medications  acetaminophen    Suspension 930 milliGRAM(s) Oral every 6 hours PRN  acetaminophen    Suspension. 930 milliGRAM(s) Oral every 4 hours PRN  ALBUTerol/ipratropium for Nebulization 3 milliLiter(s) Nebulizer every 6 hours PRN  haloperidol     Tablet 0.5 milliGRAM(s) Oral every 12 hours PRN  heparin  Injectable 4000 Unit(s) IV Push every 6 hours PRN  oxyCODONE    5 mG/acetaminophen 325 mG 1 Tablet(s) Oral every 6 hours PRN      REVIEW OF SYSTEMS  --------------------------------------------------------------------------------  Unable to obtain due to AMS.     VITALS/PHYSICAL EXAM  --------------------------------------------------------------------------------  T(C): 35.9 (10-30-17 @ 13:00), Max: 37 (10-29-17 @ 18:00)  HR: 60 (10-30-17 @ 13:20) (60 - 118)  BP: 90/59 (10-30-17 @ 13:20) (73/48 - 138/91)  RR: 18 (10-30-17 @ 13:20) (13 - 29)  SpO2: 100% (10-30-17 @ 13:20) (83% - 100%)  Wt(kg): --        10-29-17 @ 07:01  -  10-30-17 @ 07:00  --------------------------------------------------------  IN: 1558 mL / OUT: 2444 mL / NET: -886 mL    10-30-17 @ 07:01  -  10-30-17 @ 13:30  --------------------------------------------------------  IN: 250 mL / OUT: 288 mL / NET: -38 mL      Physical Exam:  	Gen: Awake  	Pulm: Course BS B/L  	CV: RRR, S1S2  	Abd: +BS, soft, nontender/nondistended   	LE: Warm, + edema  	Skin: Warm, without rashes              Neuro: lethargic responsive to command but not communicative              Vascular Access: + RIJ non-tunnelled HD catheter     LABS/STUDIES  --------------------------------------------------------------------------------              8.3    20.2  >-----------<  393      [10-30-17 @ 05:17]              25.2     137  |  101  |  32  ----------------------------<  90      [10-30-17 @ 05:17]  4.7   |  25  |  1.53        Ca     8.6     [10-30-17 @ 05:17]      Mg     2.7     [10-30-17 @ 05:17]      Phos  2.1     [10-30-17 @ 05:17]    TPro  6.1  /  Alb  3.0  /  TBili  1.4  /  DBili  x   /  AST  45  /  ALT  55  /  AlkPhos  82  [10-30-17 @ 05:17]      PTT: 62.2       [10-30-17 @ 05:17]      Creatinine Trend:  SCr 1.53 [10-30 @ 05:17]  SCr 1.50 [10-29 @ 22:36]  SCr 1.33 [10-29 @ 13:35]  SCr 1.37 [10-29 @ 05:38]  SCr 1.53 [10-28 @ 18:00]    Urinalysis - [10-15-17 @ 16:48]      Color Yellow / Appearance Turbid / SG >1.030 / pH 6.5      Gluc 50 / Ketone Negative  / Bili Negative / Urobili Negative       Blood Moderate / Protein >600 / Leuk Est Negative / Nitrite Negative      RBC >50 / WBC 3-5 / Hyaline  / Gran  / Sq Epi  / Non Sq Epi OCC / Bacteria Few      HbA1c 5.4      [10-15-17 @ 21:32]  TSH 0.05      [10-25-17 @ 13:21]  Lipid: chol 129, TG 45, HDL 41, LDL 79      [10-15-17 @ 21:40]    HBsAg Nonreact      [10-19-17 @ 07:25]  HCV 0.15, Nonreact      [10-19-17 @ 07:25]  HIV Nonreact      [10-19-17 @ 07:25]    EYAD: titer 1:640, pattern Homogeneous      [10-19-17 @ 07:25]  dsDNA <12      [10-19-17 @ 07:25]  C3 Complement 72      [10-19-17 @ 07:25]  C4 Complement 14      [10-19-17 @ 07:25]  Free Light Chains: kappa 15.20, lambda 10.50, ratio = 1.45      [10-19 @ 07:25]  Immunofixation Serum:   No Monoclonal Band Identified      [10-24-17 @ 13:31]  SPEP Interpretation: Hypoalbuminemia Increased beta fraction due to probable fibrinogen presence.      [10-24-17 @ 12:31]

## 2017-10-30 NOTE — PROGRESS NOTE ADULT - ATTENDING COMMENTS
Taper off norepi.  Off , but SVC O2 sat is 45%.  Clarify GOC. Taper off norepi.  Off , but SVC O2 sat is 45%.  Clarify GOC.  Will sign off. Pls call with questions.

## 2017-10-30 NOTE — PROGRESS NOTE ADULT - SUBJECTIVE AND OBJECTIVE BOX
Interval events:  Intermittent failure to capture with TVP  Pacer pads placed  Planned for pseudoaneurysm procedure with vascular  Lethargic    Review Of Systems:  Constitutional: [ ] Fever [ ] Chills [ ] Fatigue [ ] Weight change   HEENT: [ ] Blurred vision [ ] Eye Pain [ ] Headache [ ] Runny nose [ ] Sore Throat   Respiratory: [ ] Cough [ ] Wheezing [ ] Shortness of breath  Cardiovascular: [ ] Chest Pain [ ] Palpitations [ ] JOE [ ] PND [ ] Orthopnea  Gastrointestinal: [ ] Abdominal Pain [ ] Diarrhea [ ] Constipation [ ] Hemorrhoids [ ] Nausea [ ] Vomiting  Genitourinary: [ ] Nocturia [ ] Dysuria [ ] Incontinence  Extremities: [ ] Swelling [ ] Joint Pain  Neurologic: [ ] Focal deficit [ ] Paresthesias [ ] Syncope  Lymphatic: [ ] Swelling [ ] Lymphadenopathy   Skin: [ ] Rash [ ] Ecchymoses [ ] Wounds [ ] Lesions  Psychiatry: [ ] Depression [ ] Suicidal/Homicidal Ideation [ ] Anxiety [ ] Sleep Disturbances  [ ] 10 point review of systems is otherwise negative except as mentioned above            [ ]Unable to obtain    Medications:  acetaminophen    Suspension 930 milliGRAM(s) Oral every 6 hours PRN  acetaminophen    Suspension. 930 milliGRAM(s) Oral every 4 hours PRN  ALBUTerol/ipratropium for Nebulization 3 milliLiter(s) Nebulizer every 6 hours PRN  aspirin  chewable 81 milliGRAM(s) Oral daily  atorvastatin 80 milliGRAM(s) Oral at bedtime  haloperidol     Tablet 0.5 milliGRAM(s) Oral every 12 hours PRN  haloperidol    Injectable 0.5 milliGRAM(s) IV Push once  haloperidol    Injectable 0.5 milliGRAM(s) IntraMuscular once  heparin  Infusion. 700 Unit(s)/Hr IV Continuous <Continuous>  heparin  Injectable 4000 Unit(s) IV Push every 6 hours PRN  imipenem/cilastatin  IVPB      imipenem/cilastatin  IVPB 250 milliGRAM(s) IV Intermittent every 6 hours  influenza   Vaccine 0.5 milliLiter(s) IntraMuscular once  lactobacillus acidophilus and bulgaricus Chewable 1 Tablet(s) Chew daily  oxyCODONE    5 mG/acetaminophen 325 mG 1 Tablet(s) Oral every 6 hours PRN  pantoprazole  Injectable 40 milliGRAM(s) IV Push daily  risperiDONE   Tablet 0.5 milliGRAM(s) Oral every 12 hours  ticagrelor 90 milliGRAM(s) Oral two times a day  vancomycin  IVPB 500 milliGRAM(s) IV Intermittent every 24 hours    PMH/PSH/FH/SH: [ ] Unchanged  Vitals:  T(C): 36.4 (10-30-17 @ 08:00), Max: 37 (10-29-17 @ 18:00)  HR: 94 (10-30-17 @ 10:00) (62 - 118)  BP: 138/91 (10-30-17 @ 10:00) (80/55 - 138/91)  BP(mean): 110 (10-30-17 @ 10:00) (58 - 726)  RR: 18 (10-30-17 @ 10:00) (17 - 29)  SpO2: 95% (10-30-17 @ 10:00) (83% - 100%)  Wt(kg): --  Daily     Daily Weight in k.2 (30 Oct 2017 03:00)  I&O's Summary    29 Oct 2017 07:  -  30 Oct 2017 07:00  --------------------------------------------------------  IN: 1558 mL / OUT: 2444 mL / NET: -886 mL    30 Oct 2017 07:  -  30 Oct 2017 10:55  --------------------------------------------------------  IN: 0 mL / OUT: 59 mL / NET: -59 mL        Physical Exam:  Appearance:  Normal, NAD  Eyes: PERRL, EOMI  HENT: Normal oral [place] NC/AT  Cardiovascular: S1, S2, RRR, No m/r/g appreciated, No edema, no elevation in JVP  Procedural Access Site: No hematoma, Non-tender to palpation, 2+ pulse , No bruit, No Ecchymosis  Respiratory: Clear to auscultation bilaterally  Gastrointestinal: Soft, Non-tender, Non-distended, BS+  Musculoskeletal:  No clubbing, No joint deformity   Neurologic: Non-focal  Lymphatic: No lymphadenopathy  Psychiatry: AAOx3, Mood & affect appropriate  Skin: No rashes, No ecchymoses, No cyanosis    Labs:                        8.3    20.2  )-----------( 393      ( 30 Oct 2017 05:17 )             25.2     10-    137  |  101  |  32<H>  ----------------------------<  90  4.7   |  25  |  1.53<H>    Ca    8.6      30 Oct 2017 05:17  Phos  2.1     10-  Mg     2.7     10-    TPro  6.1  /  Alb  3.0<L>  /  TBili  1.4<H>  /  DBili  x   /  AST  45<H>  /  ALT  55<H>  /  AlkPhos  82  10-30    PTT - ( 30 Oct 2017 05:17 )  PTT:62.2 sec    Interpretation of Telemetry:  intermittent failure to capture, afib

## 2017-10-30 NOTE — PROGRESS NOTE ADULT - ATTENDING COMMENTS
Patient is seen and examined with fellow, NP and the CCU house-staff. I agree with the history, physical and the assessment and plan.   delirium - f/u with repeat CT head - ?infectious  - f/u with ID - ?need to expand to fungal coverage - c/w ABX -

## 2017-10-30 NOTE — PROGRESS NOTE ADULT - ASSESSMENT
83M pmhx of AF on pradaxa initially presented on 10/15/17 with chest pain/diaphoresis/ dyspnea to OSH, IW-STEMI emergently transferred here for C c/b VF arrest requiring IABP followed by pRCA stent c/b now acute right heart failure and cardiogenic shock s/p IABP (explanted 10/19), RP impella for RV support (explanted 10/20).       #cardiogenic shock  - sepsis, unclear etiology of infection ?bacteremia with coag neg staph, cont abx  - plan for ppm when infection clears    Maximilian Emery MD

## 2017-10-30 NOTE — PROGRESS NOTE ADULT - SUBJECTIVE AND OBJECTIVE BOX
Subjective:  Pt seen and examined at bedside. Today, pt does not produce verbal output. No acute events overnight.       MEDICATIONS  (STANDING):  aspirin  chewable 81 milliGRAM(s) Oral daily  atorvastatin 80 milliGRAM(s) Oral at bedtime  haloperidol    Injectable 0.5 milliGRAM(s) IV Push once  haloperidol    Injectable 0.5 milliGRAM(s) IntraMuscular once  heparin  Infusion. 700 Unit(s)/Hr (7 mL/Hr) IV Continuous <Continuous>  imipenem/cilastatin  IVPB      imipenem/cilastatin  IVPB 250 milliGRAM(s) IV Intermittent every 6 hours  influenza   Vaccine 0.5 milliLiter(s) IntraMuscular once  lactobacillus acidophilus and bulgaricus Chewable 1 Tablet(s) Chew daily  pantoprazole  Injectable 40 milliGRAM(s) IV Push daily  risperiDONE   Tablet 0.5 milliGRAM(s) Oral every 12 hours  ticagrelor 90 milliGRAM(s) Oral two times a day  vancomycin  IVPB 500 milliGRAM(s) IV Intermittent every 24 hours    MEDICATIONS  (PRN):  acetaminophen    Suspension 930 milliGRAM(s) Oral every 6 hours PRN For Temp greater than 38 C (100.4 F)  acetaminophen    Suspension. 930 milliGRAM(s) Oral every 4 hours PRN Moderate Pain (4 - 6)  ALBUTerol/ipratropium for Nebulization 3 milliLiter(s) Nebulizer every 6 hours PRN Shortness of Breath and/or Wheezing  haloperidol     Tablet 0.5 milliGRAM(s) Oral every 12 hours PRN agitaion  heparin  Injectable 4000 Unit(s) IV Push every 6 hours PRN For aPTT less than 40  oxyCODONE    5 mG/acetaminophen 325 mG 1 Tablet(s) Oral every 6 hours PRN Severe Pain (7 - 10)      Allergies  No Known Allergies    Objective:   Vital Signs Last 24 Hrs  T(C): 36.4 (30 Oct 2017 08:00), Max: 37 (29 Oct 2017 18:00)  T(F): 97.6 (30 Oct 2017 08:00), Max: 98.6 (29 Oct 2017 18:00)  HR: 94 (30 Oct 2017 10:00) (62 - 118)  BP: 138/91 (30 Oct 2017 10:00) (80/55 - 138/91)  BP(mean): 110 (30 Oct 2017 10:00) (58 - 726)  RR: 18 (30 Oct 2017 10:00) (17 - 29)  SpO2: 95% (30 Oct 2017 10:00) (83% - 100%)    Exam:   eyes open, no verbal output, follows occasional simple commands with encouragement  blinks to threat b/l, R ptosis, R pupil 3mm and less responsive than L pupil 2mm,   +R hand , minimal withdrawal to painful stimuli x 4    Other:  CBC Full  -  ( 30 Oct 2017 05:17 )  WBC Count : 20.2 K/uL  Hemoglobin : 8.3 g/dL  Hematocrit : 25.2 %  Platelet Count - Automated : 393 K/uL  Mean Cell Volume : 105.0 fl  Mean Cell Hemoglobin : 34.3 pg  Mean Cell Hemoglobin Concentration : 32.8 gm/dL  Auto Neutrophil # : x  Auto Lymphocyte # : x  Auto Monocyte # : x  Auto Eosinophil # : x  Auto Basophil # : x  Auto Neutrophil % : x  Auto Lymphocyte % : x  Auto Monocyte % : x  Auto Eosinophil % : x  Auto Basophil % : x    10-30    137  |  101  |  32<H>  ----------------------------<  90  4.7   |  25  |  1.53<H>    Ca    8.6      30 Oct 2017 05:17  Phos  2.1     10-30  Mg     2.7     10-30    TPro  6.1  /  Alb  3.0<L>  /  TBili  1.4<H>  /  DBili  x   /  AST  45<H>  /  ALT  55<H>  /  AlkPhos  82  10-30    10-30    137  |  101  |  32<H>  ----------------------------<  90  4.7   |  25  |  1.53<H>    Ca    8.6      30 Oct 2017 05:17  Phos  2.1     10-30  Mg     2.7     10-30    TPro  6.1  /  Alb  3.0<L>  /  TBili  1.4<H>  /  DBili  x   /  AST  45<H>  /  ALT  55<H>  /  AlkPhos  82  10-30    LIVER FUNCTIONS - ( 30 Oct 2017 05:17 )  Alb: 3.0 g/dL / Pro: 6.1 g/dL / ALK PHOS: 82 U/L / ALT: 55 U/L RC / AST: 45 U/L / GGT: x             Imaging:     CT Head 10/24/17:   No acute intracranial hemorrhage, mass effect, or midline shift.  Unchanged microvascular disease.

## 2017-10-30 NOTE — PROGRESS NOTE ADULT - SUBJECTIVE AND OBJECTIVE BOX
24H hour events:   -bradycardic overnight with intermittent loss of capture from TVP, pt transcutaneously paced at times  -hr 80-90 bp currently 70s/40s, o/n up to 110s systolic  -CVP 18-23, central venous sat 45 (prev 45 prev 60)  -wbc 20.2 (prev 19.5) hgb 8.3 Cr 1.53 (prev 1.3)  albumin 3.0    MEDICATIONS:  aspirin  chewable 81 milliGRAM(s) Oral daily  heparin  Infusion. 700 Unit(s)/Hr IV Continuous <Continuous>  heparin  Injectable 4000 Unit(s) IV Push every 6 hours PRN  ticagrelor 90 milliGRAM(s) Oral two times a day    imipenem/cilastatin  IVPB      imipenem/cilastatin  IVPB 250 milliGRAM(s) IV Intermittent every 6 hours  vancomycin  IVPB 500 milliGRAM(s) IV Intermittent every 24 hours    ALBUTerol/ipratropium for Nebulization 3 milliLiter(s) Nebulizer every 6 hours PRN    acetaminophen    Suspension 930 milliGRAM(s) Oral every 6 hours PRN  acetaminophen    Suspension. 930 milliGRAM(s) Oral every 4 hours PRN  haloperidol     Tablet 0.5 milliGRAM(s) Oral every 12 hours PRN  oxyCODONE    5 mG/acetaminophen 325 mG 1 Tablet(s) Oral every 6 hours PRN  risperiDONE   Tablet 0.5 milliGRAM(s) Oral every 12 hours    pantoprazole  Injectable 40 milliGRAM(s) IV Push daily    atorvastatin 80 milliGRAM(s) Oral at bedtime    influenza   Vaccine 0.5 milliLiter(s) IntraMuscular once      REVIEW OF SYSTEMS:  Complete 10point ROS negative.    PHYSICAL EXAM:  T(C): 36.4 (10-30-17 @ 08:00), Max: 37 (10-29-17 @ 18:00)  HR: 92 (10-30-17 @ 11:00) (62 - 118)  BP: 73/48 (10-30-17 @ 11:00) (73/48 - 138/91)  RR: 18 (10-30-17 @ 11:00) (17 - 29)  SpO2: 100% (10-30-17 @ 11:00) (83% - 100%)  Wt(kg): --  I&O's Summary    29 Oct 2017 07:01  -  30 Oct 2017 07:00  --------------------------------------------------------  IN: 1558 mL / OUT: 2444 mL / NET: -886 mL    30 Oct 2017 07:01  -  30 Oct 2017 12:06  --------------------------------------------------------  IN: 0 mL / OUT: 186 mL / NET: -186 mL        Appearance: Normal	  HEENT:   Normal oral mucosa, PERRL, EOMI	  Lymphatic: No lymphadenopathy  Cardiovascular: Normal S1 S2, No JVD, No murmurs, diffuse anasarca  Respiratory: b/l rhonchi	  Psychiatry: A & O x 3, Mood & affect appropriate  Gastrointestinal:  Soft, Non-tender, + BS	  Skin: No rashes, No ecchymoses, No cyanosis	  Neurologic: Non-focal  Extremities: Normal range of motion, No clubbing, cyanosis   Vascular: Peripheral pulses palpable 2+ bilaterally        LABS:	 	    CBC Full  -  ( 30 Oct 2017 05:17 )  WBC Count : 20.2 K/uL  Hemoglobin : 8.3 g/dL  Hematocrit : 25.2 %  Platelet Count - Automated : 393 K/uL  Mean Cell Volume : 105.0 fl  Mean Cell Hemoglobin : 34.3 pg  Mean Cell Hemoglobin Concentration : 32.8 gm/dL  Auto Neutrophil # : 17.6 K/uL  Auto Lymphocyte # : 1.2 K/uL  Auto Monocyte # : 1.4 K/uL  Auto Eosinophil # : 0.1 K/uL  Auto Basophil # : 0.0 K/uL  Auto Neutrophil % : 87.0 %  Auto Lymphocyte % : 5.0 %  Auto Monocyte % : 3.0 %  Auto Eosinophil % : 1.0 %  Auto Basophil % : x    10-30    137  |  101  |  32<H>  ----------------------------<  90  4.7   |  25  |  1.53<H>  10-29    137  |  102  |  35<H>  ----------------------------<  96  4.8   |  26  |  1.50<H>    Ca    8.6      30 Oct 2017 05:17  Ca    8.2<L>      29 Oct 2017 22:36  Phos  2.1     10-30  Phos  2.2     10-29  Mg     2.7     10-30  Mg     2.6     10-29    TPro  6.1  /  Alb  3.0<L>  /  TBili  1.4<H>  /  DBili  x   /  AST  45<H>  /  ALT  55<H>  /  AlkPhos  82  10-30  TPro  5.9<L>  /  Alb  2.8<L>  /  TBili  1.2  /  DBili  x   /  AST  46<H>  /  ALT  55<H>  /  AlkPhos  79  10-29      proBNP:   Lipid Profile:   HgA1c:   TSH:       CARDIAC MARKERS:            TELEMETRY: 	    ECG:  	  RADIOLOGY:  OTHER: 	    PREVIOUS DIAGNOSTIC TESTING:    < from: CT Angio Abdomen and Pelvis w/ IV Cont (10.29.17 @ 16:25) >  IMPRESSION:   1. 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  2. Intramuscular hematoma involving the left iliacus.  3. Questionable enhancement involving a 2.8 cm left renal lesion    < end of copied text >    < from: CT Head No Cont (10.30.17 @ 10:18) >  Impression: No intracranial hemorrhage or mass. No new lucencies compared   with 10/24/2017.    < end of copied text >        [ ] Echocardiogram:  [ ]  Catheterization:  [ ] Stress Test:  	  	  ASSESSMENT/PLAN:

## 2017-10-30 NOTE — PROGRESS NOTE ADULT - ASSESSMENT
83/M with Afib on Pradaxa who came to [facility] c/o SOB found to have IWSTEMI, transferred to [facility] for cath, h/c by VF arrest requiring IABP, RCA stent & SWAN, h/c c/b cardiogenic shock 2/2 right heart failure s/p impella (now removed), w/ h/c c/b renal failure requiring CVVH. Pt today noted to have less verbal output and not moving ext as much as yesterday. Unclear etiology -- unclear if 2/2 delirium vs CVA.     - repeat CT Head today   - recommend MRI brain w/o, MRA Head/Neck w/o when stable  - continue supportive care as per primary team 83/M with Afib on Pradaxa who came to [facility] c/o SOB found to have IWSTEMI, transferred to [facility] for cath, h/c by VF arrest requiring IABP, RCA stent & SWAN, h/c c/b cardiogenic shock 2/2 right heart failure s/p impella (now removed), w/ h/c c/b renal failure requiring CVVH. Pt today noted to have less verbal output and not moving ext as much as yesterday. Unclear etiology -- unclear if 2/2 delirium vs CVA.     - repeat CT Head today for change in mental status.  - recommend MRI brain w/o, MRA Head/Neck w/o when stable  - continue supportive care as per primary team

## 2017-10-30 NOTE — PROGRESS NOTE ADULT - SUBJECTIVE AND OBJECTIVE BOX
INFECTIOUS DISEASES FOLLOW UP--Marcus Cesar MD  Pager [phone]    This is a follow up note for this  83y Male with  ST elevation myocardial infarction (STEMI)  and ID following for bacteremia with CNS and AFB organism.  lethargic on cvvh--wife at bedside    Further ROS:  unable to supply    Allergies  No Known Allergies    ANTIBIOTICS/RELEVANT:  antimicrobials  azithromycin  IVPB 500 milliGRAM(s) IV Intermittent every 24 hours  imipenem/cilastatin  IVPB      imipenem/cilastatin  IVPB 250 milliGRAM(s) IV Intermittent every 6 hours  vancomycin  IVPB 500 milliGRAM(s) IV Intermittent every 24 hours    immunologic:  influenza   Vaccine 0.5 milliLiter(s) IntraMuscular once    OTHER:  acetaminophen    Suspension 930 milliGRAM(s) Oral every 6 hours PRN  acetaminophen    Suspension. 930 milliGRAM(s) Oral every 4 hours PRN  ALBUTerol/ipratropium for Nebulization 3 milliLiter(s) Nebulizer every 6 hours PRN  aspirin  chewable 81 milliGRAM(s) Oral daily  atorvastatin 80 milliGRAM(s) Oral at bedtime  haloperidol     Tablet 0.5 milliGRAM(s) Oral every 12 hours PRN  heparin  Infusion. 700 Unit(s)/Hr IV Continuous <Continuous>  heparin  Injectable 4000 Unit(s) IV Push every 6 hours PRN  lactobacillus acidophilus and bulgaricus Chewable 1 Tablet(s) Chew daily  oxyCODONE    5 mG/acetaminophen 325 mG 1 Tablet(s) Oral every 6 hours PRN  pantoprazole  Injectable 40 milliGRAM(s) IV Push daily  risperiDONE   Tablet 0.5 milliGRAM(s) Oral every 12 hours  ticagrelor 90 milliGRAM(s) Oral two times a day      Objective:  Vital Signs Last 24 Hrs  T(C): 35.6 (30 Oct 2017 06:00), Max: 37 (29 Oct 2017 18:00)  T(F): 96.1 (30 Oct 2017 06:00), Max: 98.6 (29 Oct 2017 18:00)  HR: 84 (30 Oct 2017 07:00) (62 - 118)  BP: 114/66 (30 Oct 2017 07:00) (81/47 - 118/53)  BP(mean): 86 (30 Oct 2017 07:00) (58 - 726)  RR: 20 (30 Oct 2017 07:00) (17 - 29)  SpO2: 93% (30 Oct 2017 07:00) (83% - 100%)    PHYSICAL EXAM:  Constitutional:no acute distress  Eyes:REGINALD, EOMI  Respiratory: clear BL ant  Cardiovascular: S1S2  Gastrointestinal:soft, (+) BS, no tenderness  Extremities:no e/e/c  No Lymphadenopathy  IV sites not inflammed.    LABS:                        8.3    20.2  )-----------( 393      ( 30 Oct 2017 05:17 )             25.2     10-30    137  |  101  |  32<H>  ----------------------------<  90  4.7   |  25  |  1.53<H>    Ca    8.6      30 Oct 2017 05:17  Phos  2.1     10-30  Mg     2.7     10-30    TPro  6.1  /  Alb  3.0<L>  /  TBili  1.4<H>  /  DBili  x   /  AST  45<H>  /  ALT  55<H>  /  AlkPhos  82  10-30    PTT - ( 30 Oct 2017 05:17 )  PTT:62.2 sec    Imp/Rx:  CNS bacteremia---planning to give total of 7 days vanco    bacteremia with GV Johan---  + AFB...unclear ID --awaiting micro lab clarification....I wonder if this could be a procurement contaminant.    continue imipenem for now.  please dc sosa.    I d/w team.

## 2017-10-31 NOTE — PROGRESS NOTE ADULT - ATTENDING COMMENTS
rising lactate, hypotension, rising CVP, and low mixed venous.  possible cardiogenic shock worsening.  continue to keep patient net negative.  discussed with primary team.

## 2017-10-31 NOTE — PROGRESS NOTE ADULT - PROBLEM SELECTOR PLAN 2
Patient with noted increasing proteinuria since UA on 6/2016 without an underlying etiology. Order urine spot protein/Cr ratio to quantify protein (once patient makes urine). Please repeat complements- C3, C4.   Renal sonogram once patient stabilizes. Follow up ANCA      HBsAg Nonreact; HCV 0.15, Nonreact; HIV Nonreact; EYAD: titer 1:640 (elevated), pattern Homogeneous; dsDNA <12 ;C3 Complement 72; C4 Complement 14; Free Light Chains: kappa 15.20, lambda 10.50, ratio = 1.45. Immunofixation negative.

## 2017-10-31 NOTE — CHART NOTE - NSCHARTNOTEFT_GEN_A_CORE
d/w micro lab and CCU team.  prelim AFB is M. fortuitum--a rapid growing AFB.  still unsure if real v. contaminant.  He has a pseudoaneurysm---could this be infected?    for now continue vanco  for now continue imipenem and add levaquin/doxy...

## 2017-10-31 NOTE — PROGRESS NOTE ADULT - SUBJECTIVE AND OBJECTIVE BOX
[facility] DIVISION OF KIDNEY DISEASES AND HYPERTENSION -- FOLLOW UP NOTE  --------------------------------------------------------------------------------    HPI: 83 year old male with a PMH of atrial fibrillation (on Pradaxa), HTN, Thyroid Goiter, CKD stage III (dx 2016) with inferior wall STEMI s/p cardiac catherization s/p PCI x 1 VF arrest s/p IABP and s/p impella course with cardiogenic shock.  Patient with DANITA and anuria and remains on CVVHDF. Patient was restarted on IV pressor support overnight due to low BP.       PAST HISTORY  --------------------------------------------------------------------------------  No significant changes to PMH, PSH, FHx, SHx, unless otherwise noted    ALLERGIES & MEDICATIONS  --------------------------------------------------------------------------------  Allergies    No Known Allergies    Intolerances      Standing Inpatient Medications  aspirin  chewable 81 milliGRAM(s) Oral daily  atorvastatin 80 milliGRAM(s) Oral at bedtime  doxycycline IVPB      doxycycline IVPB 100 milliGRAM(s) IV Intermittent once  doxycycline IVPB 100 milliGRAM(s) IV Intermittent every 12 hours  heparin  Infusion. 700 Unit(s)/Hr IV Continuous <Continuous>  imipenem/cilastatin  IVPB      imipenem/cilastatin  IVPB 250 milliGRAM(s) IV Intermittent every 6 hours  influenza   Vaccine 0.5 milliLiter(s) IntraMuscular once  lactobacillus acidophilus and bulgaricus Chewable 1 Tablet(s) Chew daily  levoFLOXacin IVPB 500 milliGRAM(s) IV Intermittent once  norepinephrine Infusion 1 MICROgram(s)/kG/Min IV Continuous <Continuous>  pantoprazole   Suspension 40 milliGRAM(s) Oral daily  risperiDONE   Tablet 0.5 milliGRAM(s) Oral every 12 hours  ticagrelor 90 milliGRAM(s) Oral two times a day  vancomycin  IVPB 500 milliGRAM(s) IV Intermittent every 24 hours    PRN Inpatient Medications  acetaminophen    Suspension 930 milliGRAM(s) Oral every 6 hours PRN  acetaminophen    Suspension. 930 milliGRAM(s) Oral every 4 hours PRN  ALBUTerol/ipratropium for Nebulization 3 milliLiter(s) Nebulizer every 6 hours PRN  haloperidol     Tablet 0.5 milliGRAM(s) Oral every 12 hours PRN  heparin  Injectable 4000 Unit(s) IV Push every 6 hours PRN  oxyCODONE    5 mG/acetaminophen 325 mG 1 Tablet(s) Oral every 6 hours PRN      REVIEW OF SYSTEMS  --------------------------------------------------------------------------------  Gen: No weakness  Skin: No rashes  Head/Eyes/Ears/Mouth: No headache;  Respiratory: No dyspnea  CV: No chest pain  GI: No abdominal pain  MSK: + edema  Neuro: No dizziness/lightheadedness  Heme: No bleeding    All other systems were reviewed and are negative, except as noted.    VITALS/PHYSICAL EXAM  --------------------------------------------------------------------------------  T(C): 35.8 (10-31-17 @ 08:00), Max: 36.4 (10-30-17 @ 19:00)  HR: 90 (10-31-17 @ 09:30) (56 - 130)  BP: 75/52 (10-31-17 @ 09:30) (74/60 - 159/73)  RR: 29 (10-31-17 @ 09:30) (13 - 29)  SpO2: 92% (10-31-17 @ 09:30) (89% - 100%)  Wt(kg): --        10-30-17 @ 07:01  -  10-31-17 @ 07:00  --------------------------------------------------------  IN: 2457.4 mL / OUT: 2946 mL / NET: -488.6 mL    10-31-17 @ 07:01  -  10-31-17 @ 11:08  --------------------------------------------------------  IN: 224.5 mL / OUT: 292 mL / NET: -67.5 mL      Physical Exam:  	Gen: Awake  	Pulm: decreased breath sounds b/l  	CV: RRR, S1S2  	Abd: soft, NT, ND   	: No suprapubic tenderness  	UE: Warm,  no edema; no asterixis  	LE: b/l LE edema              Vascular Access: + RIJ non-tunnelled HD catheter     LABS/STUDIES  --------------------------------------------------------------------------------              8.1    21.9  >-----------<  392      [10-31-17 @ 01:31]              24.7     138  |  102  |  34  ----------------------------<  138      [10-31-17 @ 09:31]  5.1   |  25  |  1.56        Ca     8.5     [10-31-17 @ 09:31]      Mg     2.8     [10-31-17 @ 09:31]      Phos  3.1     [10-31-17 @ 09:31]    TPro  6.2  /  Alb  2.8  /  TBili  1.4  /  DBili  x   /  AST  78  /  ALT  61  /  AlkPhos  88  [10-31-17 @ 09:31]      PTT: 59.6       [10-31-17 @ 09:31]      Creatinine Trend:  SCr 1.56 [10-31 @ 09:31]  SCr 1.64 [10-31 @ 01:31]  SCr 1.53 [10-30 @ 05:17]  SCr 1.50 [10-29 @ 22:36]  SCr 1.33 [10-29 @ 13:35]    Urinalysis - [10-15-17 @ 16:48]      Color Yellow / Appearance Turbid / SG >1.030 / pH 6.5      Gluc 50 / Ketone Negative  / Bili Negative / Urobili Negative       Blood Moderate / Protein >600 / Leuk Est Negative / Nitrite Negative      RBC >50 / WBC 3-5 / Hyaline  / Gran  / Sq Epi  / Non Sq Epi OCC / Bacteria Few      HbA1c 5.4      [10-15-17 @ 21:32]  TSH 0.05      [10-25-17 @ 13:21]  Lipid: chol 129, TG 45, HDL 41, LDL 79      [10-15-17 @ 21:40]    HBsAg Nonreact      [10-19-17 @ 07:25]  HCV 0.15, Nonreact      [10-19-17 @ 07:25]  HIV Nonreact      [10-19-17 @ 07:25]    EYAD: titer 1:640, pattern Homogeneous      [10-19-17 @ 07:25]  dsDNA <12      [10-19-17 @ 07:25]  C3 Complement 72      [10-19-17 @ 07:25]  C4 Complement 14      [10-19-17 @ 07:25]  Free Light Chains: kappa 15.20, lambda 10.50, ratio = 1.45      [10-19 @ 07:25]  Immunofixation Serum:   No Monoclonal Band Identified      [10-24-17 @ 13:31]  SPEP Interpretation: Hypoalbuminemia Increased beta fraction due to probable fibrinogen presence.      [10-24-17 @ 12:31]

## 2017-10-31 NOTE — PROGRESS NOTE ADULT - ASSESSMENT
84yo M with R Circumflex pseudoaneurysm with wide neck. Per US attending, Dr. Juares, the PSA is not amenable to thrombin injection. The pt is high risk for operative repair.     - Recommend repeat US of PSA in 1 week  - Hold A/C if able  - Serial exams  - Will follow  - D/W Dr. Goncalves, CCU team, and pt's family.    Sylvia Guaman   [phone]

## 2017-10-31 NOTE — PROGRESS NOTE ADULT - ASSESSMENT
83/M with Afib on Pradaxa who came to [facility] c/o SOB found to have IWSTEMI, transferred to [facility] for cath, h/c by VF arrest requiring IABP, RCA stent & SWAN, h/c c/b cardiogenic shock 2/2 right heart failure s/p impella (now removed), w/ h/c c/b renal failure requiring CVVH. Pt today noted to have less verbal output and not moving ext as much as yesterday. Unclear etiology -- unclear if 2/2 delirium vs CVA.     - CTH showing   - recommend MRI brain w/o, MRA Head/Neck w/o when stable  - continue supportive care as per primary team 83/M with Afib on Pradaxa who came to [facility] c/o SOB found to have IWSTEMI, transferred to [facility] for cath, h/c by VF arrest requiring IABP, RCA stent & SWAN, h/c c/b cardiogenic shock 2/2 right heart failure s/p impella (now removed), w/ h/c c/b renal failure requiring CVVH. Pt today noted to have less verbal output and not moving ext as much as yesterday. Unclear etiology -- unclear if 2/2 delirium vs CVA.     - CTH showing no new lucencies compared with 10/24/2017.   - recommend MRI brain w/o, MRA Head/Neck w/o when stable  - continue supportive care as per primary team

## 2017-10-31 NOTE — PROGRESS NOTE ADULT - ASSESSMENT
83 year old male with a PMH of atrial fibrillation (on Pradaxa), HTN, Thyroid Goiter, CKD stage III (dx 2016) was seen at [facility] with inferior wall STEMI and was brought to [facility] for cardiac catherization s/p PCI x 1 now with DANITA and Anuria.

## 2017-10-31 NOTE — PROGRESS NOTE ADULT - ASSESSMENT
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 Previously a code stoke was called and Head CT did not show new acute findings. Repeat Head CT was done given the focal neurological deficits but it was unchanged. EEG did not show signs of seizure. Neurology team following   Due to agitation, patient was started on precedex drip, does not respond to haldol     # CV:   - CAD s/p RCA stent: continue hep gtt, aspirin, brillinta, lipitor  - Atrial fibrillation: Was on amiodarone load, however, due to tachy isabelle, amiodarone was held   - Cardiogenic shock s/p TVP and impella s/ removal: Continue dobutmine 5  - Tachy-isabelle s/p TVP, did not pace overnight     #Respiratory: CXR Small left pleural effusion with adjacent atelectasis. ENT scope showed tracheomalacia   - decadron 4mg for three days with protonix and ISS  - Slightly tachypneic, but comfortable   - Continue suctioning due to increased secretions     #GI:   - Currently on NGT w/ tube feeds with pivot because patient was having loose bm with vital  - dysphagia eval will be repeated     #Metabolic/Renal  - Rhabdo: continue CVVH, net negative: 1Liter, now removing 80cc/ hr   - Patient is anuric at this time, will continue CCVH until HD stable as per Renal Team  - Replete Mg, Ph, K as needed     #Heme  - H&H stable  - Metamyelocytes and blasts on CBC, Heme consulted for peripheral smear     # Vascular  - Patient currently has a R shilley and L central line in place     # Skin  - Breakdown in R groin at the site of impella, wound care following    # Infectious  - Blood culture gram variable beaded rods acid fast (10/19), gram positive cocci in clusters (10/24)  - Repeat blood culturespending  - Continue vanc D6 and aztreonam D4  - ID team following  - Echocardiogram ruled out endocarditis     # Endocrine   - Goiter TSH 0.05 T4 10.4 T3 85 2/2 sick euthyroid syndrome, no intervention at this time  - Endocrinology following (house) (patient's endocrinologist is Dr. Baldomero Roberson in [place])    #DVT ppx: hep gtt  #Dispo - PT consulted 82 yo M w/PMH of Afib, AS, marlena who presented to 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) and right groin pseudoaneurysm, sepsis secondary to gram + bacteremia and altered mental status    # Neuro  - markedly improved mental status this morning, AAOx3, patient is aware today is a holiday (Halloween)  - continues to have generalized shaking, previously a code stoke was called and head CT did not show new acute findings  - repeat head CT was done given the focal neurological deficits but it was unchanged  - EEG did not show signs of seizure  - Neurology team following    # CV  - CAD s/p RCA stent: continue hep gtt, aspirin, brillinta, lipitor  - Atrial fibrillation: previously on amiodarone load, however, due to tachy-isabelle, amiodarone had been on hold   - Cardiogenic shock s/p TVP and impella s/ removal  - Tachy-isabelle s/p TVP, continues to have delayed capture, patient will eventually need a permanent PPM, pending clearance of infection    #Respiratory  - CXR Small left pleural effusion with adjacent atelectasis  - 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, patient now s/p decadron  - Slightly tachypneic, but comfortable   - Continue suctioning as needed for increased secretions     #GI  - Currently on NGT w/ tube feeds with pivot because patient was having loose bm with vital  - dysphagia eval will be repeated once patient improves    #Metabolic/Renal  - Rhabdo: continue CVVH, net negative 488cc since yesterday, currently not removing fluid  - Patient is anuric at this time, will continue CCVH until HD stable as per Renal Team  - Replete Mg, Ph, K as needed    #Heme  - H&H stable  - Metamyelocytes and blasts on CBC  - Heme consulted for peripheral smear - many myelocytes seen as well as bands, suggestive of a left-shifted peripheral blood because of inflammation/infection ill state  - continue to treat infection as below    # Vascular  - Patient with right groin pseudoaneurysm evaluated by vascular surgery  - no neck visible on imagining, unable to inject thrombin  - per vascular surgery, will repeat groin imagining in 1 week  - continue to closely monitor  - wound care following    # ID  - Blood culture gram variable beaded rods +acid fast (10/19), gram positive cocci in clusters (10/24)  - Repeat blood cultures (10/26, 10/29) no growth to date  - Continue Vancomycin (day 7) and Imipenem (day 7), per ID would dc Vancomycin after 7 day course  - Azithromycin discontinued yesterday per ID recs  - ID following  - no evidence of vegetations on echocardiogram     # Endocrine   - Goiter TSH 0.05 T4 10.4 T3 85 2/2 sick euthyroid syndrome, no intervention at this time  - Endocrinology following (house) (patient's endocrinologist is Dr. 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) and right groin pseudoaneurysm, sepsis secondary to gram + bacteremia and altered mental status    # Neuro  - markedly improved mental status this morning, AAOx3, patient is aware today is a holiday (Halloween)  - continues to have generalized shaking, previously a code stoke was called and head CT did not show new acute findings  - repeat head CT was done given the focal neurological deficits but it was unchanged  - EEG did not show signs of seizure  - Neurology team following    # CV  - CAD s/p RCA stent: continue hep gtt, aspirin, brillinta, lipitor  - Atrial fibrillation: previously on amiodarone load, however, due to tachy-isabelle, amiodarone had been on hold   - Cardiogenic shock s/p TVP and impella s/ removal  - Tachy-isabelle s/p TVP, continues to have delayed capture, patient will eventually need a permanent PPM, pending clearance of infection    #Respiratory  - CXR Small left pleural effusion with adjacent atelectasis  - 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, patient now s/p decadron  - Slightly tachypneic, but comfortable   - Continue suctioning as needed for increased secretions     #GI  - Currently on NGT w/ tube feeds with pivot because patient was having loose bm with vital  - dysphagia eval will be repeated once patient improves    #Metabolic/Renal  - Rhabdo: continue CVVH, net negative 488cc since yesterday, currently not removing fluid  - patient with increasing proteinuria  - f/u rheum work up as below, nephrology recommendations  - Patient is anuric at this time, will continue CCVH until HD stable as per Renal Team  - Replete Mg, Ph, K as needed    #Rheum  - patient with positive EYAD 1:640, pattern Homogeneous  - dsDNA <12 ;C3 Complement 72; C4 Complement 1  - repeat EYAD, anti-ribonuclear protein, C3, C4, dsDNA, anti-histone ab and ANCA sent    #Heme  - H&H stable  - Metamyelocytes and blasts on CBC  - Heme consulted for peripheral smear - many myelocytes seen as well as bands, suggestive of a left-shifted peripheral blood because of inflammation/infection ill state  - f/u BCR/ABL  - continue to treat infection as below    # Vascular  - Patient with right groin pseudoaneurysm evaluated by vascular surgery  - no neck visible on imagining, unable to inject thrombin  - per vascular surgery, will repeat groin imagining in 1 week  - continue to closely monitor  - wound care following  - b/l LE dopplers (10/30) no evidence of DVT    # ID  - Blood culture gram variable beaded rods +acid fast (10/19), gram positive cocci in clusters (10/24)  - Repeat blood cultures (10/26, 10/29) no growth to date  - Continue Vancomycin (day 7) and Imipenem (day 7), per ID would dc Vancomycin after 7 day course  - Azithromycin discontinued yesterday per ID recs  - ID following  - no evidence of vegetations on echocardiogram     # Endocrine   - Goiter TSH 0.05 T4 10.4 T3 85 2/2 sick euthyroid syndrome, no intervention at this time  - Endocrinology following (house) (patient's endocrinologist is Dr. Baldomero Roberson in [place])    #DVT ppx: hep gtt

## 2017-10-31 NOTE — CHART NOTE - NSCHARTNOTEFT_GEN_A_CORE
====================  NEW EVENTS:  ====================  Patients MS down to AO2. TVP not capturing when pt bradys into 40s    ====================  SUMMARY:  ====================  84 yo M w/PMH of ISMAEL Johnson goiter who presented to [facility] c/o SOB found to have IWSTEMI, transferred to 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) and right groin pseudoaneurysm, sepsis secondary to gram + bacteremia and altered mental status    ====================  VITALS:  ====================    ICU Vital Signs Last 24 Hrs  T(C): 36.4 (31 Oct 2017 19:30), Max: 37.3 (31 Oct 2017 12:00)  T(F): 97.5 (31 Oct 2017 19:30), Max: 99.1 (31 Oct 2017 12:00)  HR: 82 (31 Oct 2017 23:00) (56 - 124)  BP: 122/63 (31 Oct 2017 23:00) (75/52 - 134/100)  BP(mean): 82 (31 Oct 2017 23:00) (58 - 121)  ABP: --  ABP(mean): --  RR: 21 (31 Oct 2017 23:00) (14 - 29)  SpO2: 99% (31 Oct 2017 23:00) (92% - 100%)      I&O's Summary    30 Oct 2017 07:01  -  31 Oct 2017 07:00  --------------------------------------------------------  IN: 2457.4 mL / OUT: 2946 mL / NET: -488.6 mL    31 Oct 2017 07:01  -  31 Oct 2017 23:52  --------------------------------------------------------  IN: 2067.5 mL / OUT: 1865 mL / NET: 202.5 mL    ====================  LABS:  ====================                          8.1    21.9  )-----------( 392      ( 31 Oct 2017 01:31 )             24.7     10-31    138  |  102  |  34<H>  ----------------------------<  138<H>  5.1   |  25  |  1.56<H>    Ca    8.5      31 Oct 2017 09:31  Phos  3.1     10-31  Mg     2.8     10-31    TPro  6.2  /  Alb  2.8<L>  /  TBili  1.4<H>  /  DBili  x   /  AST  78<H>  /  ALT  61<H>  /  AlkPhos  88  10-31    PTT - ( 31 Oct 2017 16:12 )  PTT:57.4 sec    ABG - ( 31 Oct 2017 23:47 )  pH: 7.48  /  pCO2: 35    /  pO2: 116   / HCO3: 26    / Base Excess: 2.6   /  SaO2: 99          ====================  PLAN:  ====================  - c/w CVVHD, keep pt euvolumic  - TVP not capturing, in position. ====================  NEW EVENTS:  ====================  Patients MS down to AO2. 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) and right groin pseudoaneurysm, sepsis secondary to gram + bacteremia and altered mental status    ====================  VITALS:  ====================    ICU Vital Signs Last 24 Hrs  T(C): 36.4 (31 Oct 2017 19:30), Max: 37.3 (31 Oct 2017 12:00)  T(F): 97.5 (31 Oct 2017 19:30), Max: 99.1 (31 Oct 2017 12:00)  HR: 82 (31 Oct 2017 23:00) (56 - 124)  BP: 122/63 (31 Oct 2017 23:00) (75/52 - 134/100)  BP(mean): 82 (31 Oct 2017 23:00) (58 - 121)  ABP: --  ABP(mean): --  RR: 21 (31 Oct 2017 23:00) (14 - 29)  SpO2: 99% (31 Oct 2017 23:00) (92% - 100%)      I&O's Summary    30 Oct 2017 07:01  -  31 Oct 2017 07:00  --------------------------------------------------------  IN: 2457.4 mL / OUT: 2946 mL / NET: -488.6 mL    31 Oct 2017 07:01  -  31 Oct 2017 23:52  --------------------------------------------------------  IN: 2067.5 mL / OUT: 1865 mL / NET: 202.5 mL    ====================  LABS:  ====================                          8.1    21.9  )-----------( 392      ( 31 Oct 2017 01:31 )             24.7     10-31    138  |  102  |  34<H>  ----------------------------<  138<H>  5.1   |  25  |  1.56<H>    Ca    8.5      31 Oct 2017 09:31  Phos  3.1     10-31  Mg     2.8     10-31    TPro  6.2  /  Alb  2.8<L>  /  TBili  1.4<H>  /  DBili  x   /  AST  78<H>  /  ALT  61<H>  /  AlkPhos  88  10-31    PTT - ( 31 Oct 2017 16:12 )  PTT:57.4 sec    ABG - ( 31 Oct 2017 23:47 )  pH: 7.48  /  pCO2: 35    /  pO2: 116   / HCO3: 26    / Base Excess: 2.6   /  SaO2: 99          ====================  PLAN:  ====================  - c/w CVVHD, keep pt euvolumic  - c/w pressors  - TVP not capturing, in position.  - c/w Abx

## 2017-10-31 NOTE — PROGRESS NOTE ADULT - SUBJECTIVE AND OBJECTIVE BOX
INFECTIOUS DISEASES FOLLOW UP--Marcus Cesar MD  Pager [phone]    This is a follow up note for this  83y Male with  ST elevation myocardial infarction (STEMI)  ID asked to assess in setting of BSI (CNS) and AFB in blood.  Remains on CVVH.    Further ROS:  CONSTITUTIONAL:  No fever, good appetite  CARDIOVASCULAR:  No chest pain or palpitations  RESPIRATORY:  No dyspnea  GASTROINTESTINAL:  No nausea, vomiting, diarrhea, or abdominal pain  GENITOURINARY:  No dysuria  NEUROLOGIC:  No headache,     Allergies  No Known Allergies    ANTIBIOTICS/RELEVANT:  antimicrobials  imipenem/cilastatin  IVPB      imipenem/cilastatin  IVPB 250 milliGRAM(s) IV Intermittent every 6 hours  vancomycin  IVPB 500 milliGRAM(s) IV Intermittent every 24 hours    immunologic:  influenza   Vaccine 0.5 milliLiter(s) IntraMuscular once    OTHER:  acetaminophen    Suspension 930 milliGRAM(s) Oral every 6 hours PRN  acetaminophen    Suspension. 930 milliGRAM(s) Oral every 4 hours PRN  ALBUTerol/ipratropium for Nebulization 3 milliLiter(s) Nebulizer every 6 hours PRN  aspirin  chewable 81 milliGRAM(s) Oral daily  atorvastatin 80 milliGRAM(s) Oral at bedtime  haloperidol     Tablet 0.5 milliGRAM(s) Oral every 12 hours PRN  heparin  Infusion. 700 Unit(s)/Hr IV Continuous <Continuous>  heparin  Injectable 4000 Unit(s) IV Push every 6 hours PRN  lactobacillus acidophilus and bulgaricus Chewable 1 Tablet(s) Chew daily  norepinephrine Infusion 1 MICROgram(s)/kG/Min IV Continuous <Continuous>  oxyCODONE    5 mG/acetaminophen 325 mG 1 Tablet(s) Oral every 6 hours PRN  pantoprazole   Suspension 40 milliGRAM(s) Oral daily  risperiDONE   Tablet 0.5 milliGRAM(s) Oral every 12 hours  ticagrelor 90 milliGRAM(s) Oral two times a day      Objective:  Vital Signs Last 24 Hrs  T(C): 35.8 (31 Oct 2017 08:00), Max: 36.4 (30 Oct 2017 19:00)  T(F): 96.4 (31 Oct 2017 08:00), Max: 97.6 (30 Oct 2017 19:00)  HR: 90 (31 Oct 2017 08:00) (60 - 130)  BP: 133/95 (31 Oct 2017 08:00) (73/48 - 159/73)  BP(mean): 121 (31 Oct 2017 08:00) (55 - 121)  RR: 27 (31 Oct 2017 08:00) (13 - 27)  SpO2: 97% (31 Oct 2017 08:00) (89% - 100%)    PHYSICAL EXAM:  Constitutional:no acute distress but lethargy  Eyes:REGINALD, EOMI  Ear/Nose/Throat: no oral lesions, goiter  Respiratory: clear BL  Cardiovascular: S1S2  Gastrointestinal:soft, (+) BS, no tenderness  Extremities:no e/e/c  No Lymphadenopathy  IV sites not inflammed.    LABS:                        8.1    21.9  )-----------( 392      ( 31 Oct 2017 01:31 )             24.7     10-31    139  |  102  |  32<H>  ----------------------------<  126<H>  5.0   |  26  |  1.64<H>    Ca    8.6      31 Oct 2017 01:31  Phos  2.0     10-31  Mg     2.8     10-31    TPro  6.2  /  Alb  2.9<L>  /  TBili  1.4<H>  /  DBili  x   /  AST  69<H>  /  ALT  59<H>  /  AlkPhos  90  10-31    PTT - ( 31 Oct 2017 01:31 )  PTT:45.5 sec      MICROBIOLOGY:  repeat BC neg

## 2017-10-31 NOTE — PROGRESS NOTE ADULT - SUBJECTIVE AND OBJECTIVE BOX
Interval events:  Levophed 0.1 mcg/kg/min  TVP remains in place, intermittently not capturing  Pacer pads in place  Patient symptomatic with bradycardic episodes  Lethargy improving  Ongoing infectious work up  TVP with backup rate 60, 10 mA output    Review Of Systems:  Constitutional: [ ] Fever [ ] Chills [ ] Fatigue [ ] Weight change   HEENT: [ ] Blurred vision [ ] Eye Pain [ ] Headache [ ] Runny nose [ ] Sore Throat   Respiratory: [ ] Cough [ ] Wheezing [ ] Shortness of breath  Cardiovascular: [ ] Chest Pain [ ] Palpitations [ ] JOE [ ] PND [ ] Orthopnea  Gastrointestinal: [ ] Abdominal Pain [ ] Diarrhea [ ] Constipation [ ] Hemorrhoids [ ] Nausea [ ] Vomiting  Genitourinary: [ ] Nocturia [ ] Dysuria [ ] Incontinence  Extremities: [ ] Swelling [ ] Joint Pain  Neurologic: [ ] Focal deficit [ ] Paresthesias [ ] Syncope  Lymphatic: [ ] Swelling [ ] Lymphadenopathy   Skin: [ ] Rash [ ] Ecchymoses [ ] Wounds [ ] Lesions  Psychiatry: [ ] Depression [ ] Suicidal/Homicidal Ideation [ ] Anxiety [ ] Sleep Disturbances  [x] 10 point review of systems is otherwise negative except as mentioned above            [ ]Unable to obtain    Medications:  acetaminophen    Suspension 930 milliGRAM(s) Oral every 6 hours PRN  acetaminophen    Suspension. 930 milliGRAM(s) Oral every 4 hours PRN  ALBUTerol/ipratropium for Nebulization 3 milliLiter(s) Nebulizer every 6 hours PRN  aspirin  chewable 81 milliGRAM(s) Oral daily  atorvastatin 80 milliGRAM(s) Oral at bedtime  haloperidol     Tablet 0.5 milliGRAM(s) Oral every 12 hours PRN  heparin  Infusion. 700 Unit(s)/Hr IV Continuous <Continuous>  heparin  Injectable 4000 Unit(s) IV Push every 6 hours PRN  imipenem/cilastatin  IVPB      imipenem/cilastatin  IVPB 250 milliGRAM(s) IV Intermittent every 6 hours  influenza   Vaccine 0.5 milliLiter(s) IntraMuscular once  lactobacillus acidophilus and bulgaricus Chewable 1 Tablet(s) Chew daily  norepinephrine Infusion 1 MICROgram(s)/kG/Min IV Continuous <Continuous>  oxyCODONE    5 mG/acetaminophen 325 mG 1 Tablet(s) Oral every 6 hours PRN  pantoprazole   Suspension 40 milliGRAM(s) Oral daily  risperiDONE   Tablet 0.5 milliGRAM(s) Oral every 12 hours  ticagrelor 90 milliGRAM(s) Oral two times a day  vancomycin  IVPB 500 milliGRAM(s) IV Intermittent every 24 hours    PMH/PSH/FH/SH: [ ] Unchanged  Vitals:  T(C): 35.8 (10-31-17 @ 08:00), Max: 36.4 (10-30-17 @ 19:00)  HR: 90 (10-31-17 @ 08:00) (60 - 130)  BP: 133/95 (10-31-17 @ 08:00) (73/48 - 159/73)  BP(mean): 121 (10-31-17 @ 08:00) (55 - 121)  RR: 27 (10-31-17 @ 08:00) (13 - 27)  SpO2: 97% (10-31-17 @ 08:00) (89% - 100%)  Wt(kg): --  Daily     Daily Weight in k (31 Oct 2017 04:00)  I&O's Summary    30 Oct 2017 07:  -  31 Oct 2017 07:00  --------------------------------------------------------  IN: 2457.4 mL / OUT: 2946 mL / NET: -488.6 mL    31 Oct 2017 07:  -  31 Oct 2017 09:13  --------------------------------------------------------  IN: 143.5 mL / OUT: 155 mL / NET: -11.5 mL        Physical Exam:  Appearance:  Normal, NAD  Eyes: PERRL, EOMI  HENT: Normal oral [place] NC/AT  Cardiovascular: S1, S2, RRR, No m/r/g appreciated, No edema, no elevation in JVP  Respiratory: Clear to auscultation bilaterally  Gastrointestinal: Soft, Non-tender, Non-distended, BS+  Musculoskeletal:  No clubbing, No joint deformity   Neurologic: Non-focal  Lymphatic: No lymphadenopathy  Psychiatry: AAOx3, Mood & affect appropriate  Skin: No rashes, No ecchymoses, No cyanosis    Labs:                        8.1    21.9  )-----------( 392      ( 31 Oct 2017 01:31 )             24.7     10-31    139  |  102  |  32<H>  ----------------------------<  126<H>  5.0   |  26  |  1.64<H>    Ca    8.6      31 Oct 2017 01:31  Phos  2.0     10-31  Mg     2.8     10-31    TPro  6.2  /  Alb  2.9<L>  /  TBili  1.4<H>  /  DBili  x   /  AST  69<H>  /  ALT  59<H>  /  AlkPhos  90  10-31    PTT - ( 31 Oct 2017 01:31 )  PTT:45.5 sec    Interpretation of Telemetry: afib, intermittently not capturing, PVCs

## 2017-10-31 NOTE — PROGRESS NOTE ADULT - PROBLEM SELECTOR PLAN 1
DANITA on CKD stage III. DANITA may be multifactorial in the setting of contrast (cardiac cath), ATN from hypotension/ cardiac arrest.   Patient remains on CVVHDF due to Anuria. Plan to continue patient on CVVHDF until patient is hemodynamically stable for intermittent HD.   Continue to check daily serum phosphorus (may be low in the use of CVVHDF). Continue to monitor intake/output, BMP and urine output. Avoid NSAIDs, RCA and nephrotoxins. Renally dose all medications

## 2017-10-31 NOTE — PROGRESS NOTE ADULT - SUBJECTIVE AND OBJECTIVE BOX
Patient is a 83y old  Male who presents with a chief complaint of SOB, chest pain, diaphoresis (15 Oct 2017 17:59)      Vascular Surgery Attending Progress Note    Interval HPI: pt w/o c/o     Medications:  acetaminophen    Suspension 930 milliGRAM(s) Oral every 6 hours PRN  acetaminophen    Suspension. 930 milliGRAM(s) Oral every 4 hours PRN  ALBUTerol/ipratropium for Nebulization 3 milliLiter(s) Nebulizer every 6 hours PRN  aspirin  chewable 81 milliGRAM(s) Oral daily  atorvastatin 80 milliGRAM(s) Oral at bedtime  doxycycline IVPB      doxycycline IVPB 100 milliGRAM(s) IV Intermittent every 12 hours  haloperidol     Tablet 0.5 milliGRAM(s) Oral every 12 hours PRN  heparin  Infusion. 700 Unit(s)/Hr IV Continuous <Continuous>  heparin  Injectable 4000 Unit(s) IV Push every 6 hours PRN  imipenem/cilastatin  IVPB      imipenem/cilastatin  IVPB 250 milliGRAM(s) IV Intermittent every 6 hours  influenza   Vaccine 0.5 milliLiter(s) IntraMuscular once  lactobacillus acidophilus and bulgaricus Chewable 1 Tablet(s) Chew daily  norepinephrine Infusion 1 MICROgram(s)/kG/Min IV Continuous <Continuous>  oxyCODONE    5 mG/acetaminophen 325 mG 1 Tablet(s) Oral every 6 hours PRN  pantoprazole   Suspension 40 milliGRAM(s) Oral daily  risperiDONE   Tablet 0.5 milliGRAM(s) Oral every 12 hours  ticagrelor 90 milliGRAM(s) Oral two times a day  vancomycin  IVPB 500 milliGRAM(s) IV Intermittent every 24 hours      Vital Signs Last 24 Hrs  T(C): 37.1 (31 Oct 2017 16:00), Max: 37.3 (31 Oct 2017 12:00)  T(F): 98.7 (31 Oct 2017 16:00), Max: 99.1 (31 Oct 2017 12:00)  HR: 94 (31 Oct 2017 18:00) (56 - 124)  BP: 117/71 (31 Oct 2017 18:00) (75/52 - 134/100)  BP(mean): 86 (31 Oct 2017 18:00) (56 - 121)  RR: 21 (31 Oct 2017 18:00) (14 - 29)  SpO2: 100% (31 Oct 2017 18:00) (92% - 100%)  I&O's Summary    30 Oct 2017 07:01  -  31 Oct 2017 07:00  --------------------------------------------------------  IN: 2457.4 mL / OUT: 2946 mL / NET: -488.6 mL    31 Oct 2017 07:01  -  31 Oct 2017 18:30  --------------------------------------------------------  IN: 1459.5 mL / OUT: 1341 mL / NET: 118.5 mL        Physical Exam:  Neuro  A&Ox2   Vascular:  rt groin hematoma stable     LABS:                        8.1    21.9  )-----------( 392      ( 31 Oct 2017 01:31 )             24.7     10-31    138  |  102  |  34<H>  ----------------------------<  138<H>  5.1   |  25  |  1.56<H>    Ca    8.5      31 Oct 2017 09:31  Phos  3.1     10-31  Mg     2.8     10-31    TPro  6.2  /  Alb  2.8<L>  /  TBili  1.4<H>  /  DBili  x   /  AST  78<H>  /  ALT  61<H>  /  AlkPhos  88  10-31    PTT - ( 31 Oct 2017 16:12 )  PTT:57.4 sec    CHARLY CAMERON MD  [phone]

## 2017-10-31 NOTE — PROGRESS NOTE ADULT - ATTENDING COMMENTS
Patient is seen and examined with fellow, NP and the CCU house-staff. I agree with the history, physical and the assessment and plan.  neuro status mildly improved - delirium in setting of possible ionfectious cause  CVP 19 with venous sat of 45, would remove more fluid with CVVHD to keep CVP 8-10  c/w ABX - ID changed - will consider changing lines at this time  wean  off of pressors

## 2017-10-31 NOTE — PROGRESS NOTE ADULT - ASSESSMENT
83M pmhx of AF on pradaxa initially presented on 10/15/17 with chest pain/diaphoresis/ dyspnea to OSH, IW-STEMI emergently transferred here for [facility] c/b VF arrest requiring IABP followed by pRCA stent c/b now acute right heart failure and cardiogenic shock s/p IABP (explanted 10/19), RP impella for RV support (explanted 10/20). TVP for symptomatic afib with slow VR.     - sepsis, unclear etiology of infection ?bacteremia with coag neg staph, cont abx  - continue tele  - monitor thresholds  - plan for ppm when infection clears    Maximilian Emery MD

## 2017-10-31 NOTE — PROGRESS NOTE ADULT - SUBJECTIVE AND OBJECTIVE BOX
Subjective:  Pt seen and examined at bedside. No change in mental status from yesterday.  No acute events overnight.       MEDICATIONS  (STANDING):  aspirin  chewable 81 milliGRAM(s) Oral daily  atorvastatin 80 milliGRAM(s) Oral at bedtime  haloperidol    Injectable 0.5 milliGRAM(s) IV Push once  haloperidol    Injectable 0.5 milliGRAM(s) IntraMuscular once  heparin  Infusion. 700 Unit(s)/Hr (7 mL/Hr) IV Continuous <Continuous>  imipenem/cilastatin  IVPB      imipenem/cilastatin  IVPB 250 milliGRAM(s) IV Intermittent every 6 hours  influenza   Vaccine 0.5 milliLiter(s) IntraMuscular once  lactobacillus acidophilus and bulgaricus Chewable 1 Tablet(s) Chew daily  pantoprazole  Injectable 40 milliGRAM(s) IV Push daily  risperiDONE   Tablet 0.5 milliGRAM(s) Oral every 12 hours  ticagrelor 90 milliGRAM(s) Oral two times a day  vancomycin  IVPB 500 milliGRAM(s) IV Intermittent every 24 hours    MEDICATIONS  (PRN):  acetaminophen    Suspension 930 milliGRAM(s) Oral every 6 hours PRN For Temp greater than 38 C (100.4 F)  acetaminophen    Suspension. 930 milliGRAM(s) Oral every 4 hours PRN Moderate Pain (4 - 6)  ALBUTerol/ipratropium for Nebulization 3 milliLiter(s) Nebulizer every 6 hours PRN Shortness of Breath and/or Wheezing  haloperidol     Tablet 0.5 milliGRAM(s) Oral every 12 hours PRN agitaion  heparin  Injectable 4000 Unit(s) IV Push every 6 hours PRN For aPTT less than 40  oxyCODONE    5 mG/acetaminophen 325 mG 1 Tablet(s) Oral every 6 hours PRN Severe Pain (7 - 10)      Allergies  No Known Allergies    Objective:   Vital Signs Last 24 Hrs  T(C): 36.4 (30 Oct 2017 08:00), Max: 37 (29 Oct 2017 18:00)  T(F): 97.6 (30 Oct 2017 08:00), Max: 98.6 (29 Oct 2017 18:00)  HR: 94 (30 Oct 2017 10:00) (62 - 118)  BP: 138/91 (30 Oct 2017 10:00) (80/55 - 138/91)  BP(mean): 110 (30 Oct 2017 10:00) (58 - 726)  RR: 18 (30 Oct 2017 10:00) (17 - 29)  SpO2: 95% (30 Oct 2017 10:00) (83% - 100%)    Exam:   eyes open, no verbal output, follows occasional simple commands with encouragement  blinks to threat b/l, R ptosis, R pupil 3mm and less responsive than L pupil 2mm,   +R hand , minimal withdrawal to painful stimuli x 4    Other:  CBC Full  -  ( 30 Oct 2017 05:17 )  WBC Count : 20.2 K/uL  Hemoglobin : 8.3 g/dL  Hematocrit : 25.2 %  Platelet Count - Automated : 393 K/uL  Mean Cell Volume : 105.0 fl  Mean Cell Hemoglobin : 34.3 pg  Mean Cell Hemoglobin Concentration : 32.8 gm/dL  Auto Neutrophil # : x  Auto Lymphocyte # : x  Auto Monocyte # : x  Auto Eosinophil # : x  Auto Basophil # : x  Auto Neutrophil % : x  Auto Lymphocyte % : x  Auto Monocyte % : x  Auto Eosinophil % : x  Auto Basophil % : x    10-30    137  |  101  |  32<H>  ----------------------------<  90  4.7   |  25  |  1.53<H>    Ca    8.6      30 Oct 2017 05:17  Phos  2.1     10-30  Mg     2.7     10-30    TPro  6.1  /  Alb  3.0<L>  /  TBili  1.4<H>  /  DBili  x   /  AST  45<H>  /  ALT  55<H>  /  AlkPhos  82  10-30    10-30    137  |  101  |  32<H>  ----------------------------<  90  4.7   |  25  |  1.53<H>    Ca    8.6      30 Oct 2017 05:17  Phos  2.1     10-30  Mg     2.7     10-30    TPro  6.1  /  Alb  3.0<L>  /  TBili  1.4<H>  /  DBili  x   /  AST  45<H>  /  ALT  55<H>  /  AlkPhos  82  10-30    LIVER FUNCTIONS - ( 30 Oct 2017 05:17 )  Alb: 3.0 g/dL / Pro: 6.1 g/dL / ALK PHOS: 82 U/L / ALT: 55 U/L RC / AST: 45 U/L / GGT: x             Imaging:     CT Head 10/24/17:   No acute intracranial hemorrhage, mass effect, or midline shift.  Unchanged microvascular disease. Subjective:  Pt seen and examined at bedside. No change in mental status from yesterday.  No acute events overnight.       MEDICATIONS  (STANDING):  aspirin  chewable 81 milliGRAM(s) Oral daily  atorvastatin 80 milliGRAM(s) Oral at bedtime  haloperidol    Injectable 0.5 milliGRAM(s) IV Push once  haloperidol    Injectable 0.5 milliGRAM(s) IntraMuscular once  heparin  Infusion. 700 Unit(s)/Hr (7 mL/Hr) IV Continuous <Continuous>  imipenem/cilastatin  IVPB      imipenem/cilastatin  IVPB 250 milliGRAM(s) IV Intermittent every 6 hours  influenza   Vaccine 0.5 milliLiter(s) IntraMuscular once  lactobacillus acidophilus and bulgaricus Chewable 1 Tablet(s) Chew daily  pantoprazole  Injectable 40 milliGRAM(s) IV Push daily  risperiDONE   Tablet 0.5 milliGRAM(s) Oral every 12 hours  ticagrelor 90 milliGRAM(s) Oral two times a day  vancomycin  IVPB 500 milliGRAM(s) IV Intermittent every 24 hours    MEDICATIONS  (PRN):  acetaminophen    Suspension 930 milliGRAM(s) Oral every 6 hours PRN For Temp greater than 38 C (100.4 F)  acetaminophen    Suspension. 930 milliGRAM(s) Oral every 4 hours PRN Moderate Pain (4 - 6)  ALBUTerol/ipratropium for Nebulization 3 milliLiter(s) Nebulizer every 6 hours PRN Shortness of Breath and/or Wheezing  haloperidol     Tablet 0.5 milliGRAM(s) Oral every 12 hours PRN agitaion  heparin  Injectable 4000 Unit(s) IV Push every 6 hours PRN For aPTT less than 40  oxyCODONE    5 mG/acetaminophen 325 mG 1 Tablet(s) Oral every 6 hours PRN Severe Pain (7 - 10)      Allergies  No Known Allergies    Objective:   Vital Signs Last 24 Hrs  T(C): 35.8 (31 Oct 2017 08:00), Max: 36.4 (30 Oct 2017 19:00)  T(F): 96.4 (31 Oct 2017 08:00), Max: 97.6 (30 Oct 2017 19:00)  HR: 90 (31 Oct 2017 09:30) (56 - 130)  BP: 75/52 (31 Oct 2017 09:30) (73/48 - 159/73)  BP(mean): 62 (31 Oct 2017 09:30) (55 - 121)  RR: 29 (31 Oct 2017 09:30) (13 - 29)  SpO2: 92% (31 Oct 2017 09:30) (89% - 100%)    Exam:   eyes open, no verbal output, follows occasional simple commands with encouragement  blinks to threat b/l, R ptosis, R pupil 3mm and less responsive than L pupil 2mm,   +R hand , minimal withdrawal to painful stimuli x 4    Other:  CBC Full  -  ( 30 Oct 2017 05:17 )  WBC Count : 20.2 K/uL  Hemoglobin : 8.3 g/dL  Hematocrit : 25.2 %  Platelet Count - Automated : 393 K/uL  Mean Cell Volume : 105.0 fl  Mean Cell Hemoglobin : 34.3 pg  Mean Cell Hemoglobin Concentration : 32.8 gm/dL  Auto Neutrophil # : x  Auto Lymphocyte # : x  Auto Monocyte # : x  Auto Eosinophil # : x  Auto Basophil # : x  Auto Neutrophil % : x  Auto Lymphocyte % : x  Auto Monocyte % : x  Auto Eosinophil % : x  Auto Basophil % : x    10-30    137  |  101  |  32<H>  ----------------------------<  90  4.7   |  25  |  1.53<H>    Ca    8.6      30 Oct 2017 05:17  Phos  2.1     10-30  Mg     2.7     10-30    TPro  6.1  /  Alb  3.0<L>  /  TBili  1.4<H>  /  DBili  x   /  AST  45<H>  /  ALT  55<H>  /  AlkPhos  82  10-30    10-30    137  |  101  |  32<H>  ----------------------------<  90  4.7   |  25  |  1.53<H>    Ca    8.6      30 Oct 2017 05:17  Phos  2.1     10-30  Mg     2.7     10-30    TPro  6.1  /  Alb  3.0<L>  /  TBili  1.4<H>  /  DBili  x   /  AST  45<H>  /  ALT  55<H>  /  AlkPhos  82  10-30    LIVER FUNCTIONS - ( 30 Oct 2017 05:17 )  Alb: 3.0 g/dL / Pro: 6.1 g/dL / ALK PHOS: 82 U/L / ALT: 55 U/L RC / AST: 45 U/L / GGT: x             Imaging:     CT Head 10/24/17:   No acute intracranial hemorrhage, mass effect, or midline shift.  Unchanged microvascular disease.

## 2017-10-31 NOTE — PROGRESS NOTE ADULT - SUBJECTIVE AND OBJECTIVE BOX
CONTACT INFO:  JADEN Brantley  Pager:  [phone]      HPI / INTERVAL HISTORY:        OBJECTIVE:  VITAL SIGNS:  ICU Vital Signs Last 24 Hrs  T(C): 36.4 (30 Oct 2017 19:00), Max: 36.4 (30 Oct 2017 08:00)  T(F): 97.6 (30 Oct 2017 19:00), Max: 97.6 (30 Oct 2017 08:00)  HR: 114 (31 Oct 2017 07:00) (60 - 130)  BP: 79/49 (31 Oct 2017 07:00) (73/48 - 159/73)  BP(mean): 61 (31 Oct 2017 07:00) (55 - 116)  ABP: --  ABP(mean): --  RR: 27 (31 Oct 2017 07:00) (13 - 27)  SpO2: 94% (31 Oct 2017 07:00) (88% - 100%)    10-30 @ 07:01  -  10-31 @ 07:00  --------------------------------------------------------  IN: 2457.4 mL / OUT: 2946 mL / NET: -488.6 mL    PHYSICAL EXAM:  Gen:   HEENT: NC/AT; PERRL, anicteric sclera  Neck: supple, no JVD  Resp: clear to ausculation B/L; no wheezes, rales or rhonchi  Cardiovasc: S1S2 normal; RRR; no murmurs, rubs or gallops  GI: soft, nondistended, nontender; +BS  Extr: warm, well-perfused, PT/DP pulses 2+ B/L; no LE edema  Skin: normal color and turgor  Neuro:     LABS:                        8.1    21.9  )-----------( 392      ( 31 Oct 2017 01:31 )             24.7     10-31    139  |  102  |  32<H>  ----------------------------<  126<H>  5.0   |  26  |  1.64<H>    Ca    8.6      31 Oct 2017 01:31  Phos  2.0     10-31  Mg     2.8     10-31    TPro  6.2  /  Alb  2.9<L>  /  TBili  1.4<H>  /  DBili  x   /  AST  69<H>  /  ALT  59<H>  /  AlkPhos  90  10-31    LIVER FUNCTIONS - ( 31 Oct 2017 01:31 )  Alb: 2.9 g/dL / Pro: 6.2 g/dL / ALK PHOS: 90 U/L / ALT: 59 U/L RC / AST: 69 U/L / GGT: x           PTT - ( 31 Oct 2017 01:31 )  PTT:45.5 sec    RADIOLOGY & ADDITIONAL TESTS:     MEDICATIONS:  acetaminophen    Suspension 930 milliGRAM(s) Oral every 6 hours PRN  acetaminophen    Suspension. 930 milliGRAM(s) Oral every 4 hours PRN  ALBUTerol/ipratropium for Nebulization 3 milliLiter(s) Nebulizer every 6 hours PRN  aspirin  chewable 81 milliGRAM(s) Oral daily  atorvastatin 80 milliGRAM(s) Oral at bedtime  haloperidol     Tablet 0.5 milliGRAM(s) Oral every 12 hours PRN  heparin  Infusion. 700 Unit(s)/Hr IV Continuous <Continuous>  heparin  Injectable 4000 Unit(s) IV Push every 6 hours PRN  imipenem/cilastatin  IVPB      imipenem/cilastatin  IVPB 250 milliGRAM(s) IV Intermittent every 6 hours  influenza   Vaccine 0.5 milliLiter(s) IntraMuscular once  lactobacillus acidophilus and bulgaricus Chewable 1 Tablet(s) Chew daily  norepinephrine Infusion 1 MICROgram(s)/kG/Min IV Continuous <Continuous>  oxyCODONE    5 mG/acetaminophen 325 mG 1 Tablet(s) Oral every 6 hours PRN  pantoprazole   Suspension 40 milliGRAM(s) Oral daily  risperiDONE   Tablet 0.5 milliGRAM(s) Oral every 12 hours  ticagrelor 90 milliGRAM(s) Oral two times a day  vancomycin  IVPB 500 milliGRAM(s) IV Intermittent every 24 hours      ALLERGIES:  No Known Allergies CONTACT INFO:  JADEN Brantley  Pager:  [phone]      HPI / INTERVAL HISTORY:  Overnight patient with improved mental status, having normal conversation, AAOx3. This morning, patient joking with provider at bedside. TVP continues to have delayed capture overnight. Transcutaneous pads in place. He denies pain, SOB, abdominal pain overnight.      OBJECTIVE:  VITAL SIGNS:  ICU Vital Signs Last 24 Hrs  T(C): 36.4 (30 Oct 2017 19:00), Max: 36.4 (30 Oct 2017 08:00)  T(F): 97.6 (30 Oct 2017 19:00), Max: 97.6 (30 Oct 2017 08:00)  HR: 114 (31 Oct 2017 07:00) (60 - 130)  BP: 79/49 (31 Oct 2017 07:00) (73/48 - 159/73)  BP(mean): 61 (31 Oct 2017 07:00) (55 - 116)  ABP: --  ABP(mean): --  RR: 27 (31 Oct 2017 07:00) (13 - 27)  SpO2: 94% (31 Oct 2017 07:00) (88% - 100%)    10-30 @ 07:01  -  10-31 @ 07:00  --------------------------------------------------------  IN: 2457.4 mL / OUT: 2946 mL / NET: -488.6 mL    PHYSICAL EXAM:  Gen: NAD  HEENT: NC/AT; PERRL, anicteric sclera  Neck: supple, no JVD  Resp: clear to ausculation B/L; no wheezes, rales or rhonchi  Cardiovasc: S1S2 normal; RRR; no murmurs, rubs or gallops  GI: soft, nondistended, nontender; +BS  Extr: warm, well-perfused, PT/DP pulses 2+ B/L; no LE edema  Skin: normal color and turgor  Neuro: AAOx3    LABS:                        8.1    21.9  )-----------( 392      ( 31 Oct 2017 01:31 )             24.7     10-31    139  |  102  |  32<H>  ----------------------------<  126<H>  5.0   |  26  |  1.64<H>    Ca    8.6      31 Oct 2017 01:31  Phos  2.0     10-31  Mg     2.8     10-31    TPro  6.2  /  Alb  2.9<L>  /  TBili  1.4<H>  /  DBili  x   /  AST  69<H>  /  ALT  59<H>  /  AlkPhos  90  10-31    LIVER FUNCTIONS - ( 31 Oct 2017 01:31 )  Alb: 2.9 g/dL / Pro: 6.2 g/dL / ALK PHOS: 90 U/L / ALT: 59 U/L RC / AST: 69 U/L / GGT: x           PTT - ( 31 Oct 2017 01:31 )  PTT:45.5 sec    RADIOLOGY & ADDITIONAL TESTS:     MEDICATIONS:  acetaminophen    Suspension 930 milliGRAM(s) Oral every 6 hours PRN  acetaminophen    Suspension. 930 milliGRAM(s) Oral every 4 hours PRN  ALBUTerol/ipratropium for Nebulization 3 milliLiter(s) Nebulizer every 6 hours PRN  aspirin  chewable 81 milliGRAM(s) Oral daily  atorvastatin 80 milliGRAM(s) Oral at bedtime  haloperidol     Tablet 0.5 milliGRAM(s) Oral every 12 hours PRN  heparin  Infusion. 700 Unit(s)/Hr IV Continuous <Continuous>  heparin  Injectable 4000 Unit(s) IV Push every 6 hours PRN  imipenem/cilastatin  IVPB      imipenem/cilastatin  IVPB 250 milliGRAM(s) IV Intermittent every 6 hours  influenza   Vaccine 0.5 milliLiter(s) IntraMuscular once  lactobacillus acidophilus and bulgaricus Chewable 1 Tablet(s) Chew daily  norepinephrine Infusion 1 MICROgram(s)/kG/Min IV Continuous <Continuous>  oxyCODONE    5 mG/acetaminophen 325 mG 1 Tablet(s) Oral every 6 hours PRN  pantoprazole   Suspension 40 milliGRAM(s) Oral daily  risperiDONE   Tablet 0.5 milliGRAM(s) Oral every 12 hours  ticagrelor 90 milliGRAM(s) Oral two times a day  vancomycin  IVPB 500 milliGRAM(s) IV Intermittent every 24 hours      ALLERGIES:  No Known Allergies

## 2017-11-01 NOTE — PROGRESS NOTE BEHAVIORAL HEALTH - SUMMARY
82 y/o male with PMH HTN, Afib, diffuse nontoxic goiter, CKD stage III, admitted 10/15/17 for inferior wall STEMI complicated by cardiogenic shock, coded in cath lab, ROS achieved, received intravenous pacing wire, balloon pump, RCA stent, intra iliac balloon, and R heart impella, pt with right heart failure, DANITA, and delirium currently managed in CCU.  Pt with questionable seizure activity, plans for MRI and MRA once medically stable enough.  Psychiatry consulted to assist with medication management of delirium/agitation. Pt has intermittent agitation episodes,more confused today, on risperdal
82 y/o male with PMH HTN, Afib, diffuse nontoxic goiter, CKD stage III, admitted 10/15/17 for inferior wall STEMI complicated by cardiogenic shock, coded in cath lab, ROS achieved, received intravenous pacing wire, balloon pump, RCA stent, intra iliac balloon, and R heart impella, pt with right heart failure, DANITA, and delirium currently managed in CCU.  Pt with questionable seizure activity, plans for MRI and MRA once medically stable enough.  Psychiatry consulted to assist with medication management of delirium/agitation. pt was agitated last night, confused, pt improving today, less confused, calm, started on risperdal, precedex
84 y/o male with PMH HTN, Afib, diffuse nontoxic goiter, CKD stage III, admitted 10/15/17 for inferior wall STEMI complicated by cardiogenic shock, coded in cath lab, ROS achieved, received intravenous pacing wire, balloon pump, RCA stent, intra iliac balloon, and R heart impella, pt with right heart failure, DANITA, and delirium currently managed in CCU.  Pt with questionable seizure activity, plans for MRI and MRA once medically stable enough.  Psychiatry consulted to assist with medication management of delirium/agitation. Pt has intermittent agitation episodes,more confused today, on risperdal
84 y/o male with PMH HTN, Afib, diffuse nontoxic goiter, CKD stage III, admitted 10/15/17 for inferior wall STEMI complicated by cardiogenic shock, coded in cath lab, ROS achieved, received intravenous pacing wire, balloon pump, RCA stent, intra iliac balloon, and R heart impella, pt with right heart failure, DANITA, and delirium currently managed in CCU.  Pt with questionable seizure activity, plans for MRI and MRA once medically stable enough.  Psychiatry consulted to assist with medication management of delirium/agitation. Pt has intermittent agitation episodes,more confused today, on risperdal

## 2017-11-01 NOTE — PROGRESS NOTE ADULT - SUBJECTIVE AND OBJECTIVE BOX
CONTACT INFO:  Negrita Delong, JADEN  Pager:  [phone]      HPI / INTERVAL HISTORY:    Overnight, pacing adjusted, continues to intermittently not capture with patient symptomatic during bradycardic episodes.    OBJECTIVE:  VITAL SIGNS:  ICU Vital Signs Last 24 Hrs  T(C): 36.4 (01 Nov 2017 05:00), Max: 37.3 (31 Oct 2017 12:00)  T(F): 97.5 (01 Nov 2017 05:00), Max: 99.1 (31 Oct 2017 12:00)  HR: 76 (01 Nov 2017 07:00) (48 - 124)  BP: 89/67 (01 Nov 2017 07:00) (70/49 - 136/71)  BP(mean): 78 (01 Nov 2017 07:00) (53 - 121)  ABP: --  ABP(mean): --  RR: 20 (01 Nov 2017 07:00) (12 - 29)  SpO2: 98% (01 Nov 2017 07:00) (91% - 100%)      10-31 @ 07:01  -  11-01 @ 07:00  --------------------------------------------------------  IN: 3353 mL / OUT: 3331 mL / NET: 22 mL      CAPILLARY BLOOD GLUCOSE    PHYSICAL EXAM:  Gen: NAD  HEENT: NC/AT; PERRL, anicteric sclera  Neck: supple, no JVD  Resp: clear to ausculation B/L; no wheezes, rales or rhonchi  Cardiovasc: S1S2 normal; RRR; no murmurs, rubs or gallops  GI: soft, nondistended, nontender; +BS  Extr: warm, well-perfused, PT/DP pulses 2+ B/L; no LE edema  Skin: normal color and turgor  Neuro:     LABS:                        8.4    20.7  )-----------( 412      ( 01 Nov 2017 05:35 )             26.4     11-01    138  |  101  |  37<H>  ----------------------------<  123<H>  5.2   |  24  |  1.50<H>    Ca    8.9      01 Nov 2017 05:35  Phos  2.8     11-01  Mg     2.7     11-01    TPro  6.3  /  Alb  3.0<L>  /  TBili  1.7<H>  /  DBili  x   /  AST  92<H>  /  ALT  64<H>  /  AlkPhos  91  11-01    LIVER FUNCTIONS - ( 01 Nov 2017 05:35 )  Alb: 3.0 g/dL / Pro: 6.3 g/dL / ALK PHOS: 91 U/L / ALT: 64 U/L RC / AST: 92 U/L / GGT: x           PTT - ( 01 Nov 2017 05:35 )  PTT:79.1 sec      RADIOLOGY & ADDITIONAL TESTS:       MEDICATIONS:  acetaminophen    Suspension 930 milliGRAM(s) Oral every 6 hours PRN  acetaminophen    Suspension. 930 milliGRAM(s) Oral every 4 hours PRN  ALBUTerol/ipratropium for Nebulization 3 milliLiter(s) Nebulizer every 6 hours PRN  aspirin  chewable 81 milliGRAM(s) Oral daily  atorvastatin 80 milliGRAM(s) Oral at bedtime  doxycycline IVPB      doxycycline IVPB 100 milliGRAM(s) IV Intermittent every 12 hours  haloperidol     Tablet 0.5 milliGRAM(s) Oral every 12 hours PRN  heparin  Infusion. 700 Unit(s)/Hr IV Continuous <Continuous>  heparin  Injectable 4000 Unit(s) IV Push every 6 hours PRN  imipenem/cilastatin  IVPB      imipenem/cilastatin  IVPB 250 milliGRAM(s) IV Intermittent every 6 hours  influenza   Vaccine 0.5 milliLiter(s) IntraMuscular once  lactobacillus acidophilus and bulgaricus Chewable 1 Tablet(s) Chew daily  levoFLOXacin IVPB 250 milliGRAM(s) IV Intermittent every 24 hours  norepinephrine Infusion 1 MICROgram(s)/kG/Min IV Continuous <Continuous>  oxyCODONE    5 mG/acetaminophen 325 mG 1 Tablet(s) Oral every 6 hours PRN  pantoprazole   Suspension 40 milliGRAM(s) Oral daily  risperiDONE   Tablet 0.5 milliGRAM(s) Oral every 12 hours  ticagrelor 90 milliGRAM(s) Oral two times a day  vancomycin  IVPB 500 milliGRAM(s) IV Intermittent every 24 hours      ALLERGIES:  No Known Allergies CONTACT INFO:  JADEN Brantley  Pager:  [phone]      HPI / INTERVAL HISTORY:    Overnight, pacing adjusted, continues to intermittently not capture with patient symptomatic during bradycardic episodes. TVP advanced by overnight fellow, will f/u chest Xray this morning. Patient awake and alert this morning, oriented x 2-3. Denies pain but reported SOB with a bradycardic episode.    OBJECTIVE:  VITAL SIGNS:  ICU Vital Signs Last 24 Hrs  T(C): 36.4 (01 Nov 2017 05:00), Max: 37.3 (31 Oct 2017 12:00)  T(F): 97.5 (01 Nov 2017 05:00), Max: 99.1 (31 Oct 2017 12:00)  HR: 76 (01 Nov 2017 07:00) (48 - 124)  BP: 89/67 (01 Nov 2017 07:00) (70/49 - 136/71)  BP(mean): 78 (01 Nov 2017 07:00) (53 - 121)  ABP: --  ABP(mean): --  RR: 20 (01 Nov 2017 07:00) (12 - 29)  SpO2: 98% (01 Nov 2017 07:00) (91% - 100%)      10-31 @ 07:01  -  11-01 @ 07:00  --------------------------------------------------------  IN: 3353 mL / OUT: 3331 mL / NET: 22 mL      CAPILLARY BLOOD GLUCOSE    PHYSICAL EXAM:  Gen: NAD  HEENT: NC/AT; PERRL, anicteric sclera  Neck: supple, no JVD  Resp: clear to ausculation B/L; no wheezes, rales or rhonchi  Cardiovasc: S1S2 normal; RRR; no murmurs, rubs or gallops  GI: soft, nondistended, nontender; +BS  Extr: warm, well-perfused, PT/DP pulses 2+ B/L; no LE edema  Skin: normal color and turgor  Neuro: awake and alert, oriented x 2-3    LABS:                        8.4    20.7  )-----------( 412      ( 01 Nov 2017 05:35 )             26.4     11-01    138  |  101  |  37<H>  ----------------------------<  123<H>  5.2   |  24  |  1.50<H>    Ca    8.9      01 Nov 2017 05:35  Phos  2.8     11-01  Mg     2.7     11-01    TPro  6.3  /  Alb  3.0<L>  /  TBili  1.7<H>  /  DBili  x   /  AST  92<H>  /  ALT  64<H>  /  AlkPhos  91  11-01    LIVER FUNCTIONS - ( 01 Nov 2017 05:35 )  Alb: 3.0 g/dL / Pro: 6.3 g/dL / ALK PHOS: 91 U/L / ALT: 64 U/L RC / AST: 92 U/L / GGT: x           PTT - ( 01 Nov 2017 05:35 )  PTT:79.1 sec      RADIOLOGY & ADDITIONAL TESTS:       MEDICATIONS:  acetaminophen    Suspension 930 milliGRAM(s) Oral every 6 hours PRN  acetaminophen    Suspension. 930 milliGRAM(s) Oral every 4 hours PRN  ALBUTerol/ipratropium for Nebulization 3 milliLiter(s) Nebulizer every 6 hours PRN  aspirin  chewable 81 milliGRAM(s) Oral daily  atorvastatin 80 milliGRAM(s) Oral at bedtime  doxycycline IVPB      doxycycline IVPB 100 milliGRAM(s) IV Intermittent every 12 hours  haloperidol     Tablet 0.5 milliGRAM(s) Oral every 12 hours PRN  heparin  Infusion. 700 Unit(s)/Hr IV Continuous <Continuous>  heparin  Injectable 4000 Unit(s) IV Push every 6 hours PRN  imipenem/cilastatin  IVPB      imipenem/cilastatin  IVPB 250 milliGRAM(s) IV Intermittent every 6 hours  influenza   Vaccine 0.5 milliLiter(s) IntraMuscular once  lactobacillus acidophilus and bulgaricus Chewable 1 Tablet(s) Chew daily  levoFLOXacin IVPB 250 milliGRAM(s) IV Intermittent every 24 hours  norepinephrine Infusion 1 MICROgram(s)/kG/Min IV Continuous <Continuous>  oxyCODONE    5 mG/acetaminophen 325 mG 1 Tablet(s) Oral every 6 hours PRN  pantoprazole   Suspension 40 milliGRAM(s) Oral daily  risperiDONE   Tablet 0.5 milliGRAM(s) Oral every 12 hours  ticagrelor 90 milliGRAM(s) Oral two times a day  vancomycin  IVPB 500 milliGRAM(s) IV Intermittent every 24 hours      ALLERGIES:  No Known Allergies

## 2017-11-01 NOTE — PROGRESS NOTE BEHAVIORAL HEALTH - NSBHCHARTREVIEWIMAGING_PSY_A_CORE FT
EXAM:  CT BRAIN                            PROCEDURE DATE:  10/24/2017            INTERPRETATION:  .    CLINICAL INFORMATION: Cardiogenic shock. ST segment elevation myocardial   infarction.    TECHNIQUE: Multiple axial CT images of the head were obtained without   contrast. Sagittal and coronal reconstructed images were acquired from   the source data.    COMPARISON: Most recent prior head CT examination from 10/16/2017.    FINDINGS: There is no acute intracranial hemorrhage, mass effect, midline   shift, herniation, extra-axial fluid collection, or hydrocephalus.    There is diffuse cerebral volume loss with prominence of the sulci,   fissures, and cisternal spaces which is normal for the patient's age.   There is mild periventricular white matter hypoattenuation statistically   compatible with microvascular changes given calcific atherosclerotic   disease of the intracranial arteries.    Aerated secretions are notable within the left sphenoid sinus. Otherwise,   the paranasal sinusesand mastoid air cells are clear. The calvarium is   intact. There is evidence of bilateral cataract removal.    A right-sided NG tube is noted.    IMPRESSION: No acute intracranial hemorrhage, mass effect, or midline   shift.    Unchanged microvascular disease.

## 2017-11-01 NOTE — PROGRESS NOTE BEHAVIORAL HEALTH - NSBHCONSULTFOLLOWAFTERCARE_PSY_A_CORE FT
pt can f/u [facility]
pt can f/u [facility]
pt can f/u [facility]
pt can f/u [facility]

## 2017-11-01 NOTE — PROGRESS NOTE BEHAVIORAL HEALTH - NSBHFUPINTERVALHXFT_PSY_A_CORE
Pt poor historian, confused, unable to explain recent events. Pt knew he was in a hospital, not oriented to time. Pt denies pain, denies psychosis symptoms. As per staff, pt still confused, but overall improving, no agitation overnight, no prns given. as per wife, pt was hallucinating this am, saw a person behind her.
Pt seen last night by psych for agitation. Pt more calm today, no prns given this am. Pt unable to explain details of admission, thought he had a stroke. Pt denies psychosis, john, depression, no si/hi. as per family, pt had no prior episodes of delirium, pt is fully independent at home,no memory issues, no si expressed, no agitation at home.
Pt poor historian, confused, unable to explain recent events. Pt knew he was in a hospital, not oriented to time. Pt denies pain, denies psychosis symptoms. As per staff, pt still confused, mild restlessness,no prns given
Pt poor historian, confused, unable to explain recent events. Pt was agitated on sat night, did better last night, pt more confused as per staff and family, mostly nonverbal, lethargic today. no prns given overnight. pt was able to answer his name, thought he was in his office.

## 2017-11-01 NOTE — PROGRESS NOTE ADULT - SUBJECTIVE AND OBJECTIVE BOX
Interval events:  This morning TVP advanced with improvement in threshold to 0.4 mV. Backup HR set at 60 bpm with output of 10 mV  Remains on levophed and broad spectrum antibiotics    Review Of Systems:  Constitutional: [ ] Fever [ ] Chills [ ] Fatigue [ ] Weight change   HEENT: [ ] Blurred vision [ ] Eye Pain [ ] Headache [ ] Runny nose [ ] Sore Throat   Respiratory: [ ] Cough [ ] Wheezing [ ] Shortness of breath  Cardiovascular: [ ] Chest Pain [ ] Palpitations [ ] JOE [ ] PND [ ] Orthopnea  Gastrointestinal: [ ] Abdominal Pain [ ] Diarrhea [ ] Constipation [ ] Hemorrhoids [ ] Nausea [ ] Vomiting  Genitourinary: [ ] Nocturia [ ] Dysuria [ ] Incontinence  Extremities: [ ] Swelling [ ] Joint Pain  Neurologic: [ ] Focal deficit [ ] Paresthesias [ ] Syncope  Lymphatic: [ ] Swelling [ ] Lymphadenopathy   Skin: [ ] Rash [ ] Ecchymoses [ ] Wounds [ ] Lesions  Psychiatry: [ ] Depression [ ] Suicidal/Homicidal Ideation [ ] Anxiety [ ] Sleep Disturbances  [ ] 10 point review of systems is otherwise negative except as mentioned above            [ ]Unable to obtain    Medications:  acetaminophen    Suspension 930 milliGRAM(s) Oral every 6 hours PRN  acetaminophen    Suspension. 930 milliGRAM(s) Oral every 4 hours PRN  ALBUTerol/ipratropium for Nebulization 3 milliLiter(s) Nebulizer every 6 hours PRN  aspirin  chewable 81 milliGRAM(s) Oral daily  atorvastatin 80 milliGRAM(s) Oral at bedtime  doxycycline IVPB      doxycycline IVPB 100 milliGRAM(s) IV Intermittent every 12 hours  haloperidol     Tablet 0.5 milliGRAM(s) Oral every 12 hours PRN  heparin  Infusion. 800 Unit(s)/Hr IV Continuous <Continuous>  heparin  Injectable 4000 Unit(s) IV Push every 6 hours PRN  imipenem/cilastatin  IVPB      imipenem/cilastatin  IVPB 250 milliGRAM(s) IV Intermittent every 6 hours  influenza   Vaccine 0.5 milliLiter(s) IntraMuscular once  lactobacillus acidophilus and bulgaricus Chewable 1 Tablet(s) Chew daily  levoFLOXacin IVPB 250 milliGRAM(s) IV Intermittent every 24 hours  norepinephrine Infusion 1 MICROgram(s)/kG/Min IV Continuous <Continuous>  oxyCODONE    5 mG/acetaminophen 325 mG 1 Tablet(s) Oral every 6 hours PRN  pantoprazole  Injectable 40 milliGRAM(s) IV Push two times a day  risperiDONE   Tablet 0.5 milliGRAM(s) Oral every 12 hours  ticagrelor 90 milliGRAM(s) Oral two times a day  vancomycin  IVPB 500 milliGRAM(s) IV Intermittent every 24 hours    PMH/PSH/FH/SH: [ ] Unchanged  Vitals:  T(C): 36.3 (17 @ 08:00), Max: 37.1 (10-31-17 @ 16:00)  HR: 80 (17 @ 11:30) (48 - 124)  BP: 86/45 (17 @ 11:30) (70/49 - 136/71)  BP(mean): 63 (17 @ 11:30) (53 - 112)  RR: 18 (17 @ 11:30) (12 - 29)  SpO2: 100% (17 @ 11:30) (91% - 100%)  Wt(kg): --  Daily     Daily Weight in k.2 (2017 02:00)  I&O's Summary    31 Oct 2017 07:  -  2017 07:00  --------------------------------------------------------  IN: 3353 mL / OUT: 3331 mL / NET: 22 mL    2017 07:  -  2017 12:04  --------------------------------------------------------  IN: 400 mL / OUT: 359 mL / NET: 41 mL        Physical Exam:  Appearance:  Normal, NAD  Eyes: PERRL, EOMI  HENT: Normal oral [place] NC/AT  Cardiovascular: S1, S2, irregularly irregular, No m/r/g appreciated, No edema, no elevation in JVP  Respiratory: Clear to auscultation bilaterally  Gastrointestinal: Soft, Non-tender, Non-distended, BS+  Musculoskeletal:  No clubbing, No joint deformity   Neurologic: Non-focal  Lymphatic: No lymphadenopathy  Psychiatry: AAOx3, Mood & affect appropriate  Skin: No rashes, No ecchymoses, No cyanosis    Labs:                        8.4    20.7  )-----------( 412      ( 2017 05:35 )             26.4         138  |  101  |  37<H>  ----------------------------<  123<H>  5.2   |  24  |  1.50<H>    Ca    8.9      2017 05:35  Phos  2.8       Mg     2.7         TPro  6.3  /  Alb  3.0<L>  /  TBili  1.7<H>  /  DBili  x   /  AST  92<H>  /  ALT  64<H>  /  AlkPhos  91      PTT - ( 2017 05:35 )  PTT:79.1 sec    Interpretation of Telemetry: atrial fibrillation with slow VR, intermittent capture with TVP

## 2017-11-01 NOTE — PROGRESS NOTE ADULT - ATTENDING COMMENTS
Patient likely has underlying intrinsic renal disease given proteinuria and CKD.  No signs of renal recovery yet.  Bedside sonogram by me this morning did not demonstrate any fluid in the bladder.  Recommend official renal sonogram.

## 2017-11-01 NOTE — PROGRESS NOTE ADULT - SUBJECTIVE AND OBJECTIVE BOX
[facility] DIVISION OF KIDNEY DISEASES AND HYPERTENSION -- FOLLOW UP NOTE  --------------------------------------------------------------------------------    HPI: 83 year old male with a PMH of atrial fibrillation (on Pradaxa), HTN, Thyroid Goiter, CKD stage III (dx 2016) with inferior wall STEMI s/p cardiac catherization s/p PCI x 1 VF arrest s/p IABP and s/p impella course with cardiogenic shock. Patient found to be bacteremic and has a stable L femoral pseudoaneurysm. Patient with DANITA and anuria and remains on CVVHDF overnight without complications. Patient remains on IV pressor support overnight.    PAST HISTORY  --------------------------------------------------------------------------------  No significant changes to PMH, PSH, FHx, SHx, unless otherwise noted    ALLERGIES & MEDICATIONS  --------------------------------------------------------------------------------  Allergies    No Known Allergies    Intolerances      Standing Inpatient Medications  aspirin  chewable 81 milliGRAM(s) Oral daily  atorvastatin 80 milliGRAM(s) Oral at bedtime  doxycycline IVPB      doxycycline IVPB 100 milliGRAM(s) IV Intermittent every 12 hours  heparin  Infusion. 900 Unit(s)/Hr IV Continuous <Continuous>  imipenem/cilastatin  IVPB      imipenem/cilastatin  IVPB 250 milliGRAM(s) IV Intermittent every 6 hours  influenza   Vaccine 0.5 milliLiter(s) IntraMuscular once  lactobacillus acidophilus and bulgaricus Chewable 1 Tablet(s) Chew daily  levoFLOXacin IVPB 250 milliGRAM(s) IV Intermittent every 24 hours  norepinephrine Infusion 1 MICROgram(s)/kG/Min IV Continuous <Continuous>  pantoprazole  Injectable 40 milliGRAM(s) IV Push two times a day  risperiDONE   Tablet 0.5 milliGRAM(s) Oral every 12 hours  ticagrelor 90 milliGRAM(s) Oral two times a day  vancomycin  IVPB 500 milliGRAM(s) IV Intermittent every 24 hours    PRN Inpatient Medications  acetaminophen    Suspension 930 milliGRAM(s) Oral every 6 hours PRN  acetaminophen    Suspension. 930 milliGRAM(s) Oral every 4 hours PRN  ALBUTerol/ipratropium for Nebulization 3 milliLiter(s) Nebulizer every 6 hours PRN  haloperidol     Tablet 0.5 milliGRAM(s) Oral every 12 hours PRN  heparin  Injectable 4000 Unit(s) IV Push every 6 hours PRN  oxyCODONE    5 mG/acetaminophen 325 mG 1 Tablet(s) Oral every 6 hours PRN      REVIEW OF SYSTEMS  --------------------------------------------------------------------------------  Gen: No weakness  Skin: No rashes  Head/Eyes/Ears/Mouth: No headache  Respiratory: No dyspnea  CV: No chest pain  GI: No abdominal pain  : No increased frequency  MSK: No edema  Neuro: No dizziness/lightheadedness  Heme: No bleeding    All other systems were reviewed and are negative, except as noted.    VITALS/PHYSICAL EXAM  --------------------------------------------------------------------------------  T(C): 36.3 (11-01-17 @ 08:00), Max: 37.3 (10-31-17 @ 12:00)  HR: 82 (11-01-17 @ 10:00) (48 - 124)  BP: 102/66 (11-01-17 @ 10:00) (70/49 - 136/71)  RR: 29 (11-01-17 @ 10:00) (12 - 29)  SpO2: 93% (11-01-17 @ 10:00) (91% - 100%)  Wt(kg): --        10-31-17 @ 07:01  -  11-01-17 @ 07:00  --------------------------------------------------------  IN: 3353 mL / OUT: 3331 mL / NET: 22 mL    11-01-17 @ 07:01  -  11-01-17 @ 10:32  --------------------------------------------------------  IN: 225 mL / OUT: 279 mL / NET: -54 mL      Physical Exam:  	Gen: Awake  	Pulm: decreased breath sounds b/l  	CV: RRR, S1S2  	Abd: soft, NT, ND   	: No suprapubic tenderness  	UE: Warm,  no edema; no asterixis  	LE: b/l LE edema              Vascular Access: + RIJ non-tunnelled HD catheter     LABS/STUDIES  --------------------------------------------------------------------------------              8.4    20.7  >-----------<  412      [11-01-17 @ 05:35]              26.4     138  |  101  |  37  ----------------------------<  123      [11-01-17 @ 05:35]  5.2   |  24  |  1.50        Ca     8.9     [11-01-17 @ 05:35]      Mg     2.7     [11-01-17 @ 05:35]      Phos  2.8     [11-01-17 @ 05:35]    TPro  6.3  /  Alb  3.0  /  TBili  1.7  /  DBili  x   /  AST  92  /  ALT  64  /  AlkPhos  91  [11-01-17 @ 05:35]      PTT: 79.1       [11-01-17 @ 05:35]      Creatinine Trend:  SCr 1.50 [11-01 @ 05:35]  SCr 1.50 [10-31 @ 23:50]  SCr 1.56 [10-31 @ 09:31]  SCr 1.64 [10-31 @ 01:31]  SCr 1.53 [10-30 @ 05:17]    Urinalysis - [10-15-17 @ 16:48]      Color Yellow / Appearance Turbid / SG >1.030 / pH 6.5      Gluc 50 / Ketone Negative  / Bili Negative / Urobili Negative       Blood Moderate / Protein >600 / Leuk Est Negative / Nitrite Negative      RBC >50 / WBC 3-5 / Hyaline  / Gran  / Sq Epi  / Non Sq Epi OCC / Bacteria Few      HbA1c 5.4      [10-15-17 @ 21:32]  TSH 0.05      [10-25-17 @ 13:21]  Lipid: chol 129, TG 45, HDL 41, LDL 79      [10-15-17 @ 21:40]    HBsAg Nonreact      [10-19-17 @ 07:25]  HCV 0.15, Nonreact      [10-19-17 @ 07:25]  HIV Nonreact      [10-19-17 @ 07:25]    EYAD: titer 1:640, pattern Homogeneous      [10-31-17 @ 05:04]  dsDNA <12      [10-31-17 @ 05:04]  C3 Complement 95      [10-31-17 @ 05:04]  C4 Complement 17      [10-31-17 @ 05:04]  ANCA: cANCA Negative, pANCA Negative, atypical ANCA --      [10-31-17 @ 05:04]  Free Light Chains: kappa 15.20, lambda 10.50, ratio = 1.45      [10-19 @ 07:25]  Immunofixation Serum:   No Monoclonal Band Identified      [10-24-17 @ 13:31]  SPEP Interpretation: Hypoalbuminemia Increased beta fraction due to probable fibrinogen presence.      [10-24-17 @ 12:31]

## 2017-11-01 NOTE — PROGRESS NOTE ADULT - ASSESSMENT
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) and right groin pseudoaneurysm, sepsis secondary to gram + bacteremia and altered mental status    # Neuro  -   - continues to have generalized shaking, previously a code stoke was called and head CT did not show new acute findings  - repeat head CT was done given the focal neurological deficits but it was unchanged  - EEG did not show signs of seizure  - Neurology team following    # CV  - CAD s/p RCA stent: continue hep gtt, aspirin, brillinta, lipitor  - Atrial fibrillation: previously on amiodarone load, however, due to tachy-isabelle, amiodarone had been on hold   - Cardiogenic shock s/p TVP and impella s/ removal  - Tachy-isabelle s/p TVP, continues to have delayed capture, patient will eventually need a permanent PPM, pending clearance of infection    #Respiratory  - CXR Small left pleural effusion with adjacent atelectasis  - 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, patient now s/p decadron  - Slightly tachypneic, but comfortable   - Continue suctioning as needed for increased secretions     #GI  - Currently on NGT w/ tube feeds with pivot because patient was having loose bm with vital  - dysphagia eval will be repeated once patient improves    #Metabolic/Renal  - Rhabdo: continue CVVH, net negative 488cc since yesterday, currently not removing fluid  - patient with increasing proteinuria  - f/u rheum work up as below, nephrology recommendations  - Patient is anuric at this time, will continue CCVH until HD stable as per Renal Team  - Replete Mg, Ph, K as needed    #Rheum  - patient with positive EYAD 1:640, pattern Homogeneous  - dsDNA <12 ;C3 Complement 72; C4 Complement 1  - repeat EYAD, anti-ribonuclear protein, C3, C4, dsDNA, anti-histone ab and ANCA sent    #Heme  - H&H stable  - Metamyelocytes and blasts on CBC  - Heme consulted for peripheral smear - many myelocytes seen as well as bands, suggestive of a left-shifted peripheral blood because of inflammation/infection ill state  - f/u BCR/ABL  - continue to treat infection as below    # Vascular  - Patient with right groin pseudoaneurysm evaluated by vascular surgery  - no neck visible on imagining, unable to inject thrombin  - per vascular surgery, will repeat groin imagining in 1 week  - continue to closely monitor  - wound care following  - b/l LE dopplers (10/30) no evidence of DVT    # ID  - Blood culture gram variable beaded rods +acid fast (10/19), gram positive cocci in clusters (10/24)  - Repeat blood cultures (10/26, 10/29) no growth to date  - Continue Vancomycin (day 7) and Imipenem (day 7), per ID would dc Vancomycin after 7 day course  - Azithromycin discontinued yesterday per ID recs  - ID following  - no evidence of vegetations on echocardiogram     # Endocrine   - Goiter TSH 0.05 T4 10.4 T3 85 2/2 sick euthyroid syndrome, no intervention at this time  - Endocrinology following (house) (patient's endocrinologist is Dr. 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) and right groin pseudoaneurysm, sepsis secondary to coag negative staph and mycobacterial bacteremia and altered mental status    # Neuro  - awake and alert this morning, aware he is in [place] but can't say where specifically (hospital), knows his name and the year  - continues to have generalized shaking, previously a code stoke was called and head CT did not show new acute findings  - repeat head CT was done given the focal neurological deficits but it was unchanged  - EEG did not show signs of seizure  - Neurology team following    # CV  - CAD s/p RCA stent - c/w ASA, Brillinta, Lipitor  - Atrial fibrillation - previously on amiodarone load, however, due to tachy-isabelle, amiodarone has been on hold  - Cardiogenic shock s/p impella removed 10/20  - Tachy-isabelle s/p TVP (10/25), patient will eventually need a permanent PPM, pending clearance of infection    #Respiratory  - CXR Small left pleural effusion with adjacent atelectasis  - 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, patient now s/p decadron  - Slightly tachypneic, but comfortable   - Continue suctioning as needed for increased secretions     #GI  - Currently on NGT w/ tube feeds with pivot because patient was having loose bm with vital  - dysphagia eval will be repeated once patient improves    #Metabolic/Renal  - continue CVVH per nephrology, net amie since yesterday, currently not removing fluid  - patient with increasing proteinuria  - f/u rheum work up as below, nephrology recommendations  - Patient is anuric at this time, will continue CVVH until HD stable    #Rheum  - patient with positive EYAD 1:640, pattern Homogeneous  - dsDNA <12 ;C3 Complement 72; C4 Complement 1  - anti-ribonuclear protein, C3, C4, dsDNA, anti-histone ab and ANCA sent - all negative    #Heme  - H&H stable  - Metamyelocytes and blasts on CBC  - Heme consulted for peripheral smear - many myelocytes seen as well as bands, suggestive of a left-shifted peripheral blood because of inflammation/infection ill state  - BCR/ABL negative  - continue to treat infection as below    # Vascular  - Patient with right groin pseudoaneurysm evaluated by vascular surgery  - no neck visible on imagining, unable to inject thrombin  - per vascular surgery, will repeat groin imagining in 1 week  - continue to closely monitor  - wound care following  - b/l LE dopplers (10/30) no evidence of DVT    # ID  - Blood culture gram variable beaded rods +acid fast, 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 Baldomero Roberson in [place])

## 2017-11-01 NOTE — PROGRESS NOTE BEHAVIORAL HEALTH - NSBHCHARTREVIEWINVESTIGATE_PSY_A_CORE FT
Ventricular Rate 111 BPM    Atrial Rate 111 BPM    QRS Duration 104 ms     ms    QTc 408 ms    R Axis 51 degrees    T Axis -119 degrees    Diagnosis Line ATRIAL FIBRILLATION WITH RAPID VENTRICULAR RESPONSE  ST & T WAVE ABNORMALITY, CONSIDER INFEROLATERAL ISCHEMIA OR DIGITALIS EFFECT  ABNORMAL ECG

## 2017-11-01 NOTE — PROGRESS NOTE BEHAVIORAL HEALTH - NSBHCONSULTMEDS_PSY_A_CORE FT
cont risperdal 0.5mg po bid

## 2017-11-01 NOTE — PROGRESS NOTE BEHAVIORAL HEALTH - NSBHCONSULTOBSREASON_PSY_A_CORE FT
1:1 not required at this time while under enhanced observation of CCU setting.

## 2017-11-01 NOTE — PROGRESS NOTE ADULT - ASSESSMENT
82yo M with R Circumflex pseudoaneurysm with wide neck. Per US attending, Dr. Juares, the PSA is not amenable to thrombin injection. The pt is high risk for operative repair.     - Recommend repeat US of PSA in 1 week  -- Serial exams

## 2017-11-01 NOTE — PROGRESS NOTE ADULT - ASSESSMENT
83 year old male with a PMH of atrial fibrillation (on Pradaxa), HTN, Thyroid Goiter, CKD stage III (dx 2016) was seen at [facility] with inferior wall STEMI and was brought to [facility] for cardiac catherization s/p PCI x 1 now with DANITA and Anuria.

## 2017-11-01 NOTE — PROGRESS NOTE BEHAVIORAL HEALTH - NSBHCONSULTWRKUPYES_PSY_A_CORE
other/agree with MRI & MRA once medically able.
agree with MRI & MRA once medically able./other

## 2017-11-01 NOTE — PROGRESS NOTE BEHAVIORAL HEALTH - MUSCLE TONE / STRENGTH
Abnormal muscle tone/strength

## 2017-11-01 NOTE — PROGRESS NOTE BEHAVIORAL HEALTH - NSBHLOC_PSY_A_CORE
Lethargic, arousable to verbal stimulus
Alert

## 2017-11-01 NOTE — PROGRESS NOTE ADULT - PROBLEM SELECTOR PLAN 1
DANITA on CKD stage III. DANITA may be multifactorial in the setting of contrast (cardiac cath), ATN from hypotension/ cardiac arrest. Patient also received IV contrast on 10/29/17.   Patient remains on CVVHDF due to Anuria. UF as per CCU. Plan to continue patient on CVVHDF until patient is hemodynamically stable for switch to intermittent HD.   Continue to check daily serum phosphorus (may be low in the use of CVVHDF). Continue to monitor intake/output, BMP and urine output. Avoid NSAIDs, RCA and nephrotoxins. Renally dose all medications    Please order for renal ultrasound today. DANITA on CKD stage III. DANITA may be multifactorial in the setting of contrast (cardiac cath), ATN from hypotension/ cardiac arrest. Patient also received IV contrast on 10/29/17.   Patient remains on CVVHDF due to Anuria. Plan to continue patient on CVVHDF until patient is hemodynamically stable for switch to intermittent HD.   Continue to check daily serum phosphorus (may be low in the use of CVVHDF). Continue to monitor intake/output, BMP and urine output. Avoid NSAIDs, RCA and nephrotoxins. Renally dose all medications  Please order for renal ultrasound today.

## 2017-11-01 NOTE — PROGRESS NOTE ADULT - ASSESSMENT
83M pmhx of AF on pradaxa initially presented on 10/15/17 with chest pain/diaphoresis/ dyspnea to OSH, IW-STEMI emergently transferred here for [facility] c/b VF arrest requiring IABP followed by pRCA stent c/b now acute right heart failure and cardiogenic shock s/p IABP (explanted 10/19), RP impella for RV support (explanted 10/20). TVP for symptomatic afib with slow VR.     - sepsis, unclear etiology of infection ?bacteremia with coag neg staph, cont abx  - continue tele  - monitor thresholds  - plan for ppm when infection clears    Maximilian Emery MD

## 2017-11-01 NOTE — PROGRESS NOTE BEHAVIORAL HEALTH - NSBHCHARTREVIEWVS_PSY_A_CORE FT
Vital Signs Last 24 Hrs  T(C): 35.8 (31 Oct 2017 08:00), Max: 36.4 (30 Oct 2017 19:00)  T(F): 96.4 (31 Oct 2017 08:00), Max: 97.6 (30 Oct 2017 19:00)  HR: 90 (31 Oct 2017 09:30) (56 - 130)  BP: 75/52 (31 Oct 2017 09:30) (73/48 - 159/73)  BP(mean): 62 (31 Oct 2017 09:30) (55 - 121)  RR: 29 (31 Oct 2017 09:30) (13 - 29)  SpO2: 92% (31 Oct 2017 09:30) (89% - 100%)
Vital Signs Last 24 Hrs  T(C): 36.5 (27 Oct 2017 05:00), Max: 37.1 (26 Oct 2017 19:30)  T(F): 97.7 (27 Oct 2017 05:00), Max: 98.8 (26 Oct 2017 19:30)  HR: 76 (27 Oct 2017 10:00) (70 - 130)  BP: 96/62 (27 Oct 2017 10:00) (93/72 - 166/92)  BP(mean): 74 (27 Oct 2017 10:00) (68 - 118)  RR: 22 (27 Oct 2017 10:00) (12 - 33)  SpO2: 97% (27 Oct 2017 10:00) (87% - 100%)
Vital Signs Last 24 Hrs  T(C): 36.3 (01 Nov 2017 08:00), Max: 37.1 (31 Oct 2017 16:00)  T(F): 97.4 (01 Nov 2017 08:00), Max: 98.7 (31 Oct 2017 16:00)  HR: 80 (01 Nov 2017 11:30) (48 - 124)  BP: 86/45 (01 Nov 2017 11:30) (70/49 - 136/71)  BP(mean): 63 (01 Nov 2017 11:30) (53 - 112)  RR: 18 (01 Nov 2017 11:30) (12 - 29)  SpO2: 100% (01 Nov 2017 11:30) (91% - 100%)
Vital Signs Last 24 Hrs  T(C): 36.4 (30 Oct 2017 08:00), Max: 37 (29 Oct 2017 18:00)  T(F): 97.6 (30 Oct 2017 08:00), Max: 98.6 (29 Oct 2017 18:00)  HR: 94 (30 Oct 2017 10:00) (62 - 118)  BP: 138/91 (30 Oct 2017 10:00) (80/55 - 138/91)  BP(mean): 110 (30 Oct 2017 10:00) (58 - 726)  RR: 18 (30 Oct 2017 10:00) (17 - 29)  SpO2: 95% (30 Oct 2017 10:00) (83% - 100%)

## 2017-11-01 NOTE — PROGRESS NOTE BEHAVIORAL HEALTH - PRIMARY DX
Delirium due to another medical condition

## 2017-11-01 NOTE — PROGRESS NOTE ADULT - SUBJECTIVE AND OBJECTIVE BOX
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  unsure if contaminants or real but treating--pat with pseudoaneurysm present.    Further ROS:  CONSTITUTIONAL:  No fever, good appetite  CARDIOVASCULAR:  No chest pain or palpitations  RESPIRATORY:  No dyspnea  GASTROINTESTINAL:  No nausea, vomiting, diarrhea, or abdominal pain  GENITOURINARY:  No dysuria  NEUROLOGIC:  No headache,     Allergies  No Known Allergies    ANTIBIOTICS/RELEVANT:  antimicrobials  doxycycline IVPB      doxycycline IVPB 100 milliGRAM(s) IV Intermittent every 12 hours  imipenem/cilastatin  IVPB      imipenem/cilastatin  IVPB 250 milliGRAM(s) IV Intermittent every 6 hours  levoFLOXacin IVPB 250 milliGRAM(s) IV Intermittent every 24 hours  vancomycin  IVPB 500 milliGRAM(s) IV Intermittent every 24 hours    immunologic:  influenza   Vaccine 0.5 milliLiter(s) IntraMuscular once    OTHER:  acetaminophen    Suspension 930 milliGRAM(s) Oral every 6 hours PRN  acetaminophen    Suspension. 930 milliGRAM(s) Oral every 4 hours PRN  acetaminophen  IVPB. 1000 milliGRAM(s) IV Intermittent once  ALBUTerol/ipratropium for Nebulization 3 milliLiter(s) Nebulizer every 6 hours PRN  aspirin  chewable 81 milliGRAM(s) Oral daily  atorvastatin 80 milliGRAM(s) Oral at bedtime  haloperidol     Tablet 0.5 milliGRAM(s) Oral every 12 hours PRN  heparin  Infusion. 900 Unit(s)/Hr IV Continuous <Continuous>  heparin  Injectable 4000 Unit(s) IV Push every 6 hours PRN  lactobacillus acidophilus and bulgaricus Chewable 1 Tablet(s) Chew daily  norepinephrine Infusion 1 MICROgram(s)/kG/Min IV Continuous <Continuous>  oxyCODONE    5 mG/acetaminophen 325 mG 1 Tablet(s) Oral every 6 hours PRN  pantoprazole  Injectable 40 milliGRAM(s) IV Push two times a day  risperiDONE   Tablet 0.5 milliGRAM(s) Oral every 12 hours  ticagrelor 90 milliGRAM(s) Oral two times a day      Objective:  Vital Signs Last 24 Hrs  T(C): 36.3 (01 Nov 2017 08:00), Max: 37.3 (31 Oct 2017 12:00)  T(F): 97.4 (01 Nov 2017 08:00), Max: 99.1 (31 Oct 2017 12:00)  HR: 82 (01 Nov 2017 08:30) (48 - 124)  BP: 79/51 (01 Nov 2017 08:30) (70/49 - 136/71)  BP(mean): 71 (01 Nov 2017 08:30) (53 - 112)  RR: 22 (01 Nov 2017 08:30) (12 - 29)  SpO2: 100% (01 Nov 2017 08:30) (91% - 100%)    PHYSICAL EXAM:  Constitutional:no acute distress---not on vent  Eyes:REGINALD, EOMI  Respiratory: clear BL  Cardiovascular: S1S2  Gastrointestinal:soft, (+) BS, no tenderness  Extremities:no e/e/c  No Lymphadenopathy  IV sites not inflammed.    LABS:                        8.4    20.7  )-----------( 412      ( 01 Nov 2017 05:35 )             26.4     11-01    138  |  101  |  37<H>  ----------------------------<  123<H>  5.2   |  24  |  1.50<H>    Ca    8.9      01 Nov 2017 05:35  Phos  2.8     11-01  Mg     2.7     11-01    TPro  6.3  /  Alb  3.0<L>  /  TBili  1.7<H>  /  DBili  x   /  AST  92<H>  /  ALT  64<H>  /  AlkPhos  91  11-01    PTT - ( 01 Nov 2017 05:35 )  PTT:79.1 sec    MICROBIOLOGY: all rep BC neg

## 2017-11-01 NOTE — PROGRESS NOTE ADULT - SUBJECTIVE AND OBJECTIVE BOX
Patient is a 83y old  Male who presents with a chief complaint of SOB, chest pain, diaphoresis (15 Oct 2017 17:59)      Vascular Surgery Attending Progress Note    Interval HPI: pt appers confused    Medications:  acetaminophen    Suspension 930 milliGRAM(s) Oral every 6 hours PRN  acetaminophen    Suspension. 930 milliGRAM(s) Oral every 4 hours PRN  ALBUTerol/ipratropium for Nebulization 3 milliLiter(s) Nebulizer every 6 hours PRN  aspirin  chewable 81 milliGRAM(s) Oral daily  atorvastatin 80 milliGRAM(s) Oral at bedtime  doxycycline IVPB      doxycycline IVPB 100 milliGRAM(s) IV Intermittent every 12 hours  haloperidol     Tablet 0.5 milliGRAM(s) Oral every 12 hours PRN  heparin  Infusion. 800 Unit(s)/Hr IV Continuous <Continuous>  heparin  Injectable 4000 Unit(s) IV Push every 6 hours PRN  imipenem/cilastatin  IVPB      imipenem/cilastatin  IVPB 250 milliGRAM(s) IV Intermittent every 6 hours  influenza   Vaccine 0.5 milliLiter(s) IntraMuscular once  lactobacillus acidophilus and bulgaricus Chewable 1 Tablet(s) Chew daily  levoFLOXacin IVPB 250 milliGRAM(s) IV Intermittent every 24 hours  norepinephrine Infusion 1 MICROgram(s)/kG/Min IV Continuous <Continuous>  oxyCODONE    5 mG/acetaminophen 325 mG 1 Tablet(s) Oral every 6 hours PRN  pantoprazole  Injectable 40 milliGRAM(s) IV Push two times a day  risperiDONE   Tablet 0.5 milliGRAM(s) Oral every 12 hours  ticagrelor 90 milliGRAM(s) Oral two times a day  vancomycin  IVPB 500 milliGRAM(s) IV Intermittent every 24 hours      Vital Signs Last 24 Hrs  T(C): 36.5 (01 Nov 2017 19:30), Max: 36.5 (01 Nov 2017 19:30)  T(F): 97.7 (01 Nov 2017 19:30), Max: 97.7 (01 Nov 2017 19:30)  HR: 96 (01 Nov 2017 20:30) (48 - 100)  BP: 76/63 (01 Nov 2017 15:00) (70/49 - 136/71)  BP(mean): 69 (01 Nov 2017 15:00) (51 - 111)  RR: 19 (01 Nov 2017 20:30) (12 - 29)  SpO2: 97% (01 Nov 2017 20:30) (91% - 100%)  I&O's Summary    31 Oct 2017 07:01  -  01 Nov 2017 07:00  --------------------------------------------------------  IN: 3353 mL / OUT: 3331 mL / NET: 22 mL    01 Nov 2017 07:01  -  01 Nov 2017 20:44  --------------------------------------------------------  IN: 1638 mL / OUT: 1549 mL / NET: 89 mL        Physical Exam:  Neuro  A&Ox1  Vascular:  rt groin stable     LABS:                        7.8    22.5  )-----------( 425      ( 01 Nov 2017 19:00 )             24.4     11-01    139  |  103  |  37<H>  ----------------------------<  141<H>  5.2   |  24  |  1.44<H>    Ca    8.5      01 Nov 2017 12:35  Phos  2.5     11-01  Mg     2.5     11-01    TPro  6.0  /  Alb  3.1<L>  /  TBili  1.6<H>  /  DBili  x   /  AST  85<H>  /  ALT  57<H>  /  AlkPhos  89  11-01    PTT - ( 01 Nov 2017 19:00 )  PTT:71.2 sec    CHARLY CAMERON MD  [phone]

## 2017-11-01 NOTE — PROGRESS NOTE BEHAVIORAL HEALTH - NSBHCHARTREVIEWLAB_PSY_A_CORE FT
8.1    21.9  )-----------( 392      ( 31 Oct 2017 01:31 )             24.7     10-31    138  |  102  |  34<H>  ----------------------------<  138<H>  5.1   |  25  |  1.56<H>    Ca    8.5      31 Oct 2017 09:31  Phos  3.1     10-31  Mg     2.8     10-31    TPro  6.2  /  Alb  2.8<L>  /  TBili  1.4<H>  /  DBili  x   /  AST  78<H>  /  ALT  61<H>  /  AlkPhos  88  10-31
8.4    20.7  )-----------( 412      ( 01 Nov 2017 05:35 )             26.4     11-01    138  |  101  |  37<H>  ----------------------------<  123<H>  5.2   |  24  |  1.50<H>    Ca    8.9      01 Nov 2017 05:35  Phos  2.8     11-01  Mg     2.7     11-01    TPro  6.3  /  Alb  3.0<L>  /  TBili  1.7<H>  /  DBili  x   /  AST  92<H>  /  ALT  64<H>  /  AlkPhos  91  11-01
8.3    20.2  )-----------( 393      ( 30 Oct 2017 05:17 )             25.2     10-30    137  |  101  |  32<H>  ----------------------------<  90  4.7   |  25  |  1.53<H>    Ca    8.6      30 Oct 2017 05:17  Phos  2.1     10-30  Mg     2.7     10-30    TPro  6.1  /  Alb  3.0<L>  /  TBili  1.4<H>  /  DBili  x   /  AST  45<H>  /  ALT  55<H>  /  AlkPhos  82  10-30    10-27
7.8    18.8  )-----------( 309      ( 27 Oct 2017 04:23 )             23.1     10-27    137  |  99  |  39<H>  ----------------------------<  112<H>  4.7   |  26  |  2.10<H>    Ca    8.6      27 Oct 2017 04:23  Phos  2.8     10-27  Mg     2.3     10-27    TPro  6.1  /  Alb  2.8<L>  /  TBili  1.3<H>  /  DBili  x   /  AST  41<H>  /  ALT  60<H>  /  AlkPhos  81  10-27

## 2017-11-01 NOTE — PROGRESS NOTE ADULT - PROBLEM SELECTOR PLAN 2
Patient with noted increasing proteinuria since UA on 6/2016 without an underlying etiology. Order urine spot protein/Cr ratio to quantify protein (once patient makes urine). Please repeat complements- C3, C4.   Renal sonogram.      HBsAg Nonreact; HCV 0.15, Nonreact; HIV Nonreact; EYAD: titer 1:640 (elevated), pattern Homogeneous; dsDNA <12 ;C3 Complement 72; C4 Complement 14; Free Light Chains: kappa 15.20, lambda 10.50, ratio = 1.45. Immunofixation negative. ANCA negative

## 2017-11-01 NOTE — PROGRESS NOTE BEHAVIORAL HEALTH - NSBHCONSULTMEDAGITATION_PSY_A_CORE FT
haldol 2mg po/iv q6hr prn agitation

## 2017-11-02 NOTE — SWALLOW BEDSIDE ASSESSMENT ADULT - SWALLOW EVAL: PROGNOSIS
Hx cont: 10/25 ENT: Nasopharynx, oropharynx, and hypopharynx clear, no bleeding. Airway patent, no foreign body visualized. No erythema, edema, pooling of thickened secretions, masses or lesions. Vocal cords mobile with good contact b/l. 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 Also with reflux changes and intubation granulomas. 10/28: s/p bedside swallow evaluation->Patient will require objective assessment to formally assess prior to initiation of oral diet. Pt continues to present with multiple swallows which may be indicative of pharyngeal residue. Case d/w ENT. Plan for FEES 10/30.

## 2017-11-02 NOTE — CONSULT NOTE ADULT - SUBJECTIVE AND OBJECTIVE BOX
KAMLESH PATEL  [id number]    HISTORY OF PRESENT ILLNESS:  Patient is an 87 yo M w/PMH of HTN, Afib, goiter who presented w/an inferior wall STEMI s/p cath c/b code/intubation and cardiogenic shock requiring impella. His course was further complicated by anuric DANTIA requiring CVVHD (with notable worsening proteinuria prior to the anuria). Also complicated by altered mental status, seen by neuro with negative EEG and CT head.  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  Patient was found to have an elevated EYAD 1:640, additional rheumatologic work up sent thus far negative.    History was obtained from patient's wife.  Patient does not have a hx of rheumotologic disease.  He has a hx of osteoarthritis in his shoulder and knees.  He was never diagnosed with rheumatoid arthritis.  He has joint pain in all joints but more prominently in the knees, shoulders, and hands.  Joint pain occurs in the morning and evening.  Patient has never had complaints of joint stiffness.  He does not take any pain medication (tylenol/NSAIDs) for joint pain.  He has never had any rashes on the face.  Denies any new rashes.   The patient notes that he feels fine.  Denies chest pain, SOB, discomfort, joint pain, fevers, chills, nausea, vomiting, abdominal pain, swelling or painful joints.         PAST MEDICAL & SURGICAL HISTORY:  Vertigo  Thyroid condition  Afib  High blood pressure  No significant past surgical history      Review of Systems:  Gen:  No fevers/chills, weight loss  HEENT: No blurry vision, no difficulty swallowing  CVS: No chest pain/palpitations  Resp: No SOB/wheezing  GI: No N/V/C/D/abdominal pain  MSK: No joint pain, no joint stiffness, no swelling of joints.  Skin: No new rashes  Neuro: No headaches    MEDICATIONS  (STANDING):  aspirin  chewable 81 milliGRAM(s) Oral daily  atorvastatin 80 milliGRAM(s) Oral at bedtime  doxycycline IVPB      doxycycline IVPB 100 milliGRAM(s) IV Intermittent every 12 hours  heparin  Infusion. 800 Unit(s)/Hr (8 mL/Hr) IV Continuous <Continuous>  imipenem/cilastatin  IVPB      imipenem/cilastatin  IVPB 250 milliGRAM(s) IV Intermittent every 6 hours  influenza   Vaccine 0.5 milliLiter(s) IntraMuscular once  lactobacillus acidophilus and bulgaricus Chewable 1 Tablet(s) Chew daily  levoFLOXacin IVPB 250 milliGRAM(s) IV Intermittent every 24 hours  norepinephrine Infusion 1 MICROgram(s)/kG/Min (174.563 mL/Hr) IV Continuous <Continuous>  pantoprazole  Injectable 40 milliGRAM(s) IV Push two times a day  risperiDONE   Tablet 0.5 milliGRAM(s) Oral every 12 hours  ticagrelor 90 milliGRAM(s) Oral two times a day  vancomycin  IVPB 500 milliGRAM(s) IV Intermittent every 24 hours    MEDICATIONS  (PRN):  acetaminophen    Suspension 930 milliGRAM(s) Oral every 6 hours PRN For Temp greater than 38 C (100.4 F)  acetaminophen    Suspension. 930 milliGRAM(s) Oral every 4 hours PRN Moderate Pain (4 - 6)  ALBUTerol/ipratropium for Nebulization 3 milliLiter(s) Nebulizer every 6 hours PRN Shortness of Breath and/or Wheezing  haloperidol     Tablet 0.5 milliGRAM(s) Oral every 12 hours PRN agitaion  heparin  Injectable 4000 Unit(s) IV Push every 6 hours PRN For aPTT less than 40  oxyCODONE    5 mG/acetaminophen 325 mG 1 Tablet(s) Oral every 6 hours PRN Severe Pain (7 - 10)      Allergies    No Known Allergies    Intolerances        PERTINENT MEDICATION HISTORY:    SOCIAL HISTORY: quit smoking 40 years ago, drinks sparingly (every couple of months), denies drug use.    FAMILY HISTORY:  Family history of heart attack (Father, Sibling, Grandparent, Uncle): many family members have had heart problems at young ages      Vital Signs Last 24 Hrs  T(C): 36.4 (2017 10:00), Max: 36.5 (2017 19:30)  T(F): 97.6 (2017 10:00), Max: 97.7 (2017 19:30)  HR: 100 (2017 10:00) (70 - 118)  BP: 76/63 (2017 15:00) (76/63 - 118/72)  BP(mean): 69 (2017 15:00) (51 - 101)  RR: 18 (2017 10:00) (13 - 27)  SpO2: 92% (2017 10:00) (92% - 100%)    Daily     Daily Weight in k.5 (2017 02:00)    Physical Exam:  General: No apparent distress, Short of breath when talking  HEENT: EOMI  CVS: tachycardia, irregular rate, +S1/S2, RRR  Resp: crackles in the LLL, no wheezing, coarse breath sounds.   GI: Soft, NT/ND +BS  MSK:  Shoulders: right with limited range of movement passive and active, normal ROM of left  Elbows: wnl  Wrists: limited ROM b/l  MCPs: wnl  PIPs: wnl  DIPs: wnl   Hips: unable to assess, however, cannot raise LE against gravity.    Knees: wnl  Ankle: wnl  Neuro: AAOx3  Skin: no visible rashes        LABS:                        7.9    20.1  )-----------( 450      ( 2017 05:25 )             24.7     11-02    139  |  102  |  39<H>  ----------------------------<  127<H>  4.7   |  24  |  1.47<H>    Ca    9.2      2017 05:25  Phos  2.4     11-  Mg     2.5     11-    TPro  6.2  /  Alb  3.0<L>  /  TBili  1.8<H>  /  DBili  x   /  AST  93<H>  /  ALT  62<H>  /  AlkPhos  94  11-02    PTT - ( 2017 05:25 )  PTT:70.1 sec      RADIOLOGY & ADDITIONAL STUDIES: KAMLESH PATEL  [id number]    HISTORY OF PRESENT ILLNESS:  Patient is an 87 yo M w/PMH of HTN, Afib, goiter who presented w/an inferior wall STEMI s/p cath c/b code/intubation and cardiogenic shock requiring impella. His course was further complicated by anuric DANITA requiring CVVHD (with notable worsening proteinuria prior to the anuria). Also complicated by altered mental status, seen by neuro with negative EEG and CT head.  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  Patient was found to have an elevated EYAD 1:640, additional rheumatologic work up sent thus far negative.    History was obtained from patient's wife.  Patient does not have a hx of rheumotologic disease.  He has a hx of osteoarthritis in his shoulder and knees.  He was never diagnosed with rheumatoid arthritis.  He has joint pain in all joints but more prominently in the knees, shoulders, and hands.  Joint pain occurs in the morning and evening.  Patient has never had complaints of joint stiffness.  He does not take any pain medication (tylenol/NSAIDs) for joint pain.  He has never had any rashes on the face.  Denies any new rashes.   The patient notes that he feels fine.  Denies chest pain, SOB, discomfort, joint pain, fevers, chills, nausea, vomiting, abdominal pain, swelling or painful joints.         PAST MEDICAL & SURGICAL HISTORY:  Vertigo  Thyroid condition  Afib  High blood pressure  No significant past surgical history      Review of Systems:  Gen:  No fevers/chills, weight loss  HEENT: No blurry vision, no difficulty swallowing  CVS: No chest pain/palpitations  Resp: No SOB/wheezing  GI: No N/V/C/D/abdominal pain  MSK: No joint pain, no joint stiffness, no swelling of joints.  Skin: No new rashes  Neuro: No headaches    MEDICATIONS  (STANDING):  aspirin  chewable 81 milliGRAM(s) Oral daily  atorvastatin 80 milliGRAM(s) Oral at bedtime  doxycycline IVPB      doxycycline IVPB 100 milliGRAM(s) IV Intermittent every 12 hours  heparin  Infusion. 800 Unit(s)/Hr (8 mL/Hr) IV Continuous <Continuous>  imipenem/cilastatin  IVPB      imipenem/cilastatin  IVPB 250 milliGRAM(s) IV Intermittent every 6 hours  influenza   Vaccine 0.5 milliLiter(s) IntraMuscular once  lactobacillus acidophilus and bulgaricus Chewable 1 Tablet(s) Chew daily  levoFLOXacin IVPB 250 milliGRAM(s) IV Intermittent every 24 hours  norepinephrine Infusion 1 MICROgram(s)/kG/Min (174.563 mL/Hr) IV Continuous <Continuous>  pantoprazole  Injectable 40 milliGRAM(s) IV Push two times a day  risperiDONE   Tablet 0.5 milliGRAM(s) Oral every 12 hours  ticagrelor 90 milliGRAM(s) Oral two times a day  vancomycin  IVPB 500 milliGRAM(s) IV Intermittent every 24 hours    MEDICATIONS  (PRN):  acetaminophen    Suspension 930 milliGRAM(s) Oral every 6 hours PRN For Temp greater than 38 C (100.4 F)  acetaminophen    Suspension. 930 milliGRAM(s) Oral every 4 hours PRN Moderate Pain (4 - 6)  ALBUTerol/ipratropium for Nebulization 3 milliLiter(s) Nebulizer every 6 hours PRN Shortness of Breath and/or Wheezing  haloperidol     Tablet 0.5 milliGRAM(s) Oral every 12 hours PRN agitaion  heparin  Injectable 4000 Unit(s) IV Push every 6 hours PRN For aPTT less than 40  oxyCODONE    5 mG/acetaminophen 325 mG 1 Tablet(s) Oral every 6 hours PRN Severe Pain (7 - 10)      Allergies    No Known Allergies    Intolerances        PERTINENT MEDICATION HISTORY:    SOCIAL HISTORY: quit smoking 40 years ago, drinks sparingly (every couple of months), denies drug use.    FAMILY HISTORY:  Family history of heart attack (Father, Sibling, Grandparent, Uncle): many family members have had heart problems at young ages      Vital Signs Last 24 Hrs  T(C): 36.4 (2017 10:00), Max: 36.5 (2017 19:30)  T(F): 97.6 (2017 10:00), Max: 97.7 (2017 19:30)  HR: 100 (2017 10:00) (70 - 118)  BP: 76/63 (2017 15:00) (76/63 - 118/72)  BP(mean): 69 (2017 15:00) (51 - 101)  RR: 18 (2017 10:00) (13 - 27)  SpO2: 92% (2017 10:00) (92% - 100%)    Daily     Daily Weight in k.5 (2017 02:00)    Physical Exam:  General: No apparent distress, Short of breath when talking  HEENT: EOMI  CVS: tachycardia, irregular rate, +S1/S2, RRR  Resp: crackles in the LLL, no wheezing, coarse breath sounds.   GI: Soft, NT/ND +BS  MSK:  Shoulders: right with limited range of movement passive and active, normal ROM of left  Elbows: wnl  Wrists: limited ROM b/l  MCPs: wnl  PIPs: wnl  DIPs: wnl   Hips: unable to assess, however, cannot raise LE against gravity.    Knees: wnl  Ankle: wnl  Toes: Hammertoes in all toes b/l  Neuro: AAOx3  Skin: no visible rashes        LABS:                        7.9    20.1  )-----------( 450      ( 2017 05:25 )             24.7     11-    139  |  102  |  39<H>  ----------------------------<  127<H>  4.7   |  24  |  1.47<H>    Ca    9.2      2017 05:25  Phos  2.4     11-  Mg     2.5     -    TPro  6.2  /  Alb  3.0<L>  /  TBili  1.8<H>  /  DBili  x   /  AST  93<H>  /  ALT  62<H>  /  AlkPhos  94  11-02    PTT - ( 2017 05:25 )  PTT:70.1 sec    dsDNA ab <12  Anti nuclear factor titer 1:640  EYAD pattern: homogeneous  c-ANCA: negative  p-ANCA: negative  atypical ANCA: indeterminate  anti-ribonuclear protein: <0.2  C3: 95  C4: 17 KAMLESH PATEL  [id number]    HISTORY OF PRESENT ILLNESS:  Patient is an 89 yo M w/PMH of HTN, Afib, goiter who presented w/an inferior wall STEMI s/p cath c/b code/intubation and cardiogenic shock requiring impella. His course was further complicated by anuric DANITA requiring CVVHD (with notable worsening proteinuria prior to the anuria). Also complicated by altered mental status, seen by neuro with negative EEG and CT head.  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  Blood cultures have since cleared.  Patient was found to have an elevated EYAD 1:640, additional rheumatologic work up sent thus far negative.  Patient had an elevated CK at 1587 on admission which continued to rise to 5331.  Patient had significant proteinuria without hematuria on admission. Patient has been anuric and has not produced urine since.   History was obtained from patient's wife.  Patient does not have a hx of rheumotologic disease.  He has a hx of osteoarthritis in his shoulder and knees.  He was never diagnosed with rheumatoid arthritis.  He has joint pain in all joints but more prominently in the knees, shoulders, and hands.  Joint pain occurs in the morning and evening.  Patient has never had complaints of joint stiffness.  He does not take any pain medication (tylenol/NSAIDs) for joint pain.  He has never had any rashes on the face.  Denies any new rashes. Patient's wife notes that the patient was on a statin 7 years ago, which was discontinued 2/2 increased "lab values".  The patient notes that he feels fine.  Denies chest pain, SOB, discomfort, joint pain, fevers, chills, nausea, vomiting, abdominal pain, swelling or painful joints.         PAST MEDICAL & SURGICAL HISTORY:  Vertigo  Thyroid condition  Afib  High blood pressure  No significant past surgical history      Review of Systems:  Gen:  No fevers/chills, weight loss  HEENT: No blurry vision, no difficulty swallowing  CVS: No chest pain/palpitations  Resp: No SOB/wheezing  GI: No N/V/C/D/abdominal pain  MSK: No joint pain, no joint stiffness, no swelling of joints.  Skin: No new rashes  Neuro: No headaches    MEDICATIONS  (STANDING):  aspirin  chewable 81 milliGRAM(s) Oral daily  atorvastatin 80 milliGRAM(s) Oral at bedtime  doxycycline IVPB      doxycycline IVPB 100 milliGRAM(s) IV Intermittent every 12 hours  heparin  Infusion. 800 Unit(s)/Hr (8 mL/Hr) IV Continuous <Continuous>  imipenem/cilastatin  IVPB      imipenem/cilastatin  IVPB 250 milliGRAM(s) IV Intermittent every 6 hours  influenza   Vaccine 0.5 milliLiter(s) IntraMuscular once  lactobacillus acidophilus and bulgaricus Chewable 1 Tablet(s) Chew daily  levoFLOXacin IVPB 250 milliGRAM(s) IV Intermittent every 24 hours  norepinephrine Infusion 1 MICROgram(s)/kG/Min (174.563 mL/Hr) IV Continuous <Continuous>  pantoprazole  Injectable 40 milliGRAM(s) IV Push two times a day  risperiDONE   Tablet 0.5 milliGRAM(s) Oral every 12 hours  ticagrelor 90 milliGRAM(s) Oral two times a day  vancomycin  IVPB 500 milliGRAM(s) IV Intermittent every 24 hours    MEDICATIONS  (PRN):  acetaminophen    Suspension 930 milliGRAM(s) Oral every 6 hours PRN For Temp greater than 38 C (100.4 F)  acetaminophen    Suspension. 930 milliGRAM(s) Oral every 4 hours PRN Moderate Pain (4 - 6)  ALBUTerol/ipratropium for Nebulization 3 milliLiter(s) Nebulizer every 6 hours PRN Shortness of Breath and/or Wheezing  haloperidol     Tablet 0.5 milliGRAM(s) Oral every 12 hours PRN agitaion  heparin  Injectable 4000 Unit(s) IV Push every 6 hours PRN For aPTT less than 40  oxyCODONE    5 mG/acetaminophen 325 mG 1 Tablet(s) Oral every 6 hours PRN Severe Pain (7 - 10)      Allergies    No Known Allergies    Intolerances        PERTINENT MEDICATION HISTORY:    SOCIAL HISTORY: quit smoking 40 years ago, drinks sparingly (every couple of months), denies drug use.    FAMILY HISTORY:  Family history of heart attack (Father, Sibling, Grandparent, Uncle): many family members have had heart problems at young ages      Vital Signs Last 24 Hrs  T(C): 36.4 (2017 10:00), Max: 36.5 (2017 19:30)  T(F): 97.6 (2017 10:00), Max: 97.7 (2017 19:30)  HR: 100 (2017 10:00) (70 - 118)  BP: 76/63 (2017 15:00) (76/63 - 118/72)  BP(mean): 69 (2017 15:00) (51 - 101)  RR: 18 (2017 10:00) (13 - 27)  SpO2: 92% (2017 10:00) (92% - 100%)    Daily     Daily Weight in k.5 (2017 02:00)    Physical Exam:  General: No apparent distress, Short of breath when talking  HEENT: EOMI  CVS: tachycardia, irregular rate, +S1/S2, RRR  Resp: crackles in the LLL, no wheezing, coarse breath sounds.   GI: Soft, NT/ND +BS  MSK:  Shoulders: right with limited range of movement passive and active, normal ROM of left  Elbows: wnl  Wrists: wnl  MCPs: wnl  PIPs: wnl  DIPs: wnl   Hips: unable to assess, however, cannot raise LE against gravity.    Knees: wnl  Ankle: wnl  Toes: Hammertoes in all toes b/l  Neuro: AAOx3  Skin: no visible rashes        LABS:                        7.9    20.1  )-----------( 450      ( 2017 05:25 )             24.7     11-02    139  |  102  |  39<H>  ----------------------------<  127<H>  4.7   |  24  |  1.47<H>    Ca    9.2      2017 05:25  Phos  2.4     11-02  Mg     2.5     11-02    TPro  6.2  /  Alb  3.0<L>  /  TBili  1.8<H>  /  DBili  x   /  AST  93<H>  /  ALT  62<H>  /  AlkPhos  94  11-02    PTT - ( 2017 05:25 )  PTT:70.1 sec    dsDNA ab <12  Anti nuclear factor titer 1:640  EYAD pattern: homogeneous  c-ANCA: negative  p-ANCA: negative  atypical ANCA: indeterminate  anti-ribonuclear protein: <0.2  C3: 95  C4: 17

## 2017-11-02 NOTE — CHART NOTE - NSCHARTNOTEFT_GEN_A_CORE
====================  NEW EVENTS:  ====================  Patient was hypotensive MAP 40s-50s at one point. 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) and right groin pseudoaneurysm, sepsis secondary to coag negative staph and mycobacterial bacteremia and altered mental status    ====================  VITALS:  ====================    ICU Vital Signs Last 24 Hrs  T(C): 36.7 (02 Nov 2017 16:00), Max: 36.7 (02 Nov 2017 16:00)  T(F): 98 (02 Nov 2017 16:00), Max: 98 (02 Nov 2017 16:00)  HR: 80 (02 Nov 2017 21:00) (74 - 118)  BP: --  BP(mean): --  ABP: 96/52 (02 Nov 2017 21:00) (66/48 - 120/66)  ABP(mean): 70 (02 Nov 2017 21:00) (56 - 88)  RR: 10 (02 Nov 2017 21:00) (10 - 36)  SpO2: 100% (02 Nov 2017 21:00) (89% - 100%)      I&O's Summary    01 Nov 2017 07:01  -  02 Nov 2017 07:00  --------------------------------------------------------  IN: 3144 mL / OUT: 3281 mL / NET: -137 mL    02 Nov 2017 07:01  -  02 Nov 2017 23:52  --------------------------------------------------------  IN: 1890 mL / OUT: 1376 mL / NET: 514 mL        Adult Advanced Hemodynamics Last 24 Hrs  CVP(mm Hg): 13 (02 Nov 2017 13:30) (13 - 23)  CVP(cm H2O): --  CO: --  CI: --  PA: --  PA(mean): --  PCWP: --  SVR: --  SVRI: --  PVR: --  PVRI: --        ====================  LABS:  ====================                          7.6    19.8  )-----------( 411      ( 02 Nov 2017 18:01 )             23.2     11-02    138  |  100  |  31<H>  ----------------------------<  117<H>  5.1   |  21<L>  |  1.37<H>    Ca    8.9      02 Nov 2017 18:01  Phos  3.5     11-02  Mg     2.6     11-02    TPro  6.2  /  Alb  2.8<L>  /  TBili  2.0<H>  /  DBili  x   /  AST  96<H>  /  ALT  65<H>  /  AlkPhos  95  11-02    PTT - ( 02 Nov 2017 05:25 )  PTT:70.1 sec  Creatine Kinase, Serum: 1417 U/L <H> (11-02-17 @ 18:05)    ABG - ( 02 Nov 2017 05:24 )  pH: 7.56  /  pCO2: 29    /  pO2: 94    / HCO3: 26    / Base Excess: 3.7   /  SaO2: 99                  ====================  PLAN:  ====================  - Permacath placement by IR/Vascular   - c/w CVVHD, keep pt euvolumic  - c/w hep gtt.   - c/w Levo  - c/w Abx. ====================  NEW EVENTS:  ====================  Patient was hypotensive MAP 40s-50s at one point. 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) and right groin pseudoaneurysm, sepsis secondary to coag negative staph and mycobacterial bacteremia and altered mental status    ====================  VITALS:  ====================    ICU Vital Signs Last 24 Hrs  T(C): 36.7 (02 Nov 2017 16:00), Max: 36.7 (02 Nov 2017 16:00)  T(F): 98 (02 Nov 2017 16:00), Max: 98 (02 Nov 2017 16:00)  HR: 80 (02 Nov 2017 21:00) (74 - 118)  BP: --  BP(mean): --  ABP: 96/52 (02 Nov 2017 21:00) (66/48 - 120/66)  ABP(mean): 70 (02 Nov 2017 21:00) (56 - 88)  RR: 10 (02 Nov 2017 21:00) (10 - 36)  SpO2: 100% (02 Nov 2017 21:00) (89% - 100%)      I&O's Summary    01 Nov 2017 07:01  -  02 Nov 2017 07:00  --------------------------------------------------------  IN: 3144 mL / OUT: 3281 mL / NET: -137 mL    02 Nov 2017 07:01  -  02 Nov 2017 23:52  --------------------------------------------------------  IN: 1890 mL / OUT: 1376 mL / NET: 514 mL        Adult Advanced Hemodynamics Last 24 Hrs  CVP(mm Hg): 13 (02 Nov 2017 13:30) (13 - 23)  CVP(cm H2O): --  CO: --  CI: --  PA: --  PA(mean): --  PCWP: --  SVR: --  SVRI: --  PVR: --  PVRI: --        ====================  LABS:  ====================                          7.6    19.8  )-----------( 411      ( 02 Nov 2017 18:01 )             23.2     11-02    138  |  100  |  31<H>  ----------------------------<  117<H>  5.1   |  21<L>  |  1.37<H>    Ca    8.9      02 Nov 2017 18:01  Phos  3.5     11-02  Mg     2.6     11-02    TPro  6.2  /  Alb  2.8<L>  /  TBili  2.0<H>  /  DBili  x   /  AST  96<H>  /  ALT  65<H>  /  AlkPhos  95  11-02    PTT - ( 02 Nov 2017 05:25 )  PTT:70.1 sec  Creatine Kinase, Serum: 1417 U/L <H> (11-02-17 @ 18:05)    ABG - ( 02 Nov 2017 05:24 )  pH: 7.56  /  pCO2: 29    /  pO2: 94    / HCO3: 26    / Base Excess: 3.7   /  SaO2: 99                  ====================  PLAN:  ====================  - Permacath placement by IR/Vascular   - c/w CVVHD, keep pt euvolumic  - c/w hep gtt.   - c/w Levo  - c/w Abx

## 2017-11-02 NOTE — CONSULT NOTE ADULT - CONSULT REASON
+ Blood cultures
+EYAD
AFib with slow VR
Acute MI, cardiogenic shock
DANITA/ Anuria
RHF
Rt groin, Lt wrist wounds
[place] placement for ARF
abnormal cells, metamyelocytes
r/o airway obstruction
code stroke
Goiter and abnormal TFTs

## 2017-11-02 NOTE — SWALLOW BEDSIDE ASSESSMENT ADULT - SWALLOW EVAL: DIAGNOSIS
Attempted to see pt for bedside swallow evaluation. Pt received in bed, lethargic. Spouse requesting this service f/u at another time as pt too lethargic and she "does not want him to fail." This service to f/u tomorrow.

## 2017-11-02 NOTE — CONSULT NOTE ADULT - CONSULT REQUESTED BY NAME
CCU
CCU
CCU Dr. Marcus Cooley
CCU Team
Dr Hammer
Dr. Hammer
Dr. Hammer
Dr. Marquez
Dr. gutierrez
[place]
CCU
Dr. Hammer

## 2017-11-02 NOTE — SWALLOW BEDSIDE ASSESSMENT ADULT - COMMENTS
10/17 Nsg 6500cc brown gastric content from NGT into canister.  10/18/18 Neuro: Patient  s/p cardiac cath and LV assist device on systemic heparin gtt, code stroke 10/16 called for asymmetric pupils R>L in setting of PTT >200,  third nerve palsy. In the setting of elevated PTT, concern for brainstem ICH. Imaging shows: No evidence for acute territorial infarct or hemorrhage.  Patient noted to have recurrent  b/l upper extremity myoclonic movement, with concomitant twitching of B/L LE. EEG completed, findings indicate non-specific moderate to severe diffuse or multifocal cerebral dysfunction. There were no epileptiform abnormalities recorded. Despite negative capture of epileptiform activity, patient continues to demonstrate myoclonic activity.  10/21/17 Pt switched from Bipap to 4L Nasal canula. 10/22 Per MD, Pt remains extubated w/o supp oxygen requirements, however, unintelligible speech and weak swallow.  CXR: IMPRESSION: Small left pleural effusion with adjacent atelectasis.  10/24/17 Per NP: had [place] removed after his mixed venous sats from the [facility] correlated with the [place].  Per Nsg note: CVVHDF placed on hold due to high access pressures; Per Nsg: Pt in no acute distress Neuro check completed as ordered on initial assessment. Rt eye size 3mm slower reaction to light than left eye 3 mm; MD to f/u.  Blood cultures collected 10/19, aerobic bottle with gram variable beaded rods; PTT > 200. 10/24/17 CXR: IMPRESSION:  Unchanged left pleural effusion with left basilar atelectasis and/or infection. s/p bedside swallow evaluation which revealed an oropharyngeal dysphagia characterized by s/s of aspiration on the most conservative textures. Rx: NPO and re-assessment as status improved.

## 2017-11-02 NOTE — CONSULT NOTE ADULT - PROVIDER SPECIALTY LIST ADULT
Cardiology
ENT
Electrophysiology
Heart Failure
Heme/Onc
Infectious Disease
Nephrology
Rheumatology
Vascular Surgery
Wound Care
Neurology
Endocrinology

## 2017-11-02 NOTE — PROGRESS NOTE ADULT - ASSESSMENT
83M pmhx of AF on pradaxa initially presented on 10/15/17 with chest pain/diaphoresis/ dyspnea to OSH, IW-STEMI emergently transferred here for [facility] c/b VF arrest requiring IABP followed by pRCA stent c/b now acute right heart failure and cardiogenic shock s/p IABP (explanted 10/19), RP impella for RV support (explanted 10/20). TVP for symptomatic afib with slow VR.     - sepsis, unclear etiology of infection ?bacteremia with coag neg staph, cont abx  - continue tele  - monitor thresholds  - plan for ppm when infection clears    Maximilian Emery MD

## 2017-11-02 NOTE — CHART NOTE - NSCHARTNOTEFT_GEN_A_CORE
====================  NEW EVENTS:  ====================  No events o/n    ====================  SUMMARY:  ====================  84 yo M w/PMH of ISMAEL Johnson goiter who presented to [facility] c/o SOB found to have IWSTEMI, transferred to 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) and right groin pseudoaneurysm, sepsis secondary to coag negative staph and mycobacterial bacteremia and altered mental status    ====================  VITALS:  ====================    ICU Vital Signs Last 24 Hrs  T(C): 36.5 (01 Nov 2017 19:30), Max: 36.5 (01 Nov 2017 19:30)  T(F): 97.7 (01 Nov 2017 19:30), Max: 97.7 (01 Nov 2017 19:30)  HR: 96 (01 Nov 2017 23:00) (48 - 100)  BP: 76/63 (01 Nov 2017 15:00) (70/49 - 136/71)  BP(mean): 69 (01 Nov 2017 15:00) (51 - 111)  ABP: 104/52 (01 Nov 2017 23:00) (76/38 - 114/56)  ABP(mean): 70 (01 Nov 2017 23:00) (56 - 76)  RR: 21 (01 Nov 2017 23:00) (12 - 29)  SpO2: 100% (01 Nov 2017 23:00) (91% - 100%)      I&O's Summary    31 Oct 2017 07:01  -  01 Nov 2017 07:00  --------------------------------------------------------  IN: 3353 mL / OUT: 3331 mL / NET: 22 mL    01 Nov 2017 07:01  -  02 Nov 2017 00:09  --------------------------------------------------------  IN: 2350 mL / OUT: 2248 mL / NET: 102 mL        Adult Advanced Hemodynamics Last 24 Hrs  CVP(mm Hg): 15 (01 Nov 2017 19:30) (14 - 17)  CVP(cm H2O): --  CO: --  CI: --  PA: --  PA(mean): --  PCWP: --  SVR: --  SVRI: --  PVR: --  PVRI: --        ====================  LABS:  ====================                          7.7    22.7  )-----------( 435      ( 01 Nov 2017 23:39 )             24.7     11-01    139  |  103  |  37<H>  ----------------------------<  141<H>  5.2   |  24  |  1.44<H>    Ca    8.5      01 Nov 2017 12:35  Phos  2.5     11-01  Mg     2.5     11-01    TPro  6.0  /  Alb  3.1<L>  /  TBili  1.6<H>  /  DBili  x   /  AST  85<H>  /  ALT  57<H>  /  AlkPhos  89  11-01    PTT - ( 01 Nov 2017 23:39 )  PTT:64.6 sec    ABG - ( 31 Oct 2017 23:47 )  pH: 7.48  /  pCO2: 35    /  pO2: 116   / HCO3: 26    / Base Excess: 2.6   /  SaO2: 99                  ====================  PLAN:  ====================  - c/w CVVHD, keep pt euvolumic  - c/w hep gtt  - c/w pressors  - c/w Abx

## 2017-11-02 NOTE — CONSULT NOTE ADULT - CONSULT REQUESTED DATE/TIME
02-Nov-2017 10:21
16-Oct-2017 07:34
16-Oct-2017 09:00
16-Oct-2017 09:26
16-Oct-2017 11:06
25-Oct-2017
25-Oct-2017 11:26
26-Oct-2017 09:49
26-Oct-2017 10:00
26-Oct-2017 12:14
16-Oct-2017 12:01
26-Oct-2017 17:10

## 2017-11-02 NOTE — PROGRESS NOTE ADULT - SUBJECTIVE AND OBJECTIVE BOX
Patient is a 83y old  Male who presents with a chief complaint of SOB, chest pain, diaphoresis (15 Oct 2017 17:59)      Vascular Surgery Attending Progress Note    Interval HPI: pt w/o new c/o    Medications:  acetaminophen    Suspension 930 milliGRAM(s) Oral every 6 hours PRN  acetaminophen    Suspension. 930 milliGRAM(s) Oral every 4 hours PRN  ALBUTerol/ipratropium for Nebulization 3 milliLiter(s) Nebulizer every 6 hours PRN  aspirin  chewable 81 milliGRAM(s) Oral daily  atorvastatin 80 milliGRAM(s) Oral at bedtime  doxycycline IVPB      doxycycline IVPB 100 milliGRAM(s) IV Intermittent every 12 hours  haloperidol     Tablet 0.5 milliGRAM(s) Oral every 12 hours PRN  heparin  Infusion. 800 Unit(s)/Hr IV Continuous <Continuous>  heparin  Injectable 4000 Unit(s) IV Push every 6 hours PRN  imipenem/cilastatin  IVPB      imipenem/cilastatin  IVPB 250 milliGRAM(s) IV Intermittent every 6 hours  influenza   Vaccine 0.5 milliLiter(s) IntraMuscular once  lactobacillus acidophilus and bulgaricus Chewable 1 Tablet(s) Chew daily  levoFLOXacin IVPB 250 milliGRAM(s) IV Intermittent every 24 hours  norepinephrine Infusion 1 MICROgram(s)/kG/Min IV Continuous <Continuous>  pantoprazole  Injectable 40 milliGRAM(s) IV Push two times a day  risperiDONE   Tablet 0.5 milliGRAM(s) Oral every 12 hours  ticagrelor 90 milliGRAM(s) Oral two times a day  vancomycin  IVPB 500 milliGRAM(s) IV Intermittent every 24 hours      Vital Signs Last 24 Hrs  T(C): 36.7 (02 Nov 2017 16:00), Max: 36.7 (02 Nov 2017 16:00)  T(F): 98 (02 Nov 2017 16:00), Max: 98 (02 Nov 2017 16:00)  HR: 94 (02 Nov 2017 18:30) (74 - 118)  BP: --  BP(mean): --  RR: 21 (02 Nov 2017 18:30) (11 - 36)  SpO2: 100% (02 Nov 2017 18:30) (89% - 100%)  I&O's Summary    01 Nov 2017 07:01  -  02 Nov 2017 07:00  --------------------------------------------------------  IN: 3144 mL / OUT: 3281 mL / NET: -137 mL    02 Nov 2017 07:01  -  02 Nov 2017 18:52  --------------------------------------------------------  IN: 1428 mL / OUT: 1376 mL / NET: 52 mL        Physical Exam:  Neuro  A&Ox3 VSS  Vascular:  right groin hematoma stable     LABS:                        7.6    19.8  )-----------( 411      ( 02 Nov 2017 18:01 )             23.2     11-02    138  |  100  |  31<H>  ----------------------------<  117<H>  5.1   |  21<L>  |  1.37<H>    Ca    8.9      02 Nov 2017 18:01  Phos  3.5     11-02  Mg     2.6     11-02    TPro  6.2  /  Alb  2.8<L>  /  TBili  2.0<H>  /  DBili  x   /  AST  96<H>  /  ALT  65<H>  /  AlkPhos  95  11-02    PTT - ( 02 Nov 2017 05:25 )  PTT:70.1 sec    CHARLY CAMERON MD  [phone]

## 2017-11-02 NOTE — PROGRESS NOTE ADULT - PROBLEM SELECTOR PLAN 2
Patient with noted increasing proteinuria since UA on 6/2016 without an underlying etiology. Order urine spot protein/Cr ratio to quantify protein (once patient makes urine). Please repeat complements- C3, C4.   Renal sonogram reveals no hydronephrosis.      HBsAg Nonreact; HCV 0.15, Nonreact; HIV Nonreact; EYAD: titer 1:640 (elevated), pattern Homogeneous; dsDNA <12 ;C3 Complement 72; C4 Complement 14; Free Light Chains: kappa 15.20, lambda 10.50, ratio = 1.45. Immunofixation negative. ANCA negative. Awaiting rheumatology recs. Patient with noted increasing proteinuria since UA on 6/2016 without an underlying etiology. Order urine spot protein/Cr ratio to quantify protein (once patient makes urine). Please repeat complements- C3, C4.   Renal sonogram reveals no hydronephrosis.      HBsAg Nonreact; HCV 0.15, Nonreact; HIV Nonreact; EYAD: titer 1:640 (elevated), pattern Homogeneous; dsDNA <12 ;C3 Complement 72; C4 Complement 14; Free Light Chains: kappa 15.20, lambda 10.50, ratio = 1.45. Immunofixation negative. ANCA negative.

## 2017-11-02 NOTE — CONSULT NOTE ADULT - ASSESSMENT
Patient is an 89 yo M w/PMH of HTN, Afib, goiter who presented w/an inferior wall STEMI s/p cath c/b code/intubation and cardiogenic shock requiring impella with hospital course complicated by anuric DANITA requiring CVVHD, altered mental status, small left pleural effusion, and mycobacterium & coag negative staph bacteremia who is found to have an elevated EYAD.      Assessment and Plan: Patient is an 87 yo M w/PMH of HTN, Afib, CKD III, goiter who presented w/an inferior wall STEMI s/p cath c/b code/intubation and cardiogenic shock requiring impella with hospital course complicated by anuric DANITA requiring CVVHD, altered mental status, small left pleural effusion, and mycobacterium & coag negative staph bacteremia who is found to have an elevated EYAD in the setting of worsening renal failure.    Assessment and Plan:  1. Positive EYAD  - likely elevated secondary to age and in the setting of recent infection  - this is a nonspecific finding not suggestive of a rheumatologic disease at this time    2. Statin induced myopathy  - patient has a hx of myopathy with statin use  - patient had elevated CK on admission with highest value at 5331.  Pt also has weakness that started after start of statin.  - recommend checking myoglobin and LDH  - consider a different agent as an alternative to atorvastatin    3. DANITA on CKD   - renal failure likely multifactorial: ATN 2/2 hypotension, and IV contrast use  - not suggestive of rheumatologic pathology at this time    Discussed with attending    Molly Em, PGY-1  [phone] Patient is an 89 yo M w/PMH of HTN, Afib, CKD III, goiter who presented w/an inferior wall STEMI s/p cath c/b code/intubation and cardiogenic shock requiring impella with hospital course complicated by anuric DANITA requiring CVVHD, altered mental status, small left pleural effusion, and mycobacterium & coag negative staph bacteremia who is found to have an elevated EYAD in the setting of worsening renal failure.    Assessment and Plan:  1. Positive EYAD   - high EYAD is frequently elevated secondary to age and in the setting of recent infection  - this is a nonspecific finding  - anti-histone ab is also positive; however, this is a low titer value.    - In addition, patient is older male, lupus is uncommon in this age group and gender.   - The etiology of renal failure is impossible to establish without renal biopsy but it is unlikely to be seen with negative dsDNA.    2. Statin induced myopathy  - patient has a hx of myopathy with statin use  - patient had elevated CK on admission with highest value at 5331.  Pt also has weakness that started after start of statin.  - check myoglobin and LDH  - check HMGCR (send out lab), which is an ab for statin induced myopathy. Positive value would be an absolute contraindication to statin use.   - consider a different agent as an alternative to atorvastatin    Discussed with attending    Molly Em, PGY-1  [phone] Patient is an 89 yo M w/PMH of HTN, Afib, CKD III, goiter who presented w/an inferior wall STEMI s/p cath c/b code/intubation and cardiogenic shock requiring impella with hospital course complicated by anuric DANITA requiring CVVHD, altered mental status, small left pleural effusion, and mycobacterium & coag negative staph bacteremia who is found to have an elevated EYAD in the setting of worsening renal failure.    Assessment and Plan:  1. Positive EYAD   -  this is a nonspecific finding; high EYAD is frequently elevated in older individuals and in the setting of recent infection  - anti-histone ab is also positive; however, this is a low titer value.    - In addition, patient is older male, lupus is uncommon in this age group and gender.   - The etiology of renal failure is impossible to establish without renal biopsy but lupus is unlikely to be seen with negative dsDNA.    2. Statin induced myopathy  - patient has a hx of myopathy with statin use  - patient had elevated CK on admission with highest value at 5331.  Pt also has weakness that started after statin was started in the hospital.  - check myoglobin and LDH  - check HMGCR (send out lab), which is an antibody for statin induced myopathy. Positive value would be an absolute contraindication to statin use.   - consider a different agent as an alternative to atorvastatin    Discussed with attending    Molly Em, PGY-1  [phone] Patient is an 89 yo M w/PMH of HTN, Afib, CKD III, goiter , inferior wall STEMI s/p cath c/b code/intubation and cardiogenic shock requiring impella with hospital course complicated by anuric DANITA requiring CVVHD, altered mental status, small left pleural effusion, and mycobacterium & coag negative staph bacteremia who is found to have an elevated EYAD in the setting of worsening renal failure.    Assessment and Plan:  1. Positive EYAD   - this is a nonspecific finding; high EYAD is frequently elevated in older individuals and in the setting of recent infection  - anti-histone ab +  are  specific for drug induced , but the titer  is low and serological _+ is not sufficient for dx of drug induced lupus, in the absence of typical clinical features    The patient is older male, lupus is uncommon in this age group and gender. Moreover drug induced lupus rarely involves kidneys and usually presents with arthritis/ rash/ serositis  .  2. ARF. The etiology of renal failure is impossible to establish with certainty without renal biopsy.  Clinically seem to be unlikely in the absence of dsDNA and with normal Co.   Clinically ARF is likely due to ATN due to hemodynamic events / IV contrast      3. Likely Statin induced myopathy, considering that the patient has a hx of statin induced myopathy about 7 years ago and has muscle weakness and  elevated CK on this admission with highest value at 5331 after was started on statin during this admission  - check myoglobin and LDH  - check HMGCR (send out lab), which is an antibody for statin induced myopathy. Positive value would be an absolute contraindication to statin use.   - consider a different agent as an alternative to atorvastatin    Discussed with attending    Molly Em, PGY-1  [phone]

## 2017-11-02 NOTE — PROGRESS NOTE ADULT - SUBJECTIVE AND OBJECTIVE BOX
INFECTIOUS DISEASES FOLLOW UP--Marcus Cesar MD  Pager [phone]    This is a follow up note for this  83y Male with  ST elevation myocardial infarction (STEMI)  R groin PA----?infection.  BC with CNS and mycobacterium sp---?real      Further ROS:  CONSTITUTIONAL:  No fever, CVVH  CARDIOVASCULAR:  No chest pain or palpitations  RESPIRATORY:  No dyspnea  GASTROINTESTINAL:  No nausea, vomiting, diarrhea, or abdominal pain  GENITOURINARY:  No dysuria    Allergies  No Known Allergies    ANTIBIOTICS/RELEVANT:  antimicrobials  doxycycline IVPB      doxycycline IVPB 100 milliGRAM(s) IV Intermittent every 12 hours  imipenem/cilastatin  IVPB      imipenem/cilastatin  IVPB 250 milliGRAM(s) IV Intermittent every 6 hours  levoFLOXacin IVPB 250 milliGRAM(s) IV Intermittent every 24 hours  vancomycin  IVPB 500 milliGRAM(s) IV Intermittent every 24 hours    immunologic:  influenza   Vaccine 0.5 milliLiter(s) IntraMuscular once    OTHER:  acetaminophen    Suspension 930 milliGRAM(s) Oral every 6 hours PRN  acetaminophen    Suspension. 930 milliGRAM(s) Oral every 4 hours PRN  ALBUTerol/ipratropium for Nebulization 3 milliLiter(s) Nebulizer every 6 hours PRN  aspirin  chewable 81 milliGRAM(s) Oral daily  atorvastatin 80 milliGRAM(s) Oral at bedtime  haloperidol     Tablet 0.5 milliGRAM(s) Oral every 12 hours PRN  heparin  Infusion. 800 Unit(s)/Hr IV Continuous <Continuous>  heparin  Injectable 4000 Unit(s) IV Push every 6 hours PRN  lactobacillus acidophilus and bulgaricus Chewable 1 Tablet(s) Chew daily  norepinephrine Infusion 1 MICROgram(s)/kG/Min IV Continuous <Continuous>  oxyCODONE    5 mG/acetaminophen 325 mG 1 Tablet(s) Oral every 6 hours PRN  pantoprazole  Injectable 40 milliGRAM(s) IV Push two times a day  risperiDONE   Tablet 0.5 milliGRAM(s) Oral every 12 hours  ticagrelor 90 milliGRAM(s) Oral two times a day      Objective:  Vital Signs Last 24 Hrs  T(C): 36.4 (02 Nov 2017 02:30), Max: 36.5 (01 Nov 2017 19:30)  T(F): 97.6 (02 Nov 2017 02:30), Max: 97.7 (01 Nov 2017 19:30)  HR: 96 (02 Nov 2017 09:00) (70 - 118)  BP: 76/63 (01 Nov 2017 15:00) (76/63 - 118/72)  BP(mean): 69 (01 Nov 2017 15:00) (51 - 101)  RR: 23 (02 Nov 2017 09:00) (13 - 29)  SpO2: 99% (02 Nov 2017 09:00) (92% - 100%)    PHYSICAL EXAM:  Constitutional:no acute distress  Eyes:REGINALD, EOMI  Ear/Nose/Throat: no oral lesions, 	  Respiratory: clear BL  Cardiovascular: S1S2  Gastrointestinal:soft, (+) BS, no tenderness  Extremities:no e/e/c  No Lymphadenopathy  IV sites not inflammed.    LABS:                        7.9    20.1  )-----------( 450      ( 02 Nov 2017 05:25 )             24.7     11-02    139  |  102  |  39<H>  ----------------------------<  127<H>  4.7   |  24  |  1.47<H>    Ca    9.2      02 Nov 2017 05:25  Phos  2.4     11-02  Mg     2.5     11-02    TPro  6.2  /  Alb  3.0<L>  /  TBili  1.8<H>  /  DBili  x   /  AST  93<H>  /  ALT  62<H>  /  AlkPhos  94  11-02    PTT - ( 02 Nov 2017 05:25 )  PTT:70.1 sec    MICROBIOLOGY: no new + BC    Imp: not any worse clinically.  on abx vs. CNS and mycobacterium  I wonder if the R groin should be explored by vascular surgery????  Please address with them....I'm wondering about the possibility that he could have infective arteritis......

## 2017-11-02 NOTE — SWALLOW BEDSIDE ASSESSMENT ADULT - SLP PERTINENT HISTORY OF CURRENT PROBLEM
83/M with Afib on Pradaxa who came to [facility] c/o SOB found to have IWSTEMI, transferred to [facility] for cath, h/c by VF arrest requiring IABP, RCA stent & SWAN, h/c c/b cardiogenic shock 2/2 right heart failure s/p impella (now removed), w/ h/c c/b renal failure requiring CVVH, Pt extubated (10/21/17). SEE HISTORY IN ADDENDUM BELOW

## 2017-11-02 NOTE — SWALLOW BEDSIDE ASSESSMENT ADULT - ADDITIONAL RECOMMENDATIONS
This service will follow up to assess the oropharyngeal swallow mechanism and for candidacy for PO intake.  Maintain good oral hygiene. This service will follow up to assess the oropharyngeal swallow mechanism and for candidacy for PO intake as appropriate.   Maintain good oral hygiene.

## 2017-11-02 NOTE — PROGRESS NOTE ADULT - SUBJECTIVE AND OBJECTIVE BOX
CONTACT INFO:  JADEN Brantley  Pager:  [phone]      HPI / INTERVAL HISTORY:    Overnight, patient became confused and pulled out his NG tube. Attempt to replace it this morning unsuccessful - concern it may have been going into the bronchus, so removed. Patient also with increasing hypotension requiring increasing doses of Levophed for BP support.    OBJECTIVE:  VITAL SIGNS:  ICU Vital Signs Last 24 Hrs  T(C): 36.4 (02 Nov 2017 02:30), Max: 36.5 (01 Nov 2017 19:30)  T(F): 97.6 (02 Nov 2017 02:30), Max: 97.7 (01 Nov 2017 19:30)  HR: 96 (02 Nov 2017 07:30) (70 - 118)  BP: 76/63 (01 Nov 2017 15:00) (76/63 - 131/86)  BP(mean): 69 (01 Nov 2017 15:00) (51 - 109)  ABP: 78/50 (02 Nov 2017 07:30) (66/48 - 114/56)  ABP(mean): 62 (02 Nov 2017 07:30) (56 - 76)  RR: 21 (02 Nov 2017 07:30) (13 - 29)  SpO2: 100% (02 Nov 2017 07:30) (91% - 100%)      11-01 @ 07:01  -  11-02 @ 07:00  --------------------------------------------------------  IN: 3144 mL / OUT: 3281 mL / NET: -137 mL      PHYSICAL EXAM:  Gen: NAD  HEENT: NC/AT; PERRL, anicteric sclera  Neck: supple, no JVD  Resp: clear to ausculation anteriorly  Cardiovasc: S1S2 normal; RRR; no murmurs, rubs or gallops  GI: soft, nondistended, nontender; +BS  Extr: warm, well-perfused, PT/DP pulses 2+ B/L; no LE edema  Skin: normal color and turgor  Neuro: arousable, awake and oriented    LABS:                        7.9    20.1  )-----------( 450      ( 02 Nov 2017 05:25 )             24.7     11-02    139  |  102  |  39<H>  ----------------------------<  127<H>  4.7   |  24  |  1.47<H>    Ca    9.2      02 Nov 2017 05:25  Phos  2.4     11-02  Mg     2.5     11-02    TPro  6.2  /  Alb  3.0<L>  /  TBili  1.8<H>  /  DBili  x   /  AST  93<H>  /  ALT  62<H>  /  AlkPhos  94  11-02    LIVER FUNCTIONS - ( 02 Nov 2017 05:25 )  Alb: 3.0 g/dL / Pro: 6.2 g/dL / ALK PHOS: 94 U/L / ALT: 62 U/L RC / AST: 93 U/L / GGT: x           PTT - ( 02 Nov 2017 05:25 )  PTT:70.1 sec    RADIOLOGY & ADDITIONAL TESTS:     MEDICATIONS:  acetaminophen    Suspension 930 milliGRAM(s) Oral every 6 hours PRN  acetaminophen    Suspension. 930 milliGRAM(s) Oral every 4 hours PRN  ALBUTerol/ipratropium for Nebulization 3 milliLiter(s) Nebulizer every 6 hours PRN  aspirin  chewable 81 milliGRAM(s) Oral daily  atorvastatin 80 milliGRAM(s) Oral at bedtime  doxycycline IVPB      doxycycline IVPB 100 milliGRAM(s) IV Intermittent every 12 hours  haloperidol     Tablet 0.5 milliGRAM(s) Oral every 12 hours PRN  heparin  Infusion. 800 Unit(s)/Hr IV Continuous <Continuous>  heparin  Injectable 4000 Unit(s) IV Push every 6 hours PRN  imipenem/cilastatin  IVPB      imipenem/cilastatin  IVPB 250 milliGRAM(s) IV Intermittent every 6 hours  influenza   Vaccine 0.5 milliLiter(s) IntraMuscular once  lactobacillus acidophilus and bulgaricus Chewable 1 Tablet(s) Chew daily  levoFLOXacin IVPB 250 milliGRAM(s) IV Intermittent every 24 hours  norepinephrine Infusion 1 MICROgram(s)/kG/Min IV Continuous <Continuous>  oxyCODONE    5 mG/acetaminophen 325 mG 1 Tablet(s) Oral every 6 hours PRN  pantoprazole  Injectable 40 milliGRAM(s) IV Push two times a day  potassium phosphate IVPB 15 milliMole(s) IV Intermittent once  risperiDONE   Tablet 0.5 milliGRAM(s) Oral every 12 hours  ticagrelor 90 milliGRAM(s) Oral two times a day  vancomycin  IVPB 500 milliGRAM(s) IV Intermittent every 24 hours      ALLERGIES:  No Known Allergies

## 2017-11-02 NOTE — PROGRESS NOTE ADULT - ASSESSMENT
83 year old male with a PMH of atrial fibrillation (on Pradaxa), HTN, Thyroid Goiter, CKD stage III (dx 2016) was seen at [facility] with inferior wall STEMI and was brought to [facility] for cardiac catherization s/p PCI x 1 now with DANITA and Anuria.

## 2017-11-02 NOTE — SWALLOW BEDSIDE ASSESSMENT ADULT - SLP GENERAL OBSERVATIONS
Pt received in bed. +CVVHD. Lethargic and restless. Pulling at lines. Spouse at the bedside supervising pt.

## 2017-11-02 NOTE — PROGRESS NOTE ADULT - ATTENDING COMMENTS
The patient seems to be moving more into a cardiogenic shock picture with rising CVP and low mixed 02.  Continue CVVHDF for now with goal of net negative fluid balance.  I forsee that dialysis will be required for some time as there is no evidence of renal recovery yet.  Recommend tunneled hd catheter.  discussed with primary team.

## 2017-11-02 NOTE — PROGRESS NOTE ADULT - SUBJECTIVE AND OBJECTIVE BOX
Interval events:  No failure to capture overnight  Remains afib 100s with PVCs and NSVT  Patient is alert and conversant this morning, feeling better  Threshold of TVP 1.5 mV, settings HR 60 output 10 mV    Review Of Systems:  Constitutional: [ ] Fever [ ] Chills [ ] Fatigue [ ] Weight change   HEENT: [ ] Blurred vision [ ] Eye Pain [ ] Headache [ ] Runny nose [ ] Sore Throat   Respiratory: [ ] Cough [ ] Wheezing [ ] Shortness of breath  Cardiovascular: [ ] Chest Pain [ ] Palpitations [ ] JOE [ ] PND [ ] Orthopnea  Gastrointestinal: [ ] Abdominal Pain [ ] Diarrhea [ ] Constipation [ ] Hemorrhoids [ ] Nausea [ ] Vomiting  Genitourinary: [ ] Nocturia [ ] Dysuria [ ] Incontinence  Extremities: [ ] Swelling [ ] Joint Pain  Neurologic: [ ] Focal deficit [ ] Paresthesias [ ] Syncope  Lymphatic: [ ] Swelling [ ] Lymphadenopathy   Skin: [ ] Rash [ ] Ecchymoses [ ] Wounds [ ] Lesions  Psychiatry: [ ] Depression [ ] Suicidal/Homicidal Ideation [ ] Anxiety [ ] Sleep Disturbances  [ ] 10 point review of systems is otherwise negative except as mentioned above            [ ]Unable to obtain    Medications:  acetaminophen    Suspension 930 milliGRAM(s) Oral every 6 hours PRN  acetaminophen    Suspension. 930 milliGRAM(s) Oral every 4 hours PRN  ALBUTerol/ipratropium for Nebulization 3 milliLiter(s) Nebulizer every 6 hours PRN  aspirin  chewable 81 milliGRAM(s) Oral daily  atorvastatin 80 milliGRAM(s) Oral at bedtime  doxycycline IVPB      doxycycline IVPB 100 milliGRAM(s) IV Intermittent every 12 hours  haloperidol     Tablet 0.5 milliGRAM(s) Oral every 12 hours PRN  heparin  Infusion. 800 Unit(s)/Hr IV Continuous <Continuous>  heparin  Injectable 4000 Unit(s) IV Push every 6 hours PRN  imipenem/cilastatin  IVPB      imipenem/cilastatin  IVPB 250 milliGRAM(s) IV Intermittent every 6 hours  influenza   Vaccine 0.5 milliLiter(s) IntraMuscular once  lactobacillus acidophilus and bulgaricus Chewable 1 Tablet(s) Chew daily  levoFLOXacin IVPB 250 milliGRAM(s) IV Intermittent every 24 hours  norepinephrine Infusion 1 MICROgram(s)/kG/Min IV Continuous <Continuous>  oxyCODONE    5 mG/acetaminophen 325 mG 1 Tablet(s) Oral every 6 hours PRN  pantoprazole  Injectable 40 milliGRAM(s) IV Push two times a day  risperiDONE   Tablet 0.5 milliGRAM(s) Oral every 12 hours  ticagrelor 90 milliGRAM(s) Oral two times a day  vancomycin  IVPB 500 milliGRAM(s) IV Intermittent every 24 hours    PMH/PSH/FH/SH: [ ] Unchanged  Vitals:  T(C): 36.4 (17 @ 02:30), Max: 36.5 (17 @ 19:30)  HR: 96 (17 @ 09:00) (70 - 118)  BP: 76/63 (17 @ 15:00) (76/63 - 118/72)  BP(mean): 69 (17 @ 15:00) (51 - 101)  RR: 23 (17 @ 09:00) (13 - 29)  SpO2: 99% (17 @ 09:00) (92% - 100%)  Wt(kg): --  Daily     Daily Weight in k.5 (2017 02:00)  I&O's Summary    2017 07:01  -  2017 07:00  --------------------------------------------------------  IN: 3144 mL / OUT: 3281 mL / NET: -137 mL    2017 07:  -  2017 09:58  --------------------------------------------------------  IN: 145 mL / OUT: 108 mL / NET: 37 mL        Physical Exam:  Appearance:  Normal, NAD  Eyes: PERRL, EOMI  HENT: Normal oral [place] NC/AT  Cardiovascular: S1, S2, RRR, No m/r/g appreciated, No edema, no elevation in JVP  Procedural Access Site: R IJ HD cath cdi, LIJ TVP cdi  Respiratory: Clear to auscultation bilaterally  Gastrointestinal: Soft, Non-tender, Non-distended, BS+  Musculoskeletal:  No clubbing, No joint deformity   Neurologic: Non-focal  Lymphatic: No lymphadenopathy  Psychiatry: AAOx3, Mood & affect appropriate  Skin: No rashes, No ecchymoses, No cyanosis    Labs:                        7.9    20.1  )-----------( 450      ( 2017 05:25 )             24.7     11-    139  |  102  |  39<H>  ----------------------------<  127<H>  4.7   |  24  |  1.47<H>    Ca    9.2      2017 05:25  Phos  2.4     -  Mg     2.5         TPro  6.2  /  Alb  3.0<L>  /  TBili  1.8<H>  /  DBili  x   /  AST  93<H>  /  ALT  62<H>  /  AlkPhos  94  11-    PTT - ( 2017 05:25 )  PTT:70.1 sec    Interpretation of Telemetry:  afib 100s with PVCs and NSVT

## 2017-11-02 NOTE — PROGRESS NOTE ADULT - SUBJECTIVE AND OBJECTIVE BOX
[facility] DIVISION OF KIDNEY DISEASES AND HYPERTENSION -- FOLLOW UP NOTE  --------------------------------------------------------------------------------    HPI: 83 year old male with a PMH of atrial fibrillation (on Pradaxa), HTN, Thyroid Goiter, CKD stage III (dx 2016) with inferior wall STEMI s/p cardiac catherization s/p PCI x 1 VF arrest s/p IABP and s/p impella course with cardiogenic shock. Patient found to be bacteremic and has a stable L femoral pseudoaneurysm. Patient with DANITA and anuria and remains on CVVHDF overnight without complications. Patient remains on IV high dose pressor support overnight.    PAST HISTORY  --------------------------------------------------------------------------------  No significant changes to PMH, PSH, FHx, SHx, unless otherwise noted    ALLERGIES & MEDICATIONS  --------------------------------------------------------------------------------  Allergies    No Known Allergies    Intolerances      Standing Inpatient Medications  aspirin  chewable 81 milliGRAM(s) Oral daily  atorvastatin 80 milliGRAM(s) Oral at bedtime  doxycycline IVPB      doxycycline IVPB 100 milliGRAM(s) IV Intermittent every 12 hours  heparin  Infusion. 800 Unit(s)/Hr IV Continuous <Continuous>  imipenem/cilastatin  IVPB      imipenem/cilastatin  IVPB 250 milliGRAM(s) IV Intermittent every 6 hours  influenza   Vaccine 0.5 milliLiter(s) IntraMuscular once  lactobacillus acidophilus and bulgaricus Chewable 1 Tablet(s) Chew daily  levoFLOXacin IVPB 250 milliGRAM(s) IV Intermittent every 24 hours  norepinephrine Infusion 1 MICROgram(s)/kG/Min IV Continuous <Continuous>  pantoprazole  Injectable 40 milliGRAM(s) IV Push two times a day  risperiDONE   Tablet 0.5 milliGRAM(s) Oral every 12 hours  ticagrelor 90 milliGRAM(s) Oral two times a day  vancomycin  IVPB 500 milliGRAM(s) IV Intermittent every 24 hours    PRN Inpatient Medications  acetaminophen    Suspension 930 milliGRAM(s) Oral every 6 hours PRN  acetaminophen    Suspension. 930 milliGRAM(s) Oral every 4 hours PRN  ALBUTerol/ipratropium for Nebulization 3 milliLiter(s) Nebulizer every 6 hours PRN  haloperidol     Tablet 0.5 milliGRAM(s) Oral every 12 hours PRN  heparin  Injectable 4000 Unit(s) IV Push every 6 hours PRN  oxyCODONE    5 mG/acetaminophen 325 mG 1 Tablet(s) Oral every 6 hours PRN      REVIEW OF SYSTEMS  --------------------------------------------------------------------------------  Gen: No weakness  Skin: No rashes  Head/Eyes/Ears/Mouth: No headache  Respiratory: No dyspnea  CV: No chest pain  GI: No abdominal pain  MSK: No edema  Neuro: No dizziness/lightheadedness  Heme: No bleeding    All other systems were reviewed and are negative, except as noted.    VITALS/PHYSICAL EXAM  --------------------------------------------------------------------------------  T(C): 36.4 (11-02-17 @ 10:00), Max: 36.5 (11-01-17 @ 19:30)  HR: 100 (11-02-17 @ 10:00) (70 - 118)  BP: 76/63 (11-01-17 @ 15:00) (76/63 - 118/72)  RR: 18 (11-02-17 @ 10:00) (13 - 27)  SpO2: 92% (11-02-17 @ 10:00) (92% - 100%)  Wt(kg): --        11-01-17 @ 07:01  -  11-02-17 @ 07:00  --------------------------------------------------------  IN: 3144 mL / OUT: 3281 mL / NET: -137 mL    11-02-17 @ 07:01  -  11-02-17 @ 11:10  --------------------------------------------------------  IN: 384 mL / OUT: 108 mL / NET: 276 mL      Physical Exam:  	Gen: Awake  	Pulm: decreased breath sounds b/l  	CV: RRR, S1S2  	Abd: soft, NT, ND   	: No suprapubic tenderness  	UE: Warm,  no edema; no asterixis  	LE: b/l LE edema              Vascular Access: + RIJ non-tunnelled HD catheter     LABS/STUDIES  --------------------------------------------------------------------------------              7.9    20.1  >-----------<  450      [11-02-17 @ 05:25]              24.7     139  |  102  |  39  ----------------------------<  127      [11-02-17 @ 05:25]  4.7   |  24  |  1.47        Ca     9.2     [11-02-17 @ 05:25]      Mg     2.5     [11-02-17 @ 05:25]      Phos  2.4     [11-02-17 @ 05:25]    TPro  6.2  /  Alb  3.0  /  TBili  1.8  /  DBili  x   /  AST  93  /  ALT  62  /  AlkPhos  94  [11-02-17 @ 05:25]      PTT: 70.1       [11-02-17 @ 05:25]      Creatinine Trend:  SCr 1.47 [11-02 @ 05:25]  SCr 1.44 [11-01 @ 12:35]  SCr 1.50 [11-01 @ 05:35]  SCr 1.50 [10-31 @ 23:50]  SCr 1.56 [10-31 @ 09:31]    Urinalysis - [10-15-17 @ 16:48]      Color Yellow / Appearance Turbid / SG >1.030 / pH 6.5      Gluc 50 / Ketone Negative  / Bili Negative / Urobili Negative       Blood Moderate / Protein >600 / Leuk Est Negative / Nitrite Negative      RBC >50 / WBC 3-5 / Hyaline  / Gran  / Sq Epi  / Non Sq Epi OCC / Bacteria Few      HbA1c 5.4      [10-15-17 @ 21:32]  TSH 0.05      [10-25-17 @ 13:21]  Lipid: chol 129, TG 45, HDL 41, LDL 79      [10-15-17 @ 21:40]    HBsAg Nonreact      [10-19-17 @ 07:25]  HCV 0.15, Nonreact      [10-19-17 @ 07:25]  HIV Nonreact      [10-19-17 @ 07:25]    EYAD: titer 1:640, pattern Homogeneous      [10-31-17 @ 05:04]  dsDNA <12      [10-31-17 @ 05:04]  C3 Complement 95      [10-31-17 @ 05:04]  C4 Complement 17      [10-31-17 @ 05:04]  ANCA: cANCA Negative, pANCA Negative, atypical ANCA Indeterminate Method interference due to EYAD fluorescence      [10-31-17 @ 05:04]  Free Light Chains: kappa 15.20, lambda 10.50, ratio = 1.45      [10-19 @ 07:25]  Immunofixation Serum:   No Monoclonal Band Identified      [10-24-17 @ 13:31]  SPEP Interpretation: Hypoalbuminemia Increased beta fraction due to probable fibrinogen presence.      [10-24-17 @ 12:31]

## 2017-11-02 NOTE — PROGRESS NOTE ADULT - PROBLEM SELECTOR PLAN 1
DANITA on CKD stage III. DANITA may be multifactorial in the setting of contrast (cardiac cath), ATN from hypotension/ cardiac arrest. Patient also received IV contrast on 10/29/17.   Patient remains on CVVHDF due to Anuria. Plan to continue patient on CVVHDF until patient is hemodynamically stable for switch to intermittent HD. Patient remains on high dose pressor support today.   Continue to check daily serum phosphorus (may be low in the use of CVVHDF). Continue to monitor intake/output, BMP and urine output. Avoid NSAIDs, RCA and nephrotoxins. Renally dose all medications  Renal ultrasound reveals no hydronephrosis bilaterally.

## 2017-11-02 NOTE — PROGRESS NOTE ADULT - ASSESSMENT
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) and right groin pseudoaneurysm, sepsis secondary to coag negative staph and mycobacterial bacteremia and altered mental status    # Neuro  - awake and alert this morning, aware he is at [facility]  - continues to have generalized shaking, previously a code stoke was called and head CT did not show new acute findings  - repeat head CT was done given the focal neurological deficits but it was unchanged  - EEG did not show signs of seizure  - Neurology team following  - overnight, confused, likely delirium as he waxes and wanes    # CV  - CAD s/p RCA stent - c/w ASA, Brillinta, Lipitor  - Atrial fibrillation - previously on amiodarone load, however, due to tachy-isabelle, amiodarone has been on hold  - Cardiogenic shock s/p impella removed 10/20  - Tachy-isabelle s/p TVP (10/25), patient will eventually need a permanent PPM, pending clearance of infection  - TTE (11/1) at least moderately reduced LVSF, RV enlargement w/decreased RVSF; SV 63cc (HR 70, LVOT diameter 2.4cm, LVOT VTI 13cm)  - consider restarting inotropic therapy given worsening heart failure as possible etiology of worsening hypotension    #Respiratory  - CXR Small left pleural effusion with adjacent atelectasis  - 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, patient now s/p decadron  - Slightly tachypneic, but comfortable   - Continue suctioning as needed for increased secretions     #GI  - NGT pulled out by patient overnight, will attempt to replace it again today  - dysphagia eval will be repeated once patient improves    #Metabolic/Renal  - continue CVVH per nephrology, net even since yesterday, currently not removing fluid  - patient with increasing proteinuria  - f/u rheum work up as below, nephrology recommendations  - Patient is anuric at this time, will continue CVVH until HD stable  - renal US (11/1) no hydronephrosis, bilateral renal cysts    #Rheum  - patient with positive EYAD 1:640, pattern Homogeneous  - dsDNA <12 ;C3 Complement 72; C4 Complement 1  - anti-ribonuclear protein, C3, C4, dsDNA, anti-histone ab and ANCA sent - all negative  - f/u rheumatology consult    #Heme  - H&H stable  - Metamyelocytes and blasts on CBC  - Heme consulted for peripheral smear - many myelocytes seen as well as bands, suggestive of a left-shifted peripheral blood because of inflammation/infection ill state  - BCR/ABL negative  - continue to treat infection as below    # Vascular  - Patient with right groin pseudoaneurysm evaluated by vascular surgery  - no neck visible on imagining, unable to inject thrombin  - per vascular surgery, will repeat groin imagining in 1 week  - continue to closely monitor  - wound care following  - b/l LE dopplers (10/30) no evidence of DVT    # ID  - Blood culture gram variable beaded rods +acid fast, 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 Baldomero Roberson in [place])

## 2017-11-03 NOTE — CHART NOTE - NSCHARTNOTEFT_GEN_A_CORE
Patient pulled his cordis and TVP out earlier this evening. This likely occurred out of agitation and delirium which appears to have been an ongoing issue. I called his wife to discuss what had happened. In the setting of a relatively consistent tachycardia (Afib w/ RVR), no recent pauses/bradycardia and agitation (which will make placing the TVP difficult) we will monitor the patient without a TVP. Pacer pads are in place. Discussed with Dr. Wadsworth.

## 2017-11-03 NOTE — PROGRESS NOTE ADULT - PROVIDER SPECIALTY LIST ADULT
Anesthesia
CCU
Cardiology
Cardiology
ENT
Electrophysiology
Heart Failure
Infectious Disease
Nephrology
Neurology
Vascular Surgery
Wound Care
CCU
CCU
Electrophysiology
Heart Failure
Nephrology
Nephrology
Electrophysiology
Electrophysiology
Heart Failure
Heart Failure
Neurology
Neurology
Vascular Surgery
Heart Failure
Nephrology

## 2017-11-03 NOTE — PROGRESS NOTE ADULT - SUBJECTIVE AND OBJECTIVE BOX
INFECTIOUS DISEASES FOLLOW UP--Marcus Cesar MD  Pager [phone]    This is a follow up note for this  83y Male with  bacteremia.  remains on cvvh  remains delirious.    Further ROS:  CONSTITUTIONAL:  No fever, good appetite  CARDIOVASCULAR:  No chest pain or palpitations  RESPIRATORY:  No dyspnea  GASTROINTESTINAL:  No nausea, vomiting, diarrhea, or abdominal pain  GENITOURINARY:  No dysuria  NEUROLOGIC:  No headache,     Allergies  No Known Allergies    ANTIBIOTICS/RELEVANT:  antimicrobials  doxycycline IVPB      doxycycline IVPB 100 milliGRAM(s) IV Intermittent every 12 hours  imipenem/cilastatin  IVPB      imipenem/cilastatin  IVPB 250 milliGRAM(s) IV Intermittent every 6 hours  levoFLOXacin IVPB 250 milliGRAM(s) IV Intermittent every 24 hours  vancomycin  IVPB 500 milliGRAM(s) IV Intermittent every 24 hours    immunologic:  influenza   Vaccine 0.5 milliLiter(s) IntraMuscular once    OTHER:  acetaminophen    Suspension 930 milliGRAM(s) Oral every 6 hours PRN  acetaminophen    Suspension. 930 milliGRAM(s) Oral every 4 hours PRN  ALBUTerol/ipratropium for Nebulization 3 milliLiter(s) Nebulizer every 6 hours PRN  aspirin  chewable 81 milliGRAM(s) Oral daily  atorvastatin 80 milliGRAM(s) Oral at bedtime  haloperidol     Tablet 0.5 milliGRAM(s) Oral every 12 hours PRN  heparin  Infusion. 800 Unit(s)/Hr IV Continuous <Continuous>  heparin  Injectable 4000 Unit(s) IV Push every 6 hours PRN  lactobacillus acidophilus and bulgaricus Chewable 1 Tablet(s) Chew daily  norepinephrine Infusion 1 MICROgram(s)/kG/Min IV Continuous <Continuous>  pantoprazole  Injectable 40 milliGRAM(s) IV Push two times a day  risperiDONE   Tablet 0.5 milliGRAM(s) Oral every 12 hours  ticagrelor 90 milliGRAM(s) Oral two times a day    Objective:  Vital Signs Last 24 Hrs  T(C): 36.3 (03 Nov 2017 08:00), Max: 36.7 (02 Nov 2017 16:00)  T(F): 97.4 (03 Nov 2017 08:00), Max: 98 (02 Nov 2017 16:00)  HR: 102 (03 Nov 2017 09:30) (74 - 118)  BP: --  BP(mean): --  RR: 18 (03 Nov 2017 09:30) (10 - 36)  SpO2: 100% (03 Nov 2017 09:30) (89% - 100%)    PHYSICAL EXAM:  Constitutional:no acute distress  Eyes:REGINALD, EOMI  Ear/Nose/Throat: no oral lesions, 	  Respiratory: clear BL  Cardiovascular: S1S2  Gastrointestinal:soft, (+) BS, no tenderness  Extremities:no e/e/c  No Lymphadenopathy  IV sites not inflammed.    LABS:                        8.2    18.1  )-----------( 378      ( 03 Nov 2017 05:38 )             25.9     11-03    139  |  102  |  28<H>  ----------------------------<  90  5.2   |  25  |  1.29    Ca    8.9      03 Nov 2017 05:38  Phos  2.7     11-03  Mg     2.6     11-03    TPro  6.2  /  Alb  2.9<L>  /  TBili  2.5<H>  /  DBili  x   /  AST  95<H>  /  ALT  65<H>  /  AlkPhos  94  11-03    PT/INR - ( 03 Nov 2017 05:38 )   PT: 15.7 sec;   INR: 1.43 ratio         PTT - ( 03 Nov 2017 05:38 )  PTT:109.0 sec    MICROBIOLOGY: repeat BC neg    Imp:  delirious  bacteremia  r/o infected endarteritis R groin    Rx:  continue abx  vascular f/u

## 2017-11-03 NOTE — PROGRESS NOTE ADULT - ASSESSMENT
83M pmhx of AF on pradaxa initially presented on 10/15/17 with chest pain/diaphoresis/ dyspnea to OSH, IW-STEMI emergently transferred here for [facility] c/b VF arrest requiring IABP followed by pRCA stent c/b now acute right heart failure and cardiogenic shock s/p IABP (explanted 10/19), RP impella for RV support (explanted 10/20). TVP for symptomatic afib with slow VR.     - sepsis, unclear etiology of infection ?bacteremia with coag neg staph, cont abx  - continue tele  - monitor thresholds  - plan for ppm when infection clears    Maximilian Emery MD

## 2017-11-03 NOTE — PROGRESS NOTE ADULT - ASSESSMENT
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) and right groin pseudoaneurysm, sepsis secondary to coag negative staph and mycobacterial bacteremia and altered mental status    # Neuro  -   - continues to have generalized shaking, previously a code stoke was called and head CT did not show new acute findings  - repeat head CT was done given the focal neurological deficits but it was unchanged  - EEG did not show signs of seizure  - Neurology team following  - overnight, confused, likely delirium as he waxes and wanes    # CV  - CAD s/p RCA stent - c/w ASA, Brillinta, Lipitor  - Atrial fibrillation - previously on amiodarone load, however, due to tachy-isabelle, amiodarone has been on hold  - Cardiogenic shock s/p impella removed 10/20  - Tachy-isabelle s/p TVP (10/25), patient will eventually need a permanent PPM, pending clearance of infection  - TTE (11/1) at least moderately reduced LVSF, RV enlargement w/decreased RVSF; SV 63cc (HR 70, LVOT diameter 2.4cm, LVOT VTI 13cm)  - consider restarting inotropic therapy given worsening heart failure as possible etiology of worsening hypotension    #Respiratory  - CXR Small left pleural effusion with adjacent atelectasis  - 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, patient now s/p decadron  - Slightly tachypneic, but comfortable   - Continue suctioning as needed for increased secretions     #GI  - patient NPO until further recommendations by S&S  - dysphagia eval will be repeated once patient improves    #Metabolic/Renal  - continue CVVH per nephrology, net even since yesterday, currently not removing fluid  - patient with increasing proteinuria  - f/u rheum work up as below, nephrology recommendations  - Patient is anuric at this time, will continue CVVH until HD stable  - renal US (11/1) no hydronephrosis, bilateral renal cysts    #Rheum  - patient with positive EYAD 1:640, pattern Homogeneous  - dsDNA <12 ;C3 Complement 72; C4 Complement 1  - anti-ribonuclear protein, C3, C4, dsDNA, and ANCA sent - all negative  - anti-histone ab positive at 1.7  - f/u rheumatology consult - very low suspicion for drug-induced lupus as rarely involves kidney and usually presents with arthralgias and serositis  - will check myoglobin, LDH, HMGCR to r/o statin-induced myopathy    #Heme  - H&H improved s/p 1 UpRBC transfusion 11/2  - Metamyelocytes and blasts on CBC  - Heme consulted for peripheral smear - many myelocytes seen as well as bands, suggestive of a left-shifted peripheral blood because of inflammation/infection ill state  - BCR/ABL negative  - continue to treat infection as below    # Vascular  - Patient with right groin pseudoaneurysm evaluated by vascular surgery  - no neck visible on imagining, unable to inject thrombin  - per vascular surgery, will repeat groin imagining in 1 week  - continue to closely monitor  - wound care following  - b/l LE dopplers (10/30) no evidence of DVT    # ID  - Blood culture gram variable beaded rods +acid fast, 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) and right groin pseudoaneurysm, sepsis secondary to coag negative staph and mycobacterial bacteremia and altered mental status    # Neuro  - awake and alert, period of delirium this morning resolved with redirection  - continues to have generalized shaking, previously a code stoke was called and head CT did not show new acute findings  - repeat head CT was done given the focal neurological deficits but it was unchanged  - EEG did not show signs of seizure  - Neurology team following    # CV  - CAD s/p RCA stent - c/w ASA, Brillinta, Lipitor  - Atrial fibrillation - previously on amiodarone load, however, due to tachy-isabelle, amiodarone has been on hold  - Cardiogenic shock s/p impella removed 10/20  - Tachy-isabelle s/p TVP (10/25), patient will eventually need a permanent PPM, pending clearance of infection  - TTE (11/1) at least moderately reduced LVSF, RV enlargement w/decreased RVSF; SV 63cc (HR 70, LVOT diameter 2.4cm, LVOT VTI 13cm)  - patient did not respond to dobutamine yesterday, monitoring off    #Respiratory  - CXR Small left pleural effusion with adjacent atelectasis  - 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, patient now s/p decadron  - Slightly tachypneic, but comfortable   - Continue suctioning as needed for increased secretions     #GI  - patient NPO until further recommendations by S&S - will likely need FEEST w/ENT    #Metabolic/Renal  - continue CVVH per nephrology, net even since yesterday, currently not removing fluid  - patient with increasing proteinuria  - f/u rheum work up as below, nephrology recommendations  - Patient is anuric at this time, will continue CVVH until HD stable  - renal US (11/1) no hydronephrosis, bilateral renal cysts    #Rheum  - patient with positive EYAD 1:640, pattern Homogeneous  - dsDNA <12 ;C3 Complement 72; C4 Complement 1  - anti-ribonuclear protein, C3, C4, dsDNA, and ANCA sent - all negative  - anti-histone ab positive at 1.7  - f/u rheumatology consult - very low suspicion for drug-induced lupus as rarely involves kidney and usually presents with arthralgias and serositis  - will check myoglobin, LDH, HMGCR to r/o statin-induced myopathy    #Heme  - H&H improved s/p 1 UpRBC transfusion 11/2  - Metamyelocytes and blasts on CBC  - Heme consulted for peripheral smear - many myelocytes seen as well as bands, suggestive of a left-shifted peripheral blood because of inflammation/infection ill state  - BCR/ABL negative  - continue to treat infection as below    # Vascular  - Patient with right groin pseudoaneurysm evaluated by vascular surgery  - no neck visible on imagining, unable to inject thrombin  - per vascular surgery, will repeat groin imagining in 1 week  - continue to closely monitor  - wound care following  - b/l LE dopplers (10/30) no evidence of DVT    # ID  - Blood culture gram variable beaded rods +acid fast, 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) and right groin pseudoaneurysm, sepsis secondary to coag negative staph and mycobacterial bacteremia and altered mental status    # Neuro  - awake and alert, period of delirium this morning resolved with redirection  - continues to have generalized shaking, previously a code stoke was called and head CT did not show new acute findings  - repeat head CT was done given the focal neurological deficits but it was unchanged  - EEG did not show signs of seizure  - Neurology team following    # CV  - CAD s/p RCA stent - c/w ASA, Brillinta, Lipitor  - Atrial fibrillation - previously on amiodarone load, however, due to tachy-isabelle, amiodarone has been on hold  - Cardiogenic shock s/p impella removed 10/20  - Tachy-isabelle s/p TVP (10/25), patient will eventually need a permanent PPM, pending clearance of infection  - TTE (11/1) at least moderately reduced LVSF, RV enlargement w/decreased RVSF; SV 63cc (HR 70, LVOT diameter 2.4cm, LVOT VTI 13cm)  - patient did not respond to dobutamine yesterday, monitoring off    #Respiratory  - CXR Small left pleural effusion with adjacent atelectasis  - 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, patient now s/p decadron  - Slightly tachypneic, but comfortable   - Continue suctioning as needed for increased secretions     #GI  - patient NPO until further recommendations by S&S - will likely need FEEST w/ENT    #Metabolic/Renal  - continue CVVH per nephrology, net even since yesterday, currently not removing fluid  - patient with increasing proteinuria  - f/u rheum work up as below, nephrology recommendations  - Patient is anuric at this time, will continue CVVH until HD stable  - renal US (11/1) no hydronephrosis, bilateral renal cysts    #Rheum  - patient with positive EYAD 1:640, pattern Homogeneous  - dsDNA <12 ;C3 Complement 72; C4 Complement 1  - anti-ribonuclear protein, C3, C4, dsDNA, and ANCA sent - all negative  - anti-histone ab positive at 1.7  - f/u rheumatology consult - very low suspicion for drug-induced lupus as rarely involves kidney and usually presents with arthralgias and serositis  - will check myoglobin, LDH, HMGCR to r/o statin-induced myopathy    #Heme  - H&H improved s/p 1 UpRBC transfusion 11/2  - Metamyelocytes and blasts on CBC  - Heme consulted for peripheral smear - many myelocytes seen as well as bands, suggestive of a left-shifted peripheral blood because of inflammation/infection ill state  - BCR/ABL negative  - continue to treat infection as below    # Vascular  - Patient with right groin pseudoaneurysm evaluated by vascular surgery  - no neck visible on imagining, unable to inject thrombin  - per vascular surgery, will repeat groin imagining in 1 week  - continue to closely monitor  - wound care following  - b/l LE dopplers (10/30) no evidence of DVT    # ID  - Blood culture gram variable beaded rods +acid fast, 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 Baldomero Roberson in [place])

## 2017-11-03 NOTE — PROGRESS NOTE ADULT - PROBLEM SELECTOR PLAN 2
Patient with noted increasing proteinuria since UA on 6/2016 without an underlying etiology. Order urine spot protein/Cr ratio to quantify protein (once patient makes urine). Repeated complements improved to- C3 95, C4 17 on 10/31/17.   Renal sonogram reveals no hydronephrosis.   HBsAg Nonreact; HCV 0.15, Nonreact; HIV Nonreact; EYAD: titer 1:640 (elevated), pattern Homogeneous; dsDNA <12 ;C3 Complement 72; C4 Complement 14; Free Light Chains: kappa 15.20, lambda 10.50, ratio = 1.45. Immunofixation negative. ANCA negative. Patient had difficulty tolerating UF with drop on BP and increased pressor requirement.  Discussed with primary team plan for net even fluid balance.

## 2017-11-03 NOTE — PROGRESS NOTE ADULT - SUBJECTIVE AND OBJECTIVE BOX
CONTACT INFO:  JADEN Brantley  Pager:  [phone]      HPI / INTERVAL HISTORY:    Yesterday evening, patient was given 1 U pRBC for low Hb of 7.6, with appropriate response. No evidence of active bleeding at this time.    OBJECTIVE:  VITAL SIGNS:  ICU Vital Signs Last 24 Hrs  T(C): 34.7 (03 Nov 2017 04:00), Max: 36.7 (02 Nov 2017 16:00)  T(F): 94.4 (03 Nov 2017 04:00), Max: 98 (02 Nov 2017 16:00)  HR: 94 (03 Nov 2017 06:30) (74 - 112)  BP: --  BP(mean): --  ABP: 92/46 (03 Nov 2017 06:30) (64/42 - 120/66)  ABP(mean): 62 (03 Nov 2017 06:30) (52 - 88)  RR: 23 (03 Nov 2017 06:30) (10 - 36)  SpO2: 100% (03 Nov 2017 06:30) (89% - 100%)      11-02 @ 07:01  -  11-03 @ 07:00  --------------------------------------------------------  IN: 2438 mL / OUT: 1376 mL / NET: 1062 mL      PHYSICAL EXAM:  Gen:   HEENT: NC/AT; PERRL, anicteric sclera  Neck: supple, no JVD  Resp: clear to ausculation B/L; no wheezes, rales or rhonchi  Cardiovasc: S1S2 normal; RRR; no murmurs, rubs or gallops  GI: soft, nondistended, nontender; +BS  Extr: warm, well-perfused, PT/DP pulses 2+ B/L; no LE edema  Skin: normal color and turgor  Neuro:     LABS:                        8.2    18.1  )-----------( 378      ( 03 Nov 2017 05:38 )             25.9     11-03    139  |  102  |  28<H>  ----------------------------<  90  5.2   |  25  |  1.29    Ca    8.9      03 Nov 2017 05:38  Phos  2.7     11-03  Mg     2.6     11-03    TPro  6.2  /  Alb  2.9<L>  /  TBili  2.5<H>  /  DBili  x   /  AST  95<H>  /  ALT  65<H>  /  AlkPhos  94  11-03    LIVER FUNCTIONS - ( 03 Nov 2017 05:38 )  Alb: 2.9 g/dL / Pro: 6.2 g/dL / ALK PHOS: 94 U/L / ALT: 65 U/L RC / AST: 95 U/L / GGT: x           PTT - ( 03 Nov 2017 05:38 )  PTT:109.0 sec    CARDIAC MARKERS ( 02 Nov 2017 18:05 )  x     / x     / 1417 U/L / x     / x        RADIOLOGY & ADDITIONAL TESTS:     MEDICATIONS:  acetaminophen    Suspension 930 milliGRAM(s) Oral every 6 hours PRN  acetaminophen    Suspension. 930 milliGRAM(s) Oral every 4 hours PRN  ALBUTerol/ipratropium for Nebulization 3 milliLiter(s) Nebulizer every 6 hours PRN  aspirin  chewable 81 milliGRAM(s) Oral daily  atorvastatin 80 milliGRAM(s) Oral at bedtime  doxycycline IVPB      doxycycline IVPB 100 milliGRAM(s) IV Intermittent every 12 hours  haloperidol     Tablet 0.5 milliGRAM(s) Oral every 12 hours PRN  heparin  Infusion. 800 Unit(s)/Hr IV Continuous <Continuous>  heparin  Injectable 4000 Unit(s) IV Push every 6 hours PRN  imipenem/cilastatin  IVPB      imipenem/cilastatin  IVPB 250 milliGRAM(s) IV Intermittent every 6 hours  influenza   Vaccine 0.5 milliLiter(s) IntraMuscular once  lactobacillus acidophilus and bulgaricus Chewable 1 Tablet(s) Chew daily  levoFLOXacin IVPB 250 milliGRAM(s) IV Intermittent every 24 hours  norepinephrine Infusion 1 MICROgram(s)/kG/Min IV Continuous <Continuous>  pantoprazole  Injectable 40 milliGRAM(s) IV Push two times a day  risperiDONE   Tablet 0.5 milliGRAM(s) Oral every 12 hours  ticagrelor 90 milliGRAM(s) Oral two times a day  vancomycin  IVPB 500 milliGRAM(s) IV Intermittent every 24 hours      ALLERGIES:  No Known Allergies CONTACT INFO:  JADEN Brantley  Pager:  [phone]      HPI / INTERVAL HISTORY:    Yesterday evening, patient was given 1 U pRBC for low Hb of 7.6, with appropriate response. No evidence of active bleeding at this time. Patient delirious this morning, responded to redirection.    OBJECTIVE:  VITAL SIGNS:  ICU Vital Signs Last 24 Hrs  T(C): 34.7 (03 Nov 2017 04:00), Max: 36.7 (02 Nov 2017 16:00)  T(F): 94.4 (03 Nov 2017 04:00), Max: 98 (02 Nov 2017 16:00)  HR: 94 (03 Nov 2017 06:30) (74 - 112)  BP: --  BP(mean): --  ABP: 92/46 (03 Nov 2017 06:30) (64/42 - 120/66)  ABP(mean): 62 (03 Nov 2017 06:30) (52 - 88)  RR: 23 (03 Nov 2017 06:30) (10 - 36)  SpO2: 100% (03 Nov 2017 06:30) (89% - 100%)      11-02 @ 07:01  -  11-03 @ 07:00  --------------------------------------------------------  IN: 2438 mL / OUT: 1376 mL / NET: 1062 mL      PHYSICAL EXAM:  Gen: NAD  HEENT: NC/AT; PERRL, anicteric sclera  Neck: supple, no JVD  Resp: clear to ausculation B/L; no wheezes, rales or rhonchi  Cardiovasc: S1S2 normal; RRR; no murmurs, rubs or gallops  GI: soft, nondistended, nontender; +BS  Extr: warm, well-perfused, PT/DP pulses 2+ B/L; no LE edema  Skin: normal color and turgor  Neuro: awake and alert, oriented    LABS:                        8.2    18.1  )-----------( 378      ( 03 Nov 2017 05:38 )             25.9     11-03    139  |  102  |  28<H>  ----------------------------<  90  5.2   |  25  |  1.29    Ca    8.9      03 Nov 2017 05:38  Phos  2.7     11-03  Mg     2.6     11-03    TPro  6.2  /  Alb  2.9<L>  /  TBili  2.5<H>  /  DBili  x   /  AST  95<H>  /  ALT  65<H>  /  AlkPhos  94  11-03    LIVER FUNCTIONS - ( 03 Nov 2017 05:38 )  Alb: 2.9 g/dL / Pro: 6.2 g/dL / ALK PHOS: 94 U/L / ALT: 65 U/L RC / AST: 95 U/L / GGT: x           PTT - ( 03 Nov 2017 05:38 )  PTT:109.0 sec    CARDIAC MARKERS ( 02 Nov 2017 18:05 )  x     / x     / 1417 U/L / x     / x        RADIOLOGY & ADDITIONAL TESTS:     MEDICATIONS:  acetaminophen    Suspension 930 milliGRAM(s) Oral every 6 hours PRN  acetaminophen    Suspension. 930 milliGRAM(s) Oral every 4 hours PRN  ALBUTerol/ipratropium for Nebulization 3 milliLiter(s) Nebulizer every 6 hours PRN  aspirin  chewable 81 milliGRAM(s) Oral daily  atorvastatin 80 milliGRAM(s) Oral at bedtime  doxycycline IVPB      doxycycline IVPB 100 milliGRAM(s) IV Intermittent every 12 hours  haloperidol     Tablet 0.5 milliGRAM(s) Oral every 12 hours PRN  heparin  Infusion. 800 Unit(s)/Hr IV Continuous <Continuous>  heparin  Injectable 4000 Unit(s) IV Push every 6 hours PRN  imipenem/cilastatin  IVPB      imipenem/cilastatin  IVPB 250 milliGRAM(s) IV Intermittent every 6 hours  influenza   Vaccine 0.5 milliLiter(s) IntraMuscular once  lactobacillus acidophilus and bulgaricus Chewable 1 Tablet(s) Chew daily  levoFLOXacin IVPB 250 milliGRAM(s) IV Intermittent every 24 hours  norepinephrine Infusion 1 MICROgram(s)/kG/Min IV Continuous <Continuous>  pantoprazole  Injectable 40 milliGRAM(s) IV Push two times a day  risperiDONE   Tablet 0.5 milliGRAM(s) Oral every 12 hours  ticagrelor 90 milliGRAM(s) Oral two times a day  vancomycin  IVPB 500 milliGRAM(s) IV Intermittent every 24 hours      ALLERGIES:  No Known Allergies

## 2017-11-03 NOTE — PROGRESS NOTE ADULT - ASSESSMENT
83 year old male with a PMH of atrial fibrillation (on Pradaxa), HTN, Thyroid Goiter, CKD stage III (dx 2016) was seen at [facility] with inferior wall STEMI and was brought to [facility] for cardiac catherization s/p PCI x 1 now with DANITA and Anuria.

## 2017-11-03 NOTE — PROGRESS NOTE ADULT - SUBJECTIVE AND OBJECTIVE BOX
POD/STATUS POST/ENT ISSUE:  84yo male with PMHx vertigo, goiter, afib, HTN, admitted for STEMI and cardiac arrest, extubated 10/21, began c/o sensation in throat since 10/25. Pt states he began coughing up phlegm, and felt a sensation in his throat like there is "something stuck." Pt pointed to left lateral submandibular area.     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 Pt is experiencing hoarseness but denies fever, chills, dysphagia, odynophagia, hemoptysis, and unintentional weight loss at this time.     Vital Signs Last 24 Hrs  T(C): 36.3 (03 Nov 2017 08:00), Max: 36.7 (02 Nov 2017 16:00)  T(F): 97.4 (03 Nov 2017 08:00), Max: 98 (02 Nov 2017 16:00)  HR: 118 (03 Nov 2017 08:00) (74 - 118)  BP: --  BP(mean): --  RR: 28 (03 Nov 2017 08:00) (10 - 36)  SpO2: 99% (03 Nov 2017 08:00) (89% - 100%)    PHYSICAL EXAM:  Gen: NAD, well-developed  Head: Normocephalic, Atraumatic  Eyes: PERRL, EOMI, no scleral injection  Nose: Nares bilaterally patent, no discharge  Mouth: Mucosa moist, tongue/uvula midline, oropharynx with small amount of secretions, no edema, no erythema, no exudates  Neck: Flat, supple, no lymphadenopathy, trachea midline, no masses  Resp: breathing easily, no stridor  CV: no peripheral edema/cyanosis    LABS:                        8.2    18.1  )-----------( 378      ( 03 Nov 2017 05:38 )             25.9 POD/STATUS POST/ENT ISSUE:  82yo male with PMHx vertigo, goiter, afib, HTN, admitted for STEMI and cardiac arrest, extubated 10/21, began c/o sensation in throat since 10/25. Pt states he began coughing up phlegm, and felt a sensation in his throat like there is "something stuck." Pt pointed to left lateral submandibular area.     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 Pt is experiencing hoarseness but denies fever, chills, dysphagia, odynophagia, hemoptysis, and unintentional weight loss at this time.     Vital Signs Last 24 Hrs  T(C): 36.3 (03 Nov 2017 08:00), Max: 36.7 (02 Nov 2017 16:00)  T(F): 97.4 (03 Nov 2017 08:00), Max: 98 (02 Nov 2017 16:00)  HR: 118 (03 Nov 2017 08:00) (74 - 118)  BP: --  BP(mean): --  RR: 28 (03 Nov 2017 08:00) (10 - 36)  SpO2: 99% (03 Nov 2017 08:00) (89% - 100%)    PHYSICAL EXAM:  Gen: NAD, well-developed  Head: Normocephalic, Atraumatic  Eyes: PERRL, EOMI, no scleral injection  Nose: Nares bilaterally patent, no discharge  Mouth: Mucosa moist, tongue/uvula midline, oropharynx with small amount of secretions, no edema, no erythema, no exudates  Neck: Flat, supple, no lymphadenopathy, trachea midline, no masses  Resp: breathing easily, no stridor  CV: no peripheral edema/cyanosis    LABS:                        8.2    18.1  )-----------( 378      ( 03 Nov 2017 05:38 )             25.9     Laryngoscopy - scope #4   Dx - dysphonia   b/l vocal folds mobile, still swelling of arytenoid and A-E folds however no more prolapsing and airway open. B/l VF mobile, some exudate on laryngeal surface for the epiglottis  aspirating into the airway on exam with poor sensation

## 2017-11-03 NOTE — PROGRESS NOTE ADULT - I WAS PHYSICALLY PRESENT FOR THE KEY PORTIONS OF THE EVALUATION AND MANAGEMENT (E/M) SERVICE PROVIDED.  I AGREE WITH THE ABOVE HISTORY, PHYSICAL, AND PLAN WHICH I HAVE REVIEWED AND EDITED WHERE APPROPRIATE

## 2017-11-03 NOTE — PROGRESS NOTE ADULT - SUBJECTIVE AND OBJECTIVE BOX
Patient is a 83y old  Male who presents with a chief complaint of SOB, chest pain, diaphoresis (15 Oct 2017 17:59)      Vascular Surgery Attending Progress Note    Interval HPI: pt w/o c/o    Medications:  acetaminophen    Suspension 930 milliGRAM(s) Oral every 6 hours PRN  acetaminophen    Suspension. 930 milliGRAM(s) Oral every 4 hours PRN  ALBUTerol/ipratropium for Nebulization 3 milliLiter(s) Nebulizer every 6 hours PRN  aspirin  chewable 81 milliGRAM(s) Oral daily  atorvastatin 80 milliGRAM(s) Oral at bedtime  doxycycline IVPB      doxycycline IVPB 100 milliGRAM(s) IV Intermittent every 12 hours  haloperidol     Tablet 0.5 milliGRAM(s) Oral every 12 hours PRN  heparin  Infusion. 800 Unit(s)/Hr IV Continuous <Continuous>  heparin  Injectable 4000 Unit(s) IV Push every 6 hours PRN  imipenem/cilastatin  IVPB      imipenem/cilastatin  IVPB 250 milliGRAM(s) IV Intermittent every 6 hours  influenza   Vaccine 0.5 milliLiter(s) IntraMuscular once  lactobacillus acidophilus and bulgaricus Chewable 1 Tablet(s) Chew daily  levoFLOXacin IVPB 250 milliGRAM(s) IV Intermittent every 24 hours  norepinephrine Infusion 1 MICROgram(s)/kG/Min IV Continuous <Continuous>  pantoprazole  Injectable 40 milliGRAM(s) IV Push two times a day  risperiDONE   Tablet 0.5 milliGRAM(s) Oral every 12 hours  ticagrelor 90 milliGRAM(s) Oral two times a day  vancomycin  IVPB 500 milliGRAM(s) IV Intermittent every 24 hours      Vital Signs Last 24 Hrs  T(C): 36.4 (03 Nov 2017 20:00), Max: 36.4 (03 Nov 2017 20:00)  T(F): 97.6 (03 Nov 2017 20:00), Max: 97.6 (03 Nov 2017 20:00)  HR: 110 (03 Nov 2017 22:00) (78 - 120)  BP: --  BP(mean): --  RR: 24 (03 Nov 2017 22:00) (11 - 32)  SpO2: 100% (03 Nov 2017 22:00) (98% - 100%)  I&O's Summary    02 Nov 2017 07:01  -  03 Nov 2017 07:00  --------------------------------------------------------  IN: 2594 mL / OUT: 2582 mL / NET: 12 mL    03 Nov 2017 07:01  -  03 Nov 2017 23:02  --------------------------------------------------------  IN: 1296.7 mL / OUT: 894 mL / NET: 402.7 mL        Physical Exam:  Neuro  A&Ox2   Vascular:  rt groin hematoma stable  Musculoskeletal:limited pt is bedbound at this time     LABS:                        8.3    18.2  )-----------( 330      ( 03 Nov 2017 17:30 )             26.2     11-03    138  |  100  |  26<H>  ----------------------------<  93  4.8   |  19<L>  |  1.28    Ca    9.3      03 Nov 2017 18:24  Phos  3.1     11-03  Mg     2.6     11-03    TPro  6.2  /  Alb  2.9<L>  /  TBili  2.8<H>  /  DBili  x   /  AST  106<H>  /  ALT  67<H>  /  AlkPhos  103  11-03    PT/INR - ( 03 Nov 2017 05:38 )   PT: 15.7 sec;   INR: 1.43 ratio         PTT - ( 03 Nov 2017 14:08 )  PTT:66.0 sec    CHARLY CAMERON MD  [phone]

## 2017-11-03 NOTE — PROGRESS NOTE ADULT - SUBJECTIVE AND OBJECTIVE BOX
Interval events:  CXR with TVP in place  No failure to capture overnight  Threshold 0.4 mV this morning, set at HR 60 bpm and output 10 mV    Review Of Systems:  Constitutional: [ ] Fever [ ] Chills [ ] Fatigue [ ] Weight change   HEENT: [ ] Blurred vision [ ] Eye Pain [ ] Headache [ ] Runny nose [ ] Sore Throat   Respiratory: [ ] Cough [ ] Wheezing [ ] Shortness of breath  Cardiovascular: [ ] Chest Pain [ ] Palpitations [ ] JOE [ ] PND [ ] Orthopnea  Gastrointestinal: [ ] Abdominal Pain [ ] Diarrhea [ ] Constipation [ ] Hemorrhoids [ ] Nausea [ ] Vomiting  Genitourinary: [ ] Nocturia [ ] Dysuria [ ] Incontinence  Extremities: [ ] Swelling [ ] Joint Pain  Neurologic: [ ] Focal deficit [ ] Paresthesias [ ] Syncope  Lymphatic: [ ] Swelling [ ] Lymphadenopathy   Skin: [ ] Rash [ ] Ecchymoses [ ] Wounds [ ] Lesions  Psychiatry: [ ] Depression [ ] Suicidal/Homicidal Ideation [ ] Anxiety [ ] Sleep Disturbances  [ ] 10 point review of systems is otherwise negative except as mentioned above            [ ]Unable to obtain    Medications:  acetaminophen    Suspension 930 milliGRAM(s) Oral every 6 hours PRN  acetaminophen    Suspension. 930 milliGRAM(s) Oral every 4 hours PRN  ALBUTerol/ipratropium for Nebulization 3 milliLiter(s) Nebulizer every 6 hours PRN  aspirin  chewable 81 milliGRAM(s) Oral daily  atorvastatin 80 milliGRAM(s) Oral at bedtime  doxycycline IVPB      doxycycline IVPB 100 milliGRAM(s) IV Intermittent every 12 hours  haloperidol     Tablet 0.5 milliGRAM(s) Oral every 12 hours PRN  heparin  Infusion. 800 Unit(s)/Hr IV Continuous <Continuous>  heparin  Injectable 4000 Unit(s) IV Push every 6 hours PRN  imipenem/cilastatin  IVPB      imipenem/cilastatin  IVPB 250 milliGRAM(s) IV Intermittent every 6 hours  influenza   Vaccine 0.5 milliLiter(s) IntraMuscular once  lactobacillus acidophilus and bulgaricus Chewable 1 Tablet(s) Chew daily  levoFLOXacin IVPB 250 milliGRAM(s) IV Intermittent every 24 hours  norepinephrine Infusion 1 MICROgram(s)/kG/Min IV Continuous <Continuous>  pantoprazole  Injectable 40 milliGRAM(s) IV Push two times a day  risperiDONE   Tablet 0.5 milliGRAM(s) Oral every 12 hours  ticagrelor 90 milliGRAM(s) Oral two times a day  vancomycin  IVPB 500 milliGRAM(s) IV Intermittent every 24 hours    PMH/PSH/FH/SH: [ ] Unchanged  Vitals:  T(C): 36.3 (17 @ 08:00), Max: 36.7 (17 @ 16:00)  HR: 96 (17 @ 10:00) (74 - 118)  BP: --  BP(mean): --  RR: 17 (17 @ 10:00) (10 - 36)  SpO2: 100% (17 @ 10:00) (89% - 100%)  Wt(kg): --  Daily     Daily Weight in k (2017 03:00)  I&O's Summary    2017 07:01  -  2017 07:00  --------------------------------------------------------  IN: 2594 mL / OUT: 2582 mL / NET: 12 mL        Physical Exam:  Appearance:  Normal, NAD  Eyes: PERRL, EOMI  HENT: Normal oral [place] NC/AT  Cardiovascular: S1, S2, RRR, No m/r/g appreciated, No edema, no elevation in JVP  Respiratory: Clear to auscultation bilaterally  Gastrointestinal: Soft, Non-tender, Non-distended, BS+  Musculoskeletal:  No clubbing, No joint deformity   Neurologic: Non-focal  Lymphatic: No lymphadenopathy  Psychiatry: AAOx3, Mood & affect appropriate  Skin: No rashes, No ecchymoses, No cyanosis    Labs:                        8.2    18.1  )-----------( 378      ( 2017 05:38 )             25.9     11    139  |  102  |  28<H>  ----------------------------<  90  5.2   |  25  |  1.29    Ca    8.9      2017 05:38  Phos  2.7       Mg     2.6         TPro  6.2  /  Alb  2.9<L>  /  TBili  2.5<H>  /  DBili  x   /  AST  95<H>  /  ALT  65<H>  /  AlkPhos  94      PT/INR - ( 2017 05:38 )   PT: 15.7 sec;   INR: 1.43 ratio         PTT - ( 2017 05:38 )  PTT:109.0 sec  CARDIAC MARKERS ( 2017 18:05 )  x     / x     / 1417 U/L / x     / x        Interpretation of Telemetry: afib 110s, NSVT up to 7 beats, no failure to capture, 1 episode requiring pacing    TTE  2017  1. Technically difficult images;  at least moderately  reduced  left ventricular systolic function, however,  endocardial border definition is limited and there is  significant heart rate variability. Consider further  limited evaluation with echo contrast Definity for  assessment of segmental wall motion if clinically  indicated.  2. Right ventricular enlargement with decreased right  ventricular systolic function.  3. Stroke volume 63cc (HR 70, LVOT diameter 2.4cm, LVOT VTI  13cm)

## 2017-11-03 NOTE — SWALLOW BEDSIDE ASSESSMENT ADULT - SPECIFY REASON(S)
to subjectively assess swallowing skills and r/o dysphagia.

## 2017-11-03 NOTE — SWALLOW BEDSIDE ASSESSMENT ADULT - ASR SWALLOW RECOMMEND DIAG
Defer at this time. This service will continue to follow to assess for candidacy for eventual objective assessment pending ENT findings and improved mental status. Defer at this time. This service will continue to follow to assess for candidacy for eventual objective assessment pending ENT findings and improved mental status. Dysphagia POC discussed with pt/spouse, MD Delong and ENT CHRISTA Palm.

## 2017-11-03 NOTE — PROGRESS NOTE ADULT - SUBJECTIVE AND OBJECTIVE BOX
SUBJECTIVE: Pt seen, chart reviewed.  Pt's wife and son at bedside, wife declined, but son saw wounds.  We discussed improvement of Lt wrist and stability of Rt groin and as pt improves -  the wounds will too.  All questions answered to family satisfaction.  No odor, f/c/s, drainage, redness, heat noted.  Pt tolerating dressings.    ROS pt unable to offer complaints    aspirin  chewable 81 milliGRAM(s) Oral daily  doxycycline IVPB      doxycycline IVPB 100 milliGRAM(s) IV Intermittent every 12 hours  heparin  Infusion. 800 Unit(s)/Hr IV Continuous <Continuous>  heparin  Injectable 4000 Unit(s) IV Push every 6 hours PRN  imipenem/cilastatin  IVPB      imipenem/cilastatin  IVPB 250 milliGRAM(s) IV Intermittent every 6 hours  levoFLOXacin IVPB 250 milliGRAM(s) IV Intermittent every 24 hours  ticagrelor 90 milliGRAM(s) Oral two times a day  vancomycin  IVPB 500 milliGRAM(s) IV Intermittent every 24 hours      Physical Exam:  Vital Signs Last 24 Hrs  T(C): 34.9 (03 Nov 2017 15:00), Max: 36.3 (03 Nov 2017 08:00)  T(F): 94.8 (03 Nov 2017 15:00), Max: 97.4 (03 Nov 2017 08:00)  HR: 92 (03 Nov 2017 16:00) (74 - 118)  BP: --  BP(mean): --  RR: 23 (03 Nov 2017 16:00) (10 - 32)  SpO2: 98% (03 Nov 2017 16:00) (96% - 100%)    General Appearance:  guarded/ sedated  Total care Sport    Skin:  moist w/ good turgor  Rt groin Impella site w/ nonviable tissue in area of cath site.  periwound skin w/ ecchymosis w/o hematoma  No drainage, mass, odor, erythema, increased warmth, tenderness, induration, fluctuance    Volar Lt wrist w/ superficial granular wound w/ ruptured bullae  scant serosanguinous drainage  No odor, erythema, increased warmth, tenderness, induration, fluctuance    LABS:                        8.2    18.1  )-----------( 378      ( 03 Nov 2017 05:38 )             25.9     PT/INR - ( 03 Nov 2017 05:38 )   PT: 15.7 sec;   INR: 1.43 ratio         PTT - ( 03 Nov 2017 14:08 )  PTT:66.0 sec

## 2017-11-03 NOTE — SWALLOW BEDSIDE ASSESSMENT ADULT - SWALLOW EVAL: DIAGNOSIS
Oral diet is contraindicated at this time. Would defer PO trials pending ENT findings re: laryngomalacia and intubation trauma and improved mental status. At this time, pt with waxing and waning mental status which puts pt at risk for aspiration. PO trials deferred at this time. Pt with known h/o dysphagia with overt s/s laryngeal penetration/aspiration during recent bedside swallow assessment. Will consider PO trials pending 1) ENT findings re: laryngomalacia and intubation trauma and 2) improved mental status. At this time, pt with waxing and waning mental status. Given all of the above pt is at risk for aspiration. Oral diet is contraindicated at this time.

## 2017-11-03 NOTE — PROGRESS NOTE ADULT - ASSESSMENT
84 y/o M s/p STEMI in CCU, extubated on 10/21, began complaining of sensation in throat 10/25. 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 Pt denies fever, chills, dysphagia, odynophagia, hemoptysis, unintentional weight loss at this time. 82 y/o M s/p STEMI in CCU, extubated on 10/21, began complaining of sensation in throat 10/25. 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 Pt denies fever, chills, dysphagia, odynophagia, hemoptysis, unintentional weight loss at this time. improving laryngomalacia airway clear. would likely fail swallow eval at this point, will consider evaluation next week with better mentation

## 2017-11-03 NOTE — PROGRESS NOTE ADULT - ASSESSMENT
A/P:  Rt Groin wound -con't  Medihoney dressing  Lt wrist ruptured blister- con't ADAPTIC    con't Nutrition (as tolerated)  con't Offloading   con't Pericare  Care as per CCU will follow w/ you  Upon discharge f/u as outpatient at [facility] [phone]  D/w team & attng  Shawanda Kearns PA-C [id number]  I spent 20  minutes w/ this pt of which more than 50% of the time was spent counseling & coordinating care of this pt.

## 2017-11-03 NOTE — SWALLOW BEDSIDE ASSESSMENT ADULT - NS ASR SWALLOW FINDINGS DISCUS
Nursing/Patient/Family
Physician/Patient/Nursing
Physician/Nursing/Patient
Nursing/Family/MD Delong
Physician/Patient/Family/Nursing/MD Delong

## 2017-11-03 NOTE — SWALLOW BEDSIDE ASSESSMENT ADULT - DIET PRIOR TO ADMI
Per Pt's wife report: regular texture diet with no h/o of dysphagia or difficulty swallowing reported.

## 2017-11-03 NOTE — SWALLOW BEDSIDE ASSESSMENT ADULT - SWALLOW EVAL: PATIENT/FAMILY GOALS STATEMENT
To eat/drink. "I want water."
Pt requesting to drink water.
Pts spouse unaware that pt scoped by ENT and requesting findings. Relayed spouse request to CASSIUS Valderrama who contacted MD.

## 2017-11-03 NOTE — PROGRESS NOTE ADULT - PROBLEM SELECTOR PLAN 1
-S/S today   -care as per CCU - PPI  - Reflux precautions  - taper steroids  - swallow eval next week

## 2017-11-03 NOTE — PROGRESS NOTE ADULT - NSHPATTENDINGPLANDISCUSS_GEN_ALL_CORE
surg ho
fellow
CCU team
patient's wife
surg ho
CCU attending, team
CCU team
CCU team, attending
CCU team, attending
attending, team CCU
cardiology team, attending
CCU team, attending
attending,team

## 2017-11-03 NOTE — PROGRESS NOTE ADULT - PROBLEM SELECTOR PLAN 1
DANITA on CKD stage III. DANITA may be multifactorial in the setting of contrast (cardiac cath), ATN from hypotension/ cardiac arrest. Patient also received IV contrast on 10/29/17.   Patient remains on CVVHDF due to Anuria. Plan to continue patient on CVVHDF until patient is hemodynamically stable for switch to intermittent HD. Patient remains on  pressor support today. Agree with patient having his catheter switch to a Tunelled HD catheter once optimized by ID.   Continue to check daily serum phosphorus (may be low in the use of CVVHDF). Continue to monitor intake/output, BMP and urine output. Avoid NSAIDs, RCA and nephrotoxins. Renally dose all medications. Renal ultrasound reviewed and reveals no hydronephrosis bilaterally.

## 2017-11-03 NOTE — SWALLOW BEDSIDE ASSESSMENT ADULT - SLP GENERAL OBSERVATIONS
Pt received in bed. A&Ox3 with external cues. + CVVHD. Positioned upright by RN prior to exam. Vocal quality hoarse. + confusion. Inconsistent command following. Improves with verbal cues and spouse providing model.

## 2017-11-03 NOTE — SWALLOW BEDSIDE ASSESSMENT ADULT - SWALLOW EVAL: RECOMMENDED DIET
NPO, with non-oral nutrition/hydration/medications.
Continue NPO, with non-oral nutrition/hydration/medications.
NPO, with non-oral nutrition/hydration/medications.
Would strongly suggest NPO, with non-oral nutrition/hydration/medications given recent bedside swallow evaluations with rx for NPO, with non-oral nutrition/hydration/medications and ENT findings of laryngomalacia, reflux changes and intubation granulomas.
NPO, with non-oral nutrition/hydration/medications.

## 2017-11-03 NOTE — PROGRESS NOTE ADULT - SUBJECTIVE AND OBJECTIVE BOX
[facility] DIVISION OF KIDNEY DISEASES AND HYPERTENSION -- FOLLOW UP NOTE  --------------------------------------------------------------------------------    HPI: 83 year old male with a PMH of atrial fibrillation (on Pradaxa), HTN, Thyroid Goiter, CKD stage III (dx 2016) with inferior wall STEMI s/p cardiac catherization s/p PCI x 1 VF arrest s/p IABP and s/p impella course with cardiogenic shock. Patient found to be bacteremic and has a stable L femoral pseudoaneurysm. Patient with DANITA and anuria and remains on CVVHDF overnight without complications. Patient remains on pressor support overnight.    PAST HISTORY  --------------------------------------------------------------------------------  No significant changes to PMH, PSH, FHx, SHx, unless otherwise noted    ALLERGIES & MEDICATIONS  --------------------------------------------------------------------------------  Allergies    No Known Allergies    Intolerances      Standing Inpatient Medications  aspirin  chewable 81 milliGRAM(s) Oral daily  atorvastatin 80 milliGRAM(s) Oral at bedtime  doxycycline IVPB      doxycycline IVPB 100 milliGRAM(s) IV Intermittent every 12 hours  heparin  Infusion. 800 Unit(s)/Hr IV Continuous <Continuous>  imipenem/cilastatin  IVPB      imipenem/cilastatin  IVPB 250 milliGRAM(s) IV Intermittent every 6 hours  influenza   Vaccine 0.5 milliLiter(s) IntraMuscular once  lactobacillus acidophilus and bulgaricus Chewable 1 Tablet(s) Chew daily  levoFLOXacin IVPB 250 milliGRAM(s) IV Intermittent every 24 hours  norepinephrine Infusion 1 MICROgram(s)/kG/Min IV Continuous <Continuous>  pantoprazole  Injectable 40 milliGRAM(s) IV Push two times a day  risperiDONE   Tablet 0.5 milliGRAM(s) Oral every 12 hours  ticagrelor 90 milliGRAM(s) Oral two times a day  vancomycin  IVPB 500 milliGRAM(s) IV Intermittent every 24 hours    PRN Inpatient Medications  acetaminophen    Suspension 930 milliGRAM(s) Oral every 6 hours PRN  acetaminophen    Suspension. 930 milliGRAM(s) Oral every 4 hours PRN  ALBUTerol/ipratropium for Nebulization 3 milliLiter(s) Nebulizer every 6 hours PRN  haloperidol     Tablet 0.5 milliGRAM(s) Oral every 12 hours PRN  heparin  Injectable 4000 Unit(s) IV Push every 6 hours PRN      REVIEW OF SYSTEMS  --------------------------------------------------------------------------------  Unable to obtain     VITALS/PHYSICAL EXAM  --------------------------------------------------------------------------------  T(C): 36.3 (11-03-17 @ 08:00), Max: 36.7 (11-02-17 @ 16:00)  HR: 102 (11-03-17 @ 09:30) (74 - 118)  BP: --  RR: 18 (11-03-17 @ 09:30) (10 - 36)  SpO2: 100% (11-03-17 @ 09:30) (89% - 100%)  Wt(kg): --        11-02-17 @ 07:01  -  11-03-17 @ 07:00  --------------------------------------------------------  IN: 2594 mL / OUT: 2582 mL / NET: 12 mL      Physical Exam:  	Gen: Awake  	Pulm: decreased breath sounds b/l  	CV: RRR, S1S2  	Abd: soft, NT, ND   	: No suprapubic tenderness  	UE: Warm,  no edema; no asterixis  	LE: b/l LE edema              Vascular Access: + RIJ non-tunnelled HD catheter     LABS/STUDIES  --------------------------------------------------------------------------------              8.2    18.1  >-----------<  378      [11-03-17 @ 05:38]              25.9     139  |  102  |  28  ----------------------------<  90      [11-03-17 @ 05:38]  5.2   |  25  |  1.29        Ca     8.9     [11-03-17 @ 05:38]      Mg     2.6     [11-03-17 @ 05:38]      Phos  2.7     [11-03-17 @ 05:38]    TPro  6.2  /  Alb  2.9  /  TBili  2.5  /  DBili  x   /  AST  95  /  ALT  65  /  AlkPhos  94  [11-03-17 @ 05:38]    PT/INR: PT 15.7 , INR 1.43       [11-03-17 @ 05:38]  PTT: 109.0      [11-03-17 @ 05:38]    CK 1417      [11-02-17 @ 18:05]    Creatinine Trend:  SCr 1.29 [11-03 @ 05:38]  SCr 1.37 [11-02 @ 18:01]  SCr 1.47 [11-02 @ 05:25]  SCr 1.44 [11-01 @ 12:35]  SCr 1.50 [11-01 @ 05:35]    Urinalysis - [10-15-17 @ 16:48]      Color Yellow / Appearance Turbid / SG >1.030 / pH 6.5      Gluc 50 / Ketone Negative  / Bili Negative / Urobili Negative       Blood Moderate / Protein >600 / Leuk Est Negative / Nitrite Negative      RBC >50 / WBC 3-5 / Hyaline  / Gran  / Sq Epi  / Non Sq Epi OCC / Bacteria Few      HbA1c 5.4      [10-15-17 @ 21:32]  TSH 0.05      [10-25-17 @ 13:21]  Lipid: chol 129, TG 45, HDL 41, LDL 79      [10-15-17 @ 21:40]    HBsAg Nonreact      [10-19-17 @ 07:25]  HCV 0.15, Nonreact      [10-19-17 @ 07:25]  HIV Nonreact      [10-19-17 @ 07:25]    EYAD: titer 1:640, pattern Homogeneous      [10-31-17 @ 05:04]  dsDNA <12      [10-31-17 @ 05:04]  C3 Complement 95      [10-31-17 @ 05:04]  C4 Complement 17      [10-31-17 @ 05:04]  ANCA: cANCA Negative, pANCA Negative, atypical ANCA Indeterminate Method interference due to EYAD fluorescence      [10-31-17 @ 05:04]  Free Light Chains: kappa 15.20, lambda 10.50, ratio = 1.45      [10-19 @ 07:25]  Immunofixation Serum:   No Monoclonal Band Identified      [10-24-17 @ 13:31]  SPEP Interpretation: Hypoalbuminemia Increased beta fraction due to probable fibrinogen presence.      [10-24-17 @ 12:31] [facility] DIVISION OF KIDNEY DISEASES AND HYPERTENSION -- FOLLOW UP NOTE  --------------------------------------------------------------------------------    HPI: 83 year old male with a PMH of atrial fibrillation (on Pradaxa), HTN, Thyroid Goiter, CKD stage III (dx 2016) with inferior wall STEMI s/p cardiac catherization s/p PCI x 1 VF arrest s/p IABP and s/p impella course with cardiogenic shock. Patient found to be bacteremic and has a stable L femoral pseudoaneurysm. Patient with DANITA and anuria and remains on CVVHDF overnight without complications. Patient remains on pressor support overnight.    PAST HISTORY  --------------------------------------------------------------------------------  No significant changes to PMH, PSH, FHx, SHx, unless otherwise noted    ALLERGIES & MEDICATIONS  --------------------------------------------------------------------------------  Allergies    No Known Allergies    Intolerances      Standing Inpatient Medications  aspirin  chewable 81 milliGRAM(s) Oral daily  atorvastatin 80 milliGRAM(s) Oral at bedtime  doxycycline IVPB      doxycycline IVPB 100 milliGRAM(s) IV Intermittent every 12 hours  heparin  Infusion. 800 Unit(s)/Hr IV Continuous <Continuous>  imipenem/cilastatin  IVPB      imipenem/cilastatin  IVPB 250 milliGRAM(s) IV Intermittent every 6 hours  influenza   Vaccine 0.5 milliLiter(s) IntraMuscular once  lactobacillus acidophilus and bulgaricus Chewable 1 Tablet(s) Chew daily  levoFLOXacin IVPB 250 milliGRAM(s) IV Intermittent every 24 hours  norepinephrine Infusion 1 MICROgram(s)/kG/Min IV Continuous <Continuous>  pantoprazole  Injectable 40 milliGRAM(s) IV Push two times a day  risperiDONE   Tablet 0.5 milliGRAM(s) Oral every 12 hours  ticagrelor 90 milliGRAM(s) Oral two times a day  vancomycin  IVPB 500 milliGRAM(s) IV Intermittent every 24 hours    PRN Inpatient Medications  acetaminophen    Suspension 930 milliGRAM(s) Oral every 6 hours PRN  acetaminophen    Suspension. 930 milliGRAM(s) Oral every 4 hours PRN  ALBUTerol/ipratropium for Nebulization 3 milliLiter(s) Nebulizer every 6 hours PRN  haloperidol     Tablet 0.5 milliGRAM(s) Oral every 12 hours PRN  heparin  Injectable 4000 Unit(s) IV Push every 6 hours PRN      REVIEW OF SYSTEMS  --------------------------------------------------------------------------------  Unable to obtain     VITALS/PHYSICAL EXAM  --------------------------------------------------------------------------------  T(C): 36.3 (11-03-17 @ 08:00), Max: 36.7 (11-02-17 @ 16:00)  HR: 102 (11-03-17 @ 09:30) (74 - 118)  BP: MAP 66  RR: 18 (11-03-17 @ 09:30) (10 - 36)  SpO2: 100% (11-03-17 @ 09:30) (89% - 100%)  Wt(kg): --        11-02-17 @ 07:01  -  11-03-17 @ 07:00  --------------------------------------------------------  IN: 2594 mL / OUT: 2582 mL / NET: 12 mL      Physical Exam:  	Gen: Awake  	Pulm: decreased breath sounds b/l  	CV: RRR, S1S2  	Abd: soft, NT, ND   	: No suprapubic tenderness  	Neuro: tremulous  	LE: b/l LE edema              Vascular Access: + RIJ non-tunnelled HD catheter     LABS/STUDIES  --------------------------------------------------------------------------------              8.2    18.1  >-----------<  378      [11-03-17 @ 05:38]              25.9     139  |  102  |  28  ----------------------------<  90      [11-03-17 @ 05:38]  5.2   |  25  |  1.29        Ca     8.9     [11-03-17 @ 05:38]      Mg     2.6     [11-03-17 @ 05:38]      Phos  2.7     [11-03-17 @ 05:38]    TPro  6.2  /  Alb  2.9  /  TBili  2.5  /  DBili  x   /  AST  95  /  ALT  65  /  AlkPhos  94  [11-03-17 @ 05:38]    PT/INR: PT 15.7 , INR 1.43       [11-03-17 @ 05:38]  PTT: 109.0      [11-03-17 @ 05:38]    CK 1417      [11-02-17 @ 18:05]    Creatinine Trend:  SCr 1.29 [11-03 @ 05:38]  SCr 1.37 [11-02 @ 18:01]  SCr 1.47 [11-02 @ 05:25]  SCr 1.44 [11-01 @ 12:35]  SCr 1.50 [11-01 @ 05:35]    Urinalysis - [10-15-17 @ 16:48]      Color Yellow / Appearance Turbid / SG >1.030 / pH 6.5      Gluc 50 / Ketone Negative  / Bili Negative / Urobili Negative       Blood Moderate / Protein >600 / Leuk Est Negative / Nitrite Negative      RBC >50 / WBC 3-5 / Hyaline  / Gran  / Sq Epi  / Non Sq Epi OCC / Bacteria Few      HbA1c 5.4      [10-15-17 @ 21:32]  TSH 0.05      [10-25-17 @ 13:21]  Lipid: chol 129, TG 45, HDL 41, LDL 79      [10-15-17 @ 21:40]    HBsAg Nonreact      [10-19-17 @ 07:25]  HCV 0.15, Nonreact      [10-19-17 @ 07:25]  HIV Nonreact      [10-19-17 @ 07:25]    EYAD: titer 1:640, pattern Homogeneous      [10-31-17 @ 05:04]  dsDNA <12      [10-31-17 @ 05:04]  C3 Complement 95      [10-31-17 @ 05:04]  C4 Complement 17      [10-31-17 @ 05:04]  ANCA: cANCA Negative, pANCA Negative, atypical ANCA Indeterminate Method interference due to EYAD fluorescence      [10-31-17 @ 05:04]  Free Light Chains: kappa 15.20, lambda 10.50, ratio = 1.45      [10-19 @ 07:25]  Immunofixation Serum:   No Monoclonal Band Identified      [10-24-17 @ 13:31]  SPEP Interpretation: Hypoalbuminemia Increased beta fraction due to probable fibrinogen presence.      [10-24-17 @ 12:31]

## 2017-11-04 NOTE — PROVIDER CONTACT NOTE (CRITICAL VALUE NOTIFICATION) - ASSESSMENT
Pt afebrile, WBC=21. Pt on IV Vanco and IV Zosyn
no s/s bleeding or hematoma
Pt VS stable, no active bleeding, bleeding precautions in place. CVVHDF active.
Pt currently tolerating vent, sedated on fentanyl, and on levo and vaso for BP support
Pt in no acute distress VS stable unable to complete CVVHDF at this time requiring vascular to place a new shiley in
Pt is sedated and intubated, unable to tolerate CPAP
Pt lethargic, on increasing pressor support
Pt sedated requiring vasopressors for end organ perfusion
Pt was tolerating CPAP well when ABG sent
Pt with high PTT
intubated, pressors, CVVHDF
minimal bleed noted
no s/s of respiratory distress noted.
no signs/symptoms of bleeding noted
pt free from active signs of bleeding.
pt is free from respiratory distress and oriented x4

## 2017-11-04 NOTE — DISCHARGE NOTE FOR THE EXPIRED PATIENT - OTHER SIGNIFICANT FINDINGS
< from: Limited Transthoracic Echo (17 @ 10:43) >  1. Technically difficult images;  at least moderately  reduced  left ventricular systolic function, however,  endocardial border definition is limited and there is  significant heart rate variability. Consider further  limited evaluation with echo contrast Definity for  assessment of segmental wall motion if clinically  indicated.  2. Right ventricular enlargement with decreased right  ventricular systolic function.  3. Stroke volume 63cc (HR 70, LVOT diameter 2.4cm, LVOT VTI  13cm)  *** Compared with echocardiogram of 10/26/2017, LV appears  reduced now  ------------------------------------------------------------------------    < end of copied text >      Blood Gas Venous - Hemoglobin/Hematocrit (17 @ 02:24)    Total Hemoglobin, Calculated: 7.1 g/dL    Hematocrit, Calculated: 22 %    < from: Cardiac Cath Lab - Adult (10.25.17 @ 12:46) >  --  Cardiac Fluoro.  TECHNIQUE: The risks and alternatives of the procedures and conscious  sedation were explained to the patient and informed consent was obtained.  Cardiac catheterization performed electively.  Local anesthetic given. Left internal jugular vein access. A 6FR SHEATH  PINNACLE was inserted in the vessel, utilizing the modified Seldinger  technique. Temporary pacemaker. A 5fr Pacel pacing catheter was placed via  the left internal jugular sheath and advanced under fluoroscopic guidance  to the right ventricular apex and attached to the pacemaker. The pacing  threshold was verified and the amplitude adjusted accordingly. At the  conclusion of the procedure, the vascular sheath and temporary pacing  catheter were sutured into place. Indicated by heart block. Temporary  Pacemaker. Cardiac Fluoro. RADIATION EXPOSURE: 1.7 min.  COMPLICATIONS: There were no complications.  DIAGNOSTIC RECOMMENDATIONS: Successful TVP placed to the left IJ.  INTERVENTIONAL RECOMMENDATIONS: Successful TVP placed tothe left IJ.  Prepared and signed by  Anabell Soto M.D.  Signed 10/25/2017 18:24:30  HEMODYNAMIC TABLES  Outputs:  Baseline  Outputs:  -- CALCULATIONS[age]  Outputs:  -- CALCULATIONS: Body Surface Area: 2.13  Outputs:  -- CALCULATIONS: Height in cm: 180.00  Outputs:  -- CALCULATIONS: Sex: Male  Outputs:  -- CALCULATIONS: Weight in k.00

## 2017-11-04 NOTE — AIRWAY PLACEMENT NOTE ADULT - POST AIRWAY PLACEMENT ASSESSMENT:
breath sounds bilateral/CXR pending/breath sounds equal/positive end tidal CO2 noted/chest excursion noted

## 2017-11-04 NOTE — DISCHARGE NOTE FOR THE EXPIRED PATIENT - PROCEDURE 3
Transvenous insertion or replacement of implantable cardioverter-defibrillator or pacemaker generator or electrode lead

## 2017-11-04 NOTE — PROVIDER CONTACT NOTE (CRITICAL VALUE NOTIFICATION) - RECOMMENDATIONS
COntinue to monitor
Will repeat the PTT
Continue with medication optimization/IABP assistance
D/C heparin gtt for line placement
Give NS bolus
Hold Heparin for 1 hour follow nomogram
Lab results seem false, repeat ABG sent
May lower the heparin bag concentration
Monitor pt, await CBC results.
Pt now intubated, repeat gas and continue to monitor
Replete K and continue with CVVHD
follow heparin nomogram
monitor closely.
partial  non -rebreather
place nonbrebreather on pt
repeat labs

## 2017-11-04 NOTE — PROVIDER CONTACT NOTE (CRITICAL VALUE NOTIFICATION) - PERSON GIVING RESULT:
Elinor Gaona
Alexandra Weber
Andrew Vallecillo
Asif Mccall
Basia Gold
Chemistry lab: Ann Smerling
Dori Flores [facility]
Elinor Gaona
Felipa Verdugo
Lab Felipa Wiley
MD JACK Thakur
Mode Ny
Mode Ny
Rich Harp
Rich Harp
Sailaja Catege
lary tolliver
sara woods
valeria díaz
Anthony SHOEMAKER

## 2017-11-04 NOTE — PROVIDER CONTACT NOTE (CRITICAL VALUE NOTIFICATION) - BACKGROUND
Pt admitted STEMI c/b cardiogenic shock/RV Impella/ IABP/DANITA. Pt on CVVHD currently, extubated, on IV ABX.
Inferior wall STEMI c/b cardiogenic shock
Patient On CVVHDF and impella, intubated, failed trial of CPAP, back on versed at 5 mg/hr
Patient with IWMI in CCU for extended periodn
Pt Vfib arrested on way to cath lab at [facility], ROSC achieved after CPR, patient on Impella, IABP, Ventilator, upon arrival to CCU, IABP DC'd 10/18, CPAP trial started when ABG sent
Pt cardiac arrested on unit, CPR performed, intubated at bedside
Pt on heaprin gtt for afib ptt >200
Pt s/p STEMI
Pt s/p cardiac arrest requiring intubation, levophed and vasopressors
STEMI, s/p cardiac arrest
See H&P
inferior wall STEMI on dobutamine
pt wth RV impella, and systemic heparin gtt
s/p sroke code
see H&P

## 2017-11-04 NOTE — CHART NOTE - NSCHARTNOTEFT_GEN_A_CORE
====================  CCU MIDNIGHT ROUNDS  ====================    KAMLESH PATEL  [id number]    ====================  SUMMARY: HPI:  83 male presents to ER by ambulance with report of shortness of breath. Wife at the bedside states patient has h/o Afib on Pradaxa, has "heart valve problems", has been having shortness of breath for the past few days, today was found sitting on stairs, c/o increased shortness of breath, chest pain and diaphoresis. At OSH, he was found to have an inferior wall STEMI and was brought to [facility] for cath. Upon arrival, pt appeared unwell and fixated on chest pain. Upon going to Cath lab, pt became non responsive and went into cardiogenic arrest. advanced life support was started, pt was intubated, and ROS was achieved. In cath lab, pt received a inta venus pacing wire, baloon pump, RCA stent, a swan RH cath, and an illiac balloon to stop R groin bleeding. Pt was admitted to the CCU for further management. (15 Oct 2017 17:59)    ====================        ====================  NEW EVENTS:  ====================        ====================  VITALS (Last 12 hrs):  ====================    T(C): 36.4 (11-03-17 @ 20:00), Max: 36.4 (11-03-17 @ 20:00)  HR: 108 (11-04-17 @ 02:07) (84 - 120)  BP: --  BP(mean): --  ABP: 86/44 (11-04-17 @ 01:00) (56/38 - 120/68)  ABP(mean): 60 (11-04-17 @ 01:00) (44 - 92)  RR: 19 (11-04-17 @ 01:00) (11 - 31)  SpO2: 95% (11-04-17 @ 02:07) (92% - 100%)  Wt(kg): --  CVP(mm Hg): 13 (11-03-17 @ 17:30) (13 - 13)  CO: --  CI: --  PA: --  PA(mean): --  PA(direct): --  PCWP: --  SVR: --    TELEMETRY:    Mode: AC/ CMV (Assist Control/ Continuous Mandatory Ventilation)  RR (machine): 20  TV (machine): 550  FiO2: 100  PEEP: 5  ITime: 1  MAP: 9  PIP: 20      *BLOOD GAS/ARTERIAL/MIXED/VENOUS  *LACTATE    I&O's Summary    02 Nov 2017 07:01  -  03 Nov 2017 07:00  --------------------------------------------------------  IN: 2594 mL / OUT: 2582 mL / NET: 12 mL    03 Nov 2017 07:01  -  04 Nov 2017 02:20  --------------------------------------------------------  IN: 2509 mL / OUT: 894 mL / NET: 1615 mL        ====================  PLAN:  ====================    HEALTH ISSUES - PROBLEM Dx:  Hypervolemia: Hypervolemia  Pseudoaneurysm of femoral artery: Pseudoaneurysm of femoral artery  Delirium due to another medical condition  Delirium  Delirium: Delirium  Goiter: Goiter  Thyroid condition: Thyroid condition  Blood disorder: Blood disorder  Sensation of foreign body in throat: Sensation of foreign body in throat  Atrial fibrillation with RVR: Atrial fibrillation with RVR  Cardiac arrest: Cardiac arrest  Proteinuria, unspecified type: Proteinuria, unspecified type  Acute kidney injury: Acute kidney injury  Proteinuria: Proteinuria  Hyperkalemia: Hyperkalemia  DANITA (acute kidney injury): DANITA (acute kidney injury)  Prophylactic measure: Prophylactic measure  Hypertension: Hypertension  Goiter diffuse, nontoxic: Goiter diffuse, nontoxic  Atrial fibrillation, chronic: Atrial fibrillation, chronic  STEMI involving right coronary artery: STEMI involving right coronary artery  Right heart failure: Right heart failure  Cardiogenic shock: Cardiogenic shock        HEALTH ISSUES - R/O PROBLEM Dx:      Jeannine Catalan, CCU NP   # ====================  CCU MIDNIGHT ROUNDS  ====================    KAMLESH PATEL  [id number]    ====================  SUMMARY: HPI:  83 male presents to ER by ambulance with report of shortness of breath. Wife at the bedside states patient has h/o Afib on Pradaxa, has "heart valve problems", has been having shortness of breath for the past few days, today was found sitting on stairs, c/o increased shortness of breath, chest pain and diaphoresis. At OSH, he was found to have an inferior wall STEMI and was brought to [facility] for cath. Upon arrival, pt appeared unwell and fixated on chest pain. Upon going to Cath lab, pt became non responsive and went into cardiogenic arrest. advanced life support was started, pt was intubated, and ROS was achieved. In cath lab, pt received a intravenous pacing wire, balloon pump, RCA stent, a swan RH cath, and an illiac balloon to stop R groin bleeding. Pt was admitted to the CCU for further management. (15 Oct 2017 17:59)  ====================    ====================  VITALS (Last 12 hrs):  ====================    T(C): 36.4 (11-03-17 @ 20:00), Max: 36.4 (11-03-17 @ 20:00)  HR: 108 (11-04-17 @ 02:07) (84 - 120)  ABP: 86/44 (11-04-17 @ 01:00) (56/38 - 120/68)  ABP(mean): 60 (11-04-17 @ 01:00) (44 - 92)  RR: 19 (11-04-17 @ 01:00) (11 - 31)  SpO2: 95% (11-04-17 @ 02:07) (92% - 100%)  CVP(mm Hg): 13 (11-03-17 @ 17:30) (13 - 13)    TELEMETRY: AF in the 90s    Mode: AC/ CMV (Assist Control/ Continuous Mandatory Ventilation)  RR (machine): 20  TV (machine): 550  FiO2: 100  PEEP: 5  ITime: 1  MAP: 9  PIP: 20    I&O's Summary    02 Nov 2017 07:01  -  03 Nov 2017 07:00  --------------------------------------------------------  IN: 2594 mL / OUT: 2582 mL / NET: 12 mL    03 Nov 2017 07:01  -  04 Nov 2017 02:20  --------------------------------------------------------  IN: 2509 mL / OUT: 894 mL / NET: 1615 mL    ====================  PLAN:  ====================  Pt TVP/central line was pulled out. At that time cardiology fellow      Jeannine Catalan, CCU NP   #[id number] ====================  CCU MIDNIGHT ROUNDS  ====================    KAMLESH PATEL  [id number]    ====================  SUMMARY: HPI:  83 male presents to ER by ambulance with report of shortness of breath. Wife at the bedside states patient has h/o Afib on Pradaxa, has "heart valve problems", has been having shortness of breath for the past few days, today was found sitting on stairs, c/o increased shortness of breath, chest pain and diaphoresis. At OSH, he was found to have an inferior wall STEMI and was brought [facility] cath. Upon arrival, pt appeared unwell and fixated on chest pain. Upon going to Cath lab, pt became non responsive and went into cardiogenic arrest. advanced life support was started, pt was intubated, and ROS was achieved. In cath lab, pt received a intravenous pacing wire, balloon pump, RCA stent, a swan RH cath, and an illiac balloon to stop R groin bleeding. Pt was admitted to the CCU for further management. (15 Oct 2017 17:59)  ====================      Interval Hx:Pt TVP/central line was pulled out by the patient. At that time, cardiology fellow assessed the patient and there was no need for TVP since HR was stable. Levophed was administered via an ultrasounded guided PIV and pacemaker pads were placed. Pt started to have a drop in blood pressure which was not responding to increased Levophed so blood was returned from CRRT. Cordis was placed in the Left IJ due to anticipation that patient needed centrally directed vasopressor support. Following central line placement , pt was having respiratory distress and ABG showed respiratory alkalosis. Pt was intubated at this time. Pt went into pEA arrest and CPR was initiated w/ ROSC following 5 mins and Epi x 3. Prior to CPR wife was contacted and informed of patient's condition. Once HCP arrived decision was made per the family to do no further CPR or escalation of vasopressors.     ====================  VITALS (Last 12 hrs):  ====================    T(C): 36.4 (11-03-17 @ 20:00), Max: 36.4 (11-03-17 @ 20:00)  HR: 108 (11-04-17 @ 02:07) (84 - 120)  ABP: 86/44 (11-04-17 @ 01:00) (56/38 - 120/68)  ABP(mean): 60 (11-04-17 @ 01:00) (44 - 92)  RR: 19 (11-04-17 @ 01:00) (11 - 31)  SpO2: 95% (11-04-17 @ 02:07) (92% - 100%)  CVP(mm Hg): 13 (11-03-17 @ 17:30) (13 - 13)    TELEMETRY: AF in the 90s    Mode: AC/ CMV (Assist Control/ Continuous Mandatory Ventilation)  RR (machine): 20  TV (machine): 550  FiO2: 100  PEEP: 5  ITime: 1  MAP: 9  PIP: 20    I&O's Summary    02 Nov 2017 07:01  -  03 Nov 2017 07:00  --------------------------------------------------------  IN: 2594 mL / OUT: 2582 mL / NET: 12 mL    03 Nov 2017 07:01  -  04 Nov 2017 02:20  --------------------------------------------------------  IN: 2509 mL / OUT: 894 mL / NET: 1615 mL    ====================  PLAN:  ====================  Cardiac arrest 2/2 mixed cardiogenic/septic shock w/ e/o MODS: No CPR or escalation of vasopressor support. Continue all other treatments. No arterial line per the family. Family informed that patient is in critical condition with high risk for cardiac arrest.     Jeannine Catalan, CCU NP   #[id number]

## 2017-11-04 NOTE — PROVIDER CONTACT NOTE (CRITICAL VALUE NOTIFICATION) - SITUATION
Gram + cocci in clusters in aerobic blood culture bottle from 10/24
PTT >200, heparin on hold pre procedure
Hgb 6.6, Hct 21
PH 7.6
PTT >200, patient is on heparin via impella
Patient on heparin for Afib with a high PTT
Pt intubated on CPAP trial, ABG sent to determine respiratory status
Pt lactate 4.9
Pt on CVVHDF, pressor requirements are increasing
Pt on impella, CVVHD due to IWMI
lactate 7.3, hematocrtit 22
lactate 7.9
positive blood culture in aerobic, gram pos. cocci in clusters
pt is s/p extubation
pt ptt greater then 200
pt s/p cath lab with iabp
pt s/p cath, vfib arrest, on CVVHDF. currently on heparin gtt.
ptt >200

## 2017-11-04 NOTE — PROVIDER CONTACT NOTE (CRITICAL VALUE NOTIFICATION) - ACTION/TREATMENT ORDERED:
Continue to monitor
patient on antibiotics, continue to monitor.
continue to monitor.
As above, will continue to monitor
Continue with medication optimization/IABP assistance
Hold Heparin
Monitor pt, await CBC results.
Pt now intubated, repeat gas and continue to monitor
Replete K 10 meq
Send a repeat ABG
close monitoring
follow heparin nomogram
monitor
monitor
monitor closely.
okay to d/c heparin gtt at this time
place pt on nonrebreather, draw ABG 1 hr after

## 2017-11-04 NOTE — AIRWAY PLACEMENT NOTE ADULT - AIRWAY COMMENTS:
ETT 7.5 w/cuffed placed on second attempt w/poor visualization in setting on ongoing CPR. Color change + b/l breath sounds noted. ETT positioned checked w/video laryngoscope - ETT visible passing between vocal cords. EtCO2 measured at ~28.

## 2017-11-04 NOTE — PROVIDER CONTACT NOTE (CRITICAL VALUE NOTIFICATION) - TEST AND RESULT REPORTED:
PTT >200
BMP Potassium - 2.9
Blood cultures collected 10/19, aerobic bottle with gram variable beaded rods
Hgb 2.5, Crit 8.0, potassium 8.7, glucose 27, calcium 0.52
Hgb 6.6, Hct 21
Lactate 4.2
Lactate 4.9
Lactate on ABG 5.0
PH 7.6
PH 7.61
PTT >200
PTT >200
PTT Value
PTT greater then 200
lactate 7.3, hematocrit 22 venous blood gas
lactate 7.9
pTT 124
positive blood culture in aerobic, gram pos. cocci in clusters
ptt >200
+blood cultures from 10/24

## 2017-11-04 NOTE — DISCHARGE NOTE FOR THE EXPIRED PATIENT - HOSPITAL COURSE
82 yo M w/ omh of HTN, AF on pradaxa, HTN, goiter who p/w IWSTEMI on 10/15 and coded on the way to cath lab requiring CPR and intubated with ROSC. In the lab, a TVP was placed  an intra-aortic balloon pump for coronary perfusion and patient was transferred to the CCU. Soon after patient returned to the cardiac cath lab for a right sided impella due to RV failure which was complicated by rhabdomyolisis, DANITA, anuria, and altered mental status with negative stroke. On 10/17 , the patient had a shiley placed for CRRT, had IABP discontinued on 10/18 and RV impella discontinued on 10/20. On 10/21 the patient was extubated. The patients course was complicated by tachy-isabelle syndrome with LOC and a new TVP wire was placed on 10/25, in addition the patient had laryngomalacia requiring an ENT consult and steroids. Hospital course was further complicated by bacteremia and broad spectrum antibiotics were given however patient was in septic shock. On 11/3  patient had pulled out his TVP and cordis. The fellow had discussion with his wife Roberta, due to  the patient blood pressure began to come low with increasing pressor requirements. The HD was stopped and the patient's blood was returned which temporarily stabilized his blood pressure. In order to continue with his care the fellow successfully placed an ultrasound guided cordis in his left IJ which could accommodate a TVP if necessary. Shortly after placing the cordis the patient began exhibiting difficulty breathing which did not resolve with suctioning and the patient were emergently intubated. During the attempted intubation the patient coded for about 10-15 minutes which we were able to achieve ROSC with and shortly after, patient exhibited purposeful movements. Some sedation was added to make the patient comfortable and the wife was alerted. She is now present at bedside and wants to limit further interventions. We will have a GO discussion and capping our care for the patient. Roberta, his wife, and HCP wants to instate a DNR/DNI and we will honor her wishes. At  at 603 I was called to the bedside by CASSIUS Malcolm to assess femoral pulse because there were no blood pressure readings and the patient was being fully paced by defibrillator. Left femoral pulse was palpated and no fem pulse felt. Wife was informed and did not wish to have CPR. Pt was pronounced . Pacemaker was turned off and ventilator was turned off. No breath sounds auscultated and heart rhythm flat lined. Family did not want an autopsy performed.

## 2017-11-29 LAB
CULTURE RESULTS: SIGNIFICANT CHANGE UP
SPECIMEN SOURCE: SIGNIFICANT CHANGE UP

## 2017-12-19 PROCEDURE — 87086 URINE CULTURE/COLONY COUNT: CPT

## 2017-12-19 PROCEDURE — 84155 ASSAY OF PROTEIN SERUM: CPT

## 2017-12-19 PROCEDURE — 94660 CPAP INITIATION&MGMT: CPT

## 2017-12-19 PROCEDURE — 82330 ASSAY OF CALCIUM: CPT

## 2017-12-19 PROCEDURE — 86225 DNA ANTIBODY NATIVE: CPT

## 2017-12-19 PROCEDURE — 84436 ASSAY OF TOTAL THYROXINE: CPT

## 2017-12-19 PROCEDURE — 33210 INSERT ELECTRD/PM CATH SNGL: CPT

## 2017-12-19 PROCEDURE — 85014 HEMATOCRIT: CPT

## 2017-12-19 PROCEDURE — 86036 ANCA SCREEN EACH ANTIBODY: CPT

## 2017-12-19 PROCEDURE — 94799 UNLISTED PULMONARY SVC/PX: CPT

## 2017-12-19 PROCEDURE — C1894: CPT

## 2017-12-19 PROCEDURE — 93970 EXTREMITY STUDY: CPT

## 2017-12-19 PROCEDURE — 82565 ASSAY OF CREATININE: CPT

## 2017-12-19 PROCEDURE — 93321 DOPPLER ECHO F-UP/LMTD STD: CPT

## 2017-12-19 PROCEDURE — 80074 ACUTE HEPATITIS PANEL: CPT

## 2017-12-19 PROCEDURE — 94003 VENT MGMT INPAT SUBQ DAY: CPT

## 2017-12-19 PROCEDURE — C1889: CPT

## 2017-12-19 PROCEDURE — 94640 AIRWAY INHALATION TREATMENT: CPT

## 2017-12-19 PROCEDURE — 82962 GLUCOSE BLOOD TEST: CPT

## 2017-12-19 PROCEDURE — 70450 CT HEAD/BRAIN W/O DYE: CPT

## 2017-12-19 PROCEDURE — 93306 TTE W/DOPPLER COMPLETE: CPT

## 2017-12-19 PROCEDURE — 75716 ARTERY X-RAYS ARMS/LEGS: CPT | Mod: XU

## 2017-12-19 PROCEDURE — 82553 CREATINE MB FRACTION: CPT

## 2017-12-19 PROCEDURE — 80162 ASSAY OF DIGOXIN TOTAL: CPT

## 2017-12-19 PROCEDURE — 86900 BLOOD TYPING SEROLOGIC ABO: CPT

## 2017-12-19 PROCEDURE — 33990 INSJ PERQ VAD L HRT ARTERIAL: CPT

## 2017-12-19 PROCEDURE — 84295 ASSAY OF SERUM SODIUM: CPT

## 2017-12-19 PROCEDURE — 86235 NUCLEAR ANTIGEN ANTIBODY: CPT

## 2017-12-19 PROCEDURE — 85610 PROTHROMBIN TIME: CPT

## 2017-12-19 PROCEDURE — 85027 COMPLETE CBC AUTOMATED: CPT

## 2017-12-19 PROCEDURE — 74174 CTA ABD&PLVS W/CONTRAST: CPT

## 2017-12-19 PROCEDURE — 93926 LOWER EXTREMITY STUDY: CPT

## 2017-12-19 PROCEDURE — C1725: CPT

## 2017-12-19 PROCEDURE — 86334 IMMUNOFIX E-PHORESIS SERUM: CPT

## 2017-12-19 PROCEDURE — 83874 ASSAY OF MYOGLOBIN: CPT

## 2017-12-19 PROCEDURE — 87389 HIV-1 AG W/HIV-1&-2 AB AG IA: CPT

## 2017-12-19 PROCEDURE — 86850 RBC ANTIBODY SCREEN: CPT

## 2017-12-19 PROCEDURE — 71045 X-RAY EXAM CHEST 1 VIEW: CPT

## 2017-12-19 PROCEDURE — 86160 COMPLEMENT ANTIGEN: CPT

## 2017-12-19 PROCEDURE — 76775 US EXAM ABDO BACK WALL LIM: CPT

## 2017-12-19 PROCEDURE — 37220: CPT

## 2017-12-19 PROCEDURE — 76937 US GUIDE VASCULAR ACCESS: CPT

## 2017-12-19 PROCEDURE — 95951: CPT

## 2017-12-19 PROCEDURE — 84100 ASSAY OF PHOSPHORUS: CPT

## 2017-12-19 PROCEDURE — C9606: CPT | Mod: RC

## 2017-12-19 PROCEDURE — 82435 ASSAY OF BLOOD CHLORIDE: CPT

## 2017-12-19 PROCEDURE — 82803 BLOOD GASES ANY COMBINATION: CPT

## 2017-12-19 PROCEDURE — 36430 TRANSFUSION BLD/BLD COMPNT: CPT

## 2017-12-19 PROCEDURE — 83516 IMMUNOASSAY NONANTIBODY: CPT

## 2017-12-19 PROCEDURE — 83605 ASSAY OF LACTIC ACID: CPT

## 2017-12-19 PROCEDURE — 81206 BCR/ABL1 GENE MAJOR BP: CPT

## 2017-12-19 PROCEDURE — 82550 ASSAY OF CK (CPK): CPT

## 2017-12-19 PROCEDURE — C1887: CPT

## 2017-12-19 PROCEDURE — C1817: CPT

## 2017-12-19 PROCEDURE — 83051 HEMOGLOBIN PLASMA: CPT

## 2017-12-19 PROCEDURE — 94002 VENT MGMT INPAT INIT DAY: CPT

## 2017-12-19 PROCEDURE — 92610 EVALUATE SWALLOWING FUNCTION: CPT

## 2017-12-19 PROCEDURE — C1769: CPT

## 2017-12-19 PROCEDURE — 84484 ASSAY OF TROPONIN QUANT: CPT

## 2017-12-19 PROCEDURE — 81001 URINALYSIS AUTO W/SCOPE: CPT

## 2017-12-19 PROCEDURE — 87040 BLOOD CULTURE FOR BACTERIA: CPT

## 2017-12-19 PROCEDURE — 81207 BCR/ABL1 GENE MINOR BP: CPT

## 2017-12-19 PROCEDURE — 80053 COMPREHEN METABOLIC PANEL: CPT

## 2017-12-19 PROCEDURE — 86901 BLOOD TYPING SEROLOGIC RH(D): CPT

## 2017-12-19 PROCEDURE — 93005 ELECTROCARDIOGRAM TRACING: CPT

## 2017-12-19 PROCEDURE — 83521 IG LIGHT CHAINS FREE EACH: CPT

## 2017-12-19 PROCEDURE — 83735 ASSAY OF MAGNESIUM: CPT

## 2017-12-19 PROCEDURE — 87070 CULTURE OTHR SPECIMN AEROBIC: CPT

## 2017-12-19 PROCEDURE — 83880 ASSAY OF NATRIURETIC PEPTIDE: CPT

## 2017-12-19 PROCEDURE — C1874: CPT

## 2017-12-19 PROCEDURE — 33967 INSERT I-AORT PERCUT DEVICE: CPT

## 2017-12-19 PROCEDURE — 84550 ASSAY OF BLOOD/URIC ACID: CPT

## 2017-12-19 PROCEDURE — 87150 DNA/RNA AMPLIFIED PROBE: CPT

## 2017-12-19 PROCEDURE — 83615 LACTATE (LD) (LDH) ENZYME: CPT

## 2017-12-19 PROCEDURE — 82947 ASSAY GLUCOSE BLOOD QUANT: CPT

## 2017-12-19 PROCEDURE — 93456 R HRT CORONARY ARTERY ANGIO: CPT | Mod: 59,78

## 2017-12-19 PROCEDURE — 80061 LIPID PANEL: CPT

## 2017-12-19 PROCEDURE — 93451 RIGHT HEART CATH: CPT

## 2017-12-19 PROCEDURE — 86923 COMPATIBILITY TEST ELECTRIC: CPT

## 2017-12-19 PROCEDURE — 84443 ASSAY THYROID STIM HORMONE: CPT

## 2017-12-19 PROCEDURE — 84480 ASSAY TRIIODOTHYRONINE (T3): CPT

## 2017-12-19 PROCEDURE — 80048 BASIC METABOLIC PNL TOTAL CA: CPT

## 2017-12-19 PROCEDURE — 84165 PROTEIN E-PHORESIS SERUM: CPT

## 2017-12-19 PROCEDURE — 95819 EEG AWAKE AND ASLEEP: CPT

## 2017-12-19 PROCEDURE — C8929: CPT

## 2017-12-19 PROCEDURE — 83010 ASSAY OF HAPTOGLOBIN QUANT: CPT

## 2017-12-19 PROCEDURE — 95957 EEG DIGITAL ANALYSIS: CPT

## 2017-12-19 PROCEDURE — 84132 ASSAY OF SERUM POTASSIUM: CPT

## 2017-12-19 PROCEDURE — P9016: CPT

## 2017-12-19 PROCEDURE — 93308 TTE F-UP OR LMTD: CPT

## 2017-12-19 PROCEDURE — 83036 HEMOGLOBIN GLYCOSYLATED A1C: CPT

## 2017-12-19 PROCEDURE — 86038 ANTINUCLEAR ANTIBODIES: CPT

## 2017-12-19 PROCEDURE — 85730 THROMBOPLASTIN TIME PARTIAL: CPT

## 2018-03-12 NOTE — PROVIDER CONTACT NOTE (CRITICAL VALUE NOTIFICATION) - DATE AND TIME:
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 Sent here by YAMILET ramos for MRI of abd. Pt c/o general weakness. Denies pain at this time.

## 2018-07-12 NOTE — PROGRESS NOTE ADULT - PROBLEM SELECTOR PLAN 6
Pt has a diffuse goiter for many years. His endocrinologist follows this closely and was last checked in August.  - thyroid panels sent
Pt has a diffuse goiter for many years. His endocrinologist follows this closely and was last checked in August.  - thyroid panels sent
Requiring intubation    -extubate after IMPELLA removed
No

## 2018-09-06 NOTE — ED ADULT NURSE NOTE - CARDIO ASSESSMENT
---
I personally performed the service described in the documentation recorded by the scribe in my presence, and it accurately and completely records my words and actions.

## 2018-12-24 NOTE — ED ADULT NURSE NOTE - MUSCULOSKELETAL ASSESSMENT
83M with PMHx of gastric cancer, htn, afib, gout, gerd, initially admitted with generalised weakness and multiple falls. Pt possibly had a siezure episode in ED as per chart notes. Now RRT called by bedside RN for tonic-clonic seizure lasting ~40 seconds which was witnessed by the RN. Pt seen and examined at bedside, awake, following commands. no post-ictal phase. Pt denies chest pain, sob, n/v/d, loc. Pt started on keppra and transferred to [facility] for further management    24 hour events: Pt seen and examined at bedside. Charts/labs reviewed. Pt with persistent fevers and leukocytosis. Pt cultured and restarted on empiric abx. Pt also lethargic, MRI head negative. ativan dc'd, but remains on ciwa protocol.    PAST MEDICAL & SURGICAL HISTORY:  Stomach cancer  Hypertension  Atrial fibrillation  Gout  No significant past surgical history      Review of Systems:  unable to obtain    T(F): 98.5 (12-24-18 @ 20:01), Max: 103.1 (12-24-18 @ 17:03)  HR: 92 (12-24-18 @ 22:01) (76 - 100)  BP: 131/62 (12-24-18 @ 22:01) (129/64 - 148/58)  RR: 31 (12-24-18 @ 22:01) (16 - 35)  SpO2: 97% (12-24-18 @ 22:01) (94% - 100%)  Wt(kg): --        CAPILLARY BLOOD GLUCOSE      POCT Blood Glucose.: 150 mg/dL (23 Dec 2018 02:04)      I&O's Summary    23 Dec 2018 07:01  -  24 Dec 2018 07:00  --------------------------------------------------------  IN: 250 mL / OUT: 200 mL / NET: 50 mL    24 Dec 2018 07:01  -  24 Dec 2018 22:20  --------------------------------------------------------  IN: 750 mL / OUT: 0 mL / NET: 750 mL        Physical Exam:     Gen: WD/WG male  Neuro: lethargic, arousable  HEENT: NC/AT  Resp: CTA b/l  CVS: nl S1/S2, RRR  Abd: soft, nt, nd, +bs  Ext: no edema, +pulses  Skin: well perfused, warm      Meds:    azithromycin  IVPB   piperacillin/tazobactam IVPB. IV Intermittent  amLODIPine   Tablet Oral  digoxin     Tablet Oral  metoprolol succinate ER Oral  allopurinol Oral  colchicine Oral  guaiFENesin   Syrup  (Sugar-Free) Oral PRN  acetaminophen   Tablet .. Oral PRN  acetaminophen  Suppository .. Rectal  levETIRAcetam  IVPB IV Intermittent  aspirin enteric coated Oral  enoxaparin Injectable SubCutaneous  aluminum hydroxide/magnesium hydroxide/simethicone Suspension Oral PRN  bisacodyl Oral PRN  folic acid Oral  lactated ringers. IV Continuous  multivitamin Oral  thiamine IVPB IV Intermittent                                  8.5    29.40 )-----------( 275      ( 24 Dec 2018 07:18 )             26.1     Bands 5.0    12-24    139  |  101  |  17  ----------------------------<  156<H>  3.9   |  23  |  1.38<H>    Ca    9.3      24 Dec 2018 07:18  Phos  3.5     12-24  Mg     1.6     12-24    TPro  7.6  /  Alb  2.6<L>  /  TBili  0.8  /  DBili  0.3<H>  /  AST  13  /  ALT  13  /  AlkPhos  24<L>  12-23    Lactate 2.0           12-24 @ 18:36              .Blood Blood   No growth to date. -- 12-22 @ 23:17  .Blood Blood   No growth to date. -- 12-22 @ 21:00  .Urine Clean Catch (Midstream)   <10,000 CFU/ml Normal Urogenital kristi present -- 12-22 @ 20:50        MRI Head:  INTERPRETATION: MRI brain without contrast     History CVA     Comparison CT performed 2 days prior     There is no mass effect, cortical edema or hydrocephalus. There is no   evidence of acute infarct or previous parenchymal hemorrhage. There is mild   central volume loss. The orbital and sellar contents and cerebellar tonsils   are within normal limits.     IMPRESSION:     No acute findings     MR Lumbarspine:    INTERPRETATION: MRI of lumbar spine without contrast     History back pain     There is mild lower thoracic dextroscoliosis and mid lumbar levoscoliosis.   There is mild diffuse red marrow replacement without focal infiltrative   lesion or edema. There is no compression fracture or subluxation. The conus   is normal     There are ventral syndesmophytes at L1-L2 and L2-L3 with relative   preservation of the disc spaces. There is mild facet hypertrophy at L2-L3   without significant spinal canal or foraminal stenosis     At L3-L4 there is mild annular osteophyte with ventral discogenic sclerosis   and bridging syndesmophyte. There is mild ligamentous and facet hypertrophy   together resulting in mild right-sided foraminal stenosis without central   canal stenosis     At L4-L5 disc space is maintained. There are mild ventral degenerative   endplate changes. Facet hypertrophy and far lateral osteophyte results in   mild left-sided foraminal stenosis without central canal stenosis     The L5-S1 disc is maintained without significant spinal stenosis. Facet   hypertrophy results in moderate left-sided foraminal stenosis     IMPRESSION:     No acute findings. Negative for compression fracture, focal disc protrusion   or spinal stenosis. Diffuse red marrow replacement suggestive of chronic   anemia.     CXR:      INTERPRETATION: History: Fever     Portable AP radiograph is compared to 12/22/2018.     The heart is again enlarged. The trachea is midline air there is no focal   infiltrate or pleural effusion. The osseous structures are intact.     Impression: Cardiomegaly. No active disease. No change.         CENTRAL LINE: no    WELLS: no    A-LINE: no    GLOBAL ISSUE/BEST PRACTICE:  Analgesia: yes  Sedation: no  HOB elevation: yes  Stress ulcer prophylaxis: n/a  VTE prophylaxis: yes  Glycemic control: n/a  Nutrition: yes      CODE STATUS: Full  GOC discussion: Y  Critical care time spent (mins): 32  (Reviewing data, imaging, discussing with multidisciplinary team, non inclusive of procedures, discussing goals of care with patient/family) 83M with PMHx of gastric cancer, htn, afib, gout, gerd, initially admitted with generalised weakness and multiple falls. Pt possibly had a siezure episode in ED as per chart notes. Now RRT called by bedside RN for tonic-clonic seizure lasting ~40 seconds which was witnessed by the RN. Pt seen and examined at bedside, awake, following commands. no post-ictal phase. Pt denies chest pain, sob, n/v/d, loc. Pt started on keppra and transferred to [facility] for further management    24 hour events: Pt seen and examined at bedside. Charts/labs reviewed. Pt with persistent fevers and leukocytosis. Pt cultured and restarted on empiric abx. Pt also lethargic, MRI head negative. ativan dc'd, but remains on ciwa protocol.    PAST MEDICAL & SURGICAL HISTORY:  Stomach cancer  Hypertension  Atrial fibrillation  Gout  No significant past surgical history      Review of Systems:  unable to obtain    T(F): 98.5 (12-24-18 @ 20:01), Max: 103.1 (12-24-18 @ 17:03)  HR: 92 (12-24-18 @ 22:01) (76 - 100)  BP: 131/62 (12-24-18 @ 22:01) (129/64 - 148/58)  RR: 31 (12-24-18 @ 22:01) (16 - 35)  SpO2: 97% (12-24-18 @ 22:01) (94% - 100%)  Wt(kg): --        CAPILLARY BLOOD GLUCOSE      POCT Blood Glucose.: 150 mg/dL (23 Dec 2018 02:04)      I&O's Summary    23 Dec 2018 07:01  -  24 Dec 2018 07:00  --------------------------------------------------------  IN: 250 mL / OUT: 200 mL / NET: 50 mL    24 Dec 2018 07:01  -  24 Dec 2018 22:20  --------------------------------------------------------  IN: 750 mL / OUT: 0 mL / NET: 750 mL        Physical Exam:     Gen: WD/WG male  Neuro: lethargic, arousable  HEENT: NC/AT  Resp: CTA b/l  CVS: nl S1/S2, RRR  Abd: soft, nt, nd, +bs  Ext: no edema, +pulses  Skin: well perfused, warm      Meds:    azithromycin  IVPB   piperacillin/tazobactam IVPB. IV Intermittent  amLODIPine   Tablet Oral  digoxin     Tablet Oral  metoprolol succinate ER Oral  allopurinol Oral  colchicine Oral  guaiFENesin   Syrup  (Sugar-Free) Oral PRN  acetaminophen   Tablet .. Oral PRN  acetaminophen  Suppository .. Rectal  levETIRAcetam  IVPB IV Intermittent  aspirin enteric coated Oral  enoxaparin Injectable SubCutaneous  aluminum hydroxide/magnesium hydroxide/simethicone Suspension Oral PRN  bisacodyl Oral PRN  folic acid Oral  lactated ringers. IV Continuous  multivitamin Oral  thiamine IVPB IV Intermittent                                  8.5    29.40 )-----------( 275      ( 24 Dec 2018 07:18 )             26.1     Bands 5.0    12-24    139  |  101  |  17  ----------------------------<  156<H>  3.9   |  23  |  1.38<H>    Ca    9.3      24 Dec 2018 07:18  Phos  3.5     12-24  Mg     1.6     12-24    TPro  7.6  /  Alb  2.6<L>  /  TBili  0.8  /  DBili  0.3<H>  /  AST  13  /  ALT  13  /  AlkPhos  24<L>  12-23    Lactate 2.0           12-24 @ 18:36              .Blood Blood   No growth to date. -- 12-22 @ 23:17  .Blood Blood   No growth to date. -- 12-22 @ 21:00  .Urine Clean Catch (Midstream)   <10,000 CFU/ml Normal Urogenital kristi present -- 12-22 @ 20:50        MRI Head:  INTERPRETATION: MRI brain without contrast     History CVA     Comparison CT performed 2 days prior     There is no mass effect, cortical edema or hydrocephalus. There is no   evidence of acute infarct or previous parenchymal hemorrhage. There is mild   central volume loss. The orbital and sellar contents and cerebellar tonsils   are within normal limits.     IMPRESSION:     No acute findings     MR Lumbarspine:    INTERPRETATION: MRI of lumbar spine without contrast     History back pain     There is mild lower thoracic dextroscoliosis and mid lumbar levoscoliosis.   There is mild diffuse red marrow replacement without focal infiltrative   lesion or edema. There is no compression fracture or subluxation. The conus   is normal     There are ventral syndesmophytes at L1-L2 and L2-L3 with relative   preservation of the disc spaces. There is mild facet hypertrophy at L2-L3   without significant spinal canal or foraminal stenosis     At L3-L4 there is mild annular osteophyte with ventral discogenic sclerosis   and bridging syndesmophyte. There is mild ligamentous and facet hypertrophy   together resulting in mild right-sided foraminal stenosis without central   canal stenosis     At L4-L5 disc space is maintained. There are mild ventral degenerative   endplate changes. Facet hypertrophy and far lateral osteophyte results in   mild left-sided foraminal stenosis without central canal stenosis     The L5-S1 disc is maintained without significant spinal stenosis. Facet   hypertrophy results in moderate left-sided foraminal stenosis     IMPRESSION:     No acute findings. Negative for compression fracture, focal disc protrusion   or spinal stenosis. Diffuse red marrow replacement suggestive of chronic   anemia.     CXR:      INTERPRETATION: History: Fever     Portable AP radiograph is compared to 12/22/2018.     The heart is again enlarged. The trachea is midline air there is no focal   infiltrate or pleural effusion. The osseous structures are intact.     Impression: Cardiomegaly. No active disease. No change.         CENTRAL LINE: no    WELLS: no    A-LINE: no    GLOBAL ISSUE/BEST PRACTICE:  Analgesia: yes  Sedation: no  HOB elevation: yes  Stress ulcer prophylaxis: n/a  VTE prophylaxis: yes  Glycemic control: n/a  Nutrition: yes      CODE STATUS: Full  GOC discussion: Y  Critical care time spent (mins): 65  (Reviewing data, imaging, discussing with multidisciplinary team, non inclusive of procedures, discussing goals of care with patient/family) WDL

## 2019-12-30 NOTE — ED ADULT NURSE NOTE - CAS TRG GENERAL NORM CIRC DET
Capillary refill less/equal to 2 seconds/Strong peripheral pulses See attending attestation for MDM summary

## 2020-07-25 NOTE — PATIENT PROFILE ADULT. - EXTENSIONS OF SELF_ADULT
other (specify)/NFPE: severe muscle wasting/fat loss (temporal, clavicle, interosseous, orbital, buccal, triceps)/underweight/debilitated Height: 5'4", Weight (7/25) 117.2lbs, BMI: 20.1  IBW: 120lbs +/- 10% None

## 2021-01-10 NOTE — PROGRESS NOTE ADULT - ASSESSMENT
aching 83M pmhx of afb on pradaxa initially presented on 10/15/17 with chest pain/diaphresis/ dyspnea to [facility] emergently transferred here for C c/b VF arrest requiring IABP followed by oRCA stent c/b now acute right heart failure and cardiogenic shock.

## 2021-07-31 NOTE — PROVIDER CONTACT NOTE (CRITICAL VALUE NOTIFICATION) - NAME OF MD/NP/PA/DO NOTIFIED:
Fatoumata MARIA
Zelalem Marquez
[facility]
CHRISTA Em
CROW Lam
CROW Lam
CROW Stern
Dr. Caldera
Dr. Marquez
Jeannine Catalan, NP
Jeannine Catalan, NP
MD Fernando León
MD Marquez (Cardiology fellow)
MD Zulma
MD wallis
OSCAR Thakur
Rajani Pro
Terra Enriquez, NP
harish saxena
none

## 2022-07-13 NOTE — ED ADULT NURSE REASSESSMENT NOTE - CONDITION
Marlyn Jenn was seen and treated in our emergency department on 7/13/2022  Diagnosis:     Malcom Luciano  may return to work on return date  She may return on this date: 07/18/2022         If you have any questions or concerns, please don't hesitate to call        Saroj Epperson PA-C    ______________________________           _______________          _______________  Hospital Representative                              Date                                Time deteriorated

## 2023-02-14 NOTE — PROGRESS NOTE BEHAVIORAL HEALTH - ABNORMAL MOVEMENTS
Pt doing well POD #1 s/p atrial fibrillation ablation. No overnight events noted, pt denies complaint today. Bilateral groin sutures removed without difficulty, pt tolerated well.        EKG: sinus bradycardia 52 bpm   TELE: sinus rhythm, no events noted     PAST MEDICAL & SURGICAL HISTORY:  Diabetes mellitus type II, controlled  Atrial fibrillation  Congestive heart failure  Dyspnea  Morbid obesity with BMI of 40.0-44.9, adult  Neuropathy  Peripheral venous insufficiency  Thyroid nodule  HTN (hypertension)  Cellulitis  At risk for sleep apnea  History of esophagogastroduodenoscopy (EGD)  H/O colonoscopy  Other complications of gastric band procedure  S/P left knee arthroscopy  Status post medial meniscus repair  History of cholecystectomy  S/P cataract surgery    MEDICATIONS  (STANDING):  aMIOdarone    Tablet 100 milliGRAM(s) Oral daily  apixaban 5 milliGRAM(s) Oral <User Schedule>  atorvastatin 10 milliGRAM(s) Oral at bedtime  cholestyramine Powder (Sugar-Free) 4 Gram(s) Oral daily  furosemide    Tablet 40 milliGRAM(s) Oral daily  losartan 25 milliGRAM(s) Oral daily  magnesium oxide 400 milliGRAM(s) Oral daily  metoprolol succinate ER 50 milliGRAM(s) Oral daily  montelukast 10 milliGRAM(s) Oral at bedtime  pantoprazole    Tablet 40 milliGRAM(s) Oral every 12 hours  predniSONE   Tablet 5 milliGRAM(s) Oral daily  sucralfate suspension 1 Gram(s) Oral every 12 hours    MEDICATIONS  (PRN):  acetaminophen     Tablet .. 650 milliGRAM(s) Oral every 6 hours PRN Moderate Pain (4 - 6)  albuterol    90 MICROgram(s) HFA Inhaler 2 Puff(s) Inhalation every 6 hours PRN Shortness of Breath  ALPRAZolam 0.25 milliGRAM(s) Oral every 8 hours PRN anxiety / insomnia  benzocaine/menthol Lozenge 1 Lozenge Oral every 2 hours PRN Sore Throat  gabapentin 800 milliGRAM(s) Oral every 8 hours PRN Neuropathic pain  ondansetron Injectable 4 milliGRAM(s) IV Push every 6 hours PRN Nausea and/or Vomiting  oxyCODONE    IR 5 milliGRAM(s) Oral every 6 hours PRN Severe Pain (7 - 10)    Allergies:  IV DYE, IODINE CONTRAST (Unknown)  latex (Unknown)  penicillin (Hives; Urticaria)    Vital Signs Last 24 Hrs  T(C): 36.4 (14 Feb 2023 06:20), Max: 36.4 (14 Feb 2023 06:20)  T(F): 97.6 (14 Feb 2023 06:20), Max: 97.6 (14 Feb 2023 06:20)  HR: 55 (14 Feb 2023 06:20) (51 - 86)  BP: 141/65 (14 Feb 2023 06:20) (109/55 - 155/76)  BP(mean): 83 (14 Feb 2023 00:00) (78 - 85)  RR: 17 (14 Feb 2023 06:20) (17 - 18)  SpO2: 93% (13 Feb 2023 20:00) (93% - 98%)    Parameters below as of 14 Feb 2023 06:20  Patient On (Oxygen Delivery Method): room air    Physical Exam:  Constitutional: NAD, AAOx3  Cardiovascular: +S1S2 RRR  Pulmonary: CTA b/l, unlabored  Abd: rotund, soft, non-tender   Groins: C/D/I bilaterally; no bleeding, hematoma, edema  Extremities: mild pedal edema, +distal pulses b/l  Neuro: non focal, CAPPS x4    LABS:                        12.0   10.46 )-----------( 263      ( 14 Feb 2023 06:09 )             37.6     02-14    138  |  103  |  15.2  ----------------------------<  112<H>  4.1   |  24.0  |  0.65    Ca    8.6      14 Feb 2023 06:09  Mg     1.7     02-14    Assessment:   73 year old female, former smoker, with PMH of HTN, hyperlipidemia, DM type 2, aortic stenosis, CAD, PVD w/ prior vascular ulcers, morbid obesity BMI 40, HFpEF (EF 50-55%), and persistent atrial fibrillation s/p prior cardioversion. Now POD#1 s/p atrial fibrillation ablation ([facility]/PVI, posterior box, CTI line). Resting comfortably.     Plan:   Continue strict compliance w/ Eliquis 5mg Q12HR  Resume Lasix 40mg daily. Pt admits to non-compliance due to urinary incontinence. Stressed the importance of diuretic following ablation and potential for heart failure.   Start Protonix 40mg BID x 2 weeks then daily X 6 weeks.     Start Carafate 1gm BID x 2 weeks.   Continue amiodarone.   Reduce Metoprolol succinate to 50mg QD  Access site care and activity limitations reviewed w/ pt.   Stable for d/c home.   Outpt f/up in 2-4 weeks. 
No abnormal movements

## 2023-05-03 NOTE — PROGRESS NOTE ADULT - PROBLEM SELECTOR PLAN 1
Pt was seen in real time Telepsych service for Psychiatric follow up appointment at [facility], [place] by Marybeth Elias MD who is located in [place].      Patient was seen with support staff at the [facility].    C/C:  Patient is being followed up for symptoms of poor focus    Last seen: 2/1/23    HPI:  Increased work stress.  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  This co-worker is having some personal and financial issues and is being verbally mean and passive aggressive with staff including patient.  Patient is feeling harassed.  He says things like “you women don't want to work, women are so lazy\".  Patient has complained about him to her supervisors and they have  their work spaces for now.  Also, there has been a recent management change and she now works from 10 a.m. to 5:30 p.m. 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  Having shoulder issues and pain.  Daughter also works at the same place and is encouraging patient to look for another job.  These issues are causing some anxiety.      Takes Adderall 20mg -20mg -10mg, she is happy about the dosing. Takes at 5.45 am, noon and 3.00 pm. She avoids taking the 3rd dose of Adderall if she does not have much to do later in the day.    Able to sustain attention at work and at home and being a productive.  She denies having problems with sleep and able to sleep through the night. Patient denies side effects with the Adderall such as chest pain/palpitations/tremors/anxiety/appetite changes/sleep disturbances etc    Without the Adderall, has a hard time waking up, easily distracted, starts multiple tasks , \"nothing gets done, nothing gets accomplished\", gets frustrated. Starts making lists.      Full time job and gets benefits.  Sleeping well through the night.     She denies depression.  Kids are doing well.  Pt has been dating  her neighbor since 1 year.    Relevant history:  Mental Health Diagnoses:   ADHD, predominantly inattentive type    Pt denies problems with anxiety and depression. She had \"crippling anxiety\" after her 2nd divorce 5 yrs ago but the stressor no longer exists and she is no longer feeling anxious. She had one episode of depression in childhood but denies further episodes.       ADHD- Diagnosed as an adult. School was very difficult as she used to be hyperactive, could not sit still, bored easily.   , Patient reports symptoms of inattention such as failing to give close attention to details, making careless mistakes at work, having difficulty sustaining attention in tasks, does not follow through on instructions, fails to finish duties in the workplace and home, starts tasks but quickly loses focus and is easily sidetracked, has difficulty organizing tasks, avoids or is reluctant to engage in tasks that require sustained mental effort, is forgetful in daily activities.  Patient reports symptoms of hyperactivity often leaving seat in situations when remaining seated is expected, feeling restless, inability to engage in leisure activities quietly.        Stressors: Pt has 4 kids- eldest daughter (29)  is in the  and is . Son (, 20) is in the army and daughter (Brittany, 15) lives with pt, son (Mihai, 17) lives with his dad and he recently got his drivers license. kids are doing well. 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 Likes her job- 8.45 am to  5 pm.  Pt says that she was a 2 yr job hopper in the past when she was . Never got fired, always quit. The first child is from her first marriage and the other 3 have the same father. She tries to avoid the dad , does not get along with him.      Patient was seen in the ER on 06/17/2022 for a stroke alert.  She complained of feeling dizzy and weak, weakness in both arms and legs, nonverbal with a question of a facial droop.  CT  - with TTE showing TAPSE at 0.9 and basal 5.0cm. LV slightly compromised with VTI of 13cm.   - would cont impella at this time, aiming for CVP of around 8-10. Can consider removing iabp given decreased troponins.  - maintain  at 5 head was normal.  There was a question of psychosomatic component.  By the time Neurology saw patient, she was verbal and moving extremities.  MRI brain was normal.  EKG normal.         Past hospitalizations: Once during childhood about 14 \"I was not coping well, I was probably depressed \". She told someone that she wanted to jump in front of the bus.  Prior suicidal attempts: Denies   Past Self injurious behavior: Picks fingers     Living situation: Lives with 1 kid and 2 cats.     Substance use: cutting down on alcohol consumption. Cutting down on soda consumption.   Hx: Alcohol : Drinks once a week - 4 standard drinks each time. Denies alcohol being a problem.  Denies DUI  Denies other drug use     Medication trials and side effects: Vyvanse (felt very flat, got headaches),  Ritalin ( ineffective and caused her problems sleeping at night). Adderall XR did not last long enough.    Medication changes since I started treating the patient:   1/28/20- Continue Adderall 20 mg t.i.d.  6/17-Reduce Adderall to 20 mg b.i.d.  09/17/2020-increase Adderall to 20-20-10 (morning, midday, early afternoon)     Mental Status exam:  GENERAL APPEARANCE: Patient is a  48 year old lady who appears the stated age. Patient is alert and in no apparent acute distress.   BEHAVIOR/DEMEANOR: Calm, Cooperative, engaged and pleasant.   ABNORMAL MOVEMENTS: None noted.  SPEECH: Normal rate, loudness and articulation.   LANGUAGE: Intact.  MOOD: Good  Affect:  Normal range  THOUGHT PROCESS: Linear and goal directed.  THOUGHT ASSOCIATIONS: Intact. No loosening of associations.   THOUGHT CONTENT: Patient is denying any suicidal, homicidal or violent ideation. Patient denies any paranoia or other delusional thoughts.   ABNORMAL PERCEPTIONS: Patient denies any Auditory or Visual hallucinations.   FUND OF KNOWLEDGE: Average.  MEMORY: Intact for remote and recent memory.  ORIENTATION: Alert and oriented to time, place, person and situation.   ATTENTION  AND CONCENTRATION:  Good  INSIGHT: Fair.  JUDGMENT: Fair.     Assessment and Plan:   Problem #1:  ADHD, predominantly inattentive type  Plan  -Continue Adderall  20mg -20mg -10 mg to be taken in the morning, midday, early afternoon.    - PDMP reviewed, no abberant behavior noted.        Medications will be e-prescribed to the patient’s pharmacy.  Patient psychoeducation provided: Indications, risks, benefits, off-label use, guidelines, monitoring, black box warnings, dosing, duration of medication treatment and titration regimen-all discussed with patient, who verbalized understanding.     Psychoeducation on mental illness, medications/side effects and exacerbation of symptoms, with safety plan discussed. Patient is encouraged to continue psychotropic medications as prescribed.    Note and plan discussed with the patient.    Encouraged to ventilate thoughts and feelings.     Psychotherapy:  Supportive therapy.     Medical:  Patient will follow up with Primary Care Physician for health monitoring, health screening and health physicals.     Safety/Follow up:  Safety plan discussed- Advised to call 911 if client becomes suicidal/homicidal or severely functionally compromised. Patient will return in 3 months to be seen in the clinic, for follow up treatment. Patient encouraged to contact the clinic earlier if need be; patient verbalized understanding. Patient had no further questions. Client verbalized understanding of treatment plan.    Greater than 50% of the visit was spent counseling regarding above issues; I spent a total of 30 minutes on the day of the visit. This includes pre-charting, chart review and documenting.    More than 16 minutes were spent in psychotherapy.Utilized empathetic listening and support for the patient’s situation. Continued efforts to improve symptom recognition and communication. Rephrase worried negative thought trends, utilize self reassurance relaxation distraction. Discussed coping  strategies, healthy boundaries. Encouraged to ventilate thoughts and feelings.  Encouraged mindfulness meditation at least 10 minutes a day.        Marybeth Elias MD

## 2024-08-20 NOTE — BEHAVIORAL HEALTH ASSESSMENT NOTE - HYGIENE
Discharge Instruction for a Cystoscopy     You had a procedure today called a cystoscopy (sis-TOS-kuh-pee) which allows your doctor to examine the lining of your bladder and the tube that carries urine out of your body (urethra). Cystoscopy is used to diagnose, monitor and treat conditions affecting the bladder and urethra such as bladder cancer or urethral cancer, bladder stones, bladder control problems, enlarged prostate (benign prostatic hyperplasia), urethral strictures and urinary fistulas, and urinary tract infections.      During the procedure  A hollow tube (cystoscope) equipped with a lens is inserted into your urethra and slowly advanced into your bladder. This procedure was performed under anesthesia to reduce your discomfort. During the procedure your vital signs and medications were continuously monitored by a certified anesthesiologist and registered nurses for your safety and comfort.    Care at home  You may have belly pain, blood-tinged urine or pain when urinating for the first day or two after the procedure. You may also feel like you need to urinate often and urgently. These problems should fade within 48 hours. Certain movements may trigger pain or a feeling that you need to urinate. You may also feel mild soreness or pressure before or during urination. It is normal to see blood in the urine but the amount of blood should be less every day. Medicine to control pain or bladder spasms or to prevent infection may be prescribed. Take this as directed.      Rest for the remainder of the day. Go up and down stairs slowly.  You may walk and climb stairs as tolerated but do not exercise or perform any vigorous actions such as vacuuming or shoveling until your doctor tells you it is okay.   We encourage you to walk several times a day in order to reduce the risk of blood clots. If you begin to have more pain or bleeding, you may be doing too much.   Do not lift anything heavier than 10 pounds or a  gallon of milk  for 5-7 days.  If you were intubated during the procedure you may have a sore throat for 48 hours or longer. In the meantime use ice, cold drinks, lozenges, and chloraseptic spray to help alleviate the discomfort.    For the next 24 hours, do not:   Drive, you must have someone drive you home today  Smoke  Drink alcohol  Operate heavy machinery, hand tools, or anything that requires a quick response time.  Sign anything legally binding    Call your doctor if you have:  Sudden pain or flank pain  Fever over 100.4°F  Nausea that lasts for days  Heavy bleeding when you urinate  Bladder spasms lasting longer than 24 hours    If you are not able to reach your doctor, you may call or go to the emergency department.    Pain Management  Be sure to follow any specific post-op instructions from your surgeon or nurse.    A prescription for pain medication may be prescribed for you, however, do not drive or operate heavy machinery while taking narcotics. You can use over-the-counter pain relievers (such as acetaminophen if not contraindicate) for milder types of pain/discomfort as instructed by you physician. Be aware that narcotics can cause constipation so you will want to use stool softeners and/or miralax or other gentle laxative.     To get the best pain relief possible, we recommend:    Use your medication only as directed.  If your pain is not relieved or if it gets worse, call your doctor.  If pain lessens, try taking your medication less often or switching to acetaminophen if not contraindicated.  Remember that medications need time to work. Most pain relievers taken by mouth need at least 20-30 minutes to take effect.  Take pain medication at regular times as directed. Don’t wait until the pain gets bad to take it.  Try to time your medication so that you take it before beginning an activity, such as dressing or sitting at the table for dinner.  Taking your medication at night may help you get a good  night’s rest.  Avoid drinking alcohol while taking pain medication as it can cause dizziness and slow your respiratory system. It can even be fatal.    Alternate methods for pain control  Practice breathing exercises that you learned at the hospital to reduce pain.  Relaxing techniques - imagery, meditating, watching TV, listening to music   Heat or cold - whichever makes you feel better   Massage   Rest and change your position in bed often.    Diet   Drink plenty of fluids (6 to 8 glasses of water a day).  Resume your regular diet as tolerated.  Avoid greasy or spicy foods for 24 hours.   Start eating a bland diet (toast, gelatin, 7-up, hot cereal, crackers, sherbet, broth soup).    Nausea   Nausea may be expected for the first 24 to 48 hours.  Drink small amounts of fluids such as water or a sports drink.  Try sucking on hard candy.     Urination  The effects of anesthetics may cause some people to have trouble passing urine the day of surgery. Drink a lot of fluids to help prevent this.  Try to urinate within 8 hours of surgery.  If you are unable to pass urine and feel like you need to, call your doctor or the hospital.        Thank you for using [facility] for your healthcare needs. We hope you were well taken care of today. If you receive a survey asking about your experience, please fill it out and return it so we continue to improve our care.         Want to Say “Thank You” to a Nurse?  The [employer] Award® was created in memory of STIVEN Vazquez by his family to say thank you to bedside nurses who provide an outstanding level of care.  Submit a nomination using any method below.     OR    [email or web address]        MPHCYSTOSCOPY V1 6/10/22mb                 Poor

## 2025-02-15 NOTE — SWALLOW BEDSIDE ASSESSMENT ADULT - SWALLOW EVAL: DIAGNOSIS
ED PROVIDER NOTE   Date of Service: 2/15/2025   Time Seen: 5:14 PM   Provider: Sunita Watkins DO    CHIEF COMPLAINT   Chief Complaint   Patient presents with    Hematuria        HISTORY OF PRESENT ILLNESS     History obtained from patient, wife and daughter    Darrel Gan is a 84 year old year old male that comes to the Emergency Department today with complaint of blood in the urine.   Patient has relative past medical history of IBS, chronic prostatitis, HTN, diverticulitis, BPH, HLD, RAD.       Old records reviewed : Patient was seen by his primary care provider for follow-up of his chronic medical problems on January 28.  He was treated for UTI in December 2024.  Urine culture grew Staphylococcus epidermidis that was resistant to Bactrim.  He was treated with Augmentin.       History of Present Illness  The patient is an 84-year-old male presenting for hematuria. He is accompanied by his wife.    He reports noticing blood in the toilet bowl after a bowel movement and urinating.  He is not sure if the blood is coming from his urine or from his rectum.  He is not having any rectal pain.  he has been experiencing difficulty urinating, accompanied by significant pain. He first noticed potential blood in his urine last night, describing it as a darker yellowish-red color. He also reports left lower abdominal discomfort, which has been progressively worsening over the past 10 days. He had a urinary tract infection (UTI) in January 2025. He reports no issues with bladder emptying. He has been using a walking stick to aid mobility due to the pain. He also reports experiencing diarrhea approximately 10 days ago.    Supplemental Information  He had diverticulitis about 2 months ago, which led to a decrease in weight from the 160s to 142 pounds. He was prescribed an antibiotic, and the condition resolved. His weight then increased to the 150s. However, less than 10 days ago, he experienced pain during urination and  his weight dropped to 140 pounds.     MEDICATIONS  Past: amoxicillin       PAST MEDICAL HISTORY     Past Medical History:   Diagnosis Date    Arthritis     Diverticulitis of large intestine     Essential (primary) hypertension     High cholesterol     IBS (irritable bowel syndrome)       PAST FAMILY HISTORY   Family History   Problem Relation Age of Onset    Patient is unaware of any medical problems Mother     Patient is unaware of any medical problems Father       PAST SURGICAL HISTORY   History reviewed. No pertinent surgical history.   SOCIAL HISTORY   Social History     Socioeconomic History    Marital status: /Civil Union     Spouse name: Not on file    Number of children: Not on file    Years of education: Not on file    Highest education level: Not on file   Occupational History    Not on file   Tobacco Use    Smoking status: Former     Current packs/day: 0.00     Types: Cigarettes     Quit date: 1991     Years since quittin.0    Smokeless tobacco: Never   Vaping Use    Vaping status: never used   Substance and Sexual Activity    Alcohol use: Not Currently    Drug use: Never    Sexual activity: Not on file   Other Topics Concern    Not on file   Social History Narrative    Not on file     Social Determinants of Health     Financial Resource Strain: Low Risk  (2/15/2025)    Financial Resource Strain     Unable to Get: None   Food Insecurity: Low Risk  (2/15/2025)    Food Insecurity     Worried about Food: Never true     Food is Gone: Never true   Transportation Needs: Not At Risk (2/15/2025)    Transportation Needs     Lack of Reliable Transportation: No   Physical Activity: Not on file   Stress: Not on file   Social Connections: Low Risk  (2/15/2025)    Social Connections     Social Connectivity: 5 or more times a week   Interpersonal Safety: Low Risk  (2/15/2025)    Interpersonal Safety     How often physically hurt: Never     How often insulted or talked down to: Never     How often  threatened with harm: Never     How often scream or curse at: Never           MEDICATIONS     Current Discharge Medication List        CONTINUE these medications which have NOT CHANGED    Details   cyclobenzaprine (FLEXERIL) 5 MG tablet Take 1 tablet by mouth 3 times daily as needed for Muscle spasms.  Qty: 30 tablet, Refills: 1    Associated Diagnoses: Osteoarthritis of spine with radiculopathy, lumbar region      acetaminophen-codeine (TYLENOL NO.3) 300-30 MG per tablet TAKE 1/2 (ONE-HALF) TABLET BY MOUTH THREE TIMES DAILY AS NEEDED FOR PAIN  Qty: 45 tablet, Refills: 0    Associated Diagnoses: Osteoarthritis of spine with radiculopathy, lumbar region      omeprazole (PriLOSEC) 40 MG capsule Take 1 capsule by mouth once daily  Qty: 90 capsule, Refills: 3    Associated Diagnoses: Irritable bowel syndrome, unspecified type      ondansetron (ZOFRAN) 4 MG tablet TAKE 1 TABLET BY MOUTH EVERY 6 HOURS AS NEEDED FOR NAUSEA  Qty: 90 tablet, Refills: 0    Associated Diagnoses: Nausea and vomiting, unspecified vomiting type      atenolol (TENORMIN) 25 MG tablet TAKE 1/2 (ONE-HALF) TABLET BY MOUTH 4 TIMES DAILY  Qty: 180 tablet, Refills: 0    Associated Diagnoses: Essential hypertension      albuterol 108 (90 Base) MCG/ACT inhaler Inhale 2 puffs into the lungs every 4 hours as needed for Shortness of Breath or Wheezing.  Qty: 1 each, Refills: 3    Comments: Ok to substitute if not covered by insurance.  Associated Diagnoses: Moderate persistent reactive airway disease without complication (CMD)      tamsulosin (FLOMAX) 0.4 MG Cap Take 1 capsule by mouth daily.  Qty: 90 capsule, Refills: 3    Associated Diagnoses: BPH associated with nocturia      Multiple Vitamins-Minerals (PRESERVISION AREDS 2 PO)       Acetaminophen 500 MG Cap Take 1,000 mg by mouth 3 times daily as needed (mild to moderate pain ).      dicyclomine (BENTYL) 20 MG tablet Take 1 tablet by mouth 3 times daily as needed (abdominal spasms).  Qty: 90 tablet,  Refills: 2    Associated Diagnoses: Irritable bowel syndrome, unspecified type      docusate sodium-sennosides (SENOKOT S) 50-8.6 MG per tablet Take 1 tablet by mouth daily.  Qty: 90 tablet, Refills: 0    Associated Diagnoses: Irritable bowel syndrome, unspecified type              ALLERGIES   ALLERGIES:   Allergen Reactions    Nsaids Nausea & Vomiting    Erythromycin Other (See Comments)        REVIEW OF SYSTEMS     Review of Systems   Constitutional:  Negative for chills and fever.   HENT:  Negative for sore throat.    Eyes:  Negative for visual disturbance.   Respiratory:  Negative for cough and shortness of breath.    Cardiovascular:  Negative for chest pain.   Gastrointestinal:  Positive for abdominal pain. Negative for diarrhea, nausea and vomiting.   Genitourinary:  Positive for difficulty urinating. Negative for dysuria.   Musculoskeletal:  Negative for arthralgias.   Skin:  Negative for rash.   Neurological:  Negative for dizziness, light-headedness and headaches.          PHYSICAL EXAM    Physical Exam  Vitals and nursing note reviewed. Exam conducted with a chaperone present.   Constitutional:       General: He is not in acute distress.     Appearance: Normal appearance. He is not ill-appearing.   HENT:      Head: Normocephalic and atraumatic.      Right Ear: External ear normal.      Left Ear: External ear normal.      Nose: Nose normal.      Mouth/Throat:      Mouth: Mucous membranes are moist.   Eyes:      Extraocular Movements: Extraocular movements intact.      Conjunctiva/sclera: Conjunctivae normal.      Pupils: Pupils are equal, round, and reactive to light.   Cardiovascular:      Rate and Rhythm: Normal rate and regular rhythm.      Pulses: Normal pulses.   Pulmonary:      Effort: Pulmonary effort is normal. No respiratory distress.      Breath sounds: Normal breath sounds. No wheezing or rales.   Abdominal:      General: Abdomen is flat. There is no distension.      Palpations: Abdomen is soft.       Tenderness: There is abdominal tenderness in the left lower quadrant. There is no guarding.   Genitourinary:     Comments: Scant stool on the rectal exam, light brown. Small soft external hemorrhoid. No prostate tenderness.   Musculoskeletal:         General: No swelling or deformity.      Cervical back: Normal range of motion. No rigidity.   Skin:     General: Skin is warm and dry.      Capillary Refill: Capillary refill takes less than 2 seconds.   Neurological:      General: No focal deficit present.      Mental Status: He is alert and oriented to person, place, and time.            DIAGNOSTIC INVESTIGATIONS   Radiology Studies:   Radiology reports reviewed.   CTA ABDOMEN PELVIS   Final Result   IMPRESSION:   No findings of active gastrointestinal hemorrhage.      Electronically Signed by: Stefania Dietrich MD   Signed on: 2/15/2025 12:59 PM   Created on Workstation ID: [id number]   Signed on Workstation ID: [id number]        Preliminary CT result showed :     PRELIMINARY REPORT:     Multifocal intraluminal blood products in the jejunum and ileum.  No bowel ischemia. No mesenteric arterial occlusions.     Full report will be completed on a routine basis during business hours.         Laboratory Studies:   Labs were ordered and reviewed.   Labs Reviewed   URINALYSIS & REFLEX MICROSCOPY WITH CULTURE IF INDICATED - Abnormal; Notable for the following components:       Result Value    PROTEIN, URINALYSIS 30 (*)     UROBILINOGEN, URINALYSIS 2.0 (*)     LEUKOCYTE ESTERASE, URINALYSIS Trace (*)     BACTERIA, URINALYSIS Few (*)     HYALINE CASTS, URINALYSIS 26 to 100 (*)     All other components within normal limits   COMPREHENSIVE METABOLIC PANEL - Abnormal; Notable for the following components:    Glucose 143 (*)     BUN 24 (*)     Creatinine 1.60 (*)     Glomerular Filtration Rate 42 (*)     Globulin 4.1 (*)     A/G Ratio 0.9 (*)     All other components within normal limits   CBC WITH AUTOMATED DIFFERENTIAL (PERFORMABLE  ONLY) - Abnormal; Notable for the following components:    MCHC 31.9 (*)     Absolute Neutrophils 9.4 (*)     Absolute Lymphocytes 0.7 (*)     All other components within normal limits   OCCULT BLOOD - GUAIAC - Abnormal; Notable for the following components:    gFOB (guaiac) - Occult Blood Positive (*)     All other components within normal limits   LIPASE - Normal   CBC WITH DIFFERENTIAL    Narrative:     The following orders were created for panel order CBC with Automated Differential.  Procedure                               Abnormality         Status                     ---------                               -----------         ------                     CBC with Automated Dif...[[id number]]  Abnormal            Final result                 Please view results for these tests on the individual orders.   EXTRA TUBES    Narrative:     The following orders were created for panel order Extra Tubes.  Procedure                               Abnormality         Status                     ---------                               -----------         ------                     Light Blue Top[[id number]]                                 In process                 Gold Top[[id number]]                                       In process                 Pink Top Tube[[id number]]                                  In process                 Grey Top Tube[[id number]]                                  In process                   Please view results for these tests on the individual orders.   LIGHT BLUE TOP   GOLD TOP   PINK TOP TUBE   GREY TOP TUBE   TYPE/SCREEN   ABO/RH CONFIRMATION   URINE, BACTERIAL CULTURE      Procedures:  Critical Care    Performed by: Sunita Watkins DO  Authorized by: Sunita Watkins DO    Critical care provider statement:     Critical care time (minutes):  35    Critical care time was exclusive of:  Separately billable procedures and treating other patients    Critical care was necessary to treat or  prevent imminent or life-threatening deterioration of the following conditions:  Shock    Critical care was time spent personally by me on the following activities:  Blood draw for specimens, development of treatment plan with patient or surrogate, discussions with consultants, evaluation of patient's response to treatment, examination of patient, obtaining history from patient or surrogate, review of old charts, re-evaluation of patient's condition, pulse oximetry, ordering and review of radiographic studies, ordering and review of laboratory studies and ordering and performing treatments and interventions    I assumed direction of critical care for this patient from another provider in my specialty: no      Care discussed with: admitting provider        ISRAEL Gan presented to the ED with hematuria, Epic chart reviewed for previous medical records and ED visits.  Case discussed with primary RN responsible for patient, triage VS were reviewed, a medical screening exam with completed.      Vital signs reviewed: Visit Vitals  /70 (BP Location: RUE - Right upper extremity, Patient Position: Supine)   Pulse 63   Temp 98.1 °F (36.7 °C)   Resp 20   Ht 6' (1.829 m)   Wt 71 kg (156 lb 8.4 oz)   SpO2 97%   BMI 21.23 kg/m²       Medications Given:   Medications   sodium chloride 0.9 % injection 10 mL (has no administration in time range)   sodium chloride 0.9 % injection 2 mL (2 mLs Intracatheter Given 2/15/25 1447)   acetaminophen (TYLENOL) tablet 650 mg (has no administration in time range)     Or   acetaminophen (TYLENOL) suppository 650 mg (has no administration in time range)   melatonin tablet 3 mg (has no administration in time range)   sodium chloride 0.9% infusion ( Intravenous New Bag 2/15/25 1447)   QUEtiapine (SEROquel) tablet 25 mg (has no administration in time range)   prochlorperazine (COMPAZINE) injection 5 mg (has no administration in time range)   sodium chloride (NORMAL SALINE) 0.9 %  Pt presents with oropharyngeal dysphagia characterized by s/s of aspiration on the most conservative textures.  Given presence of goiter, laryngeal elevation could not be palpated.  Multiple swallows may be indicative of pharyngeal residue.  Vocal quality perceived to be hoarse/harsh. bolus 1,000 mL (0 mLs Intravenous Completed 2/15/25 1126)   iohexol (OMNIPAQUE 350 INJECT) contrast solution 150 mL (150 mLs Intravenous Given 2/15/25 1205)   sodium chloride 0.9 % injector flush 100 mL (100 mLs Injection Given 2/15/25 1205)   cephalexin (KEFLEX) capsule 500 mg (500 mg Oral Given 2/15/25 1329)       A/P      Assessment & Plan  Initial Assessment: 84-year-old male presenting with hematuria and difficulty urinating, accompanied by abdominal pain and recent significant weight loss. The patient also reports blood in the stool and urine.    Differential Diagnosis:  - Gastrointestinal bleeding: Blood in the colon seen on abdominal scan, history of diverticulitis, plan to admit for monitoring and possible colonoscopy.  - Urinary tract infection: Presence of bacteria in urine, plan to treat with antibiotics.    ED Course:  - Hemoglobin stable.  - No blood in urine.  - Abdominal scan shows blood contents in the colon.  - Urine sample collected.  - Consultation with gastroenterologist arranged.  - Plan to admit for monitoring of bowel movements and blood pressure.    Final Assessment: The patient is experiencing gastrointestinal bleeding with a possible urinary tract infection. The patient will be admitted for further monitoring and treatment.    Clinical Impression:  - Gastrointestinal bleeding  - Urinary tract infection  - History of diverticulitis    Disposition:  - Admission: The patient will be admitted to the hospital for close monitoring and further evaluation.    MDM Components Evaluation:  - Number of Differential Diagnoses or Management Options: Gastrointestinal bleeding, Urinary tract infection  - Amount and Complexity of Data Reviewed: Hemoglobin levels, urine analysis, abdominal scan, consultation with gastroenterologist  - Risk of Complication and Morbidity or Mortality: High risk due to potential for significant gastrointestinal bleeding and possible complications from urinary tract  infection.    ED Course as of 02/15/25 1714   Sat Feb 15, 2025   1021 gFOB (guaiac) - Occult Blood(!): Positive [BP]   1035 HGB: 14.3 [BP]   1044 Creatinine(!): 1.60  Increase from baseline.  [BP]   1044 Glomerular Filtration Rate(!): 42  Decrease from baseline.  [BP]   1053 Patient is not the best historian and I think he has some dementia but is probably not been diagnosed as of yet.  However his family has noticed a decline in his memory status over the past few months.  I asked the patient if he has had blood in his stool, he does relay that yesterday he had a bowel movement that was only bright red blood.  He has not yet had a bowel movement today. [BP]   1133 BLOOD: Negative  No blood in the urine, so I suspect he is having BRBPR.  [BP]   1241 CTA ABDOMEN PELVIS  PRELIMINARY REPORT:     Multifocal intraluminal blood products in the jejunum and ileum.  No bowel ischemia. No mesenteric arterial occlusions.     Full report will be completed on a routine basis during business hours.   [BP]   1258 Spoke with  regarding presentation and CTA results. Preliminary CTA results showed     \"PRELIMINARY REPORT:     Multifocal intraluminal blood products in the jejunum and ileum.  No bowel ischemia. No mesenteric arterial occlusions.     Full report will be completed on a routine basis during business hours.\"     She Recommend admission for observation.  [BP]   1319 Updated patient and family regarding results and plan of care.  They were agreeable for admission.  Spoke with the hospitalist who agrees to accept the patient for admission. [BP]      ED Course User Index  [BP] Sunita Watkins, DO       Disposition decision :     Diagnosis  The primary encounter diagnosis was Lower GI bleed. Diagnoses of Acute cystitis without hematuria and Transient hypotension were also pertinent to this visit.    Follow Up:  No follow-up provider specified.     Current Discharge Medication List          Disposition:  Admit  2/15/2025  1:08 PM  Telemetry Bed?: Yes  Admitting Physician: JIMY CONNELLY [[id number]]  Is this a telephone or verbal order?: This is a telephone order from the admitting physician                                   Sunita Watkins,   02/15/25 0307     Patient will require objective assessment to formally assess prior to initiation of oral diet. Pt continues to present with multiple swallows which may be indicative of pharyngeal residue. Case d/w ENT. Plan for FEES 10/30.

## 2025-07-02 NOTE — BEHAVIORAL HEALTH ASSESSMENT NOTE - GAIT / STATION
Name from pharmacy: Amlodipine Besylate 10mg Tablet         Will file in chart as: AMLODIPINE 10 MG Oral Tab    Sig: Take 1 tablet by mouth daily.    Disp: 90 tablet    Refills: 0 (Pharmacy requested: Not specified)    Start: 7/2/2025    Class: Normal    Non-formulary For: Essential hypertension, benign    Last ordered: 3 months ago (3/27/2025) by Oliva Ji MD    Last refill: 7/1/2025    Rx #: [id number]    Hypertension Medications Protocol [id number]/02/2025 04:25 AM   Protocol Details CMP or BMP in past 12 months    Last BP reading less than 140/90    In person appointment or virtual visit in the past 12 mos or appointment in next 3 mos    EGFRCR or GFRAA > 50    Medication is active on med list      To be filled at: [facility] [address] [phone], [phone]      Other